# Patient Record
Sex: FEMALE | Race: WHITE | Employment: OTHER | URBAN - METROPOLITAN AREA
[De-identification: names, ages, dates, MRNs, and addresses within clinical notes are randomized per-mention and may not be internally consistent; named-entity substitution may affect disease eponyms.]

---

## 2017-12-18 ENCOUNTER — HOSPITAL ENCOUNTER (OUTPATIENT)
Dept: MAMMOGRAPHY | Age: 72
Discharge: HOME | End: 2017-12-18
Attending: INTERNAL MEDICINE
Payer: MEDICARE

## 2017-12-18 DIAGNOSIS — Z12.31 VISIT FOR SCREENING MAMMOGRAM: ICD-10-CM

## 2017-12-18 PROCEDURE — 77067 SCR MAMMO BI INCL CAD: CPT

## 2018-05-31 ENCOUNTER — HOSPITAL ENCOUNTER (EMERGENCY)
Age: 73
Discharge: HOME | End: 2018-06-01
Attending: EMERGENCY MEDICINE
Payer: MEDICARE

## 2018-05-31 DIAGNOSIS — N30.91 HEMORRHAGIC CYSTITIS: Primary | ICD-10-CM

## 2018-05-31 DIAGNOSIS — B37.31 YEAST VAGINITIS: ICD-10-CM

## 2018-05-31 LAB
BASOPHILS # BLD: 0 K/UL (ref 0–0.2)
BASOPHILS NFR BLD: 0 % (ref 0–2)
DIFFERENTIAL METHOD BLD: ABNORMAL
EOSINOPHIL # BLD: 0.5 K/UL (ref 0–0.8)
EOSINOPHIL NFR BLD: 5 % (ref 0.5–7.8)
ERYTHROCYTE [DISTWIDTH] IN BLOOD BY AUTOMATED COUNT: 13.4 % (ref 11.9–14.6)
HCT VFR BLD AUTO: 35.6 % (ref 35.8–46.3)
HGB BLD-MCNC: 12.4 G/DL (ref 11.7–15.4)
IMM GRANULOCYTES # BLD: 0 K/UL (ref 0–0.5)
IMM GRANULOCYTES NFR BLD AUTO: 0 % (ref 0–5)
LYMPHOCYTES # BLD: 3.3 K/UL (ref 0.5–4.6)
LYMPHOCYTES NFR BLD: 34 % (ref 13–44)
MCH RBC QN AUTO: 30.8 PG (ref 26.1–32.9)
MCHC RBC AUTO-ENTMCNC: 34.8 G/DL (ref 31.4–35)
MCV RBC AUTO: 88.6 FL (ref 79.6–97.8)
MONOCYTES # BLD: 0.9 K/UL (ref 0.1–1.3)
MONOCYTES NFR BLD: 9 % (ref 4–12)
NEUTS SEG # BLD: 5 K/UL (ref 1.7–8.2)
NEUTS SEG NFR BLD: 52 % (ref 43–78)
PLATELET # BLD AUTO: 204 K/UL (ref 150–450)
PMV BLD AUTO: 10.6 FL (ref 10.8–14.1)
RBC # BLD AUTO: 4.02 M/UL (ref 4.05–5.25)
WBC # BLD AUTO: 9.8 K/UL (ref 4.3–11.1)

## 2018-05-31 PROCEDURE — 74011250636 HC RX REV CODE- 250/636: Performed by: EMERGENCY MEDICINE

## 2018-05-31 PROCEDURE — 99284 EMERGENCY DEPT VISIT MOD MDM: CPT | Performed by: EMERGENCY MEDICINE

## 2018-05-31 PROCEDURE — 81003 URINALYSIS AUTO W/O SCOPE: CPT | Performed by: EMERGENCY MEDICINE

## 2018-05-31 PROCEDURE — 80053 COMPREHEN METABOLIC PANEL: CPT | Performed by: STUDENT IN AN ORGANIZED HEALTH CARE EDUCATION/TRAINING PROGRAM

## 2018-05-31 PROCEDURE — 85025 COMPLETE CBC W/AUTO DIFF WBC: CPT | Performed by: STUDENT IN AN ORGANIZED HEALTH CARE EDUCATION/TRAINING PROGRAM

## 2018-05-31 PROCEDURE — 96374 THER/PROPH/DIAG INJ IV PUSH: CPT | Performed by: EMERGENCY MEDICINE

## 2018-05-31 RX ORDER — EZETIMIBE 10 MG
TABLET ORAL
COMMUNITY

## 2018-05-31 RX ORDER — FUROSEMIDE 20 MG/1
20 TABLET ORAL DAILY
Status: ON HOLD | COMMUNITY
End: 2020-05-26

## 2018-05-31 RX ORDER — MORPHINE SULFATE 2 MG/ML
2 INJECTION, SOLUTION INTRAMUSCULAR; INTRAVENOUS
Status: COMPLETED | OUTPATIENT
Start: 2018-05-31 | End: 2018-06-01

## 2018-06-01 VITALS
WEIGHT: 237 LBS | TEMPERATURE: 97.4 F | OXYGEN SATURATION: 96 % | HEART RATE: 80 BPM | RESPIRATION RATE: 18 BRPM | SYSTOLIC BLOOD PRESSURE: 185 MMHG | BODY MASS INDEX: 39.49 KG/M2 | DIASTOLIC BLOOD PRESSURE: 96 MMHG | HEIGHT: 65 IN

## 2018-06-01 LAB
ALBUMIN SERPL-MCNC: 3.6 G/DL (ref 3.2–4.6)
ALBUMIN/GLOB SERPL: 0.9 {RATIO} (ref 1.2–3.5)
ALP SERPL-CCNC: 97 U/L (ref 50–136)
ALT SERPL-CCNC: 15 U/L (ref 12–65)
ANION GAP SERPL CALC-SCNC: 14 MMOL/L (ref 7–16)
AST SERPL-CCNC: 14 U/L (ref 15–37)
BACTERIA URNS QL MICRO: 0 /HPF
BILIRUB SERPL-MCNC: 0.4 MG/DL (ref 0.2–1.1)
BUN SERPL-MCNC: 31 MG/DL (ref 8–23)
CALCIUM SERPL-MCNC: 9.1 MG/DL (ref 8.3–10.4)
CASTS URNS QL MICRO: 0 /LPF
CHLORIDE SERPL-SCNC: 97 MMOL/L (ref 98–107)
CO2 SERPL-SCNC: 24 MMOL/L (ref 21–32)
CREAT SERPL-MCNC: 1.7 MG/DL (ref 0.6–1)
CRYSTALS URNS QL MICRO: 0 /LPF
EPI CELLS #/AREA URNS HPF: 0 /HPF
GLOBULIN SER CALC-MCNC: 3.8 G/DL (ref 2.3–3.5)
GLUCOSE BLD STRIP.AUTO-MCNC: 209 MG/DL (ref 65–100)
GLUCOSE SERPL-MCNC: 318 MG/DL (ref 65–100)
MUCOUS THREADS URNS QL MICRO: 0 /LPF
POTASSIUM SERPL-SCNC: 3.6 MMOL/L (ref 3.5–5.1)
PROT SERPL-MCNC: 7.4 G/DL (ref 6.3–8.2)
RBC #/AREA URNS HPF: NORMAL /HPF
SERVICE CMNT-IMP: NORMAL
SODIUM SERPL-SCNC: 135 MMOL/L (ref 136–145)
WBC URNS QL MICRO: NORMAL /HPF
WET PREP GENITAL: NORMAL

## 2018-06-01 PROCEDURE — 81015 MICROSCOPIC EXAM OF URINE: CPT | Performed by: EMERGENCY MEDICINE

## 2018-06-01 PROCEDURE — 74011250637 HC RX REV CODE- 250/637: Performed by: EMERGENCY MEDICINE

## 2018-06-01 PROCEDURE — 82962 GLUCOSE BLOOD TEST: CPT

## 2018-06-01 PROCEDURE — 87086 URINE CULTURE/COLONY COUNT: CPT | Performed by: EMERGENCY MEDICINE

## 2018-06-01 PROCEDURE — 87210 SMEAR WET MOUNT SALINE/INK: CPT | Performed by: EMERGENCY MEDICINE

## 2018-06-01 RX ORDER — NITROFURANTOIN 25; 75 MG/1; MG/1
100 CAPSULE ORAL 2 TIMES DAILY
Qty: 20 CAP | Refills: 0 | Status: SHIPPED | OUTPATIENT
Start: 2018-06-01 | End: 2020-06-23

## 2018-06-01 RX ORDER — PHENAZOPYRIDINE HYDROCHLORIDE 95 MG/1
TABLET ORAL
Status: DISCONTINUED
Start: 2018-06-01 | End: 2018-06-01 | Stop reason: HOSPADM

## 2018-06-01 RX ORDER — NITROFURANTOIN MACROCRYSTALS 50 MG/1
100 CAPSULE ORAL EVERY 6 HOURS
Status: DISCONTINUED | OUTPATIENT
Start: 2018-06-01 | End: 2018-06-01 | Stop reason: HOSPADM

## 2018-06-01 RX ORDER — ONDANSETRON 4 MG/1
4 TABLET, ORALLY DISINTEGRATING ORAL
Status: COMPLETED | OUTPATIENT
Start: 2018-06-01 | End: 2018-06-01

## 2018-06-01 RX ORDER — HYOSCYAMINE SULFATE 0.12 MG/1
0.12 TABLET SUBLINGUAL
Qty: 10 TAB | Refills: 0 | Status: SHIPPED | OUTPATIENT
Start: 2018-06-01

## 2018-06-01 RX ORDER — PHENAZOPYRIDINE HYDROCHLORIDE 95 MG/1
200 TABLET ORAL
Status: COMPLETED | OUTPATIENT
Start: 2018-06-01 | End: 2018-06-01

## 2018-06-01 RX ORDER — PHENAZOPYRIDINE HYDROCHLORIDE 200 MG/1
200 TABLET, FILM COATED ORAL 3 TIMES DAILY
Qty: 6 TAB | Refills: 0 | Status: SHIPPED | OUTPATIENT
Start: 2018-06-01 | End: 2018-06-03

## 2018-06-01 RX ORDER — ONDANSETRON 4 MG/1
TABLET, ORALLY DISINTEGRATING ORAL
Status: DISCONTINUED
Start: 2018-06-01 | End: 2018-06-01 | Stop reason: HOSPADM

## 2018-06-01 RX ORDER — FLUCONAZOLE 100 MG/1
TABLET ORAL
Status: DISCONTINUED
Start: 2018-06-01 | End: 2018-06-01 | Stop reason: HOSPADM

## 2018-06-01 RX ORDER — FLUCONAZOLE 100 MG/1
200 TABLET ORAL
Status: COMPLETED | OUTPATIENT
Start: 2018-06-01 | End: 2018-06-01

## 2018-06-01 RX ORDER — NITROFURANTOIN MACROCRYSTALS 50 MG/1
CAPSULE ORAL
Status: DISCONTINUED
Start: 2018-06-01 | End: 2018-06-01 | Stop reason: HOSPADM

## 2018-06-01 RX ADMIN — NITROFURANTOIN MACROCRYSTALS 100 MG: 50 CAPSULE ORAL at 02:38

## 2018-06-01 RX ADMIN — MORPHINE SULFATE 2 MG: 2 INJECTION, SOLUTION INTRAMUSCULAR; INTRAVENOUS at 00:01

## 2018-06-01 RX ADMIN — ONDANSETRON 4 MG: 4 TABLET, ORALLY DISINTEGRATING ORAL at 02:59

## 2018-06-01 RX ADMIN — URINARY PAIN RELIEF 190 MG: 95 TABLET ORAL at 02:37

## 2018-06-01 RX ADMIN — FLUCONAZOLE 200 MG: 100 TABLET ORAL at 02:38

## 2018-06-01 NOTE — ED NOTES
I have reviewed discharge instructions with the patient and spouse. The patient and spouse verbalized understanding. Patient left ED via Discharge Method: ambulatory to Home with spouse. Opportunity for questions and clarification provided. Patient given 4 scripts. To continue your aftercare when you leave the hospital, you may receive an automated call from our care team to check in on how you are doing. This is a free service and part of our promise to provide the best care and service to meet your aftercare needs.  If you have questions, or wish to unsubscribe from this service please call [phone]. Thank you for Choosing our [facility].

## 2018-06-01 NOTE — ED TRIAGE NOTES
Pt c/o pain when she urinates. She states she is having blood in her urine and possibly coming out of her vagina. Pt is very anxious in triage.

## 2018-06-01 NOTE — DISCHARGE INSTRUCTIONS
Rest.  Plenty of fluids. Medications as directed. Call your doctor recheck one week. Recheck sooner for worse pain, vomiting, fever. Candidiasis: Care Instructions  Your Care Instructions  Candidiasis (say \"jzt-tem-RY-uh-ralph\") is a yeast infection. Yeast normally lives in your body. But it can cause problems if your body's defenses don't work as they should. Some medicines can increase your chance of getting a yeast infection. These include antibiotics, steroids, and cancer drugs. And some diseases like AIDS and diabetes can make you more likely to get yeast infections. There are different types of yeast infections. Zoraida Muller is a yeast infection in the mouth. It usually occurs in people with weak immune systems. It causes white patches inside the mouth and throat. Yeast infections of the skin usually occur in skin folds where the skin stays moist. They cause red, oozing patches on your skin. Babies can get these infections under the diaper. People who often wear gloves can get them on their hands. Many women get vaginal yeast infections. They are most common when women take antibiotics. These infections can cause the vagina to itch and burn. They also cause white discharge that looks like cottage cheese. In rare cases, yeast infects the blood. This can cause serious disease. This kind of infection is treated with medicine given through a needle into a vein (IV). After you start treatment, a yeast infection usually goes away quickly. But if your immune system is weak, the infection may come back. Tell your doctor if you get yeast infections often. Follow-up care is a key part of your treatment and safety. Be sure to make and go to all appointments, and call your doctor if you are having problems. It's also a good idea to know your test results and keep a list of the medicines you take. How can you care for yourself at home? · Take your medicines exactly as prescribed.  Call your doctor if you think you are having a problem with your medicine. · Use antibiotics only as directed by your doctor. · Eat yogurt with live cultures. It has bacteria called lactobacillus. It may help prevent some types of yeast infections. · Keep your skin clean and dry. Put powder on moist places. · If you are using a cream or suppository to treat a vaginal yeast infection, don't use condoms or a diaphragm. Use a different type of birth control. · Eat a healthy diet and get regular exercise. This will help keep your immune system strong. When should you call for help? Watch closely for changes in your health, and be sure to contact your doctor if:  ? · You do not get better as expected. Where can you learn more? Go to http://tanya-jose.info/. Enter K327 in the search box to learn more about \"Candidiasis: Care Instructions. \"  Current as of: October 13, 2016  Content Version: 11.4  © 0278-9513 [employer]. Care instructions adapted under license by [employer] (which disclaims liability or warranty for this information). If you have questions about a medical condition or this instruction, always ask your healthcare professional. [place] [address] any warranty or liability for your use of this information. Urinary Tract Infection in Women: Care Instructions  Your Care Instructions    A urinary tract infection, or UTI, is a general term for an infection anywhere between the kidneys and the urethra (where urine comes out). Most UTIs are bladder infections. They often cause pain or burning when you urinate. UTIs are caused by bacteria and can be cured with antibiotics. Be sure to complete your treatment so that the infection goes away. Follow-up care is a key part of your treatment and safety. Be sure to make and go to all appointments, and call your doctor if you are having problems.  It's also a good idea to know your test results and keep a list of the medicines you take. How can you care for yourself at home? · Take your antibiotics as directed. Do not stop taking them just because you feel better. You need to take the full course of antibiotics. · Drink extra water and other fluids for the next day or two. This may help wash out the bacteria that are causing the infection. (If you have kidney, heart, or liver disease and have to limit fluids, talk with your doctor before you increase your fluid intake.)  · Avoid drinks that are carbonated or have caffeine. They can irritate the bladder. · Urinate often. Try to empty your bladder each time. · To relieve pain, take a hot bath or lay a heating pad set on low over your lower belly or genital area. Never go to sleep with a heating pad in place. To prevent UTIs  · Drink plenty of water each day. This helps you urinate often, which clears bacteria from your system. (If you have kidney, heart, or liver disease and have to limit fluids, talk with your doctor before you increase your fluid intake.)  · Urinate when you need to. · Urinate right after you have sex. · Change sanitary pads often. · Avoid douches, bubble baths, feminine hygiene sprays, and other feminine hygiene products that have deodorants. · After going to the bathroom, wipe from front to back. When should you call for help? Call your doctor now or seek immediate medical care if:  ? · Symptoms such as fever, chills, nausea, or vomiting get worse or appear for the first time. ? · You have new pain in your back just below your rib cage. This is called flank pain. ? · There is new blood or pus in your urine. ? · You have any problems with your antibiotic medicine. ? Watch closely for changes in your health, and be sure to contact your doctor if:  ? · You are not getting better after taking an antibiotic for 2 days. ? · Your symptoms go away but then come back. Where can you learn more?   Go to http://tanya-jose.info/. Enter I358 in the search box to learn more about \"Urinary Tract Infection in Women: Care Instructions. \"  Current as of: May 12, 2017  Content Version: 11.4  © 9069-2777 [employer]. Care instructions adapted under license by [employer] (which disclaims liability or warranty for this information). If you have questions about a medical condition or this instruction, always ask your healthcare professional. [address] any warranty or liability for your use of this information.

## 2018-06-01 NOTE — ED PROVIDER NOTES
HPI Comments: 25-year-old female with suprapubic pain and hematuria and difficulty with urination tonight. She was treated on May 15 for 7 days with Keflex for UTI that had hematuria at that time as well. All her symptoms seem to improve but started up again tonight. She denies any back pain or fever or vomiting. She does take aspirin because of a history of SVT with ablation. She's had a hysterectomy in the past.  No history of kidney stones    Patient is a 74 y.o. female presenting with hematuria. The history is provided by the patient. Blood in Urine    This is a new problem. The current episode started 6 to 12 hours ago. The problem occurs every urination. The quality of the pain is described as burning and stabbing. The pain is moderate. There has been no fever. Associated symptoms include nausea, frequency, hematuria, urgency and abdominal pain. Pertinent negatives include no chills, no sweats, no vomiting, no flank pain and no back pain. Her past medical history is significant for recurrent UTIs. Her past medical history does not include kidney stones. Past Medical History:   Diagnosis Date    Arrhythmia     recurrent SVT    Arthritis     Asthma     stress induced- well controlled    CAD (coronary artery disease)     \"leaking valves\"    Chronic pain     shoulder    Diabetes ([address]     adv -160.  type 2 IDDM; does not take home sqbs- hypo at 78    GERD (gastroesophageal reflux disease)     Hypertension     well controlled with meds    Morbid obesity ([address]     Stroke [address] [address]     6-2012, mini stroke    Thyroid disease     hypo    Unspecified adverse effect of anesthesia     woke up during every surgery       Past Surgical History:   Procedure Laterality Date    CARDIAC SURG PROCEDURE UNLIST  4/2010    AVNRT ablation times three    HX APPENDECTOMY      HX BREAST BIOPSY      bilateral breast bx    HX KNEE ARTHROSCOPY      left knee    HX LAP CHOLECYSTECTOMY      HX ORTHOPAEDIC rt rotator cuff and bicep tendon repair    HX EMELY AND BSO      hysterectomy         Family History:   Problem Relation Age of Onset    Other Brother     Cancer Mother     Heart Disease Mother     Breast Cancer Mother 79    Heart Disease Father     Malignant Hyperthermia Neg Hx     Pseudocholinesterase Deficiency Neg Hx     Delayed Awakening Neg Hx     Post-op Nausea/Vomiting Neg Hx     Emergence Delirium Neg Hx     Post-op Cognitive Dysfunction Neg Hx        Social History     Social History    Marital status:      Spouse name: N/A    Number of children: N/A    Years of education: N/A     Occupational History    Not on file. Social History Main Topics    Smoking status: Former Smoker     Packs/day: 2.00     Years: 13.00     Quit date: 4/22/1970    Smokeless tobacco: Never Used      Comment: quit 36 years ago    Alcohol use No    Drug use: No    Sexual activity: Not on file     Other Topics Concern    Not on file     Social History Narrative         ALLERGIES: Ace inhibitors; Adhesive tape; Codeine; Other medication; Other omega-3s; and Sulfa (sulfonamide antibiotics)    Review of Systems   Constitutional: Negative for chills and fever. Respiratory: Negative for shortness of breath. Cardiovascular: Negative for chest pain. Gastrointestinal: Positive for abdominal pain and nausea. Negative for blood in stool, diarrhea and vomiting. Genitourinary: Positive for difficulty urinating, dysuria, frequency, hematuria and urgency. Negative for flank pain and vaginal bleeding. Musculoskeletal: Negative for back pain. Vitals:    05/31/18 2255   BP: 183/89   Pulse: 92   Resp: 20   Temp: 97.9 °F (36.6 °C)   SpO2: 96%   Weight: 107.5 kg (237 lb)   Height: 5' 5\" (1.651 m)            Physical Exam   Constitutional: She is oriented to person, place, and time. She appears well-developed and well-nourished. No distress. HENT:   Head: Normocephalic and atraumatic.    Eyes: Conjunctivae and EOM are normal. Pupils are equal, round, and reactive to light. Neck: Normal range of motion. Neck supple. Pulmonary/Chest: Breath sounds normal. No respiratory distress. Abdominal: Soft. Bowel sounds are normal. She exhibits no mass. There is no tenderness. There is no rebound and no guarding. No hernia. Genitourinary: There is no lesion on the right labia. There is no lesion on the left labia. No erythema, tenderness or bleeding in the vagina. No signs of injury around the vagina. No vaginal discharge found. Neurological: She is alert and oriented to person, place, and time. Gait normal.   Nl speech   Skin: Skin is warm and dry. Psychiatric: She has a normal mood and affect. Her speech is normal.   Nursing note and vitals reviewed. MDM  Number of Diagnoses or Management Options  Diagnosis management comments: Suspect recurrent UTI, acute cystitis. Check Urine. Check for attention. Amount and/or Complexity of Data Reviewed  Clinical lab tests: ordered and reviewed    Risk of Complications, Morbidity, and/or Mortality  Presenting problems: moderate  Diagnostic procedures: minimal  Management options: low    Patient Progress  Patient progress: stable        ED Course       Procedures    Results Include:    Recent Results (from the past 24 hour(s))   CBC WITH AUTOMATED DIFF    Collection Time: 05/31/18 11:34 PM   Result Value Ref Range    WBC 9.8 4.3 - 11.1 K/uL    RBC 4.02 (L) 4.05 - 5.25 M/uL    HGB 12.4 11.7 - 15.4 g/dL    HCT 35.6 (L) 35.8 - 46.3 %    MCV 88.6 79.6 - 97.8 FL    MCH 30.8 26.1 - 32.9 PG    MCHC 34.8 31.4 - 35.0 g/dL    RDW 13.4 11.9 - 14.6 %    PLATELET 973 579 - 190 K/uL    MPV 10.6 (L) 10.8 - 14.1 FL    DF AUTOMATED      NEUTROPHILS 52 43 - 78 %    LYMPHOCYTES 34 13 - 44 %    MONOCYTES 9 4.0 - 12.0 %    EOSINOPHILS 5 0.5 - 7.8 %    BASOPHILS 0 0.0 - 2.0 %    IMMATURE GRANULOCYTES 0 0.0 - 5.0 %    ABS. NEUTROPHILS 5.0 1.7 - 8.2 K/UL    ABS.  LYMPHOCYTES 3.3 0.5 - 4.6 K/UL    ABS. MONOCYTES 0.9 0.1 - 1.3 K/UL    ABS. EOSINOPHILS 0.5 0.0 - 0.8 K/UL    ABS. BASOPHILS 0.0 0.0 - 0.2 K/UL    ABS. IMM. GRANS. 0.0 0.0 - 0.5 K/UL   METABOLIC PANEL, COMPREHENSIVE    Collection Time: 05/31/18 11:34 PM   Result Value Ref Range    Sodium 135 (L) 136 - 145 mmol/L    Potassium 3.6 3.5 - 5.1 mmol/L    Chloride 97 (L) 98 - 107 mmol/L    CO2 24 21 - 32 mmol/L    Anion gap 14 7 - 16 mmol/L    Glucose 318 (H) 65 - 100 mg/dL    BUN 31 (H) 8 - 23 MG/DL    Creatinine 1.70 (H) 0.6 - 1.0 MG/DL    GFR est AA 38 (L) >60 ml/min/1.73m2    GFR est non-AA 31 (L) >60 ml/min/1.73m2    Calcium 9.1 8.3 - 10.4 MG/DL    Bilirubin, total 0.4 0.2 - 1.1 MG/DL    ALT (SGPT) 15 12 - 65 U/L    AST (SGOT) 14 (L) 15 - 37 U/L    Alk.  phosphatase 97 50 - 136 U/L    Protein, total 7.4 6.3 - 8.2 g/dL    Albumin 3.6 3.2 - 4.6 g/dL    Globulin 3.8 (H) 2.3 - 3.5 g/dL    A-G Ratio 0.9 (L) 1.2 - 3.5     WET PREP    Collection Time: 06/01/18  1:21 AM   Result Value Ref Range    Special Requests: NO SPECIAL REQUESTS      Wet prep 3 TO 5 WBC/HPF     Wet prep FEW  YEAST        Wet prep NO TRICHOMONAS SEEN      Wet prep CLUE CELLS ABSENT     URINE MICROSCOPIC    Collection Time: 06/01/18  1:34 AM   Result Value Ref Range    WBC  0 /hpf    RBC 10-20 0 /hpf    Epithelial cells 0 0 /hpf    Bacteria 0 0 /hpf    Casts 0 0 /lpf    Crystals, urine 0 0 /LPF    Mucus 0 0 /lpf

## 2018-06-03 LAB
BACTERIA SPEC CULT: NORMAL
SERVICE CMNT-IMP: NORMAL

## 2020-02-07 ENCOUNTER — HOSPITAL ENCOUNTER (OUTPATIENT)
Dept: MAMMOGRAPHY | Age: 75
Discharge: HOME | End: 2020-02-07
Attending: INTERNAL MEDICINE
Payer: MEDICARE

## 2020-02-07 DIAGNOSIS — Z12.31 VISIT FOR SCREENING MAMMOGRAM: ICD-10-CM

## 2020-02-07 PROCEDURE — 77067 SCR MAMMO BI INCL CAD: CPT

## 2020-05-25 ENCOUNTER — HOSPITAL ENCOUNTER (EMERGENCY)
Age: 75
Discharge: HOME | DRG: 246 | End: 2020-05-25
Attending: EMERGENCY MEDICINE
Payer: MEDICARE

## 2020-05-25 ENCOUNTER — HOSPITAL ENCOUNTER (INPATIENT)
Age: 75
LOS: 6 days | Discharge: NURSING, REHAB OR LONG-TERM CARE | DRG: 246 | End: 2020-06-01
Attending: EMERGENCY MEDICINE | Admitting: FAMILY MEDICINE
Payer: MEDICARE

## 2020-05-25 ENCOUNTER — APPOINTMENT (OUTPATIENT)
Dept: GENERAL RADIOLOGY | Age: 75
DRG: 246 | End: 2020-05-25
Attending: EMERGENCY MEDICINE
Payer: MEDICARE

## 2020-05-25 ENCOUNTER — APPOINTMENT (OUTPATIENT)
Dept: CT IMAGING | Age: 75
DRG: 246 | End: 2020-05-25
Attending: EMERGENCY MEDICINE
Payer: MEDICARE

## 2020-05-25 ENCOUNTER — HOSPITAL ENCOUNTER (EMERGENCY)
Dept: NUCLEAR MEDICINE | Age: 75
Discharge: HOME | DRG: 246 | End: 2020-05-25
Attending: EMERGENCY MEDICINE
Payer: MEDICARE

## 2020-05-25 VITALS
SYSTOLIC BLOOD PRESSURE: 175 MMHG | RESPIRATION RATE: 16 BRPM | WEIGHT: 237 LBS | OXYGEN SATURATION: 95 % | TEMPERATURE: 98 F | BODY MASS INDEX: 39.49 KG/M2 | DIASTOLIC BLOOD PRESSURE: 79 MMHG | HEIGHT: 65 IN | HEART RATE: 79 BPM

## 2020-05-25 DIAGNOSIS — I63.10 CEREBROVASCULAR ACCIDENT (CVA) DUE TO EMBOLISM OF PRECEREBRAL ARTERY (HCC): ICD-10-CM

## 2020-05-25 DIAGNOSIS — I63.89 ACUTE ISCHEMIC MULTIFOCAL MULTIPLE VASCULAR TERRITORIES STROKE (HCC): ICD-10-CM

## 2020-05-25 DIAGNOSIS — N20.1 RIGHT URETERAL STONE: Primary | ICD-10-CM

## 2020-05-25 DIAGNOSIS — R11.2 NON-INTRACTABLE VOMITING WITH NAUSEA, UNSPECIFIED VOMITING TYPE: ICD-10-CM

## 2020-05-25 DIAGNOSIS — R53.81 MALAISE AND FATIGUE: ICD-10-CM

## 2020-05-25 DIAGNOSIS — I21.4 NSTEMI (NON-ST ELEVATED MYOCARDIAL INFARCTION) (HCC): Primary | ICD-10-CM

## 2020-05-25 DIAGNOSIS — E87.20 LACTIC ACIDOSIS: ICD-10-CM

## 2020-05-25 DIAGNOSIS — R26.81 GAIT INSTABILITY: ICD-10-CM

## 2020-05-25 DIAGNOSIS — R53.1 WEAKNESS: ICD-10-CM

## 2020-05-25 DIAGNOSIS — R53.83 MALAISE AND FATIGUE: ICD-10-CM

## 2020-05-25 LAB
ALBUMIN SERPL-MCNC: 3.8 G/DL (ref 3.2–4.6)
ALBUMIN SERPL-MCNC: 3.9 G/DL (ref 3.2–4.6)
ALBUMIN/GLOB SERPL: 0.8 {RATIO} (ref 1.2–3.5)
ALBUMIN/GLOB SERPL: 0.9 {RATIO} (ref 1.2–3.5)
ALP SERPL-CCNC: 108 U/L (ref 50–136)
ALP SERPL-CCNC: 130 U/L (ref 50–136)
ALT SERPL-CCNC: 14 U/L (ref 12–65)
ALT SERPL-CCNC: 18 U/L (ref 12–65)
ANION GAP SERPL CALC-SCNC: 10 MMOL/L (ref 7–16)
ANION GAP SERPL CALC-SCNC: 11 MMOL/L (ref 7–16)
ARTERIAL PATENCY WRIST A: YES
AST SERPL-CCNC: 14 U/L (ref 15–37)
AST SERPL-CCNC: 49 U/L (ref 15–37)
ATRIAL RATE: 101 BPM
ATRIAL RATE: 81 BPM
BASE DEFICIT BLD-SCNC: 5 MMOL/L
BASOPHILS # BLD: 0 K/UL (ref 0–0.2)
BASOPHILS # BLD: 0 K/UL (ref 0–0.2)
BASOPHILS NFR BLD: 0 % (ref 0–2)
BASOPHILS NFR BLD: 0 % (ref 0–2)
BDY SITE: ABNORMAL
BILIRUB SERPL-MCNC: 0.3 MG/DL (ref 0.2–1.1)
BILIRUB SERPL-MCNC: 0.4 MG/DL (ref 0.2–1.1)
BUN SERPL-MCNC: 36 MG/DL (ref 8–23)
BUN SERPL-MCNC: 41 MG/DL (ref 8–23)
CALCIUM SERPL-MCNC: 9.2 MG/DL (ref 8.3–10.4)
CALCIUM SERPL-MCNC: 9.6 MG/DL (ref 8.3–10.4)
CALCULATED P AXIS, ECG09: 62 DEGREES
CALCULATED R AXIS, ECG10: -17 DEGREES
CALCULATED R AXIS, ECG10: -17 DEGREES
CALCULATED T AXIS, ECG11: 44 DEGREES
CALCULATED T AXIS, ECG11: 60 DEGREES
CHLORIDE SERPL-SCNC: 102 MMOL/L (ref 98–107)
CHLORIDE SERPL-SCNC: 104 MMOL/L (ref 98–107)
CK SERPL-CCNC: 352 U/L (ref 21–215)
CO2 BLD-SCNC: 19 MMOL/L
CO2 SERPL-SCNC: 21 MMOL/L (ref 21–32)
CO2 SERPL-SCNC: 22 MMOL/L (ref 21–32)
COLLECT TIME,HTIME: 1910
CREAT SERPL-MCNC: 1.63 MG/DL (ref 0.6–1)
CREAT SERPL-MCNC: 1.78 MG/DL (ref 0.6–1)
DIAGNOSIS, 93000: NORMAL
DIAGNOSIS, 93000: NORMAL
DIFFERENTIAL METHOD BLD: ABNORMAL
DIFFERENTIAL METHOD BLD: ABNORMAL
EOSINOPHIL # BLD: 0 K/UL (ref 0–0.8)
EOSINOPHIL # BLD: 0.5 K/UL (ref 0–0.8)
EOSINOPHIL NFR BLD: 0 % (ref 0.5–7.8)
EOSINOPHIL NFR BLD: 5 % (ref 0.5–7.8)
ERYTHROCYTE [DISTWIDTH] IN BLOOD BY AUTOMATED COUNT: 13.3 % (ref 11.9–14.6)
ERYTHROCYTE [DISTWIDTH] IN BLOOD BY AUTOMATED COUNT: 13.4 % (ref 11.9–14.6)
GAS FLOW.O2 O2 DELIVERY SYS: ABNORMAL L/MIN
GLOBULIN SER CALC-MCNC: 4.4 G/DL (ref 2.3–3.5)
GLOBULIN SER CALC-MCNC: 4.7 G/DL (ref 2.3–3.5)
GLUCOSE SERPL-MCNC: 128 MG/DL (ref 65–100)
GLUCOSE SERPL-MCNC: 234 MG/DL (ref 65–100)
HCO3 BLD-SCNC: 18.5 MMOL/L (ref 22–26)
HCT VFR BLD AUTO: 34.7 % (ref 35.8–46.3)
HCT VFR BLD AUTO: 36.1 % (ref 35.8–46.3)
HGB BLD-MCNC: 12.3 G/DL (ref 11.7–15.4)
HGB BLD-MCNC: 12.6 G/DL (ref 11.7–15.4)
IMM GRANULOCYTES # BLD AUTO: 0.1 K/UL (ref 0–0.5)
IMM GRANULOCYTES # BLD AUTO: 0.1 K/UL (ref 0–0.5)
IMM GRANULOCYTES NFR BLD AUTO: 1 % (ref 0–5)
IMM GRANULOCYTES NFR BLD AUTO: 1 % (ref 0–5)
LACTATE SERPL-SCNC: 2.7 MMOL/L (ref 0.4–2)
LIPASE SERPL-CCNC: 104 U/L (ref 73–393)
LYMPHOCYTES # BLD: 2.2 K/UL (ref 0.5–4.6)
LYMPHOCYTES # BLD: 3.2 K/UL (ref 0.5–4.6)
LYMPHOCYTES NFR BLD: 17 % (ref 13–44)
LYMPHOCYTES NFR BLD: 29 % (ref 13–44)
MAGNESIUM SERPL-MCNC: 2.4 MG/DL (ref 1.8–2.4)
MAGNESIUM SERPL-MCNC: 2.4 MG/DL (ref 1.8–2.4)
MCH RBC QN AUTO: 31.7 PG (ref 26.1–32.9)
MCH RBC QN AUTO: 32.2 PG (ref 26.1–32.9)
MCHC RBC AUTO-ENTMCNC: 34.9 G/DL (ref 31.4–35)
MCHC RBC AUTO-ENTMCNC: 35.4 G/DL (ref 31.4–35)
MCV RBC AUTO: 90.8 FL (ref 79.6–97.8)
MCV RBC AUTO: 90.9 FL (ref 79.6–97.8)
MONOCYTES # BLD: 0.8 K/UL (ref 0.1–1.3)
MONOCYTES # BLD: 0.9 K/UL (ref 0.1–1.3)
MONOCYTES NFR BLD: 6 % (ref 4–12)
MONOCYTES NFR BLD: 7 % (ref 4–12)
NEUTS SEG # BLD: 10.3 K/UL (ref 1.7–8.2)
NEUTS SEG # BLD: 6.2 K/UL (ref 1.7–8.2)
NEUTS SEG NFR BLD: 58 % (ref 43–78)
NEUTS SEG NFR BLD: 76 % (ref 43–78)
NRBC # BLD: 0 K/UL (ref 0–0.2)
NRBC # BLD: 0 K/UL (ref 0–0.2)
P-R INTERVAL, ECG05: 236 MS
PCO2 BLD: 28.6 MMHG (ref 35–45)
PH BLD: 7.42 [PH] (ref 7.35–7.45)
PHOSPHATE SERPL-MCNC: 2.3 MG/DL (ref 2.3–3.7)
PLATELET # BLD AUTO: 228 K/UL (ref 150–450)
PLATELET # BLD AUTO: 263 K/UL (ref 150–450)
PMV BLD AUTO: 10.4 FL (ref 9.4–12.3)
PMV BLD AUTO: 10.9 FL (ref 9.4–12.3)
PO2 BLD: 77 MMHG (ref 75–100)
POTASSIUM SERPL-SCNC: 3.8 MMOL/L (ref 3.5–5.1)
POTASSIUM SERPL-SCNC: 4.5 MMOL/L (ref 3.5–5.1)
PROT SERPL-MCNC: 8.2 G/DL (ref 6.3–8.2)
PROT SERPL-MCNC: 8.6 G/DL (ref 6.3–8.2)
Q-T INTERVAL, ECG07: 396 MS
Q-T INTERVAL, ECG07: 420 MS
QRS DURATION, ECG06: 96 MS
QRS DURATION, ECG06: 96 MS
QTC CALCULATION (BEZET), ECG08: 465 MS
QTC CALCULATION (BEZET), ECG08: 487 MS
RBC # BLD AUTO: 3.82 M/UL (ref 4.05–5.2)
RBC # BLD AUTO: 3.97 M/UL (ref 4.05–5.2)
SAO2 % BLD: 96 % (ref 95–98)
SERVICE CMNT-IMP: ABNORMAL
SERVICE CMNT-IMP: ABNORMAL
SODIUM SERPL-SCNC: 134 MMOL/L (ref 136–145)
SODIUM SERPL-SCNC: 136 MMOL/L (ref 136–145)
SPECIMEN TYPE: ABNORMAL
TROPONIN I SERPL-MCNC: 6.44 NG/ML (ref 0.02–0.05)
TROPONIN I SERPL-MCNC: 8.51 NG/ML (ref 0.02–0.05)
TROPONIN I SERPL-MCNC: <0.02 NG/ML (ref 0.02–0.05)
VENTRICULAR RATE, ECG03: 81 BPM
VENTRICULAR RATE, ECG03: 83 BPM
WBC # BLD AUTO: 10.8 K/UL (ref 4.3–11.1)
WBC # BLD AUTO: 13.5 K/UL (ref 4.3–11.1)

## 2020-05-25 PROCEDURE — 84100 ASSAY OF PHOSPHORUS: CPT

## 2020-05-25 PROCEDURE — 81003 URINALYSIS AUTO W/O SCOPE: CPT

## 2020-05-25 PROCEDURE — 84484 ASSAY OF TROPONIN QUANT: CPT

## 2020-05-25 PROCEDURE — 80053 COMPREHEN METABOLIC PANEL: CPT

## 2020-05-25 PROCEDURE — 87040 BLOOD CULTURE FOR BACTERIA: CPT

## 2020-05-25 PROCEDURE — 93005 ELECTROCARDIOGRAM TRACING: CPT | Performed by: EMERGENCY MEDICINE

## 2020-05-25 PROCEDURE — 74019 RADEX ABDOMEN 2 VIEWS: CPT

## 2020-05-25 PROCEDURE — 71045 X-RAY EXAM CHEST 1 VIEW: CPT

## 2020-05-25 PROCEDURE — 83735 ASSAY OF MAGNESIUM: CPT

## 2020-05-25 PROCEDURE — 99285 EMERGENCY DEPT VISIT HI MDM: CPT

## 2020-05-25 PROCEDURE — 74011250637 HC RX REV CODE- 250/637: Performed by: EMERGENCY MEDICINE

## 2020-05-25 PROCEDURE — 83605 ASSAY OF LACTIC ACID: CPT

## 2020-05-25 PROCEDURE — 82803 BLOOD GASES ANY COMBINATION: CPT

## 2020-05-25 PROCEDURE — 83690 ASSAY OF LIPASE: CPT

## 2020-05-25 PROCEDURE — 74011250636 HC RX REV CODE- 250/636: Performed by: EMERGENCY MEDICINE

## 2020-05-25 PROCEDURE — 99284 EMERGENCY DEPT VISIT MOD MDM: CPT

## 2020-05-25 PROCEDURE — 96375 TX/PRO/DX INJ NEW DRUG ADDON: CPT

## 2020-05-25 PROCEDURE — 82550 ASSAY OF CK (CPK): CPT

## 2020-05-25 PROCEDURE — 87088 URINE BACTERIA CULTURE: CPT

## 2020-05-25 PROCEDURE — 96374 THER/PROPH/DIAG INJ IV PUSH: CPT

## 2020-05-25 PROCEDURE — 71046 X-RAY EXAM CHEST 2 VIEWS: CPT

## 2020-05-25 PROCEDURE — 85025 COMPLETE CBC W/AUTO DIFF WBC: CPT

## 2020-05-25 PROCEDURE — 87186 SC STD MICRODIL/AGAR DIL: CPT

## 2020-05-25 PROCEDURE — 36600 WITHDRAWAL OF ARTERIAL BLOOD: CPT

## 2020-05-25 PROCEDURE — 74011000258 HC RX REV CODE- 258: Performed by: EMERGENCY MEDICINE

## 2020-05-25 PROCEDURE — 96365 THER/PROPH/DIAG IV INF INIT: CPT

## 2020-05-25 PROCEDURE — 78580 LUNG PERFUSION IMAGING: CPT

## 2020-05-25 PROCEDURE — 70450 CT HEAD/BRAIN W/O DYE: CPT

## 2020-05-25 PROCEDURE — 96361 HYDRATE IV INFUSION ADD-ON: CPT

## 2020-05-25 PROCEDURE — 87086 URINE CULTURE/COLONY COUNT: CPT

## 2020-05-25 RX ORDER — ONDANSETRON 4 MG/1
4 TABLET, ORALLY DISINTEGRATING ORAL
Qty: 12 TAB | Refills: 0 | Status: SHIPPED | OUTPATIENT
Start: 2020-05-25

## 2020-05-25 RX ORDER — ONDANSETRON 2 MG/ML
4 INJECTION INTRAMUSCULAR; INTRAVENOUS
Status: COMPLETED | OUTPATIENT
Start: 2020-05-25 | End: 2020-05-25

## 2020-05-25 RX ORDER — GUAIFENESIN 100 MG/5ML
324 LIQUID (ML) ORAL
Status: COMPLETED | OUTPATIENT
Start: 2020-05-25 | End: 2020-05-25

## 2020-05-25 RX ORDER — METOCLOPRAMIDE HYDROCHLORIDE 5 MG/ML
10 INJECTION INTRAMUSCULAR; INTRAVENOUS
Status: COMPLETED | OUTPATIENT
Start: 2020-05-25 | End: 2020-05-25

## 2020-05-25 RX ADMIN — PIPERACILLIN SODIUM, TAZOBACTAM SODIUM 4.5 G: 4; .5 INJECTION, POWDER, LYOPHILIZED, FOR SOLUTION INTRAVENOUS at 19:25

## 2020-05-25 RX ADMIN — SODIUM CHLORIDE 1000 ML: 900 INJECTION, SOLUTION INTRAVENOUS at 20:49

## 2020-05-25 RX ADMIN — ONDANSETRON 4 MG: 2 INJECTION INTRAMUSCULAR; INTRAVENOUS at 17:46

## 2020-05-25 RX ADMIN — ASPIRIN 81 MG 324 MG: 81 TABLET ORAL at 18:23

## 2020-05-25 RX ADMIN — NITROGLYCERIN 1 INCH: 20 OINTMENT TOPICAL at 18:23

## 2020-05-25 RX ADMIN — SODIUM CHLORIDE 1000 ML: 900 INJECTION, SOLUTION INTRAVENOUS at 17:46

## 2020-05-25 RX ADMIN — METOCLOPRAMIDE 10 MG: 5 INJECTION, SOLUTION INTRAMUSCULAR; INTRAVENOUS at 03:55

## 2020-05-25 RX ADMIN — VANCOMYCIN HYDROCHLORIDE 2500 MG: 10 INJECTION, POWDER, LYOPHILIZED, FOR SOLUTION INTRAVENOUS at 23:32

## 2020-05-25 NOTE — ED PROVIDER NOTES
64-year-old female presents with complaints of generalized weakness and inability to stand and walk  Nursing reports that the patient was unable to transfer out of the wheelchair and needed the assist of several people to essentially drag her into the bed  Patient was seen yesterday her work-up was unremarkable and she reported feeling better after some Zofran. She states today that her nausea is worse but she is had no vomiting. She did not attempt any medications at home for the nausea today. Patient does have a history of somewhat chronic debility and gait disturbance and has not been able to attend her normal physical therapy due to transportation/logistic problems. Patient does have a history of prior stroke which is left her with fairly significant left-sided weakness. She is difficult to pinpoint in her history but states that the left-sided weakness is not significantly different from what it has been in the past.    The history is provided by the patient and the EMS personnel. Fatigue   This is a recurrent problem. The current episode started more than 2 days ago. The problem has been gradually worsening. There was no focality noted. Primary symptoms include mental status change. Pertinent negatives include no loss of sensation, no slurred speech, no speech difficulty and no movement disorder. There has been no fever. Associated symptoms include chest pain and confusion. Pertinent negatives include no shortness of breath and no headaches. There were no medications administered prior to arrival. Associated medical issues include CVA. Past Medical History:   Diagnosis Date    Arrhythmia     recurrent SVT    Arthritis     Asthma     stress induced- well controlled    CAD (coronary artery disease)     \"leaking valves\"    Chronic pain     shoulder    Diabetes ([facility] [address]     adv -160.  type 2 IDDM; does not take home sqbs- hypo at 78    GERD (gastroesophageal reflux disease)     Hypertension     well controlled with meds    Menopause     Morbid obesity [address] [address]     Stroke ([facility] [address]     , mini stroke    Thyroid disease     hypo    Unspecified adverse effect of anesthesia     woke up during every surgery       Past Surgical History:   Procedure Laterality Date    CARDIAC SURG PROCEDURE UNLIST  2010    AVNRT ablation times three    HX APPENDECTOMY      HX BREAST BIOPSY      bilateral breast bx    HX KNEE ARTHROSCOPY      left knee    HX LAP CHOLECYSTECTOMY      HX ORTHOPAEDIC      rt rotator cuff and bicep tendon repair    HX EMELY AND BSO      hysterectomy         Family History:   Problem Relation Age of Onset    Other Brother     Cancer Mother     Heart Disease Mother     Breast Cancer Mother 79    Heart Disease Father     Malignant Hyperthermia Neg Hx     Pseudocholinesterase Deficiency Neg Hx     Delayed Awakening Neg Hx     Post-op Nausea/Vomiting Neg Hx     Emergence Delirium Neg Hx     Post-op Cognitive Dysfunction Neg Hx        Social History     Socioeconomic History    Marital status:      Spouse name: Not on file    Number of children: Not on file    Years of education: Not on file    Highest education level: Not on file   Occupational History    Not on file   Social Needs    Financial resource strain: Not on file    Food insecurity     Worry: Not on file     Inability: Not on file    Transportation needs     Medical: Not on file     Non-medical: Not on file   Tobacco Use    Smoking status: Former Smoker     Packs/day: 2.00     Years: 13.00     Pack years: 26.00     Last attempt to quit: 1970     Years since quittin.1    Smokeless tobacco: Never Used    Tobacco comment: quit 36 years ago   Substance and Sexual Activity    Alcohol use: No    Drug use: No    Sexual activity: Not on file   Lifestyle    Physical activity     Days per week: Not on file     Minutes per session: Not on file    Stress: Not on file   Relationships    Social connections     Talks on phone: Not on file     Gets together: Not on file     Attends [employer] service: Not on file     Active member of club or organization: Not on file     Attends meetings of clubs or organizations: Not on file     Relationship status: Not on file    Intimate partner violence     Fear of current or ex partner: Not on file     Emotionally abused: Not on file     Physically abused: Not on file     Forced sexual activity: Not on file   Other Topics Concern    Not on file   Social History Narrative    Not on file         ALLERGIES: Ace inhibitors; Adhesive tape; Codeine; Other medication; Other omega-3s; and Sulfa (sulfonamide antibiotics)    Review of Systems   Unable to perform ROS: Mental status change   Constitutional: Positive for fatigue. Respiratory: Negative for shortness of breath. Cardiovascular: Positive for chest pain. Neurological: Positive for weakness. Negative for speech difficulty and headaches. Psychiatric/Behavioral: Positive for confusion. Vitals:    05/25/20 1625 05/25/20 1710   BP: 166/88 196/87   Pulse: 93 90   Resp: 18    Temp: 98.9 °F (37.2 °C)    SpO2: 92% 92%   Weight: 107.5 kg (237 lb)    Height: 5' 5\" (1.651 m)             Physical Exam  Vitals signs and nursing note reviewed. Constitutional:       General: She is in acute distress. Appearance: Normal appearance. She is well-developed. She is obese. HENT:      Head: Normocephalic and atraumatic. Right Ear: External ear normal.      Left Ear: External ear normal.      Mouth/Throat:      Mouth: Mucous membranes are dry. Pharynx: Oropharynx is clear. Uvula midline. No oropharyngeal exudate. Eyes:      Conjunctiva/sclera: Conjunctivae normal.      Pupils: Pupils are equal, round, and reactive to light. Neck:      Musculoskeletal: Normal range of motion and neck supple. Vascular: No JVD. Cardiovascular:      Rate and Rhythm: Normal rate and regular rhythm.       Heart sounds: Normal heart sounds. No murmur. No friction rub. No gallop. Pulmonary:      Effort: Pulmonary effort is normal.      Breath sounds: Normal breath sounds. Abdominal:      General: Bowel sounds are normal. There is no distension. Palpations: Abdomen is soft. There is no mass. Tenderness: There is no abdominal tenderness. Musculoskeletal: Normal range of motion. General: No deformity. Skin:     General: Skin is warm and dry. Capillary Refill: Capillary refill takes less than 2 seconds. Findings: No rash. Neurological:      Mental Status: She is alert and oriented to person, place, and time. GCS: GCS eye subscore is 4. GCS verbal subscore is 5. GCS motor subscore is 6. Cranial Nerves: Cranial nerves are intact. No cranial nerve deficit. Sensory: No sensory deficit. Motor: Weakness, abnormal muscle tone and pronator drift present. Coordination: Coordination abnormal. Heel to Negron Test abnormal.      Gait: Gait abnormal.      Comments: Patient has persistent weakness on her left side in both the upper and lower extremity. Psychiatric:         Mood and Affect: Affect is blunt and flat. Speech: Speech is delayed and tangential.         Behavior: Behavior is slowed. Behavior is cooperative. Cognition and Memory: Memory is impaired. MDM  Number of Diagnoses or Management Options  Gait instability: established and worsening  Lactic acidosis: new and requires workup  Malaise and fatigue: established and worsening  NSTEMI (non-ST elevated myocardial infarction) [facility]): new and requires workup  Diagnosis management comments: 78-year-old female returns to the ER due to worsening nausea generalized malaise and weakness. Work-up today reveals a markedly elevated troponin with a normal EKG. Mild lactic acidosis. Mild bump in her white blood cell count as well. VQ scan is normal   Head CT is normal     Cardiology is consulted. they will follow the patient. I have consulted the hospitalist service who graciously agreed to admit the patient       Amount and/or Complexity of Data Reviewed  Clinical lab tests: ordered and reviewed  Tests in the radiology section of CPT®: ordered and reviewed  Tests in the medicine section of CPT®: ordered and reviewed  Review and summarize past medical records: yes  Discuss the patient with other providers: yes  Independent visualization of images, tracings, or specimens: yes    Risk of Complications, Morbidity, and/or Mortality  Presenting problems: high  Diagnostic procedures: high  Management options: high  General comments: Elements of this note have been dictated via voice recognition software. Text and phrases may be limited by the accuracy of the software. The chart has been reviewed, but errors may still be present.       Critical Care  Total time providing critical care: 30-74 minutes (70)    Patient Progress  Patient progress: improved         Procedures

## 2020-05-25 NOTE — ED PROVIDER NOTES
Patient is a 57-year-old female presenting to emerge department day by EMS  secondary to abdominal pain x1 hour with nausea and vomiting. The patient says that over the last few days she has had a lot of urinary urgency, frequency and dysuria. The patients  is present with her and tells me that she has been under a great deal of stress recently excessive a 59-year-old son who back into the home with them who suffers from depression and he stays downstairs so his wife has been staying upstairs because \"the 2 of them but heads. \"  The patient has been referred to physical therapy for deconditioning and they went for times but the  says that they only have one car and he has to work so he is unable to any taking her for the time being. The patient says she did have some chest pain tonight but notes that it has resolved. She does have a history of cardiac ablation x4 and follows with [facility]. The patient denies any fevers or chills. Past Medical History:   Diagnosis Date    Arrhythmia     recurrent SVT    Arthritis     Asthma     stress induced- well controlled    CAD (coronary artery disease)     \"leaking valves\"    Chronic pain     shoulder    Diabetes ([address]     adv -160.  type 2 IDDM; does not take home sqbs- hypo at 80    GERD (gastroesophageal reflux disease)     Hypertension     well controlled with meds    Menopause     Morbid obesity ([address]     Stroke ([address]     6-2012, mini stroke    Thyroid disease     hypo    Unspecified adverse effect of anesthesia     woke up during every surgery       Past Surgical History:   Procedure Laterality Date    CARDIAC SURG PROCEDURE UNLIST  4/2010    AVNRT ablation times three    HX APPENDECTOMY      HX BREAST BIOPSY      bilateral breast bx    HX KNEE ARTHROSCOPY      left knee    HX LAP CHOLECYSTECTOMY      HX ORTHOPAEDIC      rt rotator cuff and bicep tendon repair    HX EMELY AND BSO      hysterectomy Family History:   Problem Relation Age of Onset    Other Brother     Cancer Mother     Heart Disease Mother     Breast Cancer Mother 79    Heart Disease Father     Malignant Hyperthermia Neg Hx     Pseudocholinesterase Deficiency Neg Hx     Delayed Awakening Neg Hx     Post-op Nausea/Vomiting Neg Hx     Emergence Delirium Neg Hx     Post-op Cognitive Dysfunction Neg Hx        Social History     Socioeconomic History    Marital status:      Spouse name: Not on file    Number of children: Not on file    Years of education: Not on file    Highest education level: Not on file   Occupational History    Not on file   Social Needs    Financial resource strain: Not on file    Food insecurity     Worry: Not on file     Inability: Not on file    Transportation needs     Medical: Not on file     Non-medical: Not on file   Tobacco Use    Smoking status: Former Smoker     Packs/day: 2.00     Years: 13.00     Pack years: 26.00     Last attempt to quit: 1970     Years since quittin.1    Smokeless tobacco: Never Used    Tobacco comment: quit 36 years ago   Substance and Sexual Activity    Alcohol use: No    Drug use: No    Sexual activity: Not on file   Lifestyle    Physical activity     Days per week: Not on file     Minutes per session: Not on file    Stress: Not on file   Relationships    Social connections     Talks on phone: Not on file     Gets together: Not on file     Attends Spiritism service: Not on file     Active member of club or organization: Not on file     Attends meetings of clubs or organizations: Not on file     Relationship status: Not on file    Intimate partner violence     Fear of current or ex partner: Not on file     Emotionally abused: Not on file     Physically abused: Not on file     Forced sexual activity: Not on file   Other Topics Concern    Not on file   Social History Narrative    Not on file         ALLERGIES: Ace inhibitors;  Adhesive tape; Codeine; Other medication; Other omega-3s; and Sulfa (sulfonamide antibiotics)    Review of Systems   Cardiovascular: Positive for chest pain. Gastrointestinal: Positive for abdominal pain, nausea and vomiting. All other systems reviewed and are negative. Vitals:    05/25/20 0349 05/25/20 0432 05/25/20 0438 05/25/20 0510   BP:  169/77 180/85 175/79   Pulse:  81 79 79   Resp:  18 17 16   Temp:    98 °F (36.7 °C)   SpO2: 95% 95% 95% 95%   Weight:       Height:                Physical Exam     GENERAL:The patient is overweight, and well-hydrated. No acute distress  VITAL SIGNS: Heart rate, blood pressure, respiratory rate reviewed as recorded in  nurse's notes  EYES: Pupils reactive. Extraocular motion intact. No conjunctival redness or drainage. MOUTH/THROAT: Pharynx clear; airway patent. NECK: Supple, no meningeal signs. Trachea midline. No masses or thyromegaly. LUNGS: Breath sounds clear and equal bilaterally no accessory muscle use  CARDIOVASCULAR: Regular rate and rhythm  ABDOMEN: Soft without tenderness. No palpable masses or organomegaly. No  peritoneal signs. No rigidity. EXTREMITIES: No clubbing or cyanosis. No joint swelling. Normal muscle tone. No  restricted range of motion appreciated. NEUROLOGIC: Sensation is grossly intact. Cranial nerve exam reveals face is  symmetrical, tongue is midline speech is clear. Negative pronator drift in the arms and legs.  strength 5 out of 5 equal bilaterally. Good sensation in V1 through 3 bilaterally. SKIN: No rash or petechiae. Good skin turgor palpated. PSYCHIATRIC: Alert and oriented. Flat affect.       MDM  Number of Diagnoses or Management Options  Diagnosis management comments: Viral infection, gastroenteritis, viral adenitis, pseudomembranous colitis, inflammatory  bowel disease, infectious diarrhea    Abdominal wall pain,     Constipation, fecal impaction, small bowel obstruction, partial small bowel obstruction,  Ileus    UTI, pyelonephritis, renal colic, ureteral stone     Peptic ulcer disease, esophagitis, GERD    Pancreatitis, pancreatic pseudocyst,    hepatic cirrhosis, GI bleed, esophageal varices, poisoning,    gallbladder disease, cholecystitis, diverticulitis, appendicitis, appendicitis with rupture,    ingestion of foreign material    CHF, COPD, pneumonia, PE,    MI, coronary artery disease, unstable angina, coronary artery disease,    Atrial fibrillation, cardiac arrhythmia, PVC, medication induced palpitations, heart block,  electrolyte induced palpitations,    Aortic dissection, aortic aneurysm,    GERD, musculoskeletal pain, costochondritis, rib fracture, pleurisy,           Amount and/or Complexity of Data Reviewed  Clinical lab tests: ordered and reviewed  Tests in the radiology section of CPT®: ordered and reviewed  Tests in the medicine section of CPT®: reviewed and ordered  Review and summarize past medical records: yes  Independent visualization of images, tracings, or specimens: yes      ED Course as of May 25 0702   Mon May 25, 2020   0401 IMPRESSION: Normal chest.   XR CHEST PA LAT [KH]   0401 IMPRESSION: Negative for free air, ileus or obstruction.     Possible right ureteral calculus. Correlate clinically. XR ABD (AP AND ERECT OR DECUB) [KH]   1515 I talked to the patient and her  about the findings in the emergency department. The patient is feeling better after the Zofran was given to her in the ED. She will be discharged home with a prescription for the Zofran to be used as needed. I encouraged the  to have her follow-up with the family doctor later this week for repeat evaluation.     [KH]      ED Course User Index  [KH] Flakita Matthews, DO       Procedures

## 2020-05-25 NOTE — ED TRIAGE NOTES
Pt BIB EMS for weakness and inability to walk. Per EMS patient was seen here this morning and told she had kidney stones. Pt reports pain on trying to ambulate. Denies that she has taken any prescription rx, falls or trauma. Pt alert and oriented. Denies fever or chills.

## 2020-05-25 NOTE — ED NOTES
I have reviewed discharge instructions with the patient. The patient verbalized understanding. Patient left ED via Discharge Method: wheelchair to Home with spouse. Opportunity for questions and clarification provided. Patient given 1 scripts. To continue your aftercare when you leave the hospital, you may receive an automated call from our care team to check in on how you are doing. This is a free service and part of our promise to provide the best care and service to meet your aftercare needs.  If you have questions, or wish to unsubscribe from this service please call [phone]. Thank you for Choosing our [facility] Emergency Department.

## 2020-05-25 NOTE — DISCHARGE INSTRUCTIONS
Take the Zofran as needed for nausea. Follow-up with your family doctor later this week for repeat evaluation.

## 2020-05-25 NOTE — ED TRIAGE NOTES
Arrives from home via [facility] with reports n/v of which awakened pt this AM. Spouse reports 1 episode vomiting with EMS. Reports lower abdominal pain. Reports intermittent productive cough, onset 1 month pta. Spouse reports slurred speech upon pt waking this AM. Pt denies chest pain, diarrhea. Spouse denies altered mental status. Pt denies urinary symptoms. Spouse states seen by nephrologist approx 6 weeks ago, told insufficient kidney function, follow up in 1 week. Spouse reports generalized weakness, was going to [facility] however difficulty going r/t transportation. Has walker at home however denies using. bgl 107 with EMS. HX DM.

## 2020-05-26 PROBLEM — D72.829 LEUKOCYTOSIS: Status: ACTIVE | Noted: 2020-05-26

## 2020-05-26 PROBLEM — R11.0 NAUSEA: Status: ACTIVE | Noted: 2020-05-26

## 2020-05-26 PROBLEM — R77.8 ELEVATED TROPONIN: Status: ACTIVE | Noted: 2020-05-26

## 2020-05-26 PROBLEM — R53.1 WEAKNESS: Status: ACTIVE | Noted: 2020-05-26

## 2020-05-26 LAB
ACT BLD: 257 SECS (ref 70–128)
ACT BLD: 329 SECS (ref 70–128)
ANION GAP SERPL CALC-SCNC: 9 MMOL/L (ref 7–16)
APPEARANCE UR: ABNORMAL
APTT PPP: 30.5 SEC (ref 24.3–35.4)
APTT PPP: 57.9 SEC (ref 24.3–35.4)
APTT PPP: >200 SEC (ref 24.3–35.4)
BACTERIA URNS QL MICRO: ABNORMAL /HPF
BASOPHILS # BLD: 0 K/UL (ref 0–0.2)
BASOPHILS NFR BLD: 0 % (ref 0–2)
BILIRUB UR QL: NEGATIVE
BUN SERPL-MCNC: 30 MG/DL (ref 8–23)
CALCIUM SERPL-MCNC: 8.9 MG/DL (ref 8.3–10.4)
CASTS URNS QL MICRO: ABNORMAL /LPF
CHLORIDE SERPL-SCNC: 108 MMOL/L (ref 98–107)
CO2 SERPL-SCNC: 21 MMOL/L (ref 21–32)
COLOR UR: YELLOW
CREAT SERPL-MCNC: 1.52 MG/DL (ref 0.6–1)
DIFFERENTIAL METHOD BLD: ABNORMAL
EOSINOPHIL # BLD: 0.2 K/UL (ref 0–0.8)
EOSINOPHIL NFR BLD: 2 % (ref 0.5–7.8)
EPI CELLS #/AREA URNS HPF: ABNORMAL /HPF
ERYTHROCYTE [DISTWIDTH] IN BLOOD BY AUTOMATED COUNT: 13.6 % (ref 11.9–14.6)
GLUCOSE BLD STRIP.AUTO-MCNC: 113 MG/DL (ref 65–100)
GLUCOSE BLD STRIP.AUTO-MCNC: 135 MG/DL (ref 65–100)
GLUCOSE BLD STRIP.AUTO-MCNC: 172 MG/DL (ref 65–100)
GLUCOSE BLD STRIP.AUTO-MCNC: 213 MG/DL (ref 65–100)
GLUCOSE SERPL-MCNC: 168 MG/DL (ref 65–100)
GLUCOSE UR STRIP.AUTO-MCNC: NEGATIVE MG/DL
HCT VFR BLD AUTO: 32.9 % (ref 35.8–46.3)
HGB BLD-MCNC: 11.5 G/DL (ref 11.7–15.4)
HGB UR QL STRIP: NEGATIVE
IMM GRANULOCYTES # BLD AUTO: 0.1 K/UL (ref 0–0.5)
IMM GRANULOCYTES NFR BLD AUTO: 1 % (ref 0–5)
KETONES UR QL STRIP.AUTO: NEGATIVE MG/DL
LACTATE SERPL-SCNC: 1.6 MMOL/L (ref 0.4–2)
LEUKOCYTE ESTERASE UR QL STRIP.AUTO: NEGATIVE
LYMPHOCYTES # BLD: 2.7 K/UL (ref 0.5–4.6)
LYMPHOCYTES NFR BLD: 27 % (ref 13–44)
MCH RBC QN AUTO: 32.1 PG (ref 26.1–32.9)
MCHC RBC AUTO-ENTMCNC: 35 G/DL (ref 31.4–35)
MCV RBC AUTO: 91.9 FL (ref 79.6–97.8)
MONOCYTES # BLD: 1 K/UL (ref 0.1–1.3)
MONOCYTES NFR BLD: 9 % (ref 4–12)
NEUTS SEG # BLD: 6.3 K/UL (ref 1.7–8.2)
NEUTS SEG NFR BLD: 62 % (ref 43–78)
NITRITE UR QL STRIP.AUTO: NEGATIVE
NRBC # BLD: 0 K/UL (ref 0–0.2)
PH UR STRIP: 5.5 [PH] (ref 5–9)
PLATELET # BLD AUTO: 214 K/UL (ref 150–450)
PMV BLD AUTO: 10.5 FL (ref 9.4–12.3)
POTASSIUM SERPL-SCNC: 4 MMOL/L (ref 3.5–5.1)
PROT UR STRIP-MCNC: 30 MG/DL
RBC # BLD AUTO: 3.58 M/UL (ref 4.05–5.2)
RBC #/AREA URNS HPF: 0 /HPF
SODIUM SERPL-SCNC: 138 MMOL/L (ref 136–145)
SP GR UR REFRACTOMETRY: 1.02 (ref 1–1.02)
TROPONIN I SERPL-MCNC: 6.83 NG/ML (ref 0.02–0.05)
TROPONIN I SERPL-MCNC: 7.69 NG/ML (ref 0.02–0.05)
UROBILINOGEN UR QL STRIP.AUTO: 0.2 EU/DL (ref 0.2–1)
WBC # BLD AUTO: 10.2 K/UL (ref 4.3–11.1)
WBC URNS QL MICRO: ABNORMAL /HPF

## 2020-05-26 PROCEDURE — 74011250637 HC RX REV CODE- 250/637: Performed by: INTERNAL MEDICINE

## 2020-05-26 PROCEDURE — C1769 GUIDE WIRE: HCPCS

## 2020-05-26 PROCEDURE — 77030016699 HC CATH ANGI DX INFN1 CARD -A

## 2020-05-26 PROCEDURE — 74011250637 HC RX REV CODE- 250/637: Performed by: FAMILY MEDICINE

## 2020-05-26 PROCEDURE — 36415 COLL VENOUS BLD VENIPUNCTURE: CPT

## 2020-05-26 PROCEDURE — 85347 COAGULATION TIME ACTIVATED: CPT

## 2020-05-26 PROCEDURE — 85025 COMPLETE CBC W/AUTO DIFF WBC: CPT

## 2020-05-26 PROCEDURE — 74011250636 HC RX REV CODE- 250/636: Performed by: INTERNAL MEDICINE

## 2020-05-26 PROCEDURE — 92928 PRQ TCAT PLMT NTRAC ST 1 LES: CPT

## 2020-05-26 PROCEDURE — 82962 GLUCOSE BLOOD TEST: CPT

## 2020-05-26 PROCEDURE — 4A023N7 MEASUREMENT OF CARDIAC SAMPLING AND PRESSURE, LEFT HEART, PERCUTANEOUS APPROACH: ICD-10-PCS | Performed by: INTERNAL MEDICINE

## 2020-05-26 PROCEDURE — 027034Z DILATION OF CORONARY ARTERY, ONE ARTERY WITH DRUG-ELUTING INTRALUMINAL DEVICE, PERCUTANEOUS APPROACH: ICD-10-PCS | Performed by: INTERNAL MEDICINE

## 2020-05-26 PROCEDURE — C1874 STENT, COATED/COV W/DEL SYS: HCPCS

## 2020-05-26 PROCEDURE — 80048 BASIC METABOLIC PNL TOTAL CA: CPT

## 2020-05-26 PROCEDURE — 65660000000 HC RM CCU STEPDOWN

## 2020-05-26 PROCEDURE — 74011000250 HC RX REV CODE- 250: Performed by: INTERNAL MEDICINE

## 2020-05-26 PROCEDURE — 81001 URINALYSIS AUTO W/SCOPE: CPT

## 2020-05-26 PROCEDURE — C8929 TTE W OR WO FOL WCON,DOPPLER: HCPCS

## 2020-05-26 PROCEDURE — B2151ZZ FLUOROSCOPY OF LEFT HEART USING LOW OSMOLAR CONTRAST: ICD-10-PCS | Performed by: INTERNAL MEDICINE

## 2020-05-26 PROCEDURE — 99153 MOD SED SAME PHYS/QHP EA: CPT

## 2020-05-26 PROCEDURE — 74011250636 HC RX REV CODE- 250/636: Performed by: FAMILY MEDICINE

## 2020-05-26 PROCEDURE — B2111ZZ FLUOROSCOPY OF MULTIPLE CORONARY ARTERIES USING LOW OSMOLAR CONTRAST: ICD-10-PCS | Performed by: INTERNAL MEDICINE

## 2020-05-26 PROCEDURE — C1887 CATHETER, GUIDING: HCPCS

## 2020-05-26 PROCEDURE — 77030015766

## 2020-05-26 PROCEDURE — 85730 THROMBOPLASTIN TIME PARTIAL: CPT

## 2020-05-26 PROCEDURE — 74011636637 HC RX REV CODE- 636/637: Performed by: FAMILY MEDICINE

## 2020-05-26 PROCEDURE — 93458 L HRT ARTERY/VENTRICLE ANGIO: CPT

## 2020-05-26 PROCEDURE — 93005 ELECTROCARDIOGRAM TRACING: CPT | Performed by: INTERNAL MEDICINE

## 2020-05-26 PROCEDURE — C1725 CATH, TRANSLUMIN NON-LASER: HCPCS

## 2020-05-26 PROCEDURE — C1894 INTRO/SHEATH, NON-LASER: HCPCS

## 2020-05-26 PROCEDURE — 84484 ASSAY OF TROPONIN QUANT: CPT

## 2020-05-26 PROCEDURE — 99152 MOD SED SAME PHYS/QHP 5/>YRS: CPT

## 2020-05-26 PROCEDURE — 77030029997 HC DEV COM RDL R BND TELE -B

## 2020-05-26 PROCEDURE — 74011636320 HC RX REV CODE- 636/320: Performed by: INTERNAL MEDICINE

## 2020-05-26 PROCEDURE — 83605 ASSAY OF LACTIC ACID: CPT

## 2020-05-26 RX ORDER — NITROFURANTOIN MACROCRYSTALS 50 MG/1
50 CAPSULE ORAL EVERY 6 HOURS
Status: DISCONTINUED | OUTPATIENT
Start: 2020-05-26 | End: 2020-05-26

## 2020-05-26 RX ORDER — MORPHINE SULFATE 2 MG/ML
1 INJECTION, SOLUTION INTRAMUSCULAR; INTRAVENOUS
Status: DISCONTINUED | OUTPATIENT
Start: 2020-05-26 | End: 2020-06-01 | Stop reason: HOSPADM

## 2020-05-26 RX ORDER — HEPARIN SODIUM 5000 [USP'U]/100ML
18-36 INJECTION, SOLUTION INTRAVENOUS
Status: DISCONTINUED | OUTPATIENT
Start: 2020-05-26 | End: 2020-05-26 | Stop reason: DRUGHIGH

## 2020-05-26 RX ORDER — HYDROCODONE BITARTRATE AND ACETAMINOPHEN 5; 325 MG/1; MG/1
1 TABLET ORAL
Status: DISCONTINUED | OUTPATIENT
Start: 2020-05-26 | End: 2020-06-01 | Stop reason: HOSPADM

## 2020-05-26 RX ORDER — SERTRALINE HYDROCHLORIDE 50 MG/1
50 TABLET, FILM COATED ORAL DAILY
Status: DISCONTINUED | OUTPATIENT
Start: 2020-05-26 | End: 2020-05-26

## 2020-05-26 RX ORDER — FUROSEMIDE 40 MG/1
40 TABLET ORAL DAILY
Status: DISCONTINUED | OUTPATIENT
Start: 2020-05-27 | End: 2020-06-01 | Stop reason: HOSPADM

## 2020-05-26 RX ORDER — NITROGLYCERIN 0.4 MG/1
0.4 TABLET SUBLINGUAL
Status: DISCONTINUED | OUTPATIENT
Start: 2020-05-26 | End: 2020-06-01 | Stop reason: HOSPADM

## 2020-05-26 RX ORDER — SODIUM CHLORIDE 0.9 % (FLUSH) 0.9 %
5-40 SYRINGE (ML) INJECTION EVERY 8 HOURS
Status: DISCONTINUED | OUTPATIENT
Start: 2020-05-26 | End: 2020-06-01 | Stop reason: HOSPADM

## 2020-05-26 RX ORDER — HEPARIN SODIUM 5000 [USP'U]/100ML
12-25 INJECTION, SOLUTION INTRAVENOUS
Status: DISCONTINUED | OUTPATIENT
Start: 2020-05-26 | End: 2020-05-26

## 2020-05-26 RX ORDER — CARVEDILOL 25 MG/1
12.5 TABLET ORAL 2 TIMES DAILY WITH MEALS
COMMUNITY
End: 2020-06-23

## 2020-05-26 RX ORDER — ACETAMINOPHEN 325 MG/1
650 TABLET ORAL
Status: DISCONTINUED | OUTPATIENT
Start: 2020-05-26 | End: 2020-06-01 | Stop reason: HOSPADM

## 2020-05-26 RX ORDER — SODIUM CHLORIDE 0.9 % (FLUSH) 0.9 %
5-40 SYRINGE (ML) INJECTION AS NEEDED
Status: DISCONTINUED | OUTPATIENT
Start: 2020-05-26 | End: 2020-06-01 | Stop reason: HOSPADM

## 2020-05-26 RX ORDER — HEPARIN SODIUM 5000 [USP'U]/ML
35 INJECTION, SOLUTION INTRAVENOUS; SUBCUTANEOUS ONCE
Status: COMPLETED | OUTPATIENT
Start: 2020-05-26 | End: 2020-05-26

## 2020-05-26 RX ORDER — LEVOTHYROXINE SODIUM 75 UG/1
150 TABLET ORAL
Status: DISCONTINUED | OUTPATIENT
Start: 2020-05-27 | End: 2020-06-01 | Stop reason: HOSPADM

## 2020-05-26 RX ORDER — FUROSEMIDE 40 MG/1
TABLET ORAL DAILY
COMMUNITY
End: 2020-06-23

## 2020-05-26 RX ORDER — HEPARIN SODIUM 200 [USP'U]/100ML
3 INJECTION, SOLUTION INTRAVENOUS CONTINUOUS
Status: DISCONTINUED | OUTPATIENT
Start: 2020-05-26 | End: 2020-06-01 | Stop reason: HOSPADM

## 2020-05-26 RX ORDER — METOPROLOL TARTRATE 25 MG/1
25 TABLET, FILM COATED ORAL 2 TIMES DAILY
Status: DISCONTINUED | OUTPATIENT
Start: 2020-05-26 | End: 2020-05-26

## 2020-05-26 RX ORDER — FENTANYL CITRATE 50 UG/ML
25-50 INJECTION, SOLUTION INTRAMUSCULAR; INTRAVENOUS
Status: DISCONTINUED | OUTPATIENT
Start: 2020-05-26 | End: 2020-05-26 | Stop reason: ALTCHOICE

## 2020-05-26 RX ORDER — HEPARIN SODIUM 10000 [USP'U]/ML
1000-10000 INJECTION, SOLUTION INTRAVENOUS; SUBCUTANEOUS
Status: DISCONTINUED | OUTPATIENT
Start: 2020-05-26 | End: 2020-06-01 | Stop reason: HOSPADM

## 2020-05-26 RX ORDER — ASPIRIN 81 MG/1
81 TABLET ORAL DAILY
Status: DISCONTINUED | OUTPATIENT
Start: 2020-05-26 | End: 2020-05-26 | Stop reason: SDUPTHER

## 2020-05-26 RX ORDER — MIDAZOLAM HYDROCHLORIDE 1 MG/ML
.5-2 INJECTION, SOLUTION INTRAMUSCULAR; INTRAVENOUS
Status: DISCONTINUED | OUTPATIENT
Start: 2020-05-26 | End: 2020-06-01 | Stop reason: HOSPADM

## 2020-05-26 RX ORDER — AMLODIPINE BESYLATE 10 MG/1
10 TABLET ORAL DAILY
COMMUNITY

## 2020-05-26 RX ORDER — LOSARTAN POTASSIUM 50 MG/1
100 TABLET ORAL 2 TIMES DAILY
Status: DISCONTINUED | OUTPATIENT
Start: 2020-05-26 | End: 2020-06-01 | Stop reason: HOSPADM

## 2020-05-26 RX ORDER — SERTRALINE HYDROCHLORIDE 100 MG/1
200 TABLET, FILM COATED ORAL DAILY
Status: DISCONTINUED | OUTPATIENT
Start: 2020-05-27 | End: 2020-06-01 | Stop reason: HOSPADM

## 2020-05-26 RX ORDER — PANTOPRAZOLE SODIUM 40 MG/1
40 TABLET, DELAYED RELEASE ORAL
Status: DISCONTINUED | OUTPATIENT
Start: 2020-05-26 | End: 2020-06-01 | Stop reason: HOSPADM

## 2020-05-26 RX ORDER — ACETAMINOPHEN 500 MG
1000 TABLET ORAL
COMMUNITY

## 2020-05-26 RX ORDER — CARVEDILOL 12.5 MG/1
12.5 TABLET ORAL 2 TIMES DAILY WITH MEALS
Status: DISCONTINUED | OUTPATIENT
Start: 2020-05-26 | End: 2020-06-01 | Stop reason: HOSPADM

## 2020-05-26 RX ORDER — ONDANSETRON 2 MG/ML
4 INJECTION INTRAMUSCULAR; INTRAVENOUS
Status: DISCONTINUED | OUTPATIENT
Start: 2020-05-26 | End: 2020-06-01 | Stop reason: HOSPADM

## 2020-05-26 RX ORDER — HEPARIN SODIUM 5000 [USP'U]/ML
35 INJECTION, SOLUTION INTRAVENOUS; SUBCUTANEOUS ONCE
Status: DISCONTINUED | OUTPATIENT
Start: 2020-05-26 | End: 2020-05-26

## 2020-05-26 RX ORDER — INSULIN GLARGINE 100 [IU]/ML
18 INJECTION, SOLUTION SUBCUTANEOUS
Status: DISCONTINUED | OUTPATIENT
Start: 2020-05-26 | End: 2020-06-01 | Stop reason: HOSPADM

## 2020-05-26 RX ORDER — EZETIMIBE 10 MG/1
10 TABLET ORAL DAILY
Status: DISCONTINUED | OUTPATIENT
Start: 2020-05-26 | End: 2020-06-01 | Stop reason: HOSPADM

## 2020-05-26 RX ORDER — SODIUM CHLORIDE 9 MG/ML
75 INJECTION, SOLUTION INTRAVENOUS CONTINUOUS
Status: DISCONTINUED | OUTPATIENT
Start: 2020-05-26 | End: 2020-05-27

## 2020-05-26 RX ORDER — FUROSEMIDE 20 MG/1
20 TABLET ORAL DAILY
Status: DISCONTINUED | OUTPATIENT
Start: 2020-05-26 | End: 2020-05-26

## 2020-05-26 RX ORDER — ADHESIVE BANDAGE
30 BANDAGE TOPICAL DAILY PRN
Status: DISCONTINUED | OUTPATIENT
Start: 2020-05-26 | End: 2020-06-01 | Stop reason: HOSPADM

## 2020-05-26 RX ORDER — LIDOCAINE HYDROCHLORIDE 10 MG/ML
10-30 INJECTION, SOLUTION EPIDURAL; INFILTRATION; INTRACAUDAL; PERINEURAL ONCE
Status: COMPLETED | OUTPATIENT
Start: 2020-05-26 | End: 2020-05-26

## 2020-05-26 RX ORDER — GUAIFENESIN 100 MG/5ML
81 LIQUID (ML) ORAL DAILY
Status: DISCONTINUED | OUTPATIENT
Start: 2020-05-27 | End: 2020-06-01 | Stop reason: HOSPADM

## 2020-05-26 RX ORDER — INSULIN LISPRO 100 [IU]/ML
4 INJECTION, SOLUTION INTRAVENOUS; SUBCUTANEOUS
Status: DISCONTINUED | OUTPATIENT
Start: 2020-05-26 | End: 2020-05-27

## 2020-05-26 RX ORDER — SODIUM CHLORIDE 9 MG/ML
125 INJECTION, SOLUTION INTRAVENOUS CONTINUOUS
Status: DISCONTINUED | OUTPATIENT
Start: 2020-05-26 | End: 2020-05-26

## 2020-05-26 RX ADMIN — INSULIN LISPRO 4 UNITS: 100 INJECTION, SOLUTION INTRAVENOUS; SUBCUTANEOUS at 17:40

## 2020-05-26 RX ADMIN — Medication 10 ML: at 07:18

## 2020-05-26 RX ADMIN — ASPIRIN 81 MG: 81 TABLET ORAL at 09:52

## 2020-05-26 RX ADMIN — Medication 5 ML: at 17:34

## 2020-05-26 RX ADMIN — Medication 10 ML: at 21:45

## 2020-05-26 RX ADMIN — HEPARIN SODIUM 12 UNITS/KG/HR: 5000 INJECTION, SOLUTION INTRAVENOUS at 03:20

## 2020-05-26 RX ADMIN — PERFLUTREN 1 ML: 6.52 INJECTION, SUSPENSION INTRAVENOUS at 10:00

## 2020-05-26 RX ADMIN — SODIUM CHLORIDE 75 ML/HR: 900 INJECTION, SOLUTION INTRAVENOUS at 17:33

## 2020-05-26 RX ADMIN — HEPARIN SODIUM 37000 UNITS: 5000 INJECTION INTRAVENOUS; SUBCUTANEOUS at 11:27

## 2020-05-26 RX ADMIN — HEPARIN SODIUM 3 UNITS/HR: 200 INJECTION, SOLUTION INTRAVENOUS at 14:19

## 2020-05-26 RX ADMIN — LIDOCAINE HYDROCHLORIDE 4 ML: 10 INJECTION, SOLUTION EPIDURAL; INFILTRATION; INTRACAUDAL; PERINEURAL at 14:20

## 2020-05-26 RX ADMIN — MIDAZOLAM 1 MG: 1 INJECTION INTRAMUSCULAR; INTRAVENOUS at 14:18

## 2020-05-26 RX ADMIN — FENTANYL CITRATE 25 MCG: 50 INJECTION INTRAMUSCULAR; INTRAVENOUS at 14:18

## 2020-05-26 RX ADMIN — VERAPAMIL HYDROCHLORIDE 2 ML: 2.5 INJECTION, SOLUTION INTRAVENOUS at 14:21

## 2020-05-26 RX ADMIN — SODIUM CHLORIDE 125 ML/HR: 900 INJECTION, SOLUTION INTRAVENOUS at 11:39

## 2020-05-26 RX ADMIN — PANTOPRAZOLE SODIUM 40 MG: 40 TABLET, DELAYED RELEASE ORAL at 07:17

## 2020-05-26 RX ADMIN — INSULIN HUMAN 6 UNITS: 100 INJECTION, SOLUTION PARENTERAL at 12:35

## 2020-05-26 RX ADMIN — CARVEDILOL 12.5 MG: 12.5 TABLET, FILM COATED ORAL at 17:41

## 2020-05-26 RX ADMIN — NITROGLYCERIN 0.2 MG: 200 INJECTION, SOLUTION INTRAVENOUS at 14:42

## 2020-05-26 RX ADMIN — INSULIN HUMAN 3 UNITS: 100 INJECTION, SOLUTION PARENTERAL at 09:48

## 2020-05-26 RX ADMIN — LOSARTAN POTASSIUM 100 MG: 50 TABLET, FILM COATED ORAL at 17:41

## 2020-05-26 RX ADMIN — TICAGRELOR 180 MG: 90 TABLET ORAL at 14:49

## 2020-05-26 RX ADMIN — HEPARIN SODIUM 3000 UNITS: 10000 INJECTION INTRAVENOUS; SUBCUTANEOUS at 14:25

## 2020-05-26 RX ADMIN — HEPARIN SODIUM 3000 UNITS: 10000 INJECTION INTRAVENOUS; SUBCUTANEOUS at 14:35

## 2020-05-26 RX ADMIN — IOPAMIDOL 160 ML: 755 INJECTION, SOLUTION INTRAVENOUS at 14:57

## 2020-05-26 RX ADMIN — Medication 5 ML: at 17:35

## 2020-05-26 NOTE — PROGRESS NOTES
Notified Dr Lamberto Randall of patients PTT result, stated to consult pharmacy about bolus dose. Order of Heparin bolus dose 35 units/kg was placed.

## 2020-05-26 NOTE — PROGRESS NOTES
TRANSFER - IN REPORT:    Verbal report received from One  Raul Laura RN(name) on [address]  being received from ED(unit) for routine progression of care      Report consisted of patients Situation, Background, Assessment and   Recommendations(SBAR). Information from the following report(s) SBAR, ED Summary and MAR was reviewed with the receiving nurse. Opportunity for questions and clarification was provided. Assessment completed upon patients arrival to unit and care assumed.

## 2020-05-26 NOTE — CONSULTS
History of present illness:75 y.o. female history of left-sided weakness with previous history of right basal ganglia with an old lacunar infarct in the left basal ganglia. Patient previous history of cardiac catheterization 2008 with angiographically normal coronary arteries. Last echocardiogram Juju 2018 normal left ventricular systolic function EF 55 to 65% dilated left atrium mild mitral stenosis moderate aortic valve calcification without aortic stenosis. [facility] department [facility]/25/20 on multiple occasions with fatigue complaints of abdominal pain and weakness. Patient was given IV Zosyn. At the time of exam she has no complaints of fatiige abdominal pain dysuria. .  According to patient she lives with  who cares for her and administers medications. She does not recall the names of specific medications she takes. ECG performed 5/25/2020 unremarkable for ischemia. Assessment  1. Elevated troponin repeat study showed troponin of 8. Currently no symptoms with normal ECG. Deferring ischemic evaluation at this time patient will be initiated on intravenous heparin. V/Q scan negative. Previous cath mentioned in 2011 Dr Yuko Trotter notes with non obstructive disease. 2. Mitral stenosis not severe by last echo   3. Abdominal pain uncontrolled appears to be primary complaint today patient with elevated white count tenderness on exam received IV Zosyn in emergency room further work-up is necessary. Patient has surgical incisions and endorses history of cholecystectomy. There is not appear to be scars consistent with appendix removal.  Abdominal xray with Possible right ureteral calculus. 4. 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 5. History of stroke debility appears unchanged compared to her previous history. CT scan was performed showing no acute infarction  6.  CKD acute on chronic   7. Leukocytosis infectious work-up not initiated thus far patient is received IV antibiotics. Abdominal xray with Possible right ureteral calculus. Plan for echo in am to determine med rx vs non invasive note. LM or 3 vessel disease less likely additional work up for weakness should be pursued as as well. Review of Systems   Constitutional: Negative for fever. HENT: Negative for hearing loss. Respiratory: Negative for cough. Cardiovascular: Negative for chest pain. Genitourinary: Negative for dysuria. Musculoskeletal: Negative for myalgias. Skin: Negative for rash. Neurological: Negative for dizziness. Psychiatric/Behavioral: Negative for depression. Past Medical History:   Diagnosis Date    Arrhythmia     recurrent SVT    Arthritis     Asthma     stress induced- well controlled    CAD (coronary artery disease)     \"leaking valves\"    Chronic pain     shoulder    Diabetes ([address]     adv -160.  type 2 IDDM; does not take home sqbs- hypo at 80    GERD (gastroesophageal reflux disease)     Hypertension     well controlled with meds    Menopause     Morbid obesity ([address]     Stroke ([address]     6-2012, mini stroke    Thyroid disease     hypo    Unspecified adverse effect of anesthesia     woke up during every surgery     Past Surgical History:   Procedure Laterality Date    CARDIAC SURG PROCEDURE UNLIST  4/2010    AVNRT ablation times three    HX APPENDECTOMY      HX BREAST BIOPSY      bilateral breast bx    HX KNEE ARTHROSCOPY      left knee    HX LAP CHOLECYSTECTOMY      HX ORTHOPAEDIC      rt rotator cuff and bicep tendon repair    HX EMELY AND BSO      hysterectomy     Family History   Problem Relation Age of Onset    Other Brother    [facility] Cancer Mother     Heart Disease Mother     Breast Cancer Mother 79    Heart Disease Father     Malignant Hyperthermia Neg Hx     Pseudocholinesterase Deficiency Neg Hx     Delayed Awakening Neg Hx     Post-op Nausea/Vomiting Neg Hx     Emergence Delirium Neg Hx     Post-op Cognitive Dysfunction Neg Hx      Social History     Socioeconomic History    Marital status:      Spouse name: Not on file    Number of children: Not on file    Years of education: Not on file    Highest education level: Not on file   Occupational History    Not on file   Social Needs    Financial resource strain: Not on file    Food insecurity     Worry: Not on file     Inability: Not on file    Transportation needs     Medical: Not on file     Non-medical: Not on file   Tobacco Use    Smoking status: Former Smoker     Packs/day: 2.00     Years: 13.00     Pack years: 26.00     Last attempt to quit: 1970     Years since quittin.1    Smokeless tobacco: Never Used    Tobacco comment: quit 36 years ago   Substance and Sexual Activity    Alcohol use: No    Drug use: No    Sexual activity: Not on file   Lifestyle    Physical activity     Days per week: Not on file     Minutes per session: Not on file    Stress: Not on file   Relationships    Social connections     Talks on phone: Not on file     Gets together: Not on file     Attends Bahai service: Not on file     Active member of club or organization: Not on file     Attends meetings of clubs or organizations: Not on file     Relationship status: Not on file    Intimate partner violence     Fear of current or ex partner: Not on file     Emotionally abused: Not on file     Physically abused: Not on file     Forced sexual activity: Not on file   Other Topics Concern    Not on file   Social History Narrative    Not on file     Current Facility-Administered Medications   Medication Dose Route Frequency    sodium chloride 0.9 % bolus infusion 1,000 mL  1,000 mL IntraVENous ONCE    vancomycin (VANCOCIN) 2500 mg in  mL infusion  2,500 mg IntraVENous ONCE     Current Outpatient Medications   Medication Sig    ondansetron (ZOFRAN ODT) 4 mg disintegrating tablet Take 1 Tab by mouth every eight (8) hours as needed for Nausea.  nitrofurantoin, macrocrystal-monohydrate, (MACROBID) 100 mg capsule Take 1 Cap by mouth two (2) times a day.  hyoscyamine SL (LEVSIN/SL) 0.125 mg SL tablet 1 Tab by SubLINGual route every six (6) hours as needed for Cramping.  furosemide (LASIX) 20 mg tablet Take 20 mg by mouth daily.  ezetimibe (ZETIA) 10 mg tablet Take  by mouth.  insulin glargine (LANTUS) 100 unit/mL injection by SubCUTAneous route nightly.  metoprolol (LOPRESSOR) 50 mg tablet Take 25 mg by mouth two (2) times a day. Indications: HYPERTENSION    aspirin delayed-release 81 mg tablet Take 81 mg by mouth daily. Take day of surgery per anesthesia protocol.  insulin aspart (NOVOLOG) 100 unit/mL injection 5 Units by SubCUTAneous route. Sliding scale, don't use day of surgery.  albuterol (PROVENTIL HFA) 90 mcg/actuation inhaler Take 2 Puffs by inhalation daily as needed. Take day of surgery per anesthesia protocol. BRING DAY OF SURGERY    Cholecalciferol, Vitamin D3, (VITAMIN D3) 1,000 unit cap Take 1 Cap by mouth daily. Stop seven days prior to surgery per anesthesia protocol.  multivitamin (ONE A DAY) tablet Take 1 Tab by mouth daily. Take day of surgery per anesthesia protocol.  ASPIRIN/CAFFEINE (ANACIN PO) Take 2 Tabs by mouth two (2) times a day. Stop seven days prior to surgery per anesthesia protocol. Indications: last dose 3-30-10    COZAAR 100 mg Tab Take 100 mg by mouth two (2) times a day. Take 1/2 tablet twice daily.  sertraline (ZOLOFT) 50 mg tablet Take 50 mg by mouth daily. Take day of surgery per anesthesia protocol.  CENESTIN 0.9 mg Tab Take  by mouth daily. Take day of surgery per anesthesia protocol.  PRILOSEC 20 mg capsule Take 20 mg by mouth daily. Take day of surgery per anesthesia protocol.  SYNTHROID 175 mcg tablet Take 175 mcg by mouth daily. Take day of surgery per anesthesia protocol.         Visit Vitals  /87   Pulse 84   Temp 98.9 °F (37.2 °C)   Resp 18   Ht 5' 5\" (1.651 m)   Wt 107.5 kg (237 lb)   SpO2 92%   BMI 39.44 kg/m²     Physical Exam   Constitutional: She appears well-developed. HENT:   Head: Normocephalic. Eyes: Pupils are equal, round, and reactive to light. Neck: Normal range of motion. Cardiovascular: Normal rate. Murmur heard. Pulmonary/Chest: No respiratory distress. She has no wheezes. Abdominal: Bowel sounds are normal. There is abdominal tenderness. Musculoskeletal:         General: No edema. Neurological:   Left upper extremity lower extremity weakness   Skin: Skin is warm. Small laparoscopic surgical scar. Psychiatric: She has a normal mood and affect.        Intake/Output Summary (Last 24 hours) at 5/25/2020 2133  Last data filed at 5/25/2020 2050  Gross per 24 hour   Intake 100 ml   Output    Net 100 ml     Lab Results   Component Value Date/Time    Sodium 134 (L) 05/25/2020 04:33 PM    Potassium 4.5 05/25/2020 04:33 PM    Chloride 102 05/25/2020 04:33 PM    CO2 21 05/25/2020 04:33 PM    Anion gap 11 05/25/2020 04:33 PM    Glucose 234 (H) 05/25/2020 04:33 PM    BUN 36 (H) 05/25/2020 04:33 PM    Creatinine 1.78 (H) 05/25/2020 04:33 PM    GFR est AA 36 (L) 05/25/2020 04:33 PM    GFR est non-AA 29 (L) 05/25/2020 04:33 PM    Calcium 9.6 05/25/2020 04:33 PM     Lab Results   Component Value Date/Time    WBC 13.5 (H) 05/25/2020 04:33 PM    HGB 12.6 05/25/2020 04:33 PM    HCT 36.1 05/25/2020 04:33 PM    PLATELET 700 56/53/9761 04:33 PM    MCV 90.9 05/25/2020 04:33 PM     Lab Results   Component Value Date/Time    CK 40 11/03/2008 10:58 AM    CK - MB <0.5 (L) 11/03/2008 10:58 AM    CK-MB Index CANNOT BE CALCULATED 11/03/2008 10:58 AM    Troponin-I, Qt. 6.44 (HH) 05/25/2020 04:33 PM    BNP 24 08/12/2008 07:30 PM     No results found for: HBA1C, HGBE8, IEV7LBCQ, STX7XBMQ      Assessment:  [unfilled]    Recommendations:

## 2020-05-26 NOTE — PROGRESS NOTES
TRANSFER - IN REPORT:    Verbal report received from Tobias ESPINAL on [address]  being received from Cath Lab for routine progression of care      Report consisted of patients Situation, Background, Assessment and   Recommendations(SBAR). Information from the following report(s) SBAR was reviewed with the receiving nurse. Opportunity for questions and clarification was provided. Assessment completed upon patients arrival to unit and care assumed.

## 2020-05-26 NOTE — PROGRESS NOTES
Patient ptt 57.9 and gave bolus heparin 3,700 and increased heparin drip to 14 units per kg. Started per order normal saline 125 ml per hour.

## 2020-05-26 NOTE — PROGRESS NOTES
TRANSFER - IN REPORT:    Verbal report received from Kofi Carrillo 148 on [address] being received from [facility] for routine progression of care. Report consisted of patients Situation, Background, Assessment and Recommendations(SBAR). Information from the following report(s) SBAR, Procedure Summary, Recent Results, Med Rec Status and Cardiac Rhythm NSR was reviewed. Opportunity for questions and clarification was provided. Assessment completed upon patients arrival to unit and care assumed. Patient received to room 307. Patient connected to monitor and assessment completed. Plan of care reviewed. Patient oriented to room and call light. Patient aware to use call light to communicate any chest pain or needs. Admission skin assessment completed with second RN and reveals the following:   Heels C/D/I. Right radial cath site Tr band in place C/D/I no hematoma or bruising present. Bilateral lower extremities edematous. Scattered scaring thoughtout. Overall skin is clean and intact.

## 2020-05-26 NOTE — H&P
HOSPITALIST INITIAL HISTORY AND PHYSICAL    NAME:  Guero Ryan Organ   Age:  66 y.o.  :   1945   MRN:   [id number]  PCP: Amna Talbert MD  Consulting MD:  Treatment Team: Attending Provider: Catherine Vital MD; Primary Nurse: Marguerite Canales, KYLIE; Consulting Provider: Madison Monzon MD    CHIEF COMPLAINT: nausea    HISTORY OF PRESENT ILLNESS:   Ella Avitia is a 66 y.o. female with a past medical history of CAD, DM type II who presents to the ER with report of nausea that has been going on since early the morning of . She presented to [facility] ED shortly after onset and had a negative workup and was sent home. She came back again tonight with persistent symptoms. She also admits to feeling weak and fatigued. Also admits to mild epigastric pain. Denies harriet chest pain, vomiting, diarrhea, fever, SOB. REVIEW OF SYSTEMS: Comprehensive ROS performed. Denies fevers, chills, vomiting, otherwise negative. Past Medical History:   Diagnosis Date    Arrhythmia     recurrent SVT    Arthritis     Asthma     stress induced- well controlled    CAD (coronary artery disease)     \"leaking valves\"    Chronic pain     shoulder    Diabetes ([address]     adv -160.  type 2 IDDM; does not take home sqbs- hypo at 80    GERD (gastroesophageal reflux disease)     Hypertension     well controlled with meds    Menopause     Morbid obesity ([address]     Stroke ([facility]     -, mini stroke    Thyroid disease     hypo    Unspecified adverse effect of anesthesia     woke up during every surgery        Past Surgical History:   Procedure Laterality Date    CARDIAC SURG PROCEDURE UNLIST  2010    AVNRT ablation times three    HX APPENDECTOMY      HX BREAST BIOPSY      bilateral breast bx    HX KNEE ARTHROSCOPY      left knee    HX LAP CHOLECYSTECTOMY      HX ORTHOPAEDIC      rt rotator cuff and bicep tendon repair    HX EMELY AND BSO      hysterectomy       Prior to Admission Medications Prescriptions Last Dose Informant Patient Reported? Taking? ASPIRIN/CAFFEINE (ANACIN PO)   Yes No   Sig: Take 2 Tabs by mouth two (2) times a day. Stop seven days prior to surgery per anesthesia protocol. Indications: last dose 3-30-10   CENESTIN 0.9 mg Tab   Yes No   Sig: Take  by mouth daily. Take day of surgery per anesthesia protocol. COZAAR 100 mg Tab   Yes No   Sig: Take 100 mg by mouth two (2) times a day. Take 1/2 tablet twice daily. Cholecalciferol, Vitamin D3, (VITAMIN D3) 1,000 unit cap   Yes No   Sig: Take 1 Cap by mouth daily. Stop seven days prior to surgery per anesthesia protocol. PRILOSEC 20 mg capsule   Yes No   Sig: Take 20 mg by mouth daily. Take day of surgery per anesthesia protocol. SYNTHROID 175 mcg tablet   Yes No   Sig: Take 175 mcg by mouth daily. Take day of surgery per anesthesia protocol. albuterol (PROVENTIL HFA) 90 mcg/actuation inhaler   Yes No   Sig: Take 2 Puffs by inhalation daily as needed. Take day of surgery per anesthesia protocol. BRING DAY OF SURGERY   aspirin delayed-release 81 mg tablet   Yes No   Sig: Take 81 mg by mouth daily. Take day of surgery per anesthesia protocol.   ezetimibe (ZETIA) 10 mg tablet   Yes No   Sig: Take  by mouth. furosemide (LASIX) 20 mg tablet   Yes No   Sig: Take 20 mg by mouth daily. hyoscyamine SL (LEVSIN/SL) 0.125 mg SL tablet   No No   Si Tab by SubLINGual route every six (6) hours as needed for Cramping. insulin aspart (NOVOLOG) 100 unit/mL injection   Yes No   Si Units by SubCUTAneous route. Sliding scale, don't use day of surgery. insulin glargine (LANTUS) 100 unit/mL injection   Yes No   Sig: by SubCUTAneous route nightly. metoprolol (LOPRESSOR) 50 mg tablet   Yes No   Sig: Take 25 mg by mouth two (2) times a day. Indications: HYPERTENSION   multivitamin (ONE A DAY) tablet   Yes No   Sig: Take 1 Tab by mouth daily. Take day of surgery per anesthesia protocol.    nitrofurantoin, macrocrystal-monohydrate, (MACROBID) 100 mg capsule   No No   Sig: Take 1 Cap by mouth two (2) times a day. ondansetron (ZOFRAN ODT) 4 mg disintegrating tablet   No No   Sig: Take 1 Tab by mouth every eight (8) hours as needed for Nausea. sertraline (ZOLOFT) 50 mg tablet   Yes No   Sig: Take 50 mg by mouth daily. Take day of surgery per anesthesia protocol. Facility-Administered Medications: None       Allergies   Allergen Reactions    Ace Inhibitors Swelling     Only to some    Adhesive Tape Rash    Codeine Nausea and Vomiting    Other Medication Swelling     Unknown blood pressure medication per pt. Pt also allergic to predforte eye drops, causes pain that is severe    Other Omega-3s Other (comments)     All oral glucose medications, hives and couldn't breathe well    Sulfa (Sulfonamide Antibiotics) Nausea and Vomiting       FAMILY HISTORY: Reviewed.  Negative except   Family History   Problem Relation Age of Onset    Other Brother     Cancer Mother     Heart Disease Mother     Breast Cancer Mother 79    Heart Disease Father     Malignant Hyperthermia Neg Hx     Pseudocholinesterase Deficiency Neg Hx     Delayed Awakening Neg Hx     Post-op Nausea/Vomiting Neg Hx     Emergence Delirium Neg Hx     Post-op Cognitive Dysfunction Neg Hx        Social History     Tobacco Use    Smoking status: Former Smoker     Packs/day: 2.00     Years: 13.00     Pack years: 26.00     Last attempt to quit: 1970     Years since quittin.1    Smokeless tobacco: Never Used    Tobacco comment: quit 36 years ago   Substance Use Topics    Alcohol use: No         Objective:     Visit Vitals  /82   Pulse 86   Temp 98.9 °F (37.2 °C)   Resp 18   Ht 5' 5\" (1.651 m)   Wt 107.5 kg (237 lb)   SpO2 93%   BMI 39.44 kg/m²      Temp (24hrs), Av.3 °F (36.8 °C), Min:97.9 °F (36.6 °C), Max:98.9 °F (37.2 °C)    Oxygen Therapy  O2 Sat (%): 93 % (20)  Pulse via Oximetry: 86 beats per minute (05/25/20 9995)  O2 Device: Room air (05/25/20 6290)  Physical Exam:  General:    The patient is a pleasant elderly female in no acute distress. Head:   Normocephalic/atraumatic. Eyes:  No palpebral pallor or scleral icterus. ENT:  External auricular and nasal exam within normal limits. Mucous membranes are moist.  Neck:  Supple, non-tender, no JVD. Lungs:   Clear to auscultation bilaterally without wheezes or crackles. No respiratory distress or accessory muscle use. Heart:   Regular rate and rhythm, without murmurs, rubs, or gallops. Abdomen:   Mildly TTP over epigastrium non-distended with normoactive bowel sounds. Genitourinary: No tenderness over the bladder or bilateral CVAs. Extremities: Without clubbing, cyanosis, or edema. Skin:     Normal color, texture, and turgor. No rashes, lesions, or jaundice. Pulses: Radial and dorsalis pedis pulses present 2+ bilaterally. Capillary refill <2s. Neurologic: CN II-XII grossly intact and symmetrical.     Moving all four extremities well with no focal deficits. Psychiatric: Pleasant demeanor, appropriate affect. Alert and oriented x 3    Data Review:   Recent Results (from the past 24 hour(s))   CBC WITH AUTOMATED DIFF    Collection Time: 05/25/20  3:04 AM   Result Value Ref Range    WBC 10.8 4.3 - 11.1 K/uL    RBC 3.82 (L) 4.05 - 5.2 M/uL    HGB 12.3 11.7 - 15.4 g/dL    HCT 34.7 (L) 35.8 - 46.3 %    MCV 90.8 79.6 - 97.8 FL    MCH 32.2 26.1 - 32.9 PG    MCHC 35.4 (H) 31.4 - 35.0 g/dL    RDW 13.4 11.9 - 14.6 %    PLATELET 466 946 - 073 K/uL    MPV 10.9 9.4 - 12.3 FL    ABSOLUTE NRBC 0.00 0.0 - 0.2 K/uL    DF AUTOMATED      NEUTROPHILS 58 43 - 78 %    LYMPHOCYTES 29 13 - 44 %    MONOCYTES 7 4.0 - 12.0 %    EOSINOPHILS 5 0.5 - 7.8 %    BASOPHILS 0 0.0 - 2.0 %    IMMATURE GRANULOCYTES 1 0.0 - 5.0 %    ABS. NEUTROPHILS 6.2 1.7 - 8.2 K/UL    ABS. LYMPHOCYTES 3.2 0.5 - 4.6 K/UL    ABS. MONOCYTES 0.8 0.1 - 1.3 K/UL    ABS.  EOSINOPHILS 0.5 0.0 - 0.8 K/UL    ABS. BASOPHILS 0.0 0.0 - 0.2 K/UL    ABS. IMM. GRANS. 0.1 0.0 - 0.5 K/UL   METABOLIC PANEL, COMPREHENSIVE    Collection Time: 05/25/20  3:04 AM   Result Value Ref Range    Sodium 136 136 - 145 mmol/L    Potassium 3.8 3.5 - 5.1 mmol/L    Chloride 104 98 - 107 mmol/L    CO2 22 21 - 32 mmol/L    Anion gap 10 7 - 16 mmol/L    Glucose 128 (H) 65 - 100 mg/dL    BUN 41 (H) 8 - 23 MG/DL    Creatinine 1.63 (H) 0.6 - 1.0 MG/DL    GFR est AA 39 (L) >60 ml/min/1.73m2    GFR est non-AA 33 (L) >60 ml/min/1.73m2    Calcium 9.2 8.3 - 10.4 MG/DL    Bilirubin, total 0.3 0.2 - 1.1 MG/DL    ALT (SGPT) 14 12 - 65 U/L    AST (SGOT) 14 (L) 15 - 37 U/L    Alk.  phosphatase 130 50 - 136 U/L    Protein, total 8.2 6.3 - 8.2 g/dL    Albumin 3.8 3.2 - 4.6 g/dL    Globulin 4.4 (H) 2.3 - 3.5 g/dL    A-G Ratio 0.9 (L) 1.2 - 3.5     LIPASE    Collection Time: 05/25/20  3:04 AM   Result Value Ref Range    Lipase 104 73 - 393 U/L   MAGNESIUM    Collection Time: 05/25/20  3:04 AM   Result Value Ref Range    Magnesium 2.4 1.8 - 2.4 mg/dL   TROPONIN I    Collection Time: 05/25/20  3:04 AM   Result Value Ref Range    Troponin-I, Qt. <0.02 (L) 0.02 - 0.05 NG/ML   EKG, 12 LEAD, INITIAL    Collection Time: 05/25/20  3:11 AM   Result Value Ref Range    Ventricular Rate 83 BPM    Atrial Rate 101 BPM    QRS Duration 96 ms    Q-T Interval 396 ms    QTC Calculation (Bezet) 465 ms    Calculated R Axis -17 degrees    Calculated T Axis 44 degrees    Diagnosis       sinus rhythm with 1st degree AV block   Low voltage QRS  Abnormal ECG  When compared with ECG of 19-SEP-2010 13:21,  No significant change was found  Confirmed by Rheta Records ([id number]) on 5/25/2020 7:36:10 AM     CBC WITH AUTOMATED DIFF    Collection Time: 05/25/20  4:33 PM   Result Value Ref Range    WBC 13.5 (H) 4.3 - 11.1 K/uL    RBC 3.97 (L) 4.05 - 5.2 M/uL    HGB 12.6 11.7 - 15.4 g/dL    HCT 36.1 35.8 - 46.3 %    MCV 90.9 79.6 - 97.8 FL    MCH 31.7 26.1 - 32.9 PG    MCHC 34.9 31.4 - 35.0 g/dL    RDW 13.3 11.9 - 14.6 %    PLATELET 427 342 - 038 K/uL    MPV 10.4 9.4 - 12.3 FL    ABSOLUTE NRBC 0.00 0.0 - 0.2 K/uL    DF AUTOMATED      NEUTROPHILS 76 43 - 78 %    LYMPHOCYTES 17 13 - 44 %    MONOCYTES 6 4.0 - 12.0 %    EOSINOPHILS 0 (L) 0.5 - 7.8 %    BASOPHILS 0 0.0 - 2.0 %    IMMATURE GRANULOCYTES 1 0.0 - 5.0 %    ABS. NEUTROPHILS 10.3 (H) 1.7 - 8.2 K/UL    ABS. LYMPHOCYTES 2.2 0.5 - 4.6 K/UL    ABS. MONOCYTES 0.9 0.1 - 1.3 K/UL    ABS. EOSINOPHILS 0.0 0.0 - 0.8 K/UL    ABS. BASOPHILS 0.0 0.0 - 0.2 K/UL    ABS. IMM. GRANS. 0.1 0.0 - 0.5 K/UL   METABOLIC PANEL, COMPREHENSIVE    Collection Time: 05/25/20  4:33 PM   Result Value Ref Range    Sodium 134 (L) 136 - 145 mmol/L    Potassium 4.5 3.5 - 5.1 mmol/L    Chloride 102 98 - 107 mmol/L    CO2 21 21 - 32 mmol/L    Anion gap 11 7 - 16 mmol/L    Glucose 234 (H) 65 - 100 mg/dL    BUN 36 (H) 8 - 23 MG/DL    Creatinine 1.78 (H) 0.6 - 1.0 MG/DL    GFR est AA 36 (L) >60 ml/min/1.73m2    GFR est non-AA 29 (L) >60 ml/min/1.73m2    Calcium 9.6 8.3 - 10.4 MG/DL    Bilirubin, total 0.4 0.2 - 1.1 MG/DL    ALT (SGPT) 18 12 - 65 U/L    AST (SGOT) 49 (H) 15 - 37 U/L    Alk.  phosphatase 108 50 - 136 U/L    Protein, total 8.6 (H) 6.3 - 8.2 g/dL    Albumin 3.9 3.2 - 4.6 g/dL    Globulin 4.7 (H) 2.3 - 3.5 g/dL    A-G Ratio 0.8 (L) 1.2 - 3.5     MAGNESIUM    Collection Time: 05/25/20  4:33 PM   Result Value Ref Range    Magnesium 2.4 1.8 - 2.4 mg/dL   PHOSPHORUS    Collection Time: 05/25/20  4:33 PM   Result Value Ref Range    Phosphorus 2.3 2.3 - 3.7 MG/DL   TROPONIN I    Collection Time: 05/25/20  4:33 PM   Result Value Ref Range    Troponin-I, Qt. 6.44 (HH) 0.02 - 0.05 NG/ML   EKG, 12 LEAD, INITIAL    Collection Time: 05/25/20  5:48 PM   Result Value Ref Range    Ventricular Rate 81 BPM    Atrial Rate 81 BPM    P-R Interval 236 ms    QRS Duration 96 ms    Q-T Interval 420 ms    QTC Calculation (Bezet) 487 ms    Calculated P Axis 62 degrees    Calculated R Axis -17 degrees Calculated T Axis 60 degrees    Diagnosis         Sinus rhythm with 1st degree A-V block  Prolonged QT  Abnormal ECG    Confirmed by Drew Bell ([id number]) on 5/25/2020 8:12:15 PM     LACTIC ACID    Collection Time: 05/25/20  6:02 PM   Result Value Ref Range    Lactic acid 2.7 (HH) 0.4 - 2.0 MMOL/L   POC G3    Collection Time: 05/25/20  7:13 PM   Result Value Ref Range    Device: ROOM AIR      pH (POC) 7.420 7.35 - 7.45      pCO2 (POC) 28.6 (L) 35 - 45 MMHG    pO2 (POC) 77 75 - 100 MMHG    HCO3 (POC) 18.5 (L) 22 - 26 MMOL/L    sO2 (POC) 96 95 - 98 %    Base deficit (POC) 5 mmol/L    Allens test (POC) YES      Site RIGHT RADIAL      Specimen type (POC) ARTERIAL      Performed by [facility]     CO2, POC 19 MMOL/L    Respiratory comment: PhysicianNotified     COLLECT TIME 1,910     CK    Collection Time: 05/25/20  8:44 PM   Result Value Ref Range     (H) 21 - 215 U/L   TROPONIN I    Collection Time: 05/25/20  8:44 PM   Result Value Ref Range    Troponin-I, Qt. 8.51 (HH) 0.02 - 0.05 NG/ML       Imaging /Procedures /Studies:  Nm Lung Scan Perf    Result Date: 5/25/2020  IMPRESSION: Normal lung perfusion study. Xr Chest Pa Lat    Result Date: 5/25/2020  IMPRESSION: Normal chest.    Xr Abd (ap And Erect Or Decub)    Result Date: 5/25/2020  IMPRESSION: Negative for free air, ileus or obstruction. Possible right ureteral calculus. Correlate clinically. Ct Head Wo Cont    Result Date: 5/25/2020  IMPRESSION: 1. No acute intracranial abnormality. Note, acute/subacute CVA cannot be excluded given the severity of the underlying white matter disease. 2. Moderate to severe bilateral white matter hypoattenuation most in keeping with chronic microangiopathic disease. 3. Old infarct in the right basal ganglia with an old lacunar infarct in the left basal ganglia. Xr Chest Port    Result Date: 5/25/2020  IMPRESSION: No acute cardiopulmonary abnormality.          Assessment and Plan:     Principal Problem: NSTEMI (5/26/2020)    New elevation of troponin to > 8 with nausea, epigastric pain. Cardiology has seen patient and deferred admission. Will admit to telemetry. IV Heparin gtt and TTE per cardiology recommendations. Active Problems:    Weakness (5/26/2020)    Follow clinically. Leukocytosis (5/26/2020)    Follow CBC      Nausea (5/26/2020)    Per above. DVT Prophylaxis: Heparin gtt per above      Code Status: FULL CODE      Disposition: Admit to remote tel for evaluation and treatment as per above.       Anticipated discharge: 2-3 days     Signed By: Magdiel Pickens MD     May 26, 2020

## 2020-05-26 NOTE — PROCEDURES
[facility]  CARDIAC CATH    Name:  Kristen Savage  MR#:  [id number]  :  1945  ACCOUNT #:  [de-identified]  DATE OF SERVICE:  2020    PROCEDURES PERFORMED:  1. Left heart cath, selective coronary arteriography, and left ventriculogram via the right radial approach. 2.  PCI and stenting of the ramus intermediate. PREOPERATIVE DIAGNOSIS:  Non-ST-elevation myocardial infarction. POSTOPERATIVE DIAGNOSIS:  Obstructive coronary artery disease requiring percutaneous revascularization. SURGEON:  Paul Chase. MD Jose Alfredo    ASSISTANT:  None. ESTIMATED BLOOD LOSS:  Less than 5 mL. SPECIMENS REMOVED:  None. COMPLICATIONS:  None. FINDINGS:  As below. IMPLANTS:  Resolute Joey drug-eluting stent. ANESTHESIA:  The patient received moderate supervised conscious sedation with 1 mg of Versed, 25 mcg of fentanyl. Sedation start time was 1418 with a procedure completion time of 1454. Sedation was administered by Noemi Perez RN under my supervision. TECHNICAL FACTORS:  After informed consent was obtained, the patient was brought to the cardiac catheterization lab. The right radial artery was prepped and draped in the usual sterile fashion. Utilizing modified Seldinger technique and micropuncture needle, the right radial artery was entered. A 6-Danish Terumo slender sheath was placed without difficulty. A radial cocktail consisting of 2000 units of heparin, 2 mg of verapamil, and 200 mcg of nitroglycerin was administered. A 5-Danish Tiger 4.0 catheter was used to select and engage the ostium of the left main coronary artery and right coronary artery respectively. Selective injection verification performed. A pigtail catheter was used to cross the aortic valve into the left ventricle. Hemodynamic measurements and left ventriculogram were obtained. Left ventricular aortic pressure gradient was obtained by pullback technique.     At the conclusion of the diagnostic procedure, the patient was referred for PCI of the ramus intermediate. Please see procedure notes for details. CONTRAST:  Isovue 165 for diagnostic interventional procedure. HEMODYNAMICS:  1. Aortic pressure 104/68 with a mean of 78.  2.  Left ventricular end-diastolic pressure 21.  3.  There was no significant gradient across the aortic valve. ANGIOGRAPHIC RESULTS:  1. Left main coronary artery:  Large caliber vessel. 10% ostial stenosis. 2.  LAD:  It is a medium caliber vessel. 30% proximal stenosis. 20% distal stenosis. 3.  First diagonal artery is a very small caliber 1.5 mm vessel. 80% mid stenosis. 4.  Ramus intermediate:  Medium caliber vessel. 20-30% proximal stenosis. High-grade 90% mid stenosis, which appears the culprit of her infarct. Distal vessel contains 20-30% stenosis. 5.  Left circumflex:  Medium caliber nondominant vessel. Patent. 6.  First obtuse marginal:  Medium caliber vessel. Bifurcates into a superior and inferior branch. Both branches at the distal portion contains 60-70% stenosis. Very small caliber 1-1.5 mm vessels. 7.  Right coronary artery:  Large caliber dominant vessel. 20% proximal, 20% mid stenosis. 8.  Right PDA:  Medium caliber vessel. Patent. 9.  Right posterolateral branch:  Medium caliber vessel. Patent. 10.  Left ventriculogram performed in WALLACE projection shows heavy mitral annular calcification. Normal left ventricular chamber size with normal left ventricular systolic function. EF 60-65%. Aortic root is nondilated. CONCLUSION:  1. Obstructive coronary artery disease with culprit of her infarct being a high-grade ramus intermediate. Small vessel disease involving the first diagonal artery and small branches of her obtuse marginal.  Felt best treated medically. 2.  Normal left ventricular systolic function. PLAN:  Proceed with PCI of the ramus intermediate. PCI NOTE:  Lesion:  Ramus intermediate.   Pre-stenosis 90%, post-stenosis 0%. TECHNICAL FACTORS:  The patient was given intravenous heparin to maintain ACT greater than 300. XB3.0 guide was used to select and engage the ostium of the left main coronary artery. A Prowater wire was positioned in the ramus intermediate. It was noted just after the high-grade stenosis a very tortuous and flexion point of the artery. This was marked carefully to avoid stenting of this area. At this point, the stenosis was predilated with a 2.5 x 15 mm Trek balloon to 16 atmospheres. Following predilatation, a Resolute Joey 2.75 x 22 mm drug-eluting stent was positioned and deployed to 16 atmospheres. This was then postdilated with NC Trek 2.75 x 12 mm balloon to 22 atmospheres. Following post dilatation, there was excellent angiographic result. The wire was removed and final orthogonal projections were obtained. After the patient was given 200 mcg of intracoronary nitroglycerin. CONCLUSION:  Successful PCI and stenting of the ramus intermediate with a Resolute Joey 2.75 x 22 mm drug-eluting stent. PLAN:  Monitor the patient closely in the postprocedural setting.       MD FATMATA [email or web address]  D:  05/26/2020 15:00  T:  05/26/2020 15:48  JOB #:  [id number]  CC:  Pravin Dela Cruz MD

## 2020-05-26 NOTE — PROGRESS NOTES
Patient received to the floor. Admission assessment completed. PT is AAO x 4 with normal unlabored respirations present on RA. IV sites are CDI. Pt denies pain. Changed brief and linen. Bed is low and locked with call light in reach and alarm set. Will continue to monitor.

## 2020-05-26 NOTE — ROUTINE PROCESS
Verbal bedside report given to Isadora Ross oncoming RN. Patient's situation, background, assessment and recommendations provided. Opportunity for questions provided. Oncoming RN assumed care of patient. Saline IV drip verified at bedside with oncoming RN. R radial site visualized C/D/I.

## 2020-05-26 NOTE — PROGRESS NOTES
TRANSFER - OUT REPORT:    Verbal report given to Kim Petersen RN on [address]  being transferred to Telemetry for routine progression of care       Report consisted of patients Situation, Background, Assessment and   Recommendations(SBAR). Information from the following report(s) SBAR was reviewed with the receiving nurse. Lines:   Peripheral IV 05/25/20 Right Hand (Active)   Site Assessment Clean, dry, & intact 5/26/2020  8:00 AM   Phlebitis Assessment 0 5/26/2020  8:00 AM   Infiltration Assessment 0 5/26/2020  8:00 AM   Dressing Status Clean, dry, & intact 5/26/2020  8:00 AM   Dressing Type Tape;Transparent 5/26/2020  8:00 AM   Hub Color/Line Status Infusing 5/26/2020  8:00 AM   Alcohol Cap Used No 5/26/2020  8:00 AM       Peripheral IV 05/25/20 Left Wrist (Active)   Site Assessment Clean, dry, & intact 5/26/2020  8:00 AM   Phlebitis Assessment 0 5/26/2020  8:00 AM   Infiltration Assessment 0 5/26/2020  8:00 AM   Dressing Status Clean, dry, & intact 5/26/2020  8:00 AM   Dressing Type Transparent;Tape 5/26/2020  8:00 AM   Hub Color/Line Status Patent 5/26/2020  8:00 AM   Alcohol Cap Used No 5/26/2020  8:00 AM       Peripheral IV 05/25/20 Right Antecubital (Active)   Site Assessment Clean, dry, & intact 5/26/2020  8:00 AM   Phlebitis Assessment 0 5/26/2020  8:00 AM   Infiltration Assessment 0 5/26/2020  8:00 AM   Dressing Status Clean, dry, & intact 5/26/2020  8:00 AM   Dressing Type Transparent;Tape 5/26/2020  8:00 AM   Hub Color/Line Status End cap changed 5/26/2020  8:00 AM   Alcohol Cap Used No 5/26/2020  8:00 AM        Opportunity for questions and clarification was provided.       Patient transported with:   Registered Nurse

## 2020-05-26 NOTE — PROGRESS NOTES
05/26/20 0209   Dual Skin Pressure Injury Assessment   Dual Skin Pressure Injury Assessment WDL   Second Care Provider (Based on [address] [place] [address]) Aakash Parker RN   Skin Integumentary   Skin Integumentary (WDL) WDL    Pressure  Injury Documentation No Pressure Injury Noted-Pressure Ulcer Prevention Initiated   Skin Color Appropriate for ethnicity; Ecchymosis (comment)  (scattered)   Skin Condition/Temp Dry;Flaky;Fragile; Warm   Skin Integrity Intact   Turgor Non-tenting

## 2020-05-26 NOTE — PROGRESS NOTES
MSN, CM:  Spoke with patient this AM about discharge planning. Patient lives with  \"Maikel\" and son Justino Steen" in own 2-story house with 5 steps for entrance. Patient also has a cousin \"Nargis\" who lives close and helps when needed. Patient is independent with all ADL's but does require a walker for ambulation. Patient has no received any rehab or [place] services in the past.  Patient confirms PCP is Dr. Cabrera and  drives her to all appointments. PT and OT consulted for evaluation and recommendations. Case Management will continue to follow for any discharge needs. Care Management Interventions  PCP Verified by CM: Yes(Dr. Cabrera)  Mode of Transport at Discharge:  Other (see comment)(family to transport)  Transition of Care Consult (CM Consult): Discharge Planning  Physical Therapy Consult: Yes  Occupational Therapy Consult: Yes  Current Support Network: Own Home, Lives with Spouse, Other(son lives in home)  Freedom of Choice List was Provided with Basic Dialogue that Supports the Patient's Individualized Plan of Care/Goals, Treatment Preferences and Shares the Quality Data Associated with the Providers?: Yes  Discharge Location  Discharge Placement: Unable to determine at this time

## 2020-05-26 NOTE — PROGRESS NOTES
Patient to CPRU for prep for heart catheterization. Patient alert and oriented, consent signed to best of her physical abilities.  Maikel updated.

## 2020-05-26 NOTE — PROGRESS NOTES
Radial compression band removed at 1814 after slowly reducing air from 10 cc to zero as per hospital protocol. No bleeding or hematoma noted. 2 x 2 gauze with tegaderm placed over puncture site. The affected extremity is warm and dry to the touch. Frequent vital signs printed and placed on bedside chart. Patient instructed to call if any bleeding noted on gauze. Patient verbalized understanding the nursing instructions.

## 2020-05-26 NOTE — PROCEDURES
Brief Cardiac Procedure Note    Patient: Didier Youngblood MRN: [id number]  SSN: [id number]    YOB: 1945  Age: 66 y.o. Sex: female      Date of Procedure: 5/26/2020     Pre-procedure Diagnosis: NSTEMI    Post-procedure Diagnosis: Coronary artery disease    Procedure: Left Heart Catheterization with PCI    Brief Description of Procedure: As above    Performed By: Laura Gallegos MD     Assistants: None    Anesthesia: Moderate Sedation    Estimated Blood Loss: Less than 10 mL      Specimens: None    Implants: None    Findings: Obstructive CAD. PCI of Ramus with [place] 2.75 x 22. Normal EF    Complications: None    Recommendations: Continue medical therapy.     Signed By: Laura Gallegos MD     May 26, 2020

## 2020-05-26 NOTE — PROGRESS NOTES
TRANSFER - OUT REPORT:    Verbal report given to cpru RN(name) on [address]  being transferred to cpru(unit) for routine progression of care       Report consisted of patients Situation, Background, Assessment and   Recommendations(SBAR). Information from the following report(s) SBAR, Kardex, Procedure Summary and MAR was reviewed with the receiving nurse. Lines:   Peripheral IV 05/25/20 Right Hand (Active)   Site Assessment Clean, dry, & intact 5/26/2020  8:00 AM   Phlebitis Assessment 0 5/26/2020  8:00 AM   Infiltration Assessment 0 5/26/2020  8:00 AM   Dressing Status Clean, dry, & intact 5/26/2020  8:00 AM   Dressing Type Tape;Transparent 5/26/2020  8:00 AM   Hub Color/Line Status Infusing 5/26/2020  8:00 AM   Alcohol Cap Used No 5/26/2020  8:00 AM       Peripheral IV 05/25/20 Left Wrist (Active)   Site Assessment Clean, dry, & intact 5/26/2020  8:00 AM   Phlebitis Assessment 0 5/26/2020  8:00 AM   Infiltration Assessment 0 5/26/2020  8:00 AM   Dressing Status Clean, dry, & intact 5/26/2020  8:00 AM   Dressing Type Transparent;Tape 5/26/2020  8:00 AM   Hub Color/Line Status Patent 5/26/2020  8:00 AM   Alcohol Cap Used No 5/26/2020  8:00 AM       Peripheral IV 05/25/20 Right Antecubital (Active)   Site Assessment Clean, dry, & intact 5/26/2020  8:00 AM   Phlebitis Assessment 0 5/26/2020  8:00 AM   Infiltration Assessment 0 5/26/2020  8:00 AM   Dressing Status Clean, dry, & intact 5/26/2020  8:00 AM   Dressing Type Transparent;Tape 5/26/2020  8:00 AM   Hub Color/Line Status End cap changed 5/26/2020  8:00 AM   Alcohol Cap Used No 5/26/2020  8:00 AM        Opportunity for questions and clarification was provided.       Patient transported with:   Registered Nurse          [address] [facility] with Dr. Jonathan Walker  Right radial access  Stent of Ramus  ACT @ 0083 was 257  ACT @ 6371 was 329  hepain 8000 units   Versed 1mg   Fentanyl 25mcg  Brilinta 180mg po  TR Band @ 1450 with 9ml in band

## 2020-05-26 NOTE — ED NOTES
TRANSFER - OUT REPORT:    Verbal report given to RN on [address]  being transferred to  for routine progression of care       Report consisted of patients Situation, Background, Assessment and   Recommendations(SBAR). Information from the following report(s) SBAR, ED Summary and MAR was reviewed with the receiving nurse. Lines:   Peripheral IV 05/25/20 Right Hand (Active)   Site Assessment Clean, dry, & intact 5/25/2020  5:14 PM   Phlebitis Assessment 0 5/25/2020  5:14 PM   Infiltration Assessment 0 5/25/2020  5:14 PM   Dressing Status Clean, dry, & intact 5/25/2020  5:14 PM   Hub Color/Line Status Pink 5/25/2020  5:14 PM       Peripheral IV 05/25/20 Left Wrist (Active)   Site Assessment Clean, dry, & intact 5/25/2020  6:00 PM   Phlebitis Assessment 0 5/25/2020  6:00 PM   Infiltration Assessment 0 5/25/2020  6:00 PM   Dressing Status Clean, dry, & intact 5/25/2020  6:00 PM   Hub Color/Line Status Blue 5/25/2020  6:00 PM       Peripheral IV 05/25/20 Right Antecubital (Active)   Site Assessment Clean, dry, & intact 5/25/2020  6:30 PM   Phlebitis Assessment 0 5/25/2020  6:30 PM   Infiltration Assessment 0 5/25/2020  6:30 PM   Dressing Status Clean, dry, & intact 5/25/2020  6:30 PM   Hub Color/Line Status Pink 5/25/2020  6:30 PM        Opportunity for questions and clarification was provided.       Patient transported with:   [employer]

## 2020-05-26 NOTE — ROUTINE PROCESS
Bedside and Verbal shift change report given to self (oncoming nurse) by Guillaume Nichols RN (offgoing nurse). Report included the following information SBAR, Kardex, ED Summary, Procedure Summary, Intake/Output, MAR, Recent Results

## 2020-05-26 NOTE — PROGRESS NOTES
Progress Note    Patient: Maximino Sullivan MRN: [id number]  SSN: [id number]    YOB: 1945  Age: 66 y.o. Sex: female      Admit Date: 5/25/2020    LOS: 0 days     Subjective: In, she denies any chest pain today, she states however that she did have chest pain on admission. I also discussed with the cardiology, appreciate input from cardiology. Objective:     Vitals:    05/26/20 1545 05/26/20 1600 05/26/20 1615 05/26/20 1656   BP: 143/66 136/76 144/78 157/70   Pulse: 70 69  72   Resp:    18   Temp:    97.4 °F (36.3 °C)   SpO2: 99% 99% 99% 95%   Weight:    106.9 kg (235 lb 10.8 oz)   Height:    5' 6\" (1.676 m)        Intake and Output:  Current Shift: No intake/output data recorded. Last three shifts: 05/24 1901 - 05/26 0700  In: 2100 [I.V.:2100]  Out: 650 [Urine:650]    Physical Exam:   GENERAL: alert, cooperative, no distress, appears stated age  LUNG: clear to auscultation bilaterally  HEART: regular rate and rhythm, S1, S2 normal, no murmur, click, rub or gallop  ABDOMEN: soft, non-tender. Bowel sounds normal. No masses,  no organomegaly  EXTREMITIES:  extremities normal, atraumatic, no cyanosis or edema    Lab/Data Review: All lab results for the last 24 hours reviewed.          Assessment:     Principal Problem:    Elevated troponin (5/26/2020)    Active Problems:    Leukocytosis (5/26/2020)      Nausea (5/26/2020)      Weakness (5/26/2020)        Plan:       NSTEMI   Trending down troponins  Continue heparin drip  Will likely need to go to cardiac cath today  Case discussed with cardiology, appreciate inout     Active Problems:  Weakness   Follow clinically  PT consult, when patient stable for PT     Leukocytosis (5/26/2020)  Possibly reactive, monitor CBC     Nausea (5/26/2020)  Per above.              Signed By: Waldemar Siegel MD     May 26, 2020

## 2020-05-26 NOTE — PROGRESS NOTES
Physical Therapy Note:    Physical therapy evaluation orders received and chart reviewed. Attempted to see patient this PM to initiate assessment. Patient currently off of the floor for prep for heart catheterization. Will follow and re-attempt at a later time/date as schedule permits pending pt availability and medical status.      Arch Hew, PT, DPT

## 2020-05-27 ENCOUNTER — APPOINTMENT (OUTPATIENT)
Dept: CT IMAGING | Age: 75
DRG: 246 | End: 2020-05-27
Attending: INTERNAL MEDICINE
Payer: MEDICARE

## 2020-05-27 ENCOUNTER — APPOINTMENT (OUTPATIENT)
Dept: MRI IMAGING | Age: 75
DRG: 246 | End: 2020-05-27
Attending: PSYCHIATRY & NEUROLOGY
Payer: MEDICARE

## 2020-05-27 LAB
ANION GAP SERPL CALC-SCNC: 9 MMOL/L (ref 7–16)
ATRIAL RATE: 75 BPM
ATRIAL RATE: 91 BPM
BASOPHILS # BLD: 0 K/UL (ref 0–0.2)
BASOPHILS NFR BLD: 0 % (ref 0–2)
BUN SERPL-MCNC: 20 MG/DL (ref 8–23)
CALCIUM SERPL-MCNC: 9.1 MG/DL (ref 8.3–10.4)
CALCULATED P AXIS, ECG09: 56 DEGREES
CALCULATED P AXIS, ECG09: 59 DEGREES
CALCULATED R AXIS, ECG10: -27 DEGREES
CALCULATED R AXIS, ECG10: -50 DEGREES
CALCULATED T AXIS, ECG11: 29 DEGREES
CALCULATED T AXIS, ECG11: 58 DEGREES
CHLORIDE SERPL-SCNC: 110 MMOL/L (ref 98–107)
CHOLEST SERPL-MCNC: 266 MG/DL
CO2 SERPL-SCNC: 21 MMOL/L (ref 21–32)
CREAT SERPL-MCNC: 1.39 MG/DL (ref 0.6–1)
DIAGNOSIS, 93000: NORMAL
DIAGNOSIS, 93000: NORMAL
DIFFERENTIAL METHOD BLD: ABNORMAL
EOSINOPHIL # BLD: 0.3 K/UL (ref 0–0.8)
EOSINOPHIL NFR BLD: 3 % (ref 0.5–7.8)
ERYTHROCYTE [DISTWIDTH] IN BLOOD BY AUTOMATED COUNT: 13.8 % (ref 11.9–14.6)
GLUCOSE BLD STRIP.AUTO-MCNC: 184 MG/DL (ref 65–100)
GLUCOSE BLD STRIP.AUTO-MCNC: 250 MG/DL (ref 65–100)
GLUCOSE BLD STRIP.AUTO-MCNC: 301 MG/DL (ref 65–100)
GLUCOSE BLD STRIP.AUTO-MCNC: 97 MG/DL (ref 65–100)
GLUCOSE SERPL-MCNC: 147 MG/DL (ref 65–100)
HCT VFR BLD AUTO: 32.3 % (ref 35.8–46.3)
HDLC SERPL-MCNC: 41 MG/DL (ref 40–60)
HDLC SERPL: 6.5 {RATIO}
HGB BLD-MCNC: 11.3 G/DL (ref 11.7–15.4)
IMM GRANULOCYTES # BLD AUTO: 0.1 K/UL (ref 0–0.5)
IMM GRANULOCYTES NFR BLD AUTO: 1 % (ref 0–5)
LDLC SERPL CALC-MCNC: 182.6 MG/DL
LIPID PROFILE,FLP: ABNORMAL
LYMPHOCYTES # BLD: 2.5 K/UL (ref 0.5–4.6)
LYMPHOCYTES NFR BLD: 25 % (ref 13–44)
MAGNESIUM SERPL-MCNC: 2.7 MG/DL (ref 1.8–2.4)
MCH RBC QN AUTO: 32.2 PG (ref 26.1–32.9)
MCHC RBC AUTO-ENTMCNC: 35 G/DL (ref 31.4–35)
MCV RBC AUTO: 92 FL (ref 79.6–97.8)
MONOCYTES # BLD: 1 K/UL (ref 0.1–1.3)
MONOCYTES NFR BLD: 10 % (ref 4–12)
NEUTS SEG # BLD: 6.3 K/UL (ref 1.7–8.2)
NEUTS SEG NFR BLD: 62 % (ref 43–78)
NRBC # BLD: 0 K/UL (ref 0–0.2)
P-R INTERVAL, ECG05: 226 MS
P-R INTERVAL, ECG05: 234 MS
PLATELET # BLD AUTO: 204 K/UL (ref 150–450)
PMV BLD AUTO: 10.5 FL (ref 9.4–12.3)
POTASSIUM SERPL-SCNC: 3.7 MMOL/L (ref 3.5–5.1)
Q-T INTERVAL, ECG07: 392 MS
Q-T INTERVAL, ECG07: 444 MS
QRS DURATION, ECG06: 104 MS
QRS DURATION, ECG06: 92 MS
QTC CALCULATION (BEZET), ECG08: 482 MS
QTC CALCULATION (BEZET), ECG08: 495 MS
RBC # BLD AUTO: 3.51 M/UL (ref 4.05–5.2)
SODIUM SERPL-SCNC: 140 MMOL/L (ref 136–145)
TRIGL SERPL-MCNC: 212 MG/DL (ref 35–150)
TROPONIN I SERPL-MCNC: 4.63 NG/ML (ref 0.02–0.05)
VENTRICULAR RATE, ECG03: 75 BPM
VENTRICULAR RATE, ECG03: 91 BPM
VLDLC SERPL CALC-MCNC: 42.4 MG/DL (ref 6–23)
WBC # BLD AUTO: 10.2 K/UL (ref 4.3–11.1)

## 2020-05-27 PROCEDURE — 74011250637 HC RX REV CODE- 250/637: Performed by: FAMILY MEDICINE

## 2020-05-27 PROCEDURE — 97112 NEUROMUSCULAR REEDUCATION: CPT

## 2020-05-27 PROCEDURE — 80048 BASIC METABOLIC PNL TOTAL CA: CPT

## 2020-05-27 PROCEDURE — 83735 ASSAY OF MAGNESIUM: CPT

## 2020-05-27 PROCEDURE — 86580 TB INTRADERMAL TEST: CPT | Performed by: INTERNAL MEDICINE

## 2020-05-27 PROCEDURE — 94760 N-INVAS EAR/PLS OXIMETRY 1: CPT

## 2020-05-27 PROCEDURE — 85025 COMPLETE CBC W/AUTO DIFF WBC: CPT

## 2020-05-27 PROCEDURE — 99222 1ST HOSP IP/OBS MODERATE 55: CPT | Performed by: PSYCHIATRY & NEUROLOGY

## 2020-05-27 PROCEDURE — 36415 COLL VENOUS BLD VENIPUNCTURE: CPT

## 2020-05-27 PROCEDURE — 97530 THERAPEUTIC ACTIVITIES: CPT

## 2020-05-27 PROCEDURE — 80061 LIPID PANEL: CPT

## 2020-05-27 PROCEDURE — 82962 GLUCOSE BLOOD TEST: CPT

## 2020-05-27 PROCEDURE — 74011250637 HC RX REV CODE- 250/637: Performed by: INTERNAL MEDICINE

## 2020-05-27 PROCEDURE — 74011636637 HC RX REV CODE- 636/637: Performed by: FAMILY MEDICINE

## 2020-05-27 PROCEDURE — 70551 MRI BRAIN STEM W/O DYE: CPT

## 2020-05-27 PROCEDURE — 92610 EVALUATE SWALLOWING FUNCTION: CPT

## 2020-05-27 PROCEDURE — 97161 PT EVAL LOW COMPLEX 20 MIN: CPT

## 2020-05-27 PROCEDURE — 74011636637 HC RX REV CODE- 636/637: Performed by: INTERNAL MEDICINE

## 2020-05-27 PROCEDURE — 93005 ELECTROCARDIOGRAM TRACING: CPT | Performed by: INTERNAL MEDICINE

## 2020-05-27 PROCEDURE — 70450 CT HEAD/BRAIN W/O DYE: CPT

## 2020-05-27 PROCEDURE — 97535 SELF CARE MNGMENT TRAINING: CPT

## 2020-05-27 PROCEDURE — 97166 OT EVAL MOD COMPLEX 45 MIN: CPT

## 2020-05-27 PROCEDURE — 84484 ASSAY OF TROPONIN QUANT: CPT

## 2020-05-27 PROCEDURE — 74011000302 HC RX REV CODE- 302: Performed by: INTERNAL MEDICINE

## 2020-05-27 PROCEDURE — 65660000000 HC RM CCU STEPDOWN

## 2020-05-27 RX ORDER — INSULIN LISPRO 100 [IU]/ML
INJECTION, SOLUTION INTRAVENOUS; SUBCUTANEOUS
Status: DISCONTINUED | OUTPATIENT
Start: 2020-05-27 | End: 2020-06-01 | Stop reason: HOSPADM

## 2020-05-27 RX ORDER — INSULIN LISPRO 100 [IU]/ML
4 INJECTION, SOLUTION INTRAVENOUS; SUBCUTANEOUS
Status: DISCONTINUED | OUTPATIENT
Start: 2020-05-27 | End: 2020-06-01

## 2020-05-27 RX ADMIN — Medication 10 ML: at 06:21

## 2020-05-27 RX ADMIN — CARVEDILOL 12.5 MG: 12.5 TABLET, FILM COATED ORAL at 16:08

## 2020-05-27 RX ADMIN — TICAGRELOR 90 MG: 90 TABLET ORAL at 03:04

## 2020-05-27 RX ADMIN — SERTRALINE HYDROCHLORIDE 200 MG: 100 TABLET ORAL at 08:14

## 2020-05-27 RX ADMIN — INSULIN GLARGINE 18 UNITS: 100 INJECTION, SOLUTION SUBCUTANEOUS at 21:51

## 2020-05-27 RX ADMIN — Medication 10 ML: at 13:21

## 2020-05-27 RX ADMIN — CARVEDILOL 12.5 MG: 12.5 TABLET, FILM COATED ORAL at 08:14

## 2020-05-27 RX ADMIN — LEVOTHYROXINE SODIUM 150 MCG: 0.07 TABLET ORAL at 06:20

## 2020-05-27 RX ADMIN — Medication 10 ML: at 21:53

## 2020-05-27 RX ADMIN — LOSARTAN POTASSIUM 100 MG: 50 TABLET, FILM COATED ORAL at 17:51

## 2020-05-27 RX ADMIN — PANTOPRAZOLE SODIUM 40 MG: 40 TABLET, DELAYED RELEASE ORAL at 06:21

## 2020-05-27 RX ADMIN — INSULIN HUMAN 9 UNITS: 100 INJECTION, SOLUTION PARENTERAL at 13:09

## 2020-05-27 RX ADMIN — INSULIN LISPRO 8 UNITS: 100 INJECTION, SOLUTION INTRAVENOUS; SUBCUTANEOUS at 16:19

## 2020-05-27 RX ADMIN — FUROSEMIDE 40 MG: 40 TABLET ORAL at 08:13

## 2020-05-27 RX ADMIN — TUBERCULIN PURIFIED PROTEIN DERIVATIVE 5 UNITS: 5 INJECTION, SOLUTION INTRADERMAL at 13:05

## 2020-05-27 RX ADMIN — EZETIMIBE 10 MG: 10 TABLET ORAL at 08:14

## 2020-05-27 RX ADMIN — ASPIRIN 81 MG 81 MG: 81 TABLET ORAL at 08:14

## 2020-05-27 RX ADMIN — INSULIN LISPRO 4 UNITS: 100 INJECTION, SOLUTION INTRAVENOUS; SUBCUTANEOUS at 16:17

## 2020-05-27 RX ADMIN — INSULIN HUMAN 3 UNITS: 100 INJECTION, SOLUTION PARENTERAL at 08:12

## 2020-05-27 RX ADMIN — TICAGRELOR 90 MG: 90 TABLET ORAL at 16:08

## 2020-05-27 RX ADMIN — LOSARTAN POTASSIUM 100 MG: 50 TABLET, FILM COATED ORAL at 08:14

## 2020-05-27 NOTE — PROGRESS NOTES
Progress Note    Patient: Mechelle Collins MRN: [id number]  SSN: [id number]    YOB: 1945  Age: 66 y.o. Sex: female      Admit Date: 5/25/2020    LOS: 1 day     Subjective:     Seen and examined. She had mental status changes, and also new focal weakness. I was in the stroke evaluation team today, and I evaluated the patient at bedside. CT head STAT was ordered, and also neurology consult STAT ordered. Few minutes later, I discussed the case with neurology the case, dr Florinda Lopez. Objective:     Vitals:    05/27/20 1420 05/27/20 1608 05/27/20 1748 05/27/20 1800   BP: 118/57 139/82 131/65 147/67   Pulse: (!) 58 79 74 71   Resp: 20   20   Temp: 97.5 °F (36.4 °C)   98.8 °F (37.1 °C)   SpO2: 96%   93%   Weight:       Height:            Intake and Output:  Current Shift: 05/27 0701 - 05/27 1900  In: 240 [P.O.:240]  Out: -   Last three shifts: 05/25 1901 - 05/27 0700  In: 2100 [I.V.:2100]  Out: 650 [Urine:650]    Physical Exam:   GENERAL: confused, disoriented  LUNG: clear to auscultation bilaterally  HEART: regular rate and rhythm, S1, S2 normal, no murmur, click, rub or gallop  ABDOMEN: soft, non-tender. Bowel sounds normal. No masses,  no organomegaly  EXTREMITIES:  extremities normal, atraumatic, no cyanosis or edema  NEUROLOGIC: negative  PSYCHIATRIC: new altered mental status, Babinsky positive left side. She had a prior h/o og left sided stroke. Left sided drop, that was not present ysterday. Lab/Data Review: All lab results for the last 24 hours reviewed.        Assessment:     Principal Problem:    Elevated troponin (5/26/2020)    Active Problems:    Leukocytosis (5/26/2020)      Nausea (5/26/2020)      Weakness (5/26/2020)        Plan:     New acute stroke  Multifocal  possibly embolic  Neurology following, not a tPA candidate  Continue aspirin, statin    NSTEMI     5/27/2020  S/p stent placement 5/26/2020  Cardiology following    5/26/2020  Trending down troponins  Continue heparin drip  Will likely need to go to cardiac cath today  Case discussed with cardiology, appreciate inout     Active Problems:  Weakness   Follow clinically  PT consult, when patient stable for PT     Leukocytosis (5/26/2020)  Possibly reactive, monitor CBC     Nausea (5/26/2020)  Per above.         Signed By: Emilee Libman, MD     May 27, 2020

## 2020-05-27 NOTE — CONSULTS
Consult    Patient: Sarah Blackman MRN: [id number]  SSN: [id number]    YOB: 1945  Age: 66 y.o. Sex: female        Assessment:     1. Acute ischemic stroke, multifocal, bilateral anterior and posterior circulation. Embolic in etiology. Probably occurred during cardiac catheterization rule out A. fib  Hospital Problems  Never Reviewed          Codes Class Noted POA    * (Principal) Elevated troponin ICD-10-CM: R79.89  ICD-9-CM: 790.6  5/26/2020 Yes        Leukocytosis ICD-10-CM: W32.553  ICD-9-CM: 288.60  5/26/2020 Yes        Nausea ICD-10-CM: R11.0  ICD-9-CM: 787.02  5/26/2020 Yes        Weakness ICD-10-CM: R53.1  ICD-9-CM: 780.79  5/26/2020 Yes              Plan:     · Start ASA 81 mg daily   · Initiate high intensity statin   · Neurochecks Q4H  · MRI of brain done  · CTA of head and neck routine  · Bedside swallow test   · Labs: A1c, FLP, TSH, Cardiac enzymes, BMP, CBC  · Telemetry and echocardiogram with bubble study  · PT/OT/ST  · DVT prophylaxis   · BP management - normotensive, with long-term goal <140/90  · Smoking cessation if applicable   · Diabetes education if applicable   · Depression Screening prior to discharge      Subjective:      Sarah Blackman is a 66 y.o. female who is being seen for urgent neurological consultation. Patient has a history of prior stroke with chronic left hemiparesis but was noted to have changes on neurological examination this morning by physical therapy. When they compared what they were observing especially in the right upper extremity to prior documentation there was a suggestion of a change. Patient was evaluated appropriately by nursing and after speaking with the patient's primary hospitalist a Code S was not called but a stat neurology consult was placed. At time last at baseline cannot be determined in talking with nursing staff. .    Past Medical History:   Diagnosis Date    Arrhythmia     recurrent SVT    Arthritis     Asthma stress induced- well controlled    CAD (coronary artery disease)     \"leaking valves\"    Chronic pain     shoulder    Diabetes ([facility] [address]     adv -160.  type 2 IDDM; does not take home sqbs- hypo at 80    GERD (gastroesophageal reflux disease)     Hypertension     well controlled with meds    Menopause     Morbid obesity ([facility] [address]     Stroke ([facility] [address]     , mini stroke    Thyroid disease     hypo    Unspecified adverse effect of anesthesia     woke up during every surgery     Past Surgical History:   Procedure Laterality Date    CARDIAC SURG PROCEDURE UNLIST  2010    AVNRT ablation times three    HX APPENDECTOMY      HX BREAST BIOPSY      bilateral breast bx    HX KNEE ARTHROSCOPY      left knee    HX LAP CHOLECYSTECTOMY      HX ORTHOPAEDIC      rt rotator cuff and bicep tendon repair    HX EMELY AND BSO      hysterectomy      Family History   Problem Relation Age of Onset    Other Brother     Cancer Mother     Heart Disease Mother     Breast Cancer Mother 79    Heart Disease Father     Malignant Hyperthermia Neg Hx     Pseudocholinesterase Deficiency Neg Hx     Delayed Awakening Neg Hx     Post-op Nausea/Vomiting Neg Hx     Emergence Delirium Neg Hx     Post-op Cognitive Dysfunction Neg Hx      Social History     Tobacco Use    Smoking status: Former Smoker     Packs/day: 2.00     Years: 13.00     Pack years: 26.00     Last attempt to quit: 1970     Years since quittin.1    Smokeless tobacco: Never Used    Tobacco comment: quit 36 years ago   Substance Use Topics    Alcohol use: No      Current Facility-Administered Medications   Medication Dose Route Frequency Provider Last Rate Last Dose    tuberculin injection 5 Units  5 Units IntraDERMal ONCE Richard Nesbitt MD        losartan (COZAAR) tablet 100 mg  100 mg Oral BID Olivia HSIEH MD   100 mg at 20 0814    ezetimibe (ZETIA) tablet 10 mg  10 mg Oral DAILY Alvarado More MD   10 mg at 20 0814    insulin lispro (HUMALOG) injection 4 Units  4 Units SubCUTAneous DAILY WITH Carlos A Dunn MD   4 Units at 05/26/20 1740    insulin regular (Theone Angle R, HUMULIN R) injection   SubCUTAneous AC&HS Darcia Bloch, MD   3 Units at 05/27/20 4763    pantoprazole (PROTONIX) tablet 40 mg  40 mg Oral ACB Berto HSIEH MD   40 mg at 05/27/20 2779    sodium chloride (NS) flush 5-40 mL  5-40 mL IntraVENous Q8H Berto HSIEH MD   10 mL at 05/27/20 2383    sodium chloride (NS) flush 5-40 mL  5-40 mL IntraVENous PRN Darcia Bloch, MD        acetaminophen (TYLENOL) tablet 650 mg  650 mg Oral Q4H PRN Darcia Bloch, MD        ondansetron [facility]) injection 4 mg  4 mg IntraVENous Q4H PRN Darcia Bloch, MD        magnesium hydroxide (MILK OF MAGNESIA) 400 mg/5 mL oral suspension 30 mL  30 mL Oral DAILY PRN Darcia Bloch, MD        heparin (PF) 2 units/ml in NS infusion  3 Units/hr IntraarTERial CONTINUOUS Juhi Veliz MD   Stopped at 05/26/20 1456    midazolam (VERSED) injection 0.5-2 mg  0.5-2 mg IntraVENous Multiple Juhi Veliz MD   1 mg at 05/26/20 1418    heparin (porcine) 10,000 unit/mL injection 1,000-10,000 Units  1,000-10,000 Units IntraVENous Multiple Juhi Veliz MD   3,000 Units at 05/26/20 1435    nitroglycerin 0.2 mg/ml IV syringe  0.1-0.25 mg IntraCORONary PRN Juhi Veliz MD   0.2 mg at 05/26/20 1442    sodium chloride (NS) flush 5-40 mL  5-40 mL IntraVENous Q8H Juhi Veliz MD   10 mL at 05/27/20 4846    sodium chloride (NS) flush 5-40 mL  5-40 mL IntraVENous PRN Juhi Veliz MD        acetaminophen (TYLENOL) tablet 650 mg  650 mg Oral Q4H PRN Juhi Veliz MD        morphine injection 1 mg  1 mg IntraVENous Q4H PRN Juhi Veliz MD        nitroglycerin (NITROSTAT) tablet 0.4 mg  0.4 mg SubLINGual Q5MIN PRN Juhi Veliz MD        aspirin chewable tablet 81 mg  81 mg Oral DAILY Juhi Veliz MD   81 mg at 05/27/20 0814    HYDROcodone-acetaminophen (NORCO) 5-325 mg per tablet 1 Tab  1 Tab Oral Q4H PRN Sivakumar Veliz MD        [facility]) tablet 90 mg  90 mg Oral Q12H Sivakumar Veliz MD   90 mg at 05/27/20 0304    furosemide (LASIX) tablet 40 mg  40 mg Oral DAILY Richard Nesbitt MD   40 mg at 05/27/20 0813    levothyroxine (SYNTHROID) tablet 150 mcg  150 mcg Oral 6am Richard Nesbitt MD   150 mcg at 05/27/20 0620    sertraline (ZOLOFT) tablet 200 mg  200 mg Oral DAILY Richard Nesbitt MD   200 mg at 05/27/20 0814    insulin glargine (LANTUS) injection 18 Units  18 Units SubCUTAneous QHS Richard Nesbitt MD   Stopped at 05/26/20 2200    carvediloL (COREG) tablet 12.5 mg  12.5 mg Oral BID WITH MEALS Richard Nesbitt MD   12.5 mg at 05/27/20 3481        Allergies   Allergen Reactions    Ace Inhibitors Swelling     Only to some    Adhesive Tape Rash    Codeine Nausea and Vomiting    Other Medication Swelling     Unknown blood pressure medication per pt. Pt also allergic to predforte eye drops, causes pain that is severe    Other Omega-3s Other (comments)     All oral glucose medications, hives and couldn't breathe well    Sulfa (Sulfonamide Antibiotics) Nausea and Vomiting       Review of Systems:      Objective:     Vitals:    05/26/20 2118 05/27/20 0037 05/27/20 0437 05/27/20 0813   BP: 154/66 151/65 159/77 152/83   Pulse: 66 80 80 82   Resp: 18 18 18 18   Temp: 98 °F (36.7 °C) 98.4 °F (36.9 °C) 97.5 °F (36.4 °C) 98.3 °F (36.8 °C)   SpO2: 97% 96% 93% 98%   Weight:   235 lb 3.7 oz (106.7 kg)    Height:            Physical Exam:      General patient is alert and in no acute distress    Cranial nerves II through XII are grossly intact    Patient has significant weakness in the left upper extremity but also mild weakness in the right upper extremity    Reflexes are diffusely brisk.   Plantar responses extensor on the left mute on the right      Recent Results (from the past 24 hour(s)) GLUCOSE, POC    Collection Time: 05/26/20 12:28 PM   Result Value Ref Range    Glucose (POC) 213 (H) 65 - 100 mg/dL   POC ACTIVATED CLOTTING TIME    Collection Time: 05/26/20  2:29 PM   Result Value Ref Range    Activated Clotting Time (POC) 257 (H) 70 - 128 SECS   POC ACTIVATED CLOTTING TIME    Collection Time: 05/26/20  2:45 PM   Result Value Ref Range    Activated Clotting Time (POC) 329 (H) 70 - 128 SECS   PTT    Collection Time: 05/26/20  5:24 PM   Result Value Ref Range    aPTT >200.0 (HH) 24.3 - 35.4 SEC   GLUCOSE, POC    Collection Time: 05/26/20  5:33 PM   Result Value Ref Range    Glucose (POC) 135 (H) 65 - 100 mg/dL   EKG, 12 LEAD, INITIAL    Collection Time: 05/26/20  5:55 PM   Result Value Ref Range    Ventricular Rate 75 BPM    Atrial Rate 75 BPM    P-R Interval 226 ms    QRS Duration 104 ms    Q-T Interval 444 ms    QTC Calculation (Bezet) 495 ms    Calculated P Axis 59 degrees    Calculated R Axis -27 degrees    Calculated T Axis 29 degrees    Diagnosis       Sinus rhythm with 1st degree A-V block  Prolonged QT  Abnormal ECG  When compared with ECG of 25-MAY-2020 17:48,  No significant change was found  Confirmed by IRVIN NORTON (), Rolando Meraz ([id number]) on 5/27/2020 7:30:55 AM     GLUCOSE, POC    Collection Time: 05/26/20  9:23 PM   Result Value Ref Range    Glucose (POC) 113 (H) 65 - 711 mg/dL   METABOLIC PANEL, BASIC    Collection Time: 05/27/20  3:28 AM   Result Value Ref Range    Sodium 140 136 - 145 mmol/L    Potassium 3.7 3.5 - 5.1 mmol/L    Chloride 110 (H) 98 - 107 mmol/L    CO2 21 21 - 32 mmol/L    Anion gap 9 7 - 16 mmol/L    Glucose 147 (H) 65 - 100 mg/dL    BUN 20 8 - 23 MG/DL    Creatinine 1.39 (H) 0.6 - 1.0 MG/DL    GFR est AA 47 (L) >60 ml/min/1.73m2    GFR est non-AA 39 (L) >60 ml/min/1.73m2    Calcium 9.1 8.3 - 10.4 MG/DL   CBC WITH AUTOMATED DIFF    Collection Time: 05/27/20  3:28 AM   Result Value Ref Range    WBC 10.2 4.3 - 11.1 K/uL    RBC 3.51 (L) 4.05 - 5.2 M/uL    HGB 11.3 (L) 11.7 - 15.4 g/dL    HCT 32.3 (L) 35.8 - 46.3 %    MCV 92.0 79.6 - 97.8 FL    MCH 32.2 26.1 - 32.9 PG    MCHC 35.0 31.4 - 35.0 g/dL    RDW 13.8 11.9 - 14.6 %    PLATELET 128 177 - 258 K/uL    MPV 10.5 9.4 - 12.3 FL    ABSOLUTE NRBC 0.00 0.0 - 0.2 K/uL    DF AUTOMATED      NEUTROPHILS 62 43 - 78 %    LYMPHOCYTES 25 13 - 44 %    MONOCYTES 10 4.0 - 12.0 %    EOSINOPHILS 3 0.5 - 7.8 %    BASOPHILS 0 0.0 - 2.0 %    IMMATURE GRANULOCYTES 1 0.0 - 5.0 %    ABS. NEUTROPHILS 6.3 1.7 - 8.2 K/UL    ABS. LYMPHOCYTES 2.5 0.5 - 4.6 K/UL    ABS. MONOCYTES 1.0 0.1 - 1.3 K/UL    ABS. EOSINOPHILS 0.3 0.0 - 0.8 K/UL    ABS. BASOPHILS 0.0 0.0 - 0.2 K/UL    ABS. IMM.  GRANS. 0.1 0.0 - 0.5 K/UL   LIPID PANEL    Collection Time: 05/27/20  3:28 AM   Result Value Ref Range    LIPID PROFILE          Cholesterol, total 266 (H) <200 MG/DL    Triglyceride 212 (H) 35 - 150 MG/DL    HDL Cholesterol 41 40 - 60 MG/DL    LDL, calculated 182.6 (H) <100 MG/DL    VLDL, calculated 42.4 (H) 6.0 - 23.0 MG/DL    CHOL/HDL Ratio 6.5     MAGNESIUM    Collection Time: 05/27/20  3:28 AM   Result Value Ref Range    Magnesium 2.7 (H) 1.8 - 2.4 mg/dL   TROPONIN I    Collection Time: 05/27/20  3:28 AM   Result Value Ref Range    Troponin-I, Qt. 4.63 (HH) 0.02 - 0.05 NG/ML   GLUCOSE, POC    Collection Time: 05/27/20  6:13 AM   Result Value Ref Range    Glucose (POC) 184 (H) 65 - 100 mg/dL   EKG, 12 LEAD, INITIAL    Collection Time: 05/27/20  7:10 AM   Result Value Ref Range    Ventricular Rate 91 BPM    Atrial Rate 91 BPM    P-R Interval 234 ms    QRS Duration 92 ms    Q-T Interval 392 ms    QTC Calculation (Bezet) 482 ms    Calculated P Axis 56 degrees    Calculated R Axis -50 degrees    Calculated T Axis 58 degrees    Diagnosis       Sinus rhythm with 1st degree A-V block  Left axis deviation  Abnormal ECG  When compared with ECG of 26-MAY-2020 17:55,  Nonspecific T wave abnormality now evident in Lateral leads  Confirmed by IRVIN NORTON (), Rolando Meraz ([id number]) on 5/27/2020 7:37:26 AM         Lab Results   Component Value Date/Time    Cholesterol, total 266 (H) 05/27/2020 03:28 AM    HDL Cholesterol 41 05/27/2020 03:28 AM    LDL, calculated 182.6 (H) 05/27/2020 03:28 AM    VLDL, calculated 42.4 (H) 05/27/2020 03:28 AM    Triglyceride 212 (H) 05/27/2020 03:28 AM    CHOL/HDL Ratio 6.5 05/27/2020 03:28 AM        No results found for: HBA1C, HGBE8, KUV1ICFE, [id number]     CT Results (most recent):  Results from [place] encounter on 05/25/20   CT HEAD WO CONT    Narrative HEAD CT WITHOUT CONTRAST  5/27/2020     HISTORY:   increased AMS  ; leukocytosis; myocardial infarction    TECHNIQUE: Noncontrast axial images were obtained through the brain. All CT  scans at this facility used dose modulation, interactive reconstruction and/or  weight based dosing when appropriate to reduce radiation dose to as low as  reasonably achievable. COMPARISON: May 25, 2020    FINDINGS: There is no acute intracranial hemorrhage, significant mass effect or  CT evidence of acute large artery territorial infarction. Please note that a  hyperacute infarct or small vessel infarct may not be apparent on initial CT  imaging. Mild to moderate cerebral volume loss is present. Scattered areas of  low-attenuation are present in the supratentorial white matter that are  compatible with chronic small vessel ischemic disease. There is no hydrocephalus , intra-axial mass or abnormal extra-axial fluid  collection. There are no displaced skull fractures. The mastoid air cells and  paranasal sinuses are clear where imaged. Impression IMPRESSION: Cerebral volume loss and white matter findings compatible with  chronic small vessel ischemic disease.          Results for orders placed or performed during the hospital encounter of 05/25/20   EKG, 12 LEAD, INITIAL   Result Value Ref Range    Ventricular Rate 91 BPM    Atrial Rate 91 BPM    P-R Interval 234 ms    QRS Duration 92 ms    Q-T Interval 392 ms    QTC Calculation (Bezet) 482 ms    Calculated P Axis 56 degrees    Calculated R Axis -50 degrees    Calculated T Axis 58 degrees    Diagnosis       Sinus rhythm with 1st degree A-V block  Left axis deviation  Abnormal ECG  When compared with ECG of 26-MAY-2020 17:55,  Nonspecific T wave abnormality now evident in Lateral leads  Confirmed by CEBE  MD (), Samsukhwinderhaley Choi ([id number]) on 5/27/2020 7:37:26 AM          MRI Results (most recent):    MRI of the brain reviewed with neuroradiology demonstrates multiple punctate areas of restricted diffusion and anterior and posterior circulation bilaterally. There is a moderate sized right caudate acute infarction as well. This pattern is indicative of an embolic process  No results found for this or any previous visit. US Results (most recent):  Results from Abstract encounter on 08/08/14   US ABD COMP        Most recent CTA    Most recent MRI  No results found for this or any previous visit.         Signed By: Heather Walton MD     May 27, 2020

## 2020-05-27 NOTE — PROGRESS NOTES
5/27/2020 12:37 PM    Admit Date: 5/25/2020    Admit Diagnosis: Elevated troponin [R79.89]; Elevated troponin [R79.89]      Subjective:   No cp or sob- facial droop      Objective:      Visit Vitals  /83   Pulse 82   Temp 98.3 °F (36.8 °C)   Resp 18   Ht 5' 6\" (1.676 m)   Wt 235 lb 3.7 oz (106.7 kg)   SpO2 98%   BMI 37.97 kg/m²       Physical Exam:  Lanis Hews, Well Nourished, No Acute Distress, Alert & Oriented x 3, appropriate mood. Neck- supple, no JVD  CV- regular rate and rhythm no MRG  Lung- clear bilaterally  Abd- soft, nontender, nondistended  Ext- no edema bilaterally. Skin- warm and dry        Data Review:   Recent Labs     05/27/20  0328      K 3.7   BUN 20   CREA 1.39*   WBC 10.2   HGB 11.3*   HCT 32.3*      HDL 41       Assessment/Plan:     Principal Problem:    Elevated troponin (5/26/2020)/ cad- Improved with current therapy.  Will continue medications    Active Problems:    Leukocytosis (5/26/2020)      Nausea (5/26/2020)      Weakness (5/26/2020)/ ? cva- worse- neruo eval in progress

## 2020-05-27 NOTE — PROGRESS NOTES
Problem: Self Care Deficits Care Plan (Adult)  Goal: *Acute Goals and Plan of Care (Insert Text)  Description:   1. Patient will complete lower body bathing and dressing with Mod A and adaptive equipment as needed. 2. Patient will complete toileting and toilet transfer with Mod A and adaptive equipment as needed. 3. Patient will complete seated ADL with good static and fair dynamic seated balance. 4. Patient will complete bed mobility with Mod A and one verbal cue placement of UE to assist.  5. Patient will complete functional transfers with Mod A and adaptive equipment as needed. 6. Patient will complete BUE exercises to increase strength in BUE for performance of ADLs and functional mobility. Timeframe: 7 visits      Outcome: Progressing Towards Goal     OCCUPATIONAL THERAPY: Initial Assessment, Daily Note and AM 5/27/2020  INPATIENT: OT Visit Days: 1  [place] / [place] / Product Type: Medicare /      NAME/AGE/GENDER: Elizabeth Mo is a 66 y.o. female   PRIMARY DIAGNOSIS:  Elevated troponin [R79.89]  Elevated troponin [R79.89] Elevated troponin Elevated troponin    ICD-10: Treatment Diagnosis:    · Generalized Muscle Weakness (M62.81)  · Other lack of cordination (R27.8)  · Difficulty in walking, Not elsewhere classified (R26.2)   Precautions/Allergies:     Ace inhibitors; Adhesive tape; Codeine; Other medication; Other omega-3s; and Sulfa (sulfonamide antibiotics)      ASSESSMENT:     Ms. Elizabeth Rodgers is a 66 y.o. female admitted for chest pain and elevated troponin. Now one day post L heart cath. Pt with prior history of CVA with L sided weakness. At baseline, patient lives in two story home with 5 MOLLY. Prior level of function unclear as pt  reports that pt was using a RW for in home ambulation to one RN and reports that pt was independent without use of DME to another. Pt with slight confusion today and unable to verify.      Pt found supine in bed upon arrival. Pt drowsy but agreeable to be seen for initial OT/PT evaluation. OT worked on self care and balance while PT facilitated functional transfers. Pt oriented x 3 but disoriented to situation and believes that she is here because she had a stroke. BUE grossly decreased for ROM, strength, and coordination with deficits greater on L compared to R side. Deficits on R side still significantly decreased for admitting diagnosis. Pt also observed to have decreased eye opening in R compared to L eye. Therapy with concern that pt may have had a stroke. RN notified but reports that pt has presented similarly since admission but would notify pt Hospitalist and that therapy could continue with initial evaluation/treatment session. Pt required Total A x 2 for supine to sit. Seated edge of bed, pt with poor static and poor dynamic seated balance with significant lean to R side. Pt provided with maximal verbal, visual, and manual cueing to hold head upright and lean to L side to orient to midline. Pt unable to self correct  and required Max assist from therapy to maintain upright position. Pt given warm wash cloth and required Max A to wash face with pt able to reach small portion of face due to decreased ROM and strength. Pt continued to show significant decline for admitting diagnosis and required Total A x 2 for sit to supine. RN notified again. Charge Nurse and Hospitalist in room to assess pt. Tanesha Alfaro is currently functioning below baseline for ADLs and functional mobility and would benefit from acute OT to increase independence and safety with ADLs. Will follow for duration of hospital stay to address the above goals. Patient was educated on role and plan of care of occupational therapy. This section established at most recent assessment   PROBLEM LIST (Impairments causing functional limitations):  1. Decreased Strength  2. Decreased ADL/Functional Activities  3.  Decreased Transfer Abilities  4. Decreased Ambulation Ability/Technique  5. Decreased Balance  6. Decreased Activity Tolerance  7. Increased Fatigue  8. Decreased Flexibility/Joint Mobility  9. Decreased Cognition   INTERVENTIONS PLANNED: (Benefits and precautions of occupational therapy have been discussed with the patient.)  1. Activities of daily living training  2. Adaptive equipment training  3. Balance training  4. Clothing management  5. Neuromuscular re-eduation  6. Re-evaluation  7. Therapeutic activity  8. Therapeutic exercise     TREATMENT PLAN: Frequency/Duration: Follow patient 3x/week to address above goals. Rehabilitation Potential For Stated Goals: [facility] (at time of discharge pending progress):    Placement: It is my opinion, based on this patient's performance to date, that Ms. Ronn Ordaz may benefit from intensive therapy at a [address] [place] [address] after discharge due to the functional deficits listed above that are likely to improve with skilled rehabilitation and concerns that he/she may be unsafe to be unsupervised at home due to decreased independence, safety, baance, strength, ROM, and activity tolerance for performance of ADLs and functional mobility. .  Equipment:    TBD              OCCUPATIONAL PROFILE AND HISTORY:   History of Present Injury/Illness (Reason for Referral):  See H & P  Past Medical History/Comorbidities:   Ms. Ronn Ordaz  has a past medical history of Arrhythmia, Arthritis, Asthma, CAD (coronary artery disease), Chronic pain, Diabetes ([address] GERD (gastroesophageal reflux disease), Hypertension, Menopause, Morbid obesity ([address] Stroke ([address] Thyroid disease, and Unspecified adverse effect of anesthesia.  She also has no past medical history of Aneurysm ([address] Autoimmune disease ([address] Cancer ([address] Chronic kidney disease, Coagulation defects, COPD, Difficult intubation, Heart failure ([address] Liver disease, Malignant hyperthermia due to anesthesia, Nausea & vomiting, Other ill-defined conditions(799.89), Pseudocholinesterase deficiency, Psychiatric disorder, PUD (peptic ulcer disease), Seizures ([facility] Thromboembolus ([facility] or Unspecified sleep apnea. Ms. Mahsa Butler  has a past surgical history that includes hx appendectomy; hx lap cholecystectomy; hx orthopaedic; hx knee arthroscopy; hx clemente and bso; hx breast biopsy; and pr cardiac surg procedure unlist (4/2010). Social History/Living Environment:   Home Environment: Private residence  # Steps to Enter: 10  One/Two Story Residence: Two story  # of Interior Steps: 20(patients room is on second floor)  Living Alone: No  Support Systems: Spouse/Significant Other/Partner  Patient Expects to be Discharged to[de-identified] Private residence  Current DME Used/Available at Home: Walker, rolling  Prior Level of Function/Work/Activity:  Prior level of function unclear as pt  reports that pt was using a RW for in home ambulation to one RN and reports that pt was independent without use of DME to another. Pt with slight confusion today and unable to verify. Personal Factors:          Sex:  female        Age:  66 y.o. Number of Personal Factors/Comorbidities that affect the Plan of Care: Extensive review of physical, cognitive, and psychosocial performance (3+):  HIGH COMPLEXITY   ASSESSMENT OF OCCUPATIONAL PERFORMANCE[de-identified]   Activities of Daily Living:   Basic ADLs (From Assessment) Complex ADLs (From Assessment)   Feeding: Moderate assistance  Oral Facial Hygiene/Grooming: Maximum assistance  Bathing: Maximum assistance  Upper Body Dressing: Maximum assistance  Lower Body Dressing: Total assistance  Toileting: Total assistance Instrumental ADL  Meal Preparation: Total assistance  Homemaking: Total assistance   Grooming/Bathing/Dressing Activities of Daily Living   Grooming  Grooming Assistance: Moderate assistance  Position Performed: Seated edge of bed  Washing Face:  Moderate assistance Cognitive Retraining  Safety/Judgement: Fall prevention;Decreased insight into deficits                       Bed/Mat Mobility  Rolling: Total assistance;Assist x2  Supine to Sit: Total assistance;Assist x2  Sit to Supine: Total assistance;Assist x2  Scooting: Total assistance;Assist x2     Most Recent Physical Functioning:   Gross Assessment:  AROM: Grossly decreased, non-functional  Strength: Grossly decreased, non-functional  Coordination: Grossly decreased, non-functional               Posture:     Balance:  Sitting: Impaired  Sitting - Static: Poor (constant support)  Sitting - Dynamic: Poor (constant support) Bed Mobility:  Rolling: Total assistance;Assist x2  Supine to Sit: Total assistance;Assist x2  Sit to Supine: Total assistance;Assist x2  Scooting: Total assistance;Assist x2  Wheelchair Mobility:     Transfers:               Patient Vitals for the past 6 hrs:   BP SpO2 Pulse   20 1420 118/57 96 % (!) 58       Mental Status  Neurologic State: Drowsy  Orientation Level: Oriented to person, Oriented to place, Oriented to time, Disoriented to situation  Cognition: Decreased attention/concentration, Follows commands  Perception: Cues to attend to left side of body, Cues to maintain midline in sitting  Perseveration: No perseveration noted  Safety/Judgement: Fall prevention, Decreased insight into deficits                          Physical Skills Involved:  1. Range of Motion  2. Balance  3. Strength  4. Activity Tolerance  5. Fine Motor Control  6. Gross Motor Control Cognitive Skills Affected (resulting in the inability to perform in a timely and safe manner):  1. Perception  2. Comprehension  3. Expression Psychosocial Skills Affected:  1. Habits/Routines  2. Environmental Adaptation   Number of elements that affect the Plan of Care: 5+:  HIGH COMPLEXITY   CLINICAL DECISION MAKIN [facility] [address] [address] AM-PAC 6 Clicks   Daily Activity Inpatient Short Form  How much help from another person does the patient currently need. ..  Total A Lot A Little None   1. Putting on and taking off regular lower body clothing? [x] 1   [] 2   [] 3   [] 4   2. Bathing (including washing, rinsing, drying)? [] 1   [x] 2   [] 3   [] 4   3. Toileting, which includes using toilet, bedpan or urinal?   [x] 1   [] 2   [] 3   [] 4   4. Putting on and taking off regular upper body clothing? [] 1   [x] 2   [] 3   [] 4   5. Taking care of personal grooming such as brushing teeth? [] 1   [x] 2   [] 3   [] 4   6. Eating meals? [] 1   [x] 2   [] 3   [] 4   © 2007, Trustees of [address] [place] [address] [address] [address], under license to [employer]. All rights reserved      Score:  Initial: 10, completed, 5/27/2020 Most Recent: X (Date: -- )    Interpretation of Tool:  Represents activities that are increasingly more difficult (i.e. Bed mobility, Transfers, Gait). Medical Necessity:     · Skilled intervention continues to be required due to decreased independence, safety, balance, strength, ROM, and activit tolerance for performance of ADLs and functional mobility. .  Reason for Services/Other Comments:  · Patient continues to require skilled intervention due to medical complications and patient unable to attend/participate in therapy as expected. Use of outcome tool(s) and clinical judgement create a POC that gives a: MODERATE COMPLEXITY         TREATMENT:   (In addition to Assessment/Re-Assessment sessions the following treatments were rendered)     Pre-treatment Symptoms/Complaints: Pt with no verbal complaints. Pain: Initial:   Pain Intensity 1: 0  Post Session:  Unchanged     Today's treatment session addressed Decreased Strength, Decreased ADL/Functional Activities and Decreased Balance to progress towards achieving goal(s) listed above. During this session, Physical Therapy addressed  Functional Transfers to progress towards their discipline specific goal(s).   Co-treatment was necessary to improve patient's cognitive participation, ability to follow higher level commands, ability to increase activity demands and ability to return to normal functional activity. Self Care: (8 minutes): Procedure(s) utilized to improve and/or restore self-care/home management as related to grooming. Required maximal visual, verbal, manual and tactile cueing to facilitate activities of daily living skills. Pt given warm wash cloth and required Max A to wash face with pt able to reach small portion of face due to decreased ROM and strength. Neuromuscular Re-education: (15 minutes):  Exercise/activities below to improve balance, coordination and posture. Required maximal visual, verbal, manual and tactile cues to promote static and dynamic balance in sitting. Seated edge of bed, pt with poor static and poor dynamic seated balance with significant lean to R side. Pt provided with maximal verbal, visual, and manual cueing to hold head upright and lean to L side to orient to midline. Pt unable to self correct  and required Max assist from therapy to maintain upright position. Braces/Orthotics/Lines/Etc:   · farris catheter  · O2 Device: Room air  Treatment/Session Assessment:    · Response to Treatment:  Pt drowsy and with decreased movement observed in R UE.  · Interdisciplinary Collaboration:   o Physical Therapist  o Occupational Therapist  o Registered Nurse  o Physician  o Certified Nursing Assistant/Patient Care Technician  · After treatment position/precautions:   o Supine in bed  o Bed/Chair-wheels locked  o Bed in low position  o Call light within reach  o RN notified  o Nurse at bedside  o Pt with Charge Nurse and RN for transfer off floor for further imaging. · Compliance with Program/Exercises: Compliant most of the time, Will assess as treatment progresses. · Recommendations/Intent for next treatment session: \"Next visit will focus on advancements to more challenging activities and reduction in assistance provided\".   Total Treatment Duration:  OT Patient Time In/Time Out  Time In: 9154  Time Out: [address] [place], OTR/L

## 2020-05-27 NOTE — ROUTINE PROCESS
Spoke with Pooja Diaz MD regarding patient's blood sugar. RN checked it was 113. Patient was to receive 18 of Lantus, MD stated to hold Lantus for blood sugar, also held sliding scale.

## 2020-05-27 NOTE — ROUTINE PROCESS
Spoke with  about plan of care. He seems concerned about the patient coming back home and her mobility. I told him I would pass that along to the day shift RN. He stated he would also call back and voice his concerns again.

## 2020-05-27 NOTE — ROUTINE PROCESS
Bedside and Verbal shift change report given to Maribell Evans, RN and Norris Bragg RN (oncoming nurse) by self Sammi delaney). Report included the following information SBAR, Kardex, Intake/Output, MAR, Recent Results

## 2020-05-27 NOTE — ROUTINE PROCESS
Bedside and Verbal shift change report given to by Saad Rivera RN (offgoing nurse). Report included the following information SBAR, Kardex, MAR and Recent Results.

## 2020-05-27 NOTE — PROGRESS NOTES
Care Management Interventions  PCP Verified by CM: Yes(Dr. Cabrera)  Mode of Transport at Discharge: Other (see comment)(family to transport)  Transition of Care Consult (CM Consult): Discharge Planning  Physical Therapy Consult: Yes  Occupational Therapy Consult: Yes  Current Support Network: Own Home, Lives with Spouse, Other(son lives in home)  Freedom of Choice List was Provided with Basic Dialogue that Supports the Patient's Individualized Plan of Care/Goals, Treatment Preferences and Shares the Quality Data Associated with the Providers?: Yes  Discharge Location  Discharge Placement: Unable to determine at this time    CM received a phone call from pt's spouse, Tony Sanchez, this AM He verbalized concerns about the weakness in his wife and that she would need to go to a rehab prior to returning home. He requested [facility] from his personal experience. CM made referral. Noted PPD ordered. Covid added. PT consult pending as pt developed acute changes in balance and orientation this AM while working with her. Pt sent for emergent CT of the head. Neuro consult pending. CM will continue to follow.

## 2020-05-27 NOTE — PROGRESS NOTES
05/27/20 1918   NIH Stroke Scale   Interval Other (comment)  (shift change)   LOC 0   LOC Questions 0   LOC Commands 0   Best Gaze 0   Visual 0   Facial Palsy 1  (previous stroke. some right droop)   Sensory 0   Best Language 0   Dysarthria 1   Extinction and Inattention 0     Verbal bedside report given to everett Rodriguez RN. Patient's situation, background, assessment and recommendations provided. Opportunity for questions provided. Oncoming RN assumed care of patient.

## 2020-05-27 NOTE — ROUTINE PROCESS
Verbal bedside report received from Abhilash Keen RN. Assumed care of patient. Pt encouraged to move. Is Unable to hold left arm up but will hold right

## 2020-05-27 NOTE — PROGRESS NOTES
Problem: Mobility Impaired (Adult and Pediatric)  Goal: *Acute Goals and Plan of Care (Insert Text)  Description: LTG:  (1.)Ms. Marty Morrell will move from supine to sit and sit to supine , scoot up and down and roll side to side in bed with MINIMAL ASSIST within 7 treatment day(s). (2.)Ms. Marty Morrell will transfer from bed to chair and chair to bed with MODERATE ASSIST using the least restrictive device within 7 treatment day(s). (3.)Ms. Marty Morrell will ambulate with MODERATE ASSIST for 10 feet with the least restrictive device within 7 treatment day(s). (4.)Ms. Marty Morrell will participate in therapeutic activity/exercises x 23 minutes for increased strength within 7 treatment days. (5.)Ms. Marty Morrell will maintain static/dynamic sitting x 15 minutes with SUPERVISION for improved balance within 7 treatment days. ________________________________________________________________________________________________     Outcome: Progressing Towards Goal     PHYSICAL THERAPY: Initial Assessment and AM 5/27/2020  INPATIENT: PT Visit Days : 1  [place] / [place] / Product Type: Medicare /       NAME/AGE/GENDER: Tulio Ross is a 66 y.o. female   PRIMARY DIAGNOSIS: Elevated troponin [R79.89]  Elevated troponin [R79.89] Elevated troponin Elevated troponin        ICD-10: Treatment Diagnosis:    Generalized Muscle Weakness (M62.81)  Difficulty in walking, Not elsewhere classified (R26.2)   Precaution/Allergies:  Ace inhibitors; Adhesive tape; Codeine; Other medication; Other omega-3s; and Sulfa (sulfonamide antibiotics)      ASSESSMENT:     Ms. Marty Morrell is a 66 y.o. female in the hospital for the above who was supine in bed upon arrival.  Pt presenting with L facial droop and reported prior CVA causing L side deficits. She also demonstrated some slurred speech throughout and disorientation. Pt noted to demonstrate grossly decreased strength in B LEs. She also has decreased AROM in B LEs (L>R).   Pt observed with R eye slightly less open throughout evaluation as well and decreased L side awareness. She required totalA x 2 for all mobility and poor sitting balance. Pt noted to have increased lean to R in sitting edge of bed and was given mod-max cuing for midline awareness. Given pt's presentation both RN and hospitalist updated on current status. Ms. Marquis Roy could benefit from skilled PT as she is currently functioning below her baseline. At this time, patient is appropriate for Co-treatment with occupational therapy due to patient's decreased overall endurance/tolerance levels, as well as need for high level skilled assistance to complete functional transfers/mobility and functional tasks. Otis Springer is appropriate for a multidisciplinary co-treatment of PT and OT to address goals of both disciplines. This section established at most recent assessment   PROBLEM LIST (Impairments causing functional limitations):  Decreased Strength  Decreased ADL/Functional Activities  Decreased Transfer Abilities  Decreased Ambulation Ability/Technique  Decreased Balance  Decreased Activity Tolerance  Increased Fatigue  Increased Shortness of Breath  Decreased Cognition   INTERVENTIONS PLANNED: (Benefits and precautions of physical therapy have been discussed with the patient.)  Balance Exercise  Bed Mobility  Family Education  Gait Training  Neuromuscular Re-education/Strengthening  Therapeutic Activites  Therapeutic Exercise/Strengthening  Transfer Training     TREATMENT PLAN: Frequency/Duration: 3 times a week for duration of hospital stay  Rehabilitation Potential For Stated Goals: [facility] (at time of discharge pending progress):    Placement:   It is my opinion, based on this patient's performance to date, that Ms. Marquis Roy may benefit from intensive therapy at a [address] [place] [address] after discharge due to the functional deficits listed above that are likely to improve with skilled rehabilitation and concerns that he/she may be unsafe to be unsupervised at home due to decreased balance, mobility, and activity tolerance. Equipment:   None at this time              HISTORY:   History of Present Injury/Illness (Reason for Referral):  Elevated troponin  Past Medical History/Comorbidities:   Ms. Deysi Washington  has a past medical history of Arrhythmia, Arthritis, Asthma, CAD (coronary artery disease), Chronic pain, Diabetes ([address] GERD (gastroesophageal reflux disease), Hypertension, Menopause, Morbid obesity ([address] Stroke ([address] Thyroid disease, and Unspecified adverse effect of anesthesia. She also has no past medical history of Aneurysm ([address] Autoimmune disease ([address] Cancer ([address] Chronic kidney disease, Coagulation defects, COPD, Difficult intubation, Heart failure ([address] Liver disease, Malignant hyperthermia due to anesthesia, Nausea & vomiting, Other ill-defined conditions(799.89), Pseudocholinesterase deficiency, Psychiatric disorder, PUD (peptic ulcer disease), Seizures ([address] Thromboembolus ([address] or Unspecified sleep apnea. Ms. Deysi Washington  has a past surgical history that includes hx appendectomy; hx lap cholecystectomy; hx orthopaedic; hx knee arthroscopy; hx clemente and bso; hx breast biopsy; and pr cardiac surg procedure unlist (4/2010). Social History/Living Environment:   Home Environment: Private residence  # Steps to Enter: 10  One/Two Story Residence: Two story  # of Interior Steps: 20(patients room is on second floor)  Living Alone: No  Support Systems: Spouse/Significant Other/Partner  Patient Expects to be Discharged to[de-identified] Private residence  Current DME Used/Available at Home: Walker, rolling  Prior Level of Function/Work/Activity:  Per RN, lives with spouse and ambulatory with [employer]. Has been declining recently.      Number of Personal Factors/Comorbidities that affect the Plan of Care: 3+: HIGH COMPLEXITY   EXAMINATION:   Most Recent Physical Functioning:   Gross Assessment:  AROM: Generally decreased, functional(L > R)  Strength: Grossly decreased, non-functional(L > R)  Coordination: Generally decreased, functional(L > R)               Posture:     Balance:  Sitting: Impaired  Sitting - Static: Poor (constant support)  Sitting - Dynamic: Poor (constant support) Bed Mobility:  Rolling: Total assistance;Assist x2  Supine to Sit: Total assistance;Assist x2  Sit to Supine: Total assistance;Assist x2  Scooting: Total assistance;Assist x2  Wheelchair Mobility:     Transfers:     Gait:            Body Structures Involved:  Nerves  Heart  Lungs  Muscles Body Functions Affected:  Mental  Sensory/Pain  Cardio  Respiratory  Neuromusculoskeletal  Movement Related Activities and Participation Affected:  General Tasks and Demands  Mobility  Self Care  Domestic Life  Community, Social and [place]   Number of elements that affect the Plan of Care: 4+: HIGH COMPLEXITY   CLINICAL PRESENTATION:   Presentation: Stable and uncomplicated: LOW COMPLEXITY   CLINICAL DECISION MAKIN[address] [place] [id number] AM-PAC 6 Clicks   Basic Mobility Inpatient Short Form  How much difficulty does the patient currently have. .. Unable A Lot A Little None   1. Turning over in bed (including adjusting bedclothes, sheets and blankets)? [] 1   [x] 2   [] 3   [] 4   2. Sitting down on and standing up from a chair with arms ( e.g., wheelchair, bedside commode, etc.)   [x] 1   [] 2   [] 3   [] 4   3. Moving from lying on back to sitting on the side of the bed? [x] 1   [] 2   [] 3   [] 4   How much help from another person does the patient currently need. .. Total A Lot A Little None   4. Moving to and from a bed to a chair (including a wheelchair)? [x] 1   [] 2   [] 3   [] 4   5. Need to walk in hospital room? [x] 1   [] 2   [] 3   [] 4   6. Climbing 3-5 steps with a railing? [x] 1   [] 2   [] 3   [] 4   © , Trustees of [address] [place] [id number], under license to [email or web address].  All rights reserved Score:  Initial: 7 Most Recent: X (Date: -- )    Interpretation of Tool:  Represents activities that are increasingly more difficult (i.e. Bed mobility, Transfers, Gait). Medical Necessity:     Patient demonstrates   fair   rehab potential due to higher previous functional level. Reason for Services/Other Comments:  Patient continues to require skilled intervention due to   Decreased balance, mobility, and activity tolerance   . Use of outcome tool(s) and clinical judgement create a POC that gives a: Clear prediction of patient's progress: LOW COMPLEXITY            TREATMENT:   (In addition to Assessment/Re-Assessment sessions the following treatments were rendered)   Pre-treatment Symptoms/Complaints:  Lethargic  Pain: Initial:   Pain Intensity 1: 0  Post Session:  0     Today's treatment session addressed Decreased Strength, Decreased ADL/Functional Activities, Decreased Balance, Decreased Activity Tolerance, Increased Fatigue, Increased Shortness of Breath, and Decreased Cognition to progress towards achieving goal(s) . During this session, Occupational Therapy addressed ADLs to progress towards their discipline specific goal(s). Co-treatment was necessary to improve patient's cognitive participation, ability to follow higher level commands, ability to increase activity demands, and ability to return to normal functional activity. Therapeutic Activity: (    25 minutes): Therapeutic activities including rolling, supine to sit <>, scooting, and midline activities at EOB to improve mobility, strength, balance, and coordination. Required maximal   to promote static and dynamic balance in sitting and promote coordination of bilateral, upper extremity(s), lower extremity(s). Braces/Orthotics/Lines/Etc:   O2 Device: Room air  Treatment/Session Assessment:    Response to Treatment:  Fairly given increased lethargy after treatment.   Interdisciplinary Collaboration:   Physical Therapist  Occupational Therapist  Registered Nurse  Physician    Certified Nursing Assistant/Patient Care Technician  Charge RN   After treatment position/precautions:   Supine in bed  Bed/Chair-wheels locked  Bed in low position  Nurse at bedside   Compliance with Program/Exercises: Will assess as treatment progresses  Recommendations/Intent for next treatment session: \"Next visit will focus on advancements to more challenging activities and reduction in assistance provided\".   Total Treatment Duration:  PT Patient Time In/Time Out  Time In: 0907  Time Out: Analia [address] Sunil PT, DPT

## 2020-05-27 NOTE — PROGRESS NOTES
Problem: Dysphagia (Adult)  Goal: *Acute Goals and Plan of Care (Insert Text)  Description: LTG: Patient will tolerate least restrictive diet without overt signs or symptoms of airway compromise. STG: Patient will tolerate clear liquid diet- NECTAR THICK LIQUIDS without overt signs or symptoms of airway compromise. STG: Patient will participate in modified barium swallow study as clinically indicated. Outcome: Progressing Towards Goal    STG: Patient will participate in speech/cognitive-linguistic assessment. SPEECH LANGUAGE PATHOLOGY: DYSPHAGIA- Initial Assessment    NAME/AGE/GENDER: Spring Keating is a 66 y.o. female  DATE: 5/27/2020  PRIMARY DIAGNOSIS: Elevated troponin [R79.89]  Elevated troponin [R79.89]       ICD-10: Treatment Diagnosis: R13.12 Dysphagia, Oropharyngeal Phase    RECOMMENDATIONS   DIET:   Liquids:  nectar- Clear liquid diet- ok to have liquids that are not clear, but liquids only    MEDICATIONS: Crushed in puree     ASPIRATION PRECAUTIONS  Slow rate of intake  Small bites/sips  Upright at 90 degrees during meal     COMPENSATORY STRATEGIES/MODIFICATIONS  Requires assistance with PO intake     EDUCATION:  Recommendations discussed with Hospitalist  Nursing  Patient     CONTINUATION OF SKILLED SERVICES/MEDICAL NECESSITY:  Patient is expected to demonstrate progress in  swallow strength, swallow timeliness, swallow function, diet tolerance, and swallow safety in order to  improve swallow safety, work toward diet advancement, and decrease aspiration risk. Patient continues to require skilled intervention due to oropharyngeal dysphagia. RECOMMENDATIONS for CONTINUED SPEECH THERAPY:   YES: Anticipate need for ongoing speech therapy during this hospitalization and at next level of care. ASSESSMENT   Patient with history of CVA with residual left sided weakness and new acute to subacute CVA per MRI this AM.  Speech dysarthric, but intelligible at word and phrase level. Patient presents with significant oropharyngeal dysphagia characterized by right sided facial droop with mild anterior spillage of liquids, severe oral holding with puree/mild oral holding with liquids, and overt s/sx airway compromise with thin liquids trials. No overt s/sx airway compromise with nectar thick liquid trials. Recommend clear liquid diet (liquids do not have to be clear, but liquids only)-NECTAR THICK LIQUIDS. Medications crushed in puree. Modified barium swallow study tomorrow to objectively assess oropharyngeal swallow function. Will also follow to complete speech/cognitive linguistic assessment. REHABILITATION POTENTIAL FOR STATED GOALS: Good    PLAN    FREQUENCY/DURATION: Continue to follow patient 3 times a week for duration of hospital stay to address above goals. - Recommendations for next treatment session: Next treatment will address modified barium swallow study. SUBJECTIVE   Alert upright in bed. Speech dysarthric. Right sided facial droop. History of Present Injury/Illness: Ms. Lyn Corral  has a past medical history of Arrhythmia, Arthritis, Asthma, CAD (coronary artery disease), Chronic pain, Diabetes ([address] GERD (gastroesophageal reflux disease), Hypertension, Menopause, Morbid obesity ([address] Stroke [facility]), Thyroid disease, and Unspecified adverse effect of anesthesia. She also has no past medical history of Aneurysm ([address] Autoimmune disease ([address] Cancer ([address] Chronic kidney disease, Coagulation defects, COPD, Difficult intubation, Heart failure ([address] Liver disease, Malignant hyperthermia due to anesthesia, Nausea & vomiting, Other ill-defined conditions(799.89), Pseudocholinesterase deficiency, Psychiatric disorder, PUD (peptic ulcer disease), Seizures ([address] Thromboembolus ([address] or Unspecified sleep apnea. . She also  has a past surgical history that includes hx appendectomy; hx lap cholecystectomy; hx orthopaedic; hx knee arthroscopy; hx clemente and bso; hx breast biopsy; and pr cardiac surg procedure unlist (4/2010). Problem List:  (Impairments causing functional limitations):  Oropharyngeal dysphagia  Dysarthria     Previous Dysphagia: NONE REPORTED History of CVA, but no history of dysphagia per chart review. Diet Prior to Evaluation: Regular/thin    Orientation:   Person  Place  Time  Situation    Pain: Pain Scale 1: Numeric (0 - 10)  Pain Intensity 1: 0    Cognitive-Linguistic Screen:  Speech Production:   Impaired  Expressive Language:  Needs further assessment  No deficits in conversation, but limited verbalizations  Receptive Language:  Needs further assessment  Follows 1 and 2 step commands  Cognition:   Needs further assessment      OBJECTIVE   Oral Motor:   Labial: Decreased rate, Decreased seal, and Right droop  Dentition: Intact and Natural  Oral Hygiene: Adequate  Lingual: Decreased rate and Decreased strength    Swallow evaluation:   Patient consumed trials ice chip, thin by teaspoon/cup, nectar by teaspoon/cup/straw/serial sips, puree, and mechanical soft consistency. Immediate weak cough with single sips thin by cup. No overt s/sx airway compromise with ice chip, thin by teaspoon, nectar by teaspoon/cup/straw/serial sips, puree, or mechanical soft trials. Mild anterior spillage on right side with thin and nectar trials by teaspoon and cup. No anterior spillage with nectar by straw. Mild oral holding with all liquids trials; however, patient swallowed bolus without cues. Severe oral holding with puree with patient requiring mod-max verbal cues to swallow bolus. Patient masticated mechanical soft consistency trial; however, again required verbal cues to swallow bolus. Mild diffuse lingual residue post swallow. INTERDISCIPLINARY COLLABORATION: Registered Nurse  PRECAUTIONS/ALLERGIES: Ace inhibitors; Adhesive tape; Codeine; Other medication;  Other omega-3s; and Sulfa (sulfonamide antibiotics)     Tool Used: Dysphagia Outcome and Severity Scale (BROOKLYN)    Score Comments   Normal Diet  [] 7 With no strategies or extra time needed   Functional Swallow  [] 6 May have mild oral or pharyngeal delay   Mild Dysphagia  [] 5 Which may require one diet consistency restricted    Mild-Moderate Dysphagia  [] 4 With 1-2 diet consistencies restricted   Moderate Dysphagia  [x] 3 With 2 or more diet consistencies restricted   Moderate-Severe Dysphagia  [] 2 With partial PO strategies (trials with ST only)   Severe Dysphagia  [] 1 With inability to tolerate any PO safely      Score:  Initial: 3 Most Recent: x (Date 05/27/20 )   Interpretation of Tool: The Dysphagia Outcome and Severity Scale (BROOKLYN) is a simple, easy-to-use, 7-point scale developed to systematically rate the functional severity of dysphagia based on objective assessment and make recommendations for diet level, independence level, and type of nutrition. Current Medications:   No current facility-administered medications on file prior to encounter. Current Outpatient Medications on File Prior to Encounter   Medication Sig Dispense Refill    acetaminophen (Tylenol Extra Strength) 500 mg tablet Take 1,000 mg by mouth two (2) times daily as needed for Pain. amLODIPine (Norvasc) 10 mg tablet Take 10 mg by mouth daily. carvediloL (Coreg) 25 mg tablet Take 12.5 mg by mouth two (2) times daily (with meals). furosemide (Lasix) 40 mg tablet Take  by mouth daily. OTHER,NON-FORMULARY, 18 Units by SubCUTAneous route once. dinner   Indications: diabetes, tresiva      Cholecalciferol, Vitamin D3, (VITAMIN D3) 1,000 unit cap Take 1 Cap by mouth daily. Stop seven days prior to surgery per anesthesia protocol. COZAAR 100 mg Tab Take 100 mg by mouth two (2) times a day. Take 1/2 tablet twice daily. sertraline (ZOLOFT) 50 mg tablet Take 200 mg by mouth two (2) times a day. Take day of surgery per anesthesia protocol. PRILOSEC 20 mg capsule Take 20 mg by mouth daily.  Take day of surgery per anesthesia protocol. SYNTHROID 175 mcg tablet Take 150 mcg by mouth daily. Take day of surgery per anesthesia protocol. ondansetron (ZOFRAN ODT) 4 mg disintegrating tablet Take 1 Tab by mouth every eight (8) hours as needed for Nausea. 12 Tab 0    nitrofurantoin, macrocrystal-monohydrate, (MACROBID) 100 mg capsule Take 1 Cap by mouth two (2) times a day. 20 Cap 0    hyoscyamine SL (LEVSIN/SL) 0.125 mg SL tablet 1 Tab by SubLINGual route every six (6) hours as needed for Cramping. 10 Tab 0    ezetimibe (ZETIA) 10 mg tablet Take  by mouth. insulin glargine (Lantus U-100 Insulin) 100 unit/mL injection by SubCUTAneous route nightly. metoprolol (LOPRESSOR) 50 mg tablet Take 25 mg by mouth two (2) times a day. Indications: HYPERTENSION      aspirin delayed-release 81 mg tablet Take 81 mg by mouth daily. Take day of surgery per anesthesia protocol. insulin aspart (NOVOLOG) 100 unit/mL injection 4 Units by SubCUTAneous route once. With dinner      albuterol (PROVENTIL HFA) 90 mcg/actuation inhaler Take 2 Puffs by inhalation daily as needed. Take day of surgery per anesthesia protocol. BRING DAY OF SURGERY      multivitamin (ONE A DAY) tablet Take 1 Tab by mouth daily. Take day of surgery per anesthesia protocol. ASPIRIN/CAFFEINE (ANACIN PO) Take 2 Tabs by mouth two (2) times a day. Stop seven days prior to surgery per anesthesia protocol. Indications: last dose 3-30-10      CENESTIN 0.9 mg Tab Take  by mouth daily. Take day of surgery per anesthesia protocol.             After treatment position/precautions:  Upright in bed  RN notified  Call light within reach    Total Treatment Duration:   Time In: 6831  Time Out: 317 [facility], [address] [facility]

## 2020-05-28 ENCOUNTER — APPOINTMENT (OUTPATIENT)
Dept: GENERAL RADIOLOGY | Age: 75
DRG: 246 | End: 2020-05-28
Attending: INTERNAL MEDICINE
Payer: MEDICARE

## 2020-05-28 LAB
ANION GAP SERPL CALC-SCNC: 12 MMOL/L (ref 7–16)
BACTERIA SPEC CULT: ABNORMAL
BASOPHILS # BLD: 0 K/UL (ref 0–0.2)
BASOPHILS NFR BLD: 0 % (ref 0–2)
BUN SERPL-MCNC: 22 MG/DL (ref 8–23)
CALCIUM SERPL-MCNC: 9.6 MG/DL (ref 8.3–10.4)
CHLORIDE SERPL-SCNC: 107 MMOL/L (ref 98–107)
CO2 SERPL-SCNC: 22 MMOL/L (ref 21–32)
CREAT SERPL-MCNC: 1.74 MG/DL (ref 0.6–1)
DIFFERENTIAL METHOD BLD: ABNORMAL
EOSINOPHIL # BLD: 0.4 K/UL (ref 0–0.8)
EOSINOPHIL NFR BLD: 4 % (ref 0.5–7.8)
ERYTHROCYTE [DISTWIDTH] IN BLOOD BY AUTOMATED COUNT: 13.5 % (ref 11.9–14.6)
EST. AVERAGE GLUCOSE BLD GHB EST-MCNC: 180 MG/DL
GLUCOSE BLD STRIP.AUTO-MCNC: 156 MG/DL (ref 65–100)
GLUCOSE BLD STRIP.AUTO-MCNC: 179 MG/DL (ref 65–100)
GLUCOSE BLD STRIP.AUTO-MCNC: 214 MG/DL (ref 65–100)
GLUCOSE BLD STRIP.AUTO-MCNC: 242 MG/DL (ref 65–100)
GLUCOSE SERPL-MCNC: 119 MG/DL (ref 65–100)
HBA1C MFR BLD: 7.9 % (ref 4.8–6)
HCT VFR BLD AUTO: 33.5 % (ref 35.8–46.3)
HGB BLD-MCNC: 11.3 G/DL (ref 11.7–15.4)
IMM GRANULOCYTES # BLD AUTO: 0.1 K/UL (ref 0–0.5)
IMM GRANULOCYTES NFR BLD AUTO: 0 % (ref 0–5)
LYMPHOCYTES # BLD: 3 K/UL (ref 0.5–4.6)
LYMPHOCYTES NFR BLD: 26 % (ref 13–44)
MCH RBC QN AUTO: 31.3 PG (ref 26.1–32.9)
MCHC RBC AUTO-ENTMCNC: 33.7 G/DL (ref 31.4–35)
MCV RBC AUTO: 92.8 FL (ref 79.6–97.8)
MM INDURATION POC: 0 MM (ref 0–5)
MONOCYTES # BLD: 1 K/UL (ref 0.1–1.3)
MONOCYTES NFR BLD: 9 % (ref 4–12)
NEUTS SEG # BLD: 6.7 K/UL (ref 1.7–8.2)
NEUTS SEG NFR BLD: 60 % (ref 43–78)
NRBC # BLD: 0 K/UL (ref 0–0.2)
PLATELET # BLD AUTO: 206 K/UL (ref 150–450)
PMV BLD AUTO: 10.3 FL (ref 9.4–12.3)
POTASSIUM SERPL-SCNC: 3.2 MMOL/L (ref 3.5–5.1)
PPD POC: NEGATIVE NEGATIVE
RBC # BLD AUTO: 3.61 M/UL (ref 4.05–5.2)
SERVICE CMNT-IMP: ABNORMAL
SODIUM SERPL-SCNC: 141 MMOL/L (ref 136–145)
WBC # BLD AUTO: 11.2 K/UL (ref 4.3–11.1)

## 2020-05-28 PROCEDURE — 82962 GLUCOSE BLOOD TEST: CPT

## 2020-05-28 PROCEDURE — 74230 X-RAY XM SWLNG FUNCJ C+: CPT

## 2020-05-28 PROCEDURE — 74011250637 HC RX REV CODE- 250/637: Performed by: INTERNAL MEDICINE

## 2020-05-28 PROCEDURE — 80048 BASIC METABOLIC PNL TOTAL CA: CPT

## 2020-05-28 PROCEDURE — 74011250637 HC RX REV CODE- 250/637: Performed by: FAMILY MEDICINE

## 2020-05-28 PROCEDURE — 74011636637 HC RX REV CODE- 636/637: Performed by: INTERNAL MEDICINE

## 2020-05-28 PROCEDURE — L3650 SO 8 ABD RESTRAINT PRE OTS: HCPCS | Performed by: NURSE PRACTITIONER

## 2020-05-28 PROCEDURE — 92611 MOTION FLUOROSCOPY/SWALLOW: CPT

## 2020-05-28 PROCEDURE — 77030038269 HC DRN EXT URIN PURWCK BARD -A

## 2020-05-28 PROCEDURE — 36415 COLL VENOUS BLD VENIPUNCTURE: CPT

## 2020-05-28 PROCEDURE — C8924 2D TTE W OR W/O FOL W/CON,FU: HCPCS

## 2020-05-28 PROCEDURE — 74011000255 HC RX REV CODE- 255: Performed by: INTERNAL MEDICINE

## 2020-05-28 PROCEDURE — 65660000000 HC RM CCU STEPDOWN

## 2020-05-28 PROCEDURE — 83036 HEMOGLOBIN GLYCOSYLATED A1C: CPT

## 2020-05-28 PROCEDURE — 99232 SBSQ HOSP IP/OBS MODERATE 35: CPT | Performed by: NURSE PRACTITIONER

## 2020-05-28 PROCEDURE — 85025 COMPLETE CBC W/AUTO DIFF WBC: CPT

## 2020-05-28 RX ADMIN — EZETIMIBE 10 MG: 10 TABLET ORAL at 08:45

## 2020-05-28 RX ADMIN — TICAGRELOR 90 MG: 90 TABLET ORAL at 17:41

## 2020-05-28 RX ADMIN — INSULIN LISPRO 4 UNITS: 100 INJECTION, SOLUTION INTRAVENOUS; SUBCUTANEOUS at 12:21

## 2020-05-28 RX ADMIN — INSULIN LISPRO 4 UNITS: 100 INJECTION, SOLUTION INTRAVENOUS; SUBCUTANEOUS at 08:42

## 2020-05-28 RX ADMIN — INSULIN GLARGINE 18 UNITS: 100 INJECTION, SOLUTION SUBCUTANEOUS at 20:57

## 2020-05-28 RX ADMIN — Medication 5 ML: at 21:00

## 2020-05-28 RX ADMIN — INSULIN LISPRO 2 UNITS: 100 INJECTION, SOLUTION INTRAVENOUS; SUBCUTANEOUS at 17:41

## 2020-05-28 RX ADMIN — TICAGRELOR 90 MG: 90 TABLET ORAL at 06:13

## 2020-05-28 RX ADMIN — FUROSEMIDE 40 MG: 40 TABLET ORAL at 08:45

## 2020-05-28 RX ADMIN — BARIUM SULFATE 15 ML: 400 PASTE ORAL at 09:43

## 2020-05-28 RX ADMIN — LEVOTHYROXINE SODIUM 150 MCG: 0.07 TABLET ORAL at 06:13

## 2020-05-28 RX ADMIN — CARVEDILOL 12.5 MG: 12.5 TABLET, FILM COATED ORAL at 17:42

## 2020-05-28 RX ADMIN — Medication 5 ML: at 06:13

## 2020-05-28 RX ADMIN — LOSARTAN POTASSIUM 100 MG: 50 TABLET, FILM COATED ORAL at 17:41

## 2020-05-28 RX ADMIN — BARIUM SULFATE 45 ML: 980 POWDER, FOR SUSPENSION ORAL at 09:38

## 2020-05-28 RX ADMIN — SERTRALINE HYDROCHLORIDE 200 MG: 100 TABLET ORAL at 08:44

## 2020-05-28 RX ADMIN — LOSARTAN POTASSIUM 100 MG: 50 TABLET, FILM COATED ORAL at 08:45

## 2020-05-28 RX ADMIN — BARIUM SULFATE 15 ML: 400 SUSPENSION ORAL at 09:44

## 2020-05-28 RX ADMIN — PANTOPRAZOLE SODIUM 40 MG: 40 TABLET, DELAYED RELEASE ORAL at 06:13

## 2020-05-28 RX ADMIN — INSULIN LISPRO 4 UNITS: 100 INJECTION, SOLUTION INTRAVENOUS; SUBCUTANEOUS at 20:58

## 2020-05-28 RX ADMIN — BARIUM SULFATE 15 ML: 400 SUSPENSION ORAL at 09:42

## 2020-05-28 RX ADMIN — ASPIRIN 81 MG 81 MG: 81 TABLET ORAL at 08:45

## 2020-05-28 RX ADMIN — INSULIN LISPRO 2 UNITS: 100 INJECTION, SOLUTION INTRAVENOUS; SUBCUTANEOUS at 08:43

## 2020-05-28 RX ADMIN — INSULIN LISPRO 4 UNITS: 100 INJECTION, SOLUTION INTRAVENOUS; SUBCUTANEOUS at 17:41

## 2020-05-28 RX ADMIN — CARVEDILOL 12.5 MG: 12.5 TABLET, FILM COATED ORAL at 08:44

## 2020-05-28 RX ADMIN — INSULIN LISPRO 4 UNITS: 100 INJECTION, SOLUTION INTRAVENOUS; SUBCUTANEOUS at 12:20

## 2020-05-28 RX ADMIN — Medication 10 ML: at 12:54

## 2020-05-28 RX ADMIN — Medication 10 ML: at 12:55

## 2020-05-28 NOTE — PROGRESS NOTES
Neurology Daily Progress Note     Assessment:     66year old female with acute ischemic stroke, embolic etiology. Likely occurred during cardiac catheterization. Would recommend long-term cardiac monitoring to rule out a fib. Plan:     · Continue DAPT per cardiology   · Initiate high intensity statin   · Recommend long-term cardiac monitoring to rule out PAF  · Neurochecks Q4H  · Telemetry   · Limited TTE with bubble study   · PT/OT/ST  · DVT prophylaxis   · BP management - normotensive, with long-term goal <130/80  · Smoking cessation if applicable   · Diabetes education if applicable   · Depression Screening prior to discharge      Subjective: Interval history:    Left sided weakness is unchanged. TTE demonstrates marked LAD. On DAPT. History:    Kianna Golden is a 66 y.o. female who is being seen for stroke. Review of systems negative with exception of pertinent positives and negatives noted above.        Objective:     Vitals:    05/28/20 0057 05/28/20 0426 05/28/20 0839 05/28/20 0906   BP: 145/70 145/75 156/80    Pulse: 76 83 81    Resp: 18 17 17    Temp: 98.1 °F (36.7 °C) 98.2 °F (36.8 °C) 98.7 °F (37.1 °C)    SpO2: 93% 97% 93%    Weight:  233 lb 4 oz (105.8 kg)  233 lb (105.7 kg)   Height:    5' 6\" (1.676 m)          Current Facility-Administered Medications:     tuberculin injection 5 Units, 5 Units, IntraDERMal, ONCE, Richard Nesbitt MD, 5 Units at 05/27/20 1305    insulin lispro (HUMALOG) injection 4 Units, 4 Units, SubCUTAneous, TIDAC, Richard Nesbitt MD, 4 Units at 05/28/20 1220    insulin lispro (HUMALOG) injection, , SubCUTAneous, AC&HS, Richard Nesbitt MD, 4 Units at 05/28/20 1221    losartan (COZAAR) tablet 100 mg, 100 mg, Oral, BID, Gloria HSIEH MD, 100 mg at 05/28/20 0845    ezetimibe (ZETIA) tablet 10 mg, 10 mg, Oral, DAILY, Gloria HSIEH MD, 10 mg at 05/28/20 0845    pantoprazole (PROTONIX) tablet 40 mg, 40 mg, Oral, ACB, Gloria HSIEH MD, 40 mg at 05/28/20 2645    sodium chloride (NS) flush 5-40 mL, 5-40 mL, IntraVENous, Q8H, Jael HSIEH MD, 5 mL at 05/28/20 5121    sodium chloride (NS) flush 5-40 mL, 5-40 mL, IntraVENous, PRN, Ann Ruiz MD    acetaminophen (TYLENOL) tablet 650 mg, 650 mg, Oral, Q4H PRN, Ann Ruiz MD    ondansetron [facility]) injection 4 mg, 4 mg, IntraVENous, Q4H PRN, Ann Ruiz MD    magnesium hydroxide (MILK OF MAGNESIA) 400 mg/5 mL oral suspension 30 mL, 30 mL, Oral, DAILY PRN, Ann Ruiz MD    heparin (PF) 2 units/ml in NS infusion, 3 Units/hr, IntraarTERial, CONTINUOUS, Tracy Veliz MD, Stopped at 05/26/20 1456    midazolam (VERSED) injection 0.5-2 mg, 0.5-2 mg, IntraVENous, Multiple, Tracy Veliz MD, 1 mg at 05/26/20 1418    heparin (porcine) 10,000 unit/mL injection 1,000-10,000 Units, 1,000-10,000 Units, IntraVENous, Multiple, Tracy Veliz MD, 3,000 Units at 05/26/20 1435    nitroglycerin 0.2 mg/ml IV syringe, 0.1-0.25 mg, IntraCORONary, PRN, Trayc Veliz MD, 0.2 mg at 05/26/20 1442    sodium chloride (NS) flush 5-40 mL, 5-40 mL, IntraVENous, Q8H, Tracy Veliz MD, 10 mL at 05/28/20 1254    sodium chloride (NS) flush 5-40 mL, 5-40 mL, IntraVENous, PRN, Tracy Veliz MD    acetaminophen (TYLENOL) tablet 650 mg, 650 mg, Oral, Q4H PRN, Tracy Veliz MD    morphine injection 1 mg, 1 mg, IntraVENous, Q4H PRN, Tracy Veliz MD    nitroglycerin (NITROSTAT) tablet 0.4 mg, 0.4 mg, SubLINGual, Q5MIN PRN, Tracy Veliz MD    aspirin chewable tablet 81 mg, 81 mg, Oral, DAILY, Tracy Veliz MD, 81 mg at 05/28/20 0845    HYDROcodone-acetaminophen (NORCO) 5-325 mg per tablet 1 Tab, 1 Tab, Oral, Q4H PRN, Tracy Veliz MD    ticagror [facility]) tablet 90 mg, 90 mg, Oral, Q12H, Tracy Veliz MD, 90 mg at 05/28/20 3703    furosemide (LASIX) tablet 40 mg, 40 mg, Oral, DAILY, Richard Nesbitt MD, 40 mg at 05/28/20 0834   levothyroxine (SYNTHROID) tablet 150 mcg, 150 mcg, Oral, 6am, Richard Nesbitt MD, 150 mcg at 05/28/20 4516    sertraline (ZOLOFT) tablet 200 mg, 200 mg, Oral, DAILY, Richard Nesbitt MD, 200 mg at 05/28/20 0844    insulin glargine (LANTUS) injection 18 Units, 18 Units, SubCUTAneous, QHS, Richard Nesbitt MD, 18 Units at 05/27/20 2151    carvediloL (COREG) tablet 12.5 mg, 12.5 mg, Oral, BID WITH MEALS, Richard Nesbitt MD, 12.5 mg at 05/28/20 0844    Recent Results (from the past 12 hour(s))   CBC WITH AUTOMATED DIFF    Collection Time: 05/28/20  3:27 AM   Result Value Ref Range    WBC 11.2 (H) 4.3 - 11.1 K/uL    RBC 3.61 (L) 4.05 - 5.2 M/uL    HGB 11.3 (L) 11.7 - 15.4 g/dL    HCT 33.5 (L) 35.8 - 46.3 %    MCV 92.8 79.6 - 97.8 FL    MCH 31.3 26.1 - 32.9 PG    MCHC 33.7 31.4 - 35.0 g/dL    RDW 13.5 11.9 - 14.6 %    PLATELET 838 715 - 877 K/uL    MPV 10.3 9.4 - 12.3 FL    ABSOLUTE NRBC 0.00 0.0 - 0.2 K/uL    DF AUTOMATED      NEUTROPHILS 60 43 - 78 %    LYMPHOCYTES 26 13 - 44 %    MONOCYTES 9 4.0 - 12.0 %    EOSINOPHILS 4 0.5 - 7.8 %    BASOPHILS 0 0.0 - 2.0 %    IMMATURE GRANULOCYTES 0 0.0 - 5.0 %    ABS. NEUTROPHILS 6.7 1.7 - 8.2 K/UL    ABS. LYMPHOCYTES 3.0 0.5 - 4.6 K/UL    ABS. MONOCYTES 1.0 0.1 - 1.3 K/UL    ABS. EOSINOPHILS 0.4 0.0 - 0.8 K/UL    ABS. BASOPHILS 0.0 0.0 - 0.2 K/UL    ABS. IMM.  GRANS. 0.1 0.0 - 0.5 K/UL   METABOLIC PANEL, BASIC    Collection Time: 05/28/20  3:27 AM   Result Value Ref Range    Sodium 141 136 - 145 mmol/L    Potassium 3.2 (L) 3.5 - 5.1 mmol/L    Chloride 107 98 - 107 mmol/L    CO2 22 21 - 32 mmol/L    Anion gap 12 7 - 16 mmol/L    Glucose 119 (H) 65 - 100 mg/dL    BUN 22 8 - 23 MG/DL    Creatinine 1.74 (H) 0.6 - 1.0 MG/DL    GFR est AA 36 (L) >60 ml/min/1.73m2    GFR est non-AA 30 (L) >60 ml/min/1.73m2    Calcium 9.6 8.3 - 10.4 MG/DL   GLUCOSE, POC    Collection Time: 05/28/20  6:08 AM   Result Value Ref Range    Glucose (POC) 156 (H) 65 - 100 mg/dL   GLUCOSE, POC    Collection Time: 05/28/20 12:08 PM   Result Value Ref Range    Glucose (POC) 214 (H) 65 - 100 mg/dL         Physical Exam:  General - Well developed, well nourished, in no apparent distress. Drowsy. HEENT - Normocephalic, atraumatic. Conjunctiva are clear. Neck - Supple without masses  Cardiovascular - Regular rate and rhythm. Normal S1, S2 without murmurs, rubs, or gallops. Lungs - Clear to auscultation. Abdomen - Soft, nontender with normal bowel sounds. Extremities - Peripheral pulses intact. No edema and no rashes. Neurological examination - Comprehension, attention, memory and reasoning are impaired. Language is normal. Speech is dysarthric. On cranial nerve examination, (II, III, IV, VI) pupils are equal, round, and reactive to light. Visual acuity is adequate. Visual fields are full to finger confrontation. Extraocular motility is normal. (V, VII) Face is asymmetric with left facial droop. (VIII) Hearing is intact. .    Motor examination - There is normal muscle tone and bulk. Strength is 5/5 in RUE, 3/5 in RLE, 2/5 in LUE, LLE. Muscle stretch reflexes are normoactive and there are no pathological reflexes present. Unable to assess sensation, cerebellar function, or gait.      Signed By: Dwain Plaza NP     May 28, 2020

## 2020-05-28 NOTE — PROGRESS NOTES
am  5/28/2020 7:26 AM    Admit Date: 5/25/2020    Admit Diagnosis: Elevated troponin [R79.89]; Elevated troponin [R79.89]      Subjective:    Patient sp cath PCI.  Needs to remain on DAPT    Objective:      Visit Vitals  /75 (BP 1 Location: Right arm, BP Patient Position: At rest)   Pulse 83   Temp 98.2 °F (36.8 °C)   Resp 17   Ht 5' 6\" (1.676 m)   Wt 105.8 kg (233 lb 4 oz)   SpO2 97%   BMI 37.65 kg/m²       ROS:  General ROS: negative for - chills  Hematological and Lymphatic ROS: negative for - blood clots or jaundice  Respiratory ROS: no cough, shortness of breath, or wheezing  Cardiovascular ROS: no chest pain or dyspnea on exertion  Gastrointestinal ROS: no abdominal pain, change in bowel habits, or black or bloody stools  Neurological ROS: no TIA or stroke symptoms    Physical Exam:    Physical Examination: General appearance - alert, well appearing, and in no distress  Mental status - alert, oriented to person, place, and time  Eyes - pupils equal and reactive, extraocular eye movements intact  Neck/lymph - supple, no significant adenopathy  Chest/CV - clear to auscultation, no wheezes, rales or rhonchi, symmetric air entry  Heart - normal rate, regular rhythm, normal S1, S2, no murmurs, rubs, clicks or gallops  Abdomen/GI - soft, nontender, nondistended, no masses or organomegaly  Musculoskeletal - no joint tenderness, deformity or swelling  Extremities - peripheral pulses normal, no pedal edema, no clubbing or cyanosis  Skin - normal coloration and turgor, no rashes, no suspicious skin lesions noted    Current Facility-Administered Medications   Medication Dose Route Frequency    tuberculin injection 5 Units  5 Units IntraDERMal ONCE    insulin lispro (HUMALOG) injection 4 Units  4 Units SubCUTAneous TIDAC    insulin lispro (HUMALOG) injection   SubCUTAneous AC&HS    losartan (COZAAR) tablet 100 mg  100 mg Oral BID    ezetimibe (ZETIA) tablet 10 mg  10 mg Oral DAILY    pantoprazole (PROTONIX) tablet 40 mg  40 mg Oral ACB    sodium chloride (NS) flush 5-40 mL  5-40 mL IntraVENous Q8H    sodium chloride (NS) flush 5-40 mL  5-40 mL IntraVENous PRN    acetaminophen (TYLENOL) tablet 650 mg  650 mg Oral Q4H PRN    ondansetron (ZOFRAN) injection 4 mg  4 mg IntraVENous Q4H PRN    magnesium hydroxide (MILK OF MAGNESIA) 400 mg/5 mL oral suspension 30 mL  30 mL Oral DAILY PRN    heparin (PF) 2 units/ml in NS infusion  3 Units/hr IntraarTERial CONTINUOUS    midazolam (VERSED) injection 0.5-2 mg  0.5-2 mg IntraVENous Multiple    heparin (porcine) 10,000 unit/mL injection 1,000-10,000 Units  1,000-10,000 Units IntraVENous Multiple    nitroglycerin 0.2 mg/ml IV syringe  0.1-0.25 mg IntraCORONary PRN    sodium chloride (NS) flush 5-40 mL  5-40 mL IntraVENous Q8H    sodium chloride (NS) flush 5-40 mL  5-40 mL IntraVENous PRN    acetaminophen (TYLENOL) tablet 650 mg  650 mg Oral Q4H PRN    morphine injection 1 mg  1 mg IntraVENous Q4H PRN    nitroglycerin (NITROSTAT) tablet 0.4 mg  0.4 mg SubLINGual Q5MIN PRN    aspirin chewable tablet 81 mg  81 mg Oral DAILY    HYDROcodone-acetaminophen (NORCO) 5-325 mg per tablet 1 Tab  1 Tab Oral Q4H PRN    ticagrelor (BRILINTA) tablet 90 mg  90 mg Oral Q12H    furosemide (LASIX) tablet 40 mg  40 mg Oral DAILY    levothyroxine (SYNTHROID) tablet 150 mcg  150 mcg Oral 6am    sertraline (ZOLOFT) tablet 200 mg  200 mg Oral DAILY    insulin glargine (LANTUS) injection 18 Units  18 Units SubCUTAneous QHS    carvediloL (COREG) tablet 12.5 mg  12.5 mg Oral BID WITH MEALS       Data Review:   @LABRCNT(Na,K,BUN,CREA,WBC,HGB,HCT,PLT,INR,TRP,TCHOL*,Triglyceride*,LDL*,LDLCPOC HDL*,HDL])@    TELEMETRY: nsr    Assessment/Plan:     Principal Problem:    Elevated troponin (5/26/2020)    Active Problems:    Leukocytosis (5/26/2020)      Nausea (5/26/2020)      Weakness (5/26/2020)      Sp cath pci.  Cont DAPT  Call if needed    Zulema Pineda MD

## 2020-05-28 NOTE — PROGRESS NOTES
Progress Note    Patient: Romy Arceo MRN: [id number]  SSN: [id number]    YOB: 1945  Age: 66 y.o. Sex: female      Admit Date: 5/25/2020    LOS: 2 days     Subjective:     Seen and examined, no acute complains, s/p stroke 36-48 hra ago. She will undergo barium swallow evaluation, today    Objective:     Vitals:    05/28/20 0426 05/28/20 0839 05/28/20 0906 05/28/20 1310   BP: 145/75 156/80  157/86   Pulse: 83 81  81   Resp: 17 17  17   Temp: 98.2 °F (36.8 °C) 98.7 °F (37.1 °C)  98.8 °F (37.1 °C)   SpO2: 97% 93%  93%   Weight: 105.8 kg (233 lb 4 oz)  105.7 kg (233 lb)    Height:   5' 6\" (1.676 m)         Intake and Output:  Current Shift: 05/28 0701 - 05/28 1900  In: 220 [P.O.:220]  Out: -   Last three shifts: 05/26 1901 - 05/28 0700  In: 240 [P.O.:240]  Out: -     Physical Exam:   GENERAL: alert, cooperative, appears stated age, fatigued  LUNG: clear to auscultation bilaterally  HEART: regular rate and rhythm, S1, S2 normal, no murmur, click, rub or gallop  ABDOMEN: soft, non-tender. Bowel sounds normal. No masses,  no organomegaly  EXTREMITIES:  extremities normal, atraumatic, no cyanosis or edema  NEUROLOGIC: left facial droop, somehow more confused, left sided weakness      Lab/Data Review: All lab results for the last 24 hours reviewed.          Assessment:     Principal Problem:    Elevated troponin (5/26/2020)    Active Problems:    Leukocytosis (5/26/2020)      Nausea (5/26/2020)      Weakness (5/26/2020)        Plan:     New acute stroke    5/28/2020  Barium swallow today  Continue aspirin, statin, monitor  Neurology following    5/27/2020  Multifocal  possibly embolic  Neurology following, not a tPA candidate  Continue aspirin, statin     NSTEMI   5/28/2020  S/p stent placement, continue aspirin, and Brilinta     5/27/2020  S/p stent placement 5/26/2020  Cardiology following     5/26/2020  Trending down troponins  Continue heparin drip  Will likely need to go to cardiac cath today  Case discussed with cardiology, appreciate inout       Weakness   Continue PT     Leukocytosis  Possibly reactive, continue monitor CBC    DM II  On lantus and carb counting sliding scale  Signed By: Johnie Luna MD     May 28, 2020

## 2020-05-28 NOTE — PROGRESS NOTES
SPEECH PATHOLOGY NOTE:    Modified barium swallow study complete. Patient presents with moderate-severe oropharyngeal dysphagia. Recommend puree diet and honey thick liquids by teaspoon only. Meds crushed in puree. Full report to follow.       Marcia Robles MS, CCC-SLP

## 2020-05-28 NOTE — ROUTINE PROCESS
Spoke with patients  who and updated him on patients swallowing difficulties. He has received special authorization to come to visit her this afternoon for a small amount of time.

## 2020-05-28 NOTE — ROUTINE PROCESS
Verbal bedside report given to everett Cowart RN. Patient's situation, background, assessment and recommendations provided. Opportunity for questions provided. Oncoming RN assumed care of patient.

## 2020-05-28 NOTE — PROGRESS NOTES
CM met with Sukh Eaton, pt's spouse, during his arranged visit with his wife. Informed him referrals had been sent to his requested SNFs. Passed along to the nurse his request for an MD to explain results of the MRI. CM to continue to follow.

## 2020-05-28 NOTE — ROUTINE PROCESS
Bedside and Verbal shift change report given to by Pratima Espinal RN (offgoing nurse). Report included the following information SBAR, Kardex, MAR and Recent Results.

## 2020-05-28 NOTE — PROGRESS NOTES
Problem: Dysphagia (Adult)  Goal: *Acute Goals and Plan of Care (Insert Text)  Description: LTG: Patient will tolerate least restrictive diet without overt signs or symptoms of airway compromise. STG: Patient will tolerate puree diet and HONEY THICK LIQUIDS by teaspoon  without overt signs or symptoms of airway compromise. Downgraded 5/28/20. STG: Patient will participate in modified barium swallow study as clinically indicated. MET 5/28/20. STG: Patient will participate in laryngeal strengthening exercises with 90% accuracy given minimal cues. ADDED 5/28/20. Outcome: Progressing Towards Goal    STG: Patient will participate in speech/cognitive-linguistic assessment. SPEECH LANGUAGE PATHOLOGY: MODIFIED BARIUM SWALLOW STUDY  Initial Assessment    NAME/AGE/GENDER: Otis Springer is a 66 y.o. female  DATE: 5/28/2020  PRIMARY DIAGNOSIS: Elevated troponin [R79.89]  Elevated troponin [R79.89]      ICD-10: Treatment Diagnosis: Oropharyngeal dysphagia (R13.12)  INTERDISCIPLINARY COLLABORATION: Radiologist, Dr. Daryle Flow  PRECAUTIONS/ALLERGIES: Ace inhibitors; Adhesive tape; Codeine; Other medication;  Other omega-3s; and Sulfa (sulfonamide antibiotics)     RECOMMENDATIONS/PLAN   DIET:    PO diet texture:  Pureed   Liquids:  honey, by spoon only    MEDICATIONS: Crushed in puree     COMPENSATORY STRATEGIES/MODIFICATIONS INCLUDING:  · Fully awake/alert  · Upright for all PO  · 1:1 assistance with all PO  · Small bites and sips  · Liquids by spoon  · Provide verbal cues to swallow puree     OTHER RECOMMENDATIONS (including follow up treatment recommendations):   · Treatment to improve/facilitate oral/pharyngeal skills   · Training in laryngeal strengthening and coordination exercises  · Training in use of compensatory safe swallowing strategies/feeding guidelines  · Patient/family education  · Follow-up MBS as deemed necessary following therapy    Recommendations for next treatment session: Next treatment will address diet tolerance and laryngeal strengthening exercises. ASSESSMENT   Ms. Geneva Hassan presents with moderate-severe oropharyngeal dysphagia. Oral phase of swallow characterized by reduced mastication strength, delayed A-P bolus propulsion, and reduced oral containment/posterior lingual elevation, with all liquids spilling to pharynx pre-swallow. Pharyngeal phase of swallow notable for delayed pharyngeal swallow initiation, reduced laryngeal elevation/excursion, decreased epiglottic inversion, and reduced laryngeal closure. These deficits resulted in the following: Aspiration of thin liquids by teaspoon with weak ineffective cough response. Shallow nonclearing penetration with nectar by teaspoon and SILENT penetration to the cords with nectar by cup on 1 out of 3 trials. Deep nonclearing penetration with honey by cup on 1 out of 2 trials. Silent penetration to cords with liquid portion of mixed consistency. No aspiration/penetration with honey by teaspoon, puree, or solid consistency; however, oral holding with puree, requiring cues to swallow bolus, and significantly prolonged oral prep time with solid conistency. Adequate tongue base retraction and pharyngeal constriction resulted in functional pharyngeal clearance post swallow with all trials. Patient is at risk for aspiration with all PO intake due to delayed pharyngeal swallow intiation; however, patient was able to protect airway with honey by teaspoon and pudding/puree. Recommend puree diet and HONEY THICK LIQUIDS by TEASPOON ONLY. Patient requires 1:1 assistance with meals to ensure carryover of safe swallowing precautions. Patient may benefit from nutrition/dietary per MD discretion due to concerns for patient not meeting nutritional needs orally. Will continue to follow for diet tolerance, PO trials for potential diet advancement, and laryngeal strengthening exercises.     SUBJECTIVE   History of Present Injury/Illness: Ms. Geneva Hassan  has a past medical history of Arrhythmia, Arthritis, Asthma, CAD (coronary artery disease), Chronic pain, Diabetes ([facility] [address] GERD (gastroesophageal reflux disease), Hypertension, Menopause, Morbid obesity ([facility] [address] Stroke [facility]), Thyroid disease, and Unspecified adverse effect of anesthesia. She also has no past medical history of Aneurysm ([facility] [address] Autoimmune disease ([facility] [address] Cancer ([facility] [address] Chronic kidney disease, Coagulation defects, COPD, Difficult intubation, Heart failure ([address] Liver disease, Malignant hyperthermia due to anesthesia, Nausea & vomiting, Other ill-defined conditions(799.89), Pseudocholinesterase deficiency, Psychiatric disorder, PUD (peptic ulcer disease), Seizures ([facility] [address] Thromboembolus ([facility] [address] or Unspecified sleep apnea. . She also  has a past surgical history that includes hx appendectomy; hx lap cholecystectomy; hx orthopaedic; hx knee arthroscopy; hx clemente and bso; hx breast biopsy; and pr cardiac surg procedure unlist (4/2010). Pain:  Pain Intensity 1: 0  Pain Location 1: Abdomen  Pain Orientation 1: Lower      Current dietary status prior to evaluation today:  Clear liquid diet- nectar thick liquids    Previous Modified Barium Swallow studies: N/A    Current Medications:   No current facility-administered medications on file prior to encounter. Current Outpatient Medications on File Prior to Encounter   Medication Sig Dispense Refill    acetaminophen (Tylenol Extra Strength) 500 mg tablet Take 1,000 mg by mouth two (2) times daily as needed for Pain.  amLODIPine (Norvasc) 10 mg tablet Take 10 mg by mouth daily.  carvediloL (Coreg) 25 mg tablet Take 12.5 mg by mouth two (2) times daily (with meals).  furosemide (Lasix) 40 mg tablet Take  by mouth daily.  OTHER,NON-FORMULARY, 18 Units by SubCUTAneous route once. dinner   Indications: diabetes, tresiva      Cholecalciferol, Vitamin D3, (VITAMIN D3) 1,000 unit cap Take 1 Cap by mouth daily.  Stop seven days prior to surgery per anesthesia protocol.  COZAAR 100 mg Tab Take 100 mg by mouth two (2) times a day. Take 1/2 tablet twice daily.  sertraline (ZOLOFT) 50 mg tablet Take 200 mg by mouth two (2) times a day. Take day of surgery per anesthesia protocol.  PRILOSEC 20 mg capsule Take 20 mg by mouth daily. Take day of surgery per anesthesia protocol.  SYNTHROID 175 mcg tablet Take 150 mcg by mouth daily. Take day of surgery per anesthesia protocol.  ondansetron (ZOFRAN ODT) 4 mg disintegrating tablet Take 1 Tab by mouth every eight (8) hours as needed for Nausea. 12 Tab 0    nitrofurantoin, macrocrystal-monohydrate, (MACROBID) 100 mg capsule Take 1 Cap by mouth two (2) times a day. 20 Cap 0    hyoscyamine SL (LEVSIN/SL) 0.125 mg SL tablet 1 Tab by SubLINGual route every six (6) hours as needed for Cramping. 10 Tab 0    ezetimibe (ZETIA) 10 mg tablet Take  by mouth.  insulin glargine (Lantus U-100 Insulin) 100 unit/mL injection by SubCUTAneous route nightly.  metoprolol (LOPRESSOR) 50 mg tablet Take 25 mg by mouth two (2) times a day. Indications: HYPERTENSION      aspirin delayed-release 81 mg tablet Take 81 mg by mouth daily. Take day of surgery per anesthesia protocol.  insulin aspart (NOVOLOG) 100 unit/mL injection 4 Units by SubCUTAneous route once. With dinner      albuterol (PROVENTIL HFA) 90 mcg/actuation inhaler Take 2 Puffs by inhalation daily as needed. Take day of surgery per anesthesia protocol. BRING DAY OF SURGERY      multivitamin (ONE A DAY) tablet Take 1 Tab by mouth daily. Take day of surgery per anesthesia protocol.  ASPIRIN/CAFFEINE (ANACIN PO) Take 2 Tabs by mouth two (2) times a day. Stop seven days prior to surgery per anesthesia protocol. Indications: last dose 3-30-10      CENESTIN 0.9 mg Tab Take  by mouth daily. Take day of surgery per anesthesia protocol.             OBJECTIVE   Orientation:    Person   Place   Situation    Oral Assessment:  Labial: Left droop  Dentition: Intact  Oral Hygiene: Adequate  Lingual: Decreased rate and Decreased strength  Vocal Quality: Low volume    Modified barium swallow study was performed in the radiology suite with Ms. James Monk in the lateral plane upright 90° in a stretcher and using C-arm. To evaluate her swallow function, barium coated liquid and food was administered in the form of thin liquids (by spoon), nectar-thick liquids (by spoon, cup sip and straw sip), honey-thick liquids (by spoon and cup sip), pudding, mixed consistency and cracker. Oral phase of swallow:    inability to draw liquid from straw   prolonged bolus manipulation   prolonged mastication   slow oral transit   reduced posterior propulsion skills   reduced posterior tongue elevation   premature spillage to pharynx pre-swallow    Pharyngeal phase of swallow:    Thin by teaspoon pooled in pyriforms for 5 seconds with aspiration before the swallow. Weak ineffective cough response.  Nectar by teaspoon triggered at pyriforms. Trace nonclearing penetration before the swallow.  Nectar by cup triggered at pyriforms. On initial 2 trials, patient with only shallow penetration before the swallow, which cleared during swallow; however, given nectar by cup later in assessment, patient with SILENT penetration to cords before the swallow.  Nectar by straw- unable to draw liquid from straw.  Honey by teaspoon- triggered at vallecula. No aspiration/penetration.  Honey by cup- smaller sip triggered a vallecula with no aspiration/penetration; however, 2nd larger sip pooled in pyriforms for 4 seconds with deep nonclearing penetration occurring during the swallow. · Pudding- patient holds in oral cavity requiring cues to swallow. Pudding spills to vallecula and pools for 5 seconds with swallow triggered at posterior epiglottis. No aspiration/penetration. · Mixed- liquids portion pools in pyriforms for 4 seconds.  SILENT trace penetration of liquid portion to cords during the swallow. · Solid- triggered at vallecula. No aspiration/penetration. Pharyngeal characteristics:   delayed pharyngeal swallow initiation   adequate retraction of base of tongue   delayed and decreased hyolaryngeal elevation/excursion   delayed and decreased epiglottic inversion   adequate constriction of posterior pharyngeal wall   delayed and decreased laryngeal closure  Attempted strategies:    none  Effective strategies:    none  Aspiration/Penetration Scale: 7 (Aspiration. Contrast passes below the folds/glottis, but is not cleared despite attempt.)    Cervical esophageal phase of swallow:    a limited view. Therefore, unable to rule out an esophageal component of dysphagia  **Distal esophagus not assessed due to limitations of MBS study. Assessment/Reassessment only, no treatment provided today. Tool Used: Dysphagia Outcome and Severity Scale ([place])    Comments   Normal Diet With no strategies or extra time needed   Functional Swallow May have mild oral or pharyngeal delay   Mild Dysphagia Which may require one diet consistency restricted    Mild-Moderate Dysphagia With 1-2 diet consistencies restricted   Moderate Dysphagia With 2 or more diet consistencies restricted   Moderately Severe Dysphagia With partial PO strategies (trials with ST only)   Severe Dysphagia With inability to tolerate any PO safely      Score:  Initial: 3 Most Recent: x (Date:5/28/2020)   Interpretation of Tool: The Dysphagia Outcome and Severity Scale (BR[place]) is a simple, easy-to-use, 7-point scale developed to systematically rate the functional severity of dysphagia based on objective assessment and make recommendations for diet level, independence level, and type of nutrition. [place] / [place] / Product Type: Medicare /     Education:  · Recommendations discussed with RN, Wilburn Severin and Hospitalist, Dr. Neena Keith.     Safety:   After treatment position/precautions:  · Patient waiting in radiology holding bay for transport back to room.     Total Treatment Duration:  Time In: 0915   Time Out: 2[address]

## 2020-05-29 LAB
ANION GAP SERPL CALC-SCNC: 8 MMOL/L (ref 7–16)
BASOPHILS # BLD: 0 K/UL (ref 0–0.2)
BASOPHILS NFR BLD: 0 % (ref 0–2)
BUN SERPL-MCNC: 35 MG/DL (ref 8–23)
CALCIUM SERPL-MCNC: 9.3 MG/DL (ref 8.3–10.4)
CHLORIDE SERPL-SCNC: 109 MMOL/L (ref 98–107)
CO2 SERPL-SCNC: 24 MMOL/L (ref 21–32)
CREAT SERPL-MCNC: 2.17 MG/DL (ref 0.6–1)
DIFFERENTIAL METHOD BLD: ABNORMAL
EMERGENT DISEASE PANEL, EDPR: NOT DETECTED
EOSINOPHIL # BLD: 0.5 K/UL (ref 0–0.8)
EOSINOPHIL NFR BLD: 4 % (ref 0.5–7.8)
ERYTHROCYTE [DISTWIDTH] IN BLOOD BY AUTOMATED COUNT: 13.7 % (ref 11.9–14.6)
GLUCOSE BLD STRIP.AUTO-MCNC: 162 MG/DL (ref 65–100)
GLUCOSE BLD STRIP.AUTO-MCNC: 187 MG/DL (ref 65–100)
GLUCOSE BLD STRIP.AUTO-MCNC: 239 MG/DL (ref 65–100)
GLUCOSE BLD STRIP.AUTO-MCNC: 322 MG/DL (ref 65–100)
GLUCOSE SERPL-MCNC: 129 MG/DL (ref 65–100)
HCT VFR BLD AUTO: 33.8 % (ref 35.8–46.3)
HGB BLD-MCNC: 11.4 G/DL (ref 11.7–15.4)
IMM GRANULOCYTES # BLD AUTO: 0.1 K/UL (ref 0–0.5)
IMM GRANULOCYTES NFR BLD AUTO: 1 % (ref 0–5)
LYMPHOCYTES # BLD: 3.1 K/UL (ref 0.5–4.6)
LYMPHOCYTES NFR BLD: 24 % (ref 13–44)
MCH RBC QN AUTO: 31.6 PG (ref 26.1–32.9)
MCHC RBC AUTO-ENTMCNC: 33.7 G/DL (ref 31.4–35)
MCV RBC AUTO: 93.6 FL (ref 79.6–97.8)
MM INDURATION POC: 0 MM (ref 0–5)
MONOCYTES # BLD: 1.2 K/UL (ref 0.1–1.3)
MONOCYTES NFR BLD: 9 % (ref 4–12)
NEUTS SEG # BLD: 8 K/UL (ref 1.7–8.2)
NEUTS SEG NFR BLD: 62 % (ref 43–78)
NRBC # BLD: 0 K/UL (ref 0–0.2)
PLATELET # BLD AUTO: 230 K/UL (ref 150–450)
PMV BLD AUTO: 10.5 FL (ref 9.4–12.3)
POTASSIUM SERPL-SCNC: 3.6 MMOL/L (ref 3.5–5.1)
PPD POC: NEGATIVE NEGATIVE
RBC # BLD AUTO: 3.61 M/UL (ref 4.05–5.2)
SODIUM SERPL-SCNC: 141 MMOL/L (ref 136–145)
WBC # BLD AUTO: 12.8 K/UL (ref 4.3–11.1)

## 2020-05-29 PROCEDURE — 85025 COMPLETE CBC W/AUTO DIFF WBC: CPT

## 2020-05-29 PROCEDURE — 74011250637 HC RX REV CODE- 250/637: Performed by: FAMILY MEDICINE

## 2020-05-29 PROCEDURE — 92523 SPEECH SOUND LANG COMPREHEN: CPT

## 2020-05-29 PROCEDURE — 65660000000 HC RM CCU STEPDOWN

## 2020-05-29 PROCEDURE — 80048 BASIC METABOLIC PNL TOTAL CA: CPT

## 2020-05-29 PROCEDURE — 74011250637 HC RX REV CODE- 250/637: Performed by: INTERNAL MEDICINE

## 2020-05-29 PROCEDURE — 74011636637 HC RX REV CODE- 636/637: Performed by: INTERNAL MEDICINE

## 2020-05-29 PROCEDURE — 82962 GLUCOSE BLOOD TEST: CPT

## 2020-05-29 PROCEDURE — 36415 COLL VENOUS BLD VENIPUNCTURE: CPT

## 2020-05-29 PROCEDURE — 99232 SBSQ HOSP IP/OBS MODERATE 35: CPT | Performed by: NURSE PRACTITIONER

## 2020-05-29 PROCEDURE — 92526 ORAL FUNCTION THERAPY: CPT

## 2020-05-29 RX ORDER — TRAZODONE HYDROCHLORIDE 100 MG/1
100 TABLET ORAL
COMMUNITY

## 2020-05-29 RX ORDER — INSULIN ASPART 100 [IU]/ML
4 INJECTION, SOLUTION INTRAVENOUS; SUBCUTANEOUS EVERY EVENING
COMMUNITY
End: 2020-06-23

## 2020-05-29 RX ORDER — INSULIN DEGLUDEC INJECTION 100 U/ML
18 INJECTION, SOLUTION SUBCUTANEOUS
COMMUNITY
End: 2020-06-23

## 2020-05-29 RX ADMIN — Medication 5 ML: at 22:24

## 2020-05-29 RX ADMIN — LOSARTAN POTASSIUM 100 MG: 50 TABLET, FILM COATED ORAL at 08:36

## 2020-05-29 RX ADMIN — INSULIN LISPRO 8 UNITS: 100 INJECTION, SOLUTION INTRAVENOUS; SUBCUTANEOUS at 11:29

## 2020-05-29 RX ADMIN — Medication 5 ML: at 06:00

## 2020-05-29 RX ADMIN — INSULIN LISPRO 4 UNITS: 100 INJECTION, SOLUTION INTRAVENOUS; SUBCUTANEOUS at 16:49

## 2020-05-29 RX ADMIN — CARVEDILOL 12.5 MG: 12.5 TABLET, FILM COATED ORAL at 17:03

## 2020-05-29 RX ADMIN — Medication 10 ML: at 14:00

## 2020-05-29 RX ADMIN — LEVOTHYROXINE SODIUM 150 MCG: 0.07 TABLET ORAL at 06:07

## 2020-05-29 RX ADMIN — INSULIN GLARGINE 18 UNITS: 100 INJECTION, SOLUTION SUBCUTANEOUS at 22:48

## 2020-05-29 RX ADMIN — FUROSEMIDE 40 MG: 40 TABLET ORAL at 08:36

## 2020-05-29 RX ADMIN — PANTOPRAZOLE SODIUM 40 MG: 40 TABLET, DELAYED RELEASE ORAL at 06:07

## 2020-05-29 RX ADMIN — INSULIN LISPRO 4 UNITS: 100 INJECTION, SOLUTION INTRAVENOUS; SUBCUTANEOUS at 08:35

## 2020-05-29 RX ADMIN — TICAGRELOR 90 MG: 90 TABLET ORAL at 17:03

## 2020-05-29 RX ADMIN — INSULIN LISPRO 2 UNITS: 100 INJECTION, SOLUTION INTRAVENOUS; SUBCUTANEOUS at 01:58

## 2020-05-29 RX ADMIN — CARVEDILOL 12.5 MG: 12.5 TABLET, FILM COATED ORAL at 08:36

## 2020-05-29 RX ADMIN — SERTRALINE HYDROCHLORIDE 200 MG: 100 TABLET ORAL at 08:36

## 2020-05-29 RX ADMIN — INSULIN LISPRO 4 UNITS: 100 INJECTION, SOLUTION INTRAVENOUS; SUBCUTANEOUS at 11:29

## 2020-05-29 RX ADMIN — ASPIRIN 81 MG 81 MG: 81 TABLET ORAL at 08:36

## 2020-05-29 RX ADMIN — INSULIN LISPRO 4 UNITS: 100 INJECTION, SOLUTION INTRAVENOUS; SUBCUTANEOUS at 16:48

## 2020-05-29 RX ADMIN — TICAGRELOR 90 MG: 90 TABLET ORAL at 06:07

## 2020-05-29 RX ADMIN — INSULIN LISPRO 2 UNITS: 100 INJECTION, SOLUTION INTRAVENOUS; SUBCUTANEOUS at 08:35

## 2020-05-29 RX ADMIN — LOSARTAN POTASSIUM 100 MG: 50 TABLET, FILM COATED ORAL at 17:03

## 2020-05-29 RX ADMIN — EZETIMIBE 10 MG: 10 TABLET ORAL at 08:35

## 2020-05-29 NOTE — PROGRESS NOTES
Progress Note    Patient: Janet Samuel MRN: [id number]  SSN: [id number]    YOB: 1945  Age: 66 y.o. Sex: female      Admit Date: 5/25/2020    LOS: 3 days     Subjective:     Seen and examined, no acute distress, mild improvement, left facial dropp, left sided weakness persistent    Objective:     Vitals:    05/29/20 0059 05/29/20 0502 05/29/20 0835 05/29/20 1232   BP: 142/62 128/50 159/74 119/51   Pulse: 71 78 77 74   Resp: 18 18 18 17   Temp: 98.4 °F (36.9 °C) 99.2 °F (37.3 °C) 98.1 °F (36.7 °C) 97.9 °F (36.6 °C)   SpO2: 95% 95%  94%   Weight:  105.4 kg (232 lb 5.8 oz)     Height:            Intake and Output:  Current Shift: 05/29 0701 - 05/29 1900  In: 360 [P.O.:360]  Out: 500 [Urine:500]  Last three shifts: 05/27 1901 - 05/29 0700  In: 220 [P.O.:220]  Out: 825 [Urine:825]    Physical Exam:   GENERAL: alert, cooperative, no distress, confused, tired  LUNG: clear to auscultation bilaterally  HEART: regular rate and rhythm, S1, S2 normal, no murmur, click, rub or gallop  ABDOMEN: soft, non-tender. Bowel sounds normal. No masses,  no organomegaly  EXTREMITIES:  extremities normal, atraumatic, no cyanosis or edema  NEUROLOGIC: left sided weakness, improved   Lab/Data Review: All lab results for the last 24 hours reviewed.          Assessment:     Principal Problem:    Elevated troponin (5/26/2020)    Active Problems:    Leukocytosis (5/26/2020)      Nausea (5/26/2020)      Weakness (5/26/2020)        Plan:     New acute stroke    5/29/2020  Continue soft diet, continue PT, aspirin, statin, monitor     5/28/2020  Barium swallow today  Continue aspirin, statin, monitor  Neurology following     5/27/2020  Multifocal  possibly embolic  Neurology following, not a tPA candidate  Continue aspirin, statin     NSTEMI   5/28/2020  S/p stent placement, continue aspirin, and Brilinta      5/27/2020  S/p stent placement 5/26/2020  Cardiology following     5/26/2020  Trending down troponins  Continue heparin drip  Will likely need to go to cardiac cath today  Case discussed with cardiology, appreciate inout        Weakness   Continue PT     Leukocytosis  Possibly reactive, continue monitor CBC    Signed By: Evelyne Treviño MD     May 29, 2020

## 2020-05-29 NOTE — PROGRESS NOTES
Problem: Dysphagia (Adult)  Goal: *Acute Goals and Plan of Care (Insert Text)  Description: LTG: Patient will tolerate least restrictive diet without overt signs or symptoms of airway compromise. STG: Patient will tolerate clear liquid diet- NECTAR THICK LIQUIDS without overt signs or symptoms of airway compromise. STG: Patient will participate in modified barium swallow study as clinically indicated. Outcome: Progressing Towards Goal     Problem: Neurolinguistics Impaired (Adult)  Goal: *Speech Goal: (INSERT TEXT)  Description: LTG: Patient will increase receptive/expressive language skills demonstrated by the ability to communicate basic wants/needs across environments   STG: Patient will complete automatic naming tasks with 80% accuracy given minimal cueing  STG: Patient will name common items with 75% accuracy given minimal cueing  STG: Patient will participate in ongoing assessment of speech and language abilities to assist with determining functional goals.     Outcome: Progressing Towards Goal   SPEECH LANGUAGE PATHOLOGY: DYSPHAGIA AND SPEECH-LANGUAGE/COGNITION: Initial Assessment    NAME/AGE/GENDER: Guero Live is a 66 y.o. female  DATE: 5/29/2020  PRIMARY DIAGNOSIS: Elevated troponin [R79.89]  Elevated troponin [R79.89]       ICD-10: Treatment Diagnosis: R13.12 Dysphagia, Oropharyngeal Phase  F80.2 Mixed Receptive-Expressive Language Disorder  R47.1 Dysarthria and Anarthria    RECOMMENDATIONS   DIET:   continue prescribed diet  PO:  Pureed  Liquids:  honey BY TSP ONLY    MEDICATIONS: Crushed in puree     ASPIRATION PRECAUTIONS  Slow rate of intake  Small bites/sips  Upright at 90 degrees during meal  1:1 assistance with meals      COMPENSATORY STRATEGIES/MODIFICATIONS  TSP SIPS ONLY     EDUCATION:  Recommendations discussed with Nursing  Family  Patient     CONTINUATION OF SKILLED SERVICES/MEDICAL NECESSITY:  Patient is expected to demonstrate progress in  swallow strength, swallow timeliness, swallow function, diet tolerance, and swallow safety in order to  improve swallow safety, work toward diet advancement, and decrease aspiration risk. Patient is expected to demonstrate progress in expressive communication and receptive ability to decrease assistance required communication, increase independence with activities of daily living, and increase communication with family/caregivers. Patient continues to require skilled intervention due to dysphagia and aphasia. RECOMMENDATIONS for CONTINUED SPEECH THERAPY: YES: Anticipate need for ongoing speech therapy during this hospitalization and at next level of care. ASSESSMENT   Patient presents with minimal po intake during therapy session. Agreeable only to honey via tsp. Anterior spillage on right, but no overt s/sx of airway compromise. Reviewed results and recommendations of MBS with  who verbally expresses understanding. Recommend puree diet/honey thick liquids via tsp only. 1:1 assistance with meals. Initial language evaluation also completed. Patient with relative strengths in receptive language abilities as she responded to all basic yes/no questions with 100% accuracy. Delayed responses noted. Minimal verbal output and required moderate verbal cues to attempt. Able to state 's name and typical phrase with model. Able to count with model when provided with moderate cues to vocalize. Suspect her expressive language abilities are greater than she reveals at this time. She is functioning significantly below baseline level of function and will benefit from skilled therapy to address above mentioned deficits during inpatient stay and at next level of care. REHABILITATION POTENTIAL FOR STATED GOALS: Good    PLAN    FREQUENCY/DURATION: Continue to follow patient 3 times a week for duration of hospital stay to address above goals.     - Recommendations for next treatment session: Next treatment will address diet tolerance, language treatment     SUBJECTIVE   Alert, agreeable to treatment. Minimal verbalizations. Initially flat affect, but smiling appropriately when  arrived. History of Present Injury/Illness: Ms. Eli Avelar  has a past medical history of Arrhythmia, Arthritis, Asthma, CAD (coronary artery disease), Chronic pain, Diabetes ([address] GERD (gastroesophageal reflux disease), Hypertension, Menopause, Morbid obesity ([address] Stroke [facility]), Thyroid disease, and Unspecified adverse effect of anesthesia. She also has no past medical history of Aneurysm ([address] Autoimmune disease ([address] Cancer ([address] Chronic kidney disease, Coagulation defects, COPD, Difficult intubation, Heart failure ([address] Liver disease, Malignant hyperthermia due to anesthesia, Nausea & vomiting, Other ill-defined conditions(799.89), Pseudocholinesterase deficiency, Psychiatric disorder, PUD (peptic ulcer disease), Seizures ([facility] Thromboembolus ([facility] or Unspecified sleep apnea. . She also  has a past surgical history that includes hx appendectomy; hx lap cholecystectomy; hx orthopaedic; hx knee arthroscopy; hx clemente and bso; hx breast biopsy; and pr cardiac surg procedure unlist (4/2010). Problem List:  (Impairments causing functional limitations):  Oropharyngeal dysphagia  Expressive aphasia  ? Mild receptive aphasia  Orientation:   Person    Pain: Pain Scale 1: Numeric (0 - 10)  Pain Intensity 1: 0    OBJECTIVE   Swallow evaluation:   Patient seen for diet tolerance with puree diet/honey thick liquids via tsp. Results of modified barium swallow study reviewed with patient and . Educated on need for honey thick liquids via tsp only due to silent penetration and aspiration with thin/nectar thick liquids. RN reports good intake with noontime meal. Patient only agreeable to honey thick liquids as she was still full from lunch. Honey thick liquids provided via tsp sip. Mild anterior spillage on right side.  Prolonged oral holding requiring intermittent verbal cues to initiate swallow on ~25% of trials. Single swallow per bolus with no overt s/sx of airway compromise. Cognitive Linguistic Assessment :  Language evaluation initiated this PM. Results as follow:  - Basic yes/no questions re: personal information and preferences 100%  - Simple yes/no questions 100%  - Functional 1-step commands 100% during po trials. Patient with no response to additional commands. - Spontaneous speech stating \"thank you\" at end of session. No additional spontaneous speech  - Modeled single syllable, functional words ('woohoo', and 'Maikel\") with 75% accuracy when provided with encouragement to attempt. - Automatic speech tasks: counting 1-5 with 65% accuracy when provided with model and encouragement to respond (100% accuracy on final trial)  Moderate dysarthria noted on all expressive language tasks. INTERDISCIPLINARY COLLABORATION: Registered Nurse  PRECAUTIONS/ALLERGIES: Ace inhibitors; Adhesive tape; Codeine; Other medication;  Other omega-3s; and Sulfa (sulfonamide antibiotics)     Tool Used: Dysphagia Outcome and Severity Scale (BROOKLYN)    Score Comments   Normal Diet  [] 7 With no strategies or extra time needed   Functional Swallow  [] 6 May have mild oral or pharyngeal delay   Mild Dysphagia  [] 5 Which may require one diet consistency restricted    Mild-Moderate Dysphagia  [] 4 With 1-2 diet consistencies restricted   Moderate Dysphagia  [] 3 With 2 or more diet consistencies restricted   Moderate-Severe Dysphagia  [] 2 With partial PO strategies (trials with ST only)   Severe Dysphagia  [] 1 With inability to tolerate any PO safely      Score:  Initial: 3 Most Recent: x (Date 05/29/20 )   Interpretation of Tool: The Dysphagia Outcome and Severity Scale (BROOKLYN) is a simple, easy-to-use, 7-point scale developed to systematically rate the functional severity of dysphagia based on objective assessment and make recommendations for diet level, independence level, and type of nutrition. Tool Used: MODIFIED SOFIA SCALE (mRS)   Score   No Symptoms  [] 0   No significant disability despite symptoms; able to carry out all usual duties and activities  [] 1   Slight disability; unable to carry out all previous activities but able to look after own affairs without assistance. [] 2   Moderate disability; requiring some help but able to walk without assistance  [] 3   Moderately severe disability; unable to walk without assistance and unable to attend to own bodily needs without assistance  [] 4   Severe disability; bedridden, incontinent, and requiring constant nursing care and attention  [] 5      Score:  Initial: 4    Interpretation of Tool: The Modified Sofia Scale is a 7-point scaled used to quantify level of disability as it relates to a patient's functional abilities. Current Medications:   No current facility-administered medications on file prior to encounter. Current Outpatient Medications on File Prior to Encounter   Medication Sig Dispense Refill    acetaminophen (Tylenol Extra Strength) 500 mg tablet Take 1,000 mg by mouth two (2) times daily as needed for Pain. amLODIPine (Norvasc) 10 mg tablet Take 10 mg by mouth daily. carvediloL (Coreg) 25 mg tablet Take 12.5 mg by mouth two (2) times daily (with meals). furosemide (Lasix) 40 mg tablet Take  by mouth daily. OTHER,NON-FORMULARY, 18 Units by SubCUTAneous route once. dinner   Indications: diabetes, tresiva      Cholecalciferol, Vitamin D3, (VITAMIN D3) 1,000 unit cap Take 1 Cap by mouth daily. Stop seven days prior to surgery per anesthesia protocol. COZAAR 100 mg Tab Take 100 mg by mouth two (2) times a day. Take 1/2 tablet twice daily. sertraline (ZOLOFT) 50 mg tablet Take 200 mg by mouth two (2) times a day. Take day of surgery per anesthesia protocol. PRILOSEC 20 mg capsule Take 20 mg by mouth daily. Take day of surgery per anesthesia protocol. SYNTHROID 175 mcg tablet Take 150 mcg by mouth daily. Take day of surgery per anesthesia protocol. ondansetron (ZOFRAN ODT) 4 mg disintegrating tablet Take 1 Tab by mouth every eight (8) hours as needed for Nausea. 12 Tab 0    nitrofurantoin, macrocrystal-monohydrate, (MACROBID) 100 mg capsule Take 1 Cap by mouth two (2) times a day. 20 Cap 0    hyoscyamine SL (LEVSIN/SL) 0.125 mg SL tablet 1 Tab by SubLINGual route every six (6) hours as needed for Cramping. 10 Tab 0    ezetimibe (ZETIA) 10 mg tablet Take  by mouth. insulin glargine (Lantus U-100 Insulin) 100 unit/mL injection by SubCUTAneous route nightly. metoprolol (LOPRESSOR) 50 mg tablet Take 25 mg by mouth two (2) times a day. Indications: HYPERTENSION      aspirin delayed-release 81 mg tablet Take 81 mg by mouth daily. Take day of surgery per anesthesia protocol. insulin aspart (NOVOLOG) 100 unit/mL injection 4 Units by SubCUTAneous route once. With dinner      albuterol (PROVENTIL HFA) 90 mcg/actuation inhaler Take 2 Puffs by inhalation daily as needed. Take day of surgery per anesthesia protocol. BRING DAY OF SURGERY      multivitamin (ONE A DAY) tablet Take 1 Tab by mouth daily. Take day of surgery per anesthesia protocol. ASPIRIN/CAFFEINE (ANACIN PO) Take 2 Tabs by mouth two (2) times a day. Stop seven days prior to surgery per anesthesia protocol. Indications: last dose 3-30-10      CENESTIN 0.9 mg Tab Take  by mouth daily. Take day of surgery per anesthesia protocol.             SAFETY:  After treatment position/precautions:  Upright in bed  RN notified   at bedside  Call light within reach    Total Treatment Duration:   Time In: 2427  Time Out: 100 [facility] [address] CCC-SLP

## 2020-05-29 NOTE — ROUTINE PROCESS
Bedside and Verbal shift change report given to self (oncoming nurse) by Nawaf Mir (offgoing nurse). Report included the following information SBAR, Kardex, Intake/Output and MAR.

## 2020-05-29 NOTE — PROGRESS NOTES
Neurology Daily Progress Note     Assessment:     66year old female with acute ischemic stroke, embolic etiology. Likely occurred during cardiac catheterization. Would recommend long-term cardiac monitoring to rule out a fib. No additional neurological workup is needed at this time. Patient can follow up with Dr. Cesar Cody in the outpatient clinic after discharge. Will sign off. Plan:     · Continue DAPT per cardiology   · Initiate high intensity statin   · Recommend long-term cardiac monitoring to rule out PAF  · Neurochecks Q4H  · Telemetry   · Limited TTE with bubble study   · PT/OT/ST  · DVT prophylaxis   · BP management - normotensive, with long-term goal <130/80  · Smoking cessation if applicable   · Diabetes education if applicable   · Depression Screening prior to discharge      Subjective: Interval history:    Left sided weakness is unchanged. TTE demonstrates marked LAD, negative for PFO. On DAPT. History:    Erika Parker is a 66 y.o. female who is being seen for stroke. Review of systems negative with exception of pertinent positives and negatives noted above.        Objective:     Vitals:    05/28/20 2046 05/29/20 0059 05/29/20 0502 05/29/20 0835   BP: 110/51 142/62 128/50 159/74   Pulse: 73 71 78 77   Resp: 18 18 18 18   Temp: 97.8 °F (36.6 °C) 98.4 °F (36.9 °C) 99.2 °F (37.3 °C) 98.1 °F (36.7 °C)   SpO2: 96% 95% 95%    Weight:   232 lb 5.8 oz (105.4 kg)    Height:              Current Facility-Administered Medications:     insulin lispro (HUMALOG) injection 4 Units, 4 Units, SubCUTAneous, TIDACLaz Catalin, MD, 4 Units at 05/29/20 1129    insulin lispro (HUMALOG) injection, , SubCUTAneous, AC&HS, Richard Nesbitt MD, 8 Units at 05/29/20 1129    losartan (COZAAR) tablet 100 mg, 100 mg, Oral, BID, Billy HSIEH MD, 100 mg at 05/29/20 0836    ezetimibe (ZETIA) tablet 10 mg, 10 mg, Oral, DAILY, Billy HSIEH MD, 10 mg at 05/29/20 0835    pantoprazole (PROTONIX) tablet 40 mg, 40 mg, Oral, ACB, Johnny HSIEH MD, 40 mg at 05/29/20 0607    sodium chloride (NS) flush 5-40 mL, 5-40 mL, IntraVENous, Q8H, Johnny HSIEH MD, 5 mL at 05/29/20 0600    sodium chloride (NS) flush 5-40 mL, 5-40 mL, IntraVENous, PRN, Bruce Smith MD    acetaminophen (TYLENOL) tablet 650 mg, 650 mg, Oral, Q4H PRN, Bruce Smith MD    [facility]) injection 4 mg, 4 mg, IntraVENous, Q4H PRN, Bruce Smith MD    magnesium hydroxide (MILK OF MAGNESIA) 400 mg/5 mL oral suspension 30 mL, 30 mL, Oral, DAILY PRN, Bruce Smith MD    heparin (PF) 2 units/ml in NS infusion, 3 Units/hr, IntraarTERial, CONTINUOUS, Paul Veliz MD, Stopped at 05/26/20 1456    midazolam (VERSED) injection 0.5-2 mg, 0.5-2 mg, IntraVENous, Multiple, Paul Veliz MD, 1 mg at 05/26/20 1418    heparin (porcine) 10,000 unit/mL injection 1,000-10,000 Units, 1,000-10,000 Units, IntraVENous, Multiple, Paul Veliz MD, 3,000 Units at 05/26/20 1435    nitroglycerin 0.2 mg/ml IV syringe, 0.1-0.25 mg, IntraCORONary, PRN, Paul Veliz MD, 0.2 mg at 05/26/20 1442    sodium chloride (NS) flush 5-40 mL, 5-40 mL, IntraVENous, Q8H, Paul Veliz MD, 5 mL at 05/28/20 2100    sodium chloride (NS) flush 5-40 mL, 5-40 mL, IntraVENous, PRN, Paul Veliz MD    acetaminophen (TYLENOL) tablet 650 mg, 650 mg, Oral, Q4H PRN, Paul Veliz MD    morphine injection 1 mg, 1 mg, IntraVENous, Q4H PRN, Paul Veliz MD    nitroglycerin (NITROSTAT) tablet 0.4 mg, 0.4 mg, SubLINGual, Q5MIN PRN, Paul Veliz MD    aspirin chewable tablet 81 mg, 81 mg, Oral, DAILY, Paul Veliz MD, 81 mg at 05/29/20 0836    HYDROcodone-acetaminophen (NORCO) 5-325 mg per tablet 1 Tab, 1 Tab, Oral, Q4H PRN, Paul Veliz MD    [facility]) tablet 90 mg, 90 mg, Oral, Q12H, Paul Veliz MD, 90 mg at 05/29/20 0607    furosemide (LASIX) tablet 40 mg, 40 mg, Oral, DAILY, Richard Nesbitt MD, 40 mg at 05/29/20 0836    levothyroxine (SYNTHROID) tablet 150 mcg, 150 mcg, Oral, 6am, Richard Nesbitt MD, 150 mcg at 05/29/20 0607    sertraline (ZOLOFT) tablet 200 mg, 200 mg, Oral, DAILY, Richard Nesbitt MD, 200 mg at 05/29/20 0836    insulin glargine (LANTUS) injection 18 Units, 18 Units, SubCUTAneous, QHS, Richrad Nesbitt MD, 18 Units at 05/28/20 2057    carvediloL (COREG) tablet 12.5 mg, 12.5 mg, Oral, BID WITH MEALS, Richard Nesbitt MD, 12.5 mg at 05/29/20 0836    Recent Results (from the past 12 hour(s))   CBC WITH AUTOMATED DIFF    Collection Time: 05/29/20  3:40 AM   Result Value Ref Range    WBC 12.8 (H) 4.3 - 11.1 K/uL    RBC 3.61 (L) 4.05 - 5.2 M/uL    HGB 11.4 (L) 11.7 - 15.4 g/dL    HCT 33.8 (L) 35.8 - 46.3 %    MCV 93.6 79.6 - 97.8 FL    MCH 31.6 26.1 - 32.9 PG    MCHC 33.7 31.4 - 35.0 g/dL    RDW 13.7 11.9 - 14.6 %    PLATELET 811 142 - 585 K/uL    MPV 10.5 9.4 - 12.3 FL    ABSOLUTE NRBC 0.00 0.0 - 0.2 K/uL    DF AUTOMATED      NEUTROPHILS 62 43 - 78 %    LYMPHOCYTES 24 13 - 44 %    MONOCYTES 9 4.0 - 12.0 %    EOSINOPHILS 4 0.5 - 7.8 %    BASOPHILS 0 0.0 - 2.0 %    IMMATURE GRANULOCYTES 1 0.0 - 5.0 %    ABS. NEUTROPHILS 8.0 1.7 - 8.2 K/UL    ABS. LYMPHOCYTES 3.1 0.5 - 4.6 K/UL    ABS. MONOCYTES 1.2 0.1 - 1.3 K/UL    ABS. EOSINOPHILS 0.5 0.0 - 0.8 K/UL    ABS. BASOPHILS 0.0 0.0 - 0.2 K/UL    ABS. IMM.  GRANS. 0.1 0.0 - 0.5 K/UL   METABOLIC PANEL, BASIC    Collection Time: 05/29/20  3:40 AM   Result Value Ref Range    Sodium 141 136 - 145 mmol/L    Potassium 3.6 3.5 - 5.1 mmol/L    Chloride 109 (H) 98 - 107 mmol/L    CO2 24 21 - 32 mmol/L    Anion gap 8 7 - 16 mmol/L    Glucose 129 (H) 65 - 100 mg/dL    BUN 35 (H) 8 - 23 MG/DL    Creatinine 2.17 (H) 0.6 - 1.0 MG/DL    GFR est AA 28 (L) >60 ml/min/1.73m2    GFR est non-AA 23 (L) >60 ml/min/1.73m2    Calcium 9.3 8.3 - 10.4 MG/DL   GLUCOSE, POC    Collection Time: 05/29/20  6:27 AM   Result Value Ref Range Glucose (POC) 162 (H) 65 - 100 mg/dL   GLUCOSE, POC    Collection Time: 05/29/20 11:03 AM   Result Value Ref Range    Glucose (POC) 322 (H) 65 - 100 mg/dL     MRI Results (most recent):  Results from [place] encounter on 05/25/20   MRI BRAIN WO CONT    Narrative EXAMINATION: BRAIN MRI 5/27/2020 11:49 AM    ACCESSION NUMBER: [id number]    INDICATION: Worsening of old stroke    COMPARISON: Head CT 5/27/2020 and 5/25/2020    TECHNIQUE: Multiplanar multisequence MRI of the brain without intravenous  contrast.    FINDINGS:    There are numerous small and punctate areas of restricted diffusion scattered  throughout the supratentorial brain parenchyma, worst in the right lentiform and  caudate nuclei. There are multiple foci of restricted diffusion throughout both cerebellar  hemispheres. There is a moderate-sized focus of restricted diffusion in the  right aspect of the benjy. The preponderance of these areas demonstrate T2  hyperintensity. The constellation of findings is consistent with multiple supratentorial acute  or early subacute infarctions involving multiple vascular distributions. None of the infarctions demonstrates significant associated mass effect or  hemorrhagic conversion. The ventricles are within normal limits for the degree of global brain  parenchymal volume loss. There is no midline shift. The basilar cisterns are  patent. There is no cerebellar tonsillar ectopia or herniation. There are T2 hyperintensities in the periventricular and subcortical white  matter and benjy, a nonspecific finding which likely represents chronic  microangiopathy. Prior bilateral lens replacement. The expected large vascular flow voids are preserved. There are no suspicious osseous lesions. Impression IMPRESSION:    1. Numerous acute or early subacute infarctions scattered throughout the  supratentorial and infratentorial brain parenchyma.  These are most likely  embolic infarctions from a central source. The most confluent areas of  involvement include the right caudate and lentiform nuclei, the benjy, and the  left cerebellar hemisphere. There is no evidence of significant mass effect or  hemorrhagic conversion. 2. Moderate burden of chronic microangiopathy. 3. Discussed with Dr. Abigail Del Rio in person by Dr. Graciela Johnston at 11:45 AM 5/27/2020. VOICE DICTATED BY: Dr. Maged Melendez            Physical Exam:  General - Well developed, well nourished, in no apparent distress. Drowsy. HEENT - Normocephalic, atraumatic. Conjunctiva are clear. Neck - Supple without masses  Cardiovascular - Regular rate and rhythm. Normal S1, S2 without murmurs, rubs, or gallops. Lungs - Clear to auscultation. Abdomen - Soft, nontender with normal bowel sounds. Extremities - Peripheral pulses intact. No edema and no rashes. Neurological examination - Comprehension, attention, memory and reasoning are impaired. Language is normal. Speech is dysarthric. On cranial nerve examination, (II, III, IV, VI) pupils are equal, round, and reactive to light. Visual acuity is adequate. Visual fields are full to finger confrontation. Extraocular motility is normal. (V, VII) Face is asymmetric with left facial droop. (VIII) Hearing is intact. .    Motor examination - There is normal muscle tone and bulk. Strength is 5/5 in RUE, 3/5 in RLE, 2/5 in LUE, LLE. Muscle stretch reflexes are normoactive and there are no pathological reflexes present. Unable to assess sensation, cerebellar function, or gait.      Signed By: Reji Payton NP     May 29, 2020

## 2020-05-29 NOTE — ROUTINE PROCESS
Bedside and verbal report given to KYLIE Zayas by Irma Navarro. Report included the following information SBAR, Kardex, Intake/Output and MAR. Oncoming RN assumed care of the patient.

## 2020-05-30 LAB
ANION GAP SERPL CALC-SCNC: 7 MMOL/L (ref 7–16)
BACTERIA SPEC CULT: NORMAL
BACTERIA SPEC CULT: NORMAL
BASOPHILS # BLD: 0 K/UL (ref 0–0.2)
BASOPHILS NFR BLD: 0 % (ref 0–2)
BUN SERPL-MCNC: 51 MG/DL (ref 8–23)
CALCIUM SERPL-MCNC: 9.4 MG/DL (ref 8.3–10.4)
CHLORIDE SERPL-SCNC: 110 MMOL/L (ref 98–107)
CO2 SERPL-SCNC: 24 MMOL/L (ref 21–32)
CREAT SERPL-MCNC: 2.59 MG/DL (ref 0.6–1)
DIFFERENTIAL METHOD BLD: ABNORMAL
EOSINOPHIL # BLD: 0.6 K/UL (ref 0–0.8)
EOSINOPHIL NFR BLD: 5 % (ref 0.5–7.8)
ERYTHROCYTE [DISTWIDTH] IN BLOOD BY AUTOMATED COUNT: 14.1 % (ref 11.9–14.6)
GLUCOSE BLD STRIP.AUTO-MCNC: 170 MG/DL (ref 65–100)
GLUCOSE BLD STRIP.AUTO-MCNC: 178 MG/DL (ref 65–100)
GLUCOSE BLD STRIP.AUTO-MCNC: 259 MG/DL (ref 65–100)
GLUCOSE BLD STRIP.AUTO-MCNC: 261 MG/DL (ref 65–100)
GLUCOSE SERPL-MCNC: 144 MG/DL (ref 65–100)
HCT VFR BLD AUTO: 31.9 % (ref 35.8–46.3)
HGB BLD-MCNC: 10.9 G/DL (ref 11.7–15.4)
IMM GRANULOCYTES # BLD AUTO: 0.1 K/UL (ref 0–0.5)
IMM GRANULOCYTES NFR BLD AUTO: 1 % (ref 0–5)
LYMPHOCYTES # BLD: 3.1 K/UL (ref 0.5–4.6)
LYMPHOCYTES NFR BLD: 26 % (ref 13–44)
MCH RBC QN AUTO: 32.2 PG (ref 26.1–32.9)
MCHC RBC AUTO-ENTMCNC: 34.2 G/DL (ref 31.4–35)
MCV RBC AUTO: 94.4 FL (ref 79.6–97.8)
MM INDURATION POC: 0 MM (ref 0–5)
MONOCYTES # BLD: 1 K/UL (ref 0.1–1.3)
MONOCYTES NFR BLD: 9 % (ref 4–12)
NEUTS SEG # BLD: 6.8 K/UL (ref 1.7–8.2)
NEUTS SEG NFR BLD: 59 % (ref 43–78)
NRBC # BLD: 0 K/UL (ref 0–0.2)
PLATELET # BLD AUTO: 226 K/UL (ref 150–450)
PMV BLD AUTO: 11 FL (ref 9.4–12.3)
POTASSIUM SERPL-SCNC: 3.8 MMOL/L (ref 3.5–5.1)
PPD POC: NEGATIVE NEGATIVE
RBC # BLD AUTO: 3.38 M/UL (ref 4.05–5.2)
SERVICE CMNT-IMP: NORMAL
SERVICE CMNT-IMP: NORMAL
SODIUM SERPL-SCNC: 141 MMOL/L (ref 136–145)
WBC # BLD AUTO: 11.6 K/UL (ref 4.3–11.1)

## 2020-05-30 PROCEDURE — 74011250637 HC RX REV CODE- 250/637: Performed by: FAMILY MEDICINE

## 2020-05-30 PROCEDURE — 85025 COMPLETE CBC W/AUTO DIFF WBC: CPT

## 2020-05-30 PROCEDURE — 36415 COLL VENOUS BLD VENIPUNCTURE: CPT

## 2020-05-30 PROCEDURE — 74011250637 HC RX REV CODE- 250/637: Performed by: INTERNAL MEDICINE

## 2020-05-30 PROCEDURE — 82962 GLUCOSE BLOOD TEST: CPT

## 2020-05-30 PROCEDURE — 97110 THERAPEUTIC EXERCISES: CPT

## 2020-05-30 PROCEDURE — 74011636637 HC RX REV CODE- 636/637: Performed by: INTERNAL MEDICINE

## 2020-05-30 PROCEDURE — 65660000000 HC RM CCU STEPDOWN

## 2020-05-30 PROCEDURE — 74011250636 HC RX REV CODE- 250/636: Performed by: INTERNAL MEDICINE

## 2020-05-30 PROCEDURE — 80048 BASIC METABOLIC PNL TOTAL CA: CPT

## 2020-05-30 RX ORDER — SODIUM CHLORIDE 9 MG/ML
75 INJECTION, SOLUTION INTRAVENOUS CONTINUOUS
Status: DISPENSED | OUTPATIENT
Start: 2020-05-30 | End: 2020-05-31

## 2020-05-30 RX ADMIN — TICAGRELOR 90 MG: 90 TABLET ORAL at 18:10

## 2020-05-30 RX ADMIN — INSULIN LISPRO 2 UNITS: 100 INJECTION, SOLUTION INTRAVENOUS; SUBCUTANEOUS at 08:33

## 2020-05-30 RX ADMIN — ASPIRIN 81 MG 81 MG: 81 TABLET ORAL at 08:31

## 2020-05-30 RX ADMIN — Medication 10 ML: at 06:26

## 2020-05-30 RX ADMIN — INSULIN LISPRO 4 UNITS: 100 INJECTION, SOLUTION INTRAVENOUS; SUBCUTANEOUS at 08:32

## 2020-05-30 RX ADMIN — LEVOTHYROXINE SODIUM 150 MCG: 0.07 TABLET ORAL at 06:26

## 2020-05-30 RX ADMIN — INSULIN LISPRO 2 UNITS: 100 INJECTION, SOLUTION INTRAVENOUS; SUBCUTANEOUS at 22:13

## 2020-05-30 RX ADMIN — Medication 5 ML: at 22:16

## 2020-05-30 RX ADMIN — SERTRALINE HYDROCHLORIDE 200 MG: 100 TABLET ORAL at 08:31

## 2020-05-30 RX ADMIN — CARVEDILOL 12.5 MG: 12.5 TABLET, FILM COATED ORAL at 18:10

## 2020-05-30 RX ADMIN — INSULIN LISPRO 6 UNITS: 100 INJECTION, SOLUTION INTRAVENOUS; SUBCUTANEOUS at 16:34

## 2020-05-30 RX ADMIN — INSULIN LISPRO 4 UNITS: 100 INJECTION, SOLUTION INTRAVENOUS; SUBCUTANEOUS at 16:34

## 2020-05-30 RX ADMIN — LOSARTAN POTASSIUM 100 MG: 50 TABLET, FILM COATED ORAL at 08:31

## 2020-05-30 RX ADMIN — INSULIN GLARGINE 18 UNITS: 100 INJECTION, SOLUTION SUBCUTANEOUS at 22:12

## 2020-05-30 RX ADMIN — SODIUM CHLORIDE 75 ML/HR: 900 INJECTION, SOLUTION INTRAVENOUS at 20:46

## 2020-05-30 RX ADMIN — PANTOPRAZOLE SODIUM 40 MG: 40 TABLET, DELAYED RELEASE ORAL at 06:26

## 2020-05-30 RX ADMIN — EZETIMIBE 10 MG: 10 TABLET ORAL at 08:31

## 2020-05-30 RX ADMIN — FUROSEMIDE 40 MG: 40 TABLET ORAL at 08:31

## 2020-05-30 RX ADMIN — TICAGRELOR 90 MG: 90 TABLET ORAL at 06:28

## 2020-05-30 RX ADMIN — CARVEDILOL 12.5 MG: 12.5 TABLET, FILM COATED ORAL at 08:31

## 2020-05-30 RX ADMIN — INSULIN LISPRO 4 UNITS: 100 INJECTION, SOLUTION INTRAVENOUS; SUBCUTANEOUS at 11:19

## 2020-05-30 RX ADMIN — LOSARTAN POTASSIUM 100 MG: 50 TABLET, FILM COATED ORAL at 18:10

## 2020-05-30 RX ADMIN — SODIUM CHLORIDE 75 ML/HR: 900 INJECTION, SOLUTION INTRAVENOUS at 08:41

## 2020-05-30 RX ADMIN — INSULIN LISPRO 6 UNITS: 100 INJECTION, SOLUTION INTRAVENOUS; SUBCUTANEOUS at 11:19

## 2020-05-30 NOTE — PROGRESS NOTES
Progress Note    Patient: Elizabeth Mo MRN: [id number]  SSN: [id number]    YOB: 1945  Age: 66 y.o. Sex: female      Admit Date: 5/25/2020    LOS: 4 days     Subjective:     Seen and examined, patient looks more tired fatigued and less talkative today. Left facial droop more pronounced today than yesterday. Objective:     Vitals:    05/30/20 0051 05/30/20 0452 05/30/20 0831 05/30/20 1242   BP: 127/59 136/72 120/52 114/66   Pulse: 76 73 87 75   Resp: 18 18 18 18   Temp: 98 °F (36.7 °C) 98.2 °F (36.8 °C) 98.1 °F (36.7 °C) 97.9 °F (36.6 °C)   SpO2: 96% 95% 98% 94%   Weight:  107 kg (235 lb 14.3 oz)     Height:            Intake and Output:  Current Shift: 05/30 0701 - 05/30 1900  In: 180 [P.O.:180]  Out: 450 [Urine:450]  Last three shifts: 05/28 1901 - 05/30 0700  In: 360 [P.O.:360]  Out: 1325 [Urine:1325]    Physical Exam:   GENERAL: alert, cooperative, no distress, appears stated age  LUNG: clear to auscultation bilaterally  HEART: regular rate and rhythm, S1, S2 normal, no murmur, click, rub or gallop  ABDOMEN: soft, non-tender. Bowel sounds normal. No masses,  no organomegaly  EXTREMITIES:  extremities normal, atraumatic, no cyanosis or edema  Neuro: ;eft facial droop, left sided hemiparesis    Lab/Data Review: All lab results for the last 24 hours reviewed. Assessment:     Principal Problem:    Elevated troponin (5/26/2020)    Active Problems:    Leukocytosis (5/26/2020)      Nausea (5/26/2020)      Weakness (5/26/2020)        Plan:     New acute stroke  5/30/2020  Continue PT, pending placement, continue aspirin, statin.  Neurology followed patient, they signed off today    5/29/2020  Continue current management, neurology following     5/28/2020  Barium swallow today  Continue aspirin, statin, monitor  Neurology following     5/27/2020  Multifocal  possibly embolic  Neurology following, not a tPA candidate  Continue aspirin, statin     NSTEMI   Continue aspirin, brilinta, s/p stent placement    5/28/2020  S/p stent placement, continue aspirin, and Brilinta      5/27/2020  S/p stent placement 5/26/2020  Cardiology following     5/26/2020  Trending down troponins  Continue heparin drip  Will likely need to go to cardiac cath today  Case discussed with cardiology, appreciate inout        Weakness   Continue PT     Leukocytosis  Possibly reactive, continue monitor CBC     DM II  On lantus and carb counting sliding scale  Signed By: Osorio Gay MD            Signed By: Osorio Gay MD     May 30, 2020

## 2020-05-30 NOTE — ROUTINE PROCESS
Bedside and Verbal shift change report given to self (oncoming nurse) by Nadyne Lennox (offgoing nurse). Report included the following information SBAR, Kardex, Intake/Output and MAR.

## 2020-05-30 NOTE — PROGRESS NOTES
Problem: Mobility Impaired (Adult and Pediatric)  Goal: *Acute Goals and Plan of Care (Insert Text)  Description: LTG:  (1.)Ms. David Musa will move from supine to sit and sit to supine , scoot up and down and roll side to side in bed with MINIMAL ASSIST within 7 treatment day(s). (2.)Ms. David Musa will transfer from bed to chair and chair to bed with MODERATE ASSIST using the least restrictive device within 7 treatment day(s). (3.)Ms. David Musa will ambulate with MODERATE ASSIST for 10 feet with the least restrictive device within 7 treatment day(s). (4.)Ms. David Musa will participate in therapeutic activity/exercises x 23 minutes for increased strength within 7 treatment days. (5.)Ms. David Musa will maintain static/dynamic sitting x 15 minutes with SUPERVISION for improved balance within 7 treatment days. ________________________________________________________________________________________________     Outcome: Progressing Towards Goal     PHYSICAL THERAPY: Daily Note and PM 5/30/2020  INPATIENT: PT Visit Days : 2  [place] / [place] / Product Type: Medicare /       NAME/AGE/GENDER: Catherine Orellana is a 66 y.o. female   PRIMARY DIAGNOSIS: Elevated troponin [R79.89]  Elevated troponin [R79.89] Elevated troponin Elevated troponin       ICD-10: Treatment Diagnosis:    · Generalized Muscle Weakness (M62.81)  · Difficulty in walking, Not elsewhere classified (R26.2)   Precaution/Allergies:  Ace inhibitors; Adhesive tape; Codeine; Other medication; Other omega-3s; and Sulfa (sulfonamide antibiotics)      ASSESSMENT:     Ms. David Musa is a 66 y.o. female in the hospital for the above who was supine in bed upon arrival eyes open and son present. She was non verbal despite maximal coaxing throughout session. Worked on just active and passive movement in bed today as she is not following commands or moving extremities and is to heavy to move without assistance.   Attempted to get her to performed heel slides, ankle pumps and hip IR/ER om R with limited success but some active movement noted. No active movement noted on the L. Performed below exercises mostly passive. Son educated on ways of assisting to stimulate her, work on L neglect and allowing for rest in between. Poor ability to participate noted today. At this time, patient is appropriate for Co-treatment with occupational therapy due to patient's decreased overall endurance/tolerance levels, as well as need for high level skilled assistance to complete functional transfers/mobility and functional tasks. Rafi Donovan is appropriate for a multidisciplinary co-treatment of PT and OT to address goals of both disciplines. This section established at most recent assessment   PROBLEM LIST (Impairments causing functional limitations):  1. Decreased Strength  2. Decreased ADL/Functional Activities  3. Decreased Transfer Abilities  4. Decreased Ambulation Ability/Technique  5. Decreased Balance  6. Decreased Activity Tolerance  7. Increased Fatigue  8. Increased Shortness of Breath  9. Decreased Cognition   INTERVENTIONS PLANNED: (Benefits and precautions of physical therapy have been discussed with the patient.)  1. Balance Exercise  2. Bed Mobility  3. Family Education  4. Gait Training  5. Neuromuscular Re-education/Strengthening  6. Therapeutic Activites  7. Therapeutic Exercise/Strengthening  8. Transfer Training     TREATMENT PLAN: Frequency/Duration: 3 times a week for duration of hospital stay  Rehabilitation Potential For Stated Goals: [facility] (at time of discharge pending progress):    Placement:   It is my opinion, based on this patient's performance to date, that Ms. Jay Littlejohn may benefit from intensive therapy at [address] after discharge due to the functional deficits listed above that are likely to improve with skilled rehabilitation and concerns that he/she may be unsafe to be unsupervised at home due to decreased balance, mobility, and activity tolerance. Equipment:    None at this time              HISTORY:   History of Present Injury/Illness (Reason for Referral):  Elevated troponin  Past Medical History/Comorbidities:   Ms. James Monk  has a past medical history of Arrhythmia, Arthritis, Asthma, CAD (coronary artery disease), Chronic pain, Diabetes ([address] GERD (gastroesophageal reflux disease), Hypertension, Menopause, Morbid obesity ([address] Stroke ([address] Thyroid disease, and Unspecified adverse effect of anesthesia. She also has no past medical history of Aneurysm ([address] Autoimmune disease ([address] Cancer ([address] Chronic kidney disease, Coagulation defects, COPD, Difficult intubation, Heart failure ([address] Liver disease, Malignant hyperthermia due to anesthesia, Nausea & vomiting, Other ill-defined conditions(799.89), Pseudocholinesterase deficiency, Psychiatric disorder, PUD (peptic ulcer disease), Seizures ([address] Thromboembolus ([address] or Unspecified sleep apnea. Ms. James Monk  has a past surgical history that includes hx appendectomy; hx lap cholecystectomy; hx orthopaedic; hx knee arthroscopy; hx clemente and bso; hx breast biopsy; and pr cardiac surg procedure unlist (4/2010). Social History/Living Environment:   Home Environment: Private residence  # Steps to Enter: 10  One/Two Story Residence: Two story  # of Interior Steps: 20(patients room is on second floor)  Living Alone: No  Support Systems: Spouse/Significant Other/Partner  Patient Expects to be Discharged to[de-identified] Private residence  Current DME Used/Available at Home: Walker, rolling  Prior Level of Function/Work/Activity:  Per RN, lives with spouse and ambulatory with [employer]. Has been declining recently.      Number of Personal Factors/Comorbidities that affect the Plan of Care: 3+: HIGH COMPLEXITY   EXAMINATION:   Most Recent Physical Functioning:   Gross Assessment:                  Posture:     Balance:    Bed Mobility:     Wheelchair Mobility:     Transfers:     Gait:            Body Structures Involved:  1. Nerves  2. Heart  3. Lungs  4. Muscles Body Functions Affected:  1. Mental  2. Sensory/Pain  3. Cardio  4. Respiratory  5. Neuromusculoskeletal  6. Movement Related Activities and Participation Affected:  1. General Tasks and Demands  2. Mobility  3. Self Care  4. Domestic Life  5. Community, Social and [place]   Number of elements that affect the Plan of Care: 4+: HIGH COMPLEXITY   CLINICAL PRESENTATION:   Presentation: Stable and uncomplicated: LOW COMPLEXITY   CLINICAL DECISION MAKIN [facility] Inpatient Short Form  How much difficulty does the patient currently have. .. Unable A Lot A Little None   1. Turning over in bed (including adjusting bedclothes, sheets and blankets)? [] 1   [x] 2   [] 3   [] 4   2. Sitting down on and standing up from a chair with arms ( e.g., wheelchair, bedside commode, etc.)   [x] 1   [] 2   [] 3   [] 4   3. Moving from lying on back to sitting on the side of the bed? [x] 1   [] 2   [] 3   [] 4   How much help from another person does the patient currently need. .. Total A Lot A Little None   4. Moving to and from a bed to a chair (including a wheelchair)? [x] 1   [] 2   [] 3   [] 4   5. Need to walk in hospital room? [x] 1   [] 2   [] 3   [] 4   6. Climbing 3-5 steps with a railing? [x] 1   [] 2   [] 3   [] 4   © , Trustees of [address] [place] [id number], under license to [employer]. All rights reserved      Score:  Initial: 7 Most Recent: X (Date: -- )    Interpretation of Tool:  Represents activities that are increasingly more difficult (i.e. Bed mobility, Transfers, Gait). Medical Necessity:     · Patient demonstrates   · fair  ·  rehab potential due to higher previous functional level. Reason for Services/Other Comments:  · Patient continues to require skilled intervention due to   · Decreased balance, mobility, and activity tolerance   · . Use of outcome tool(s) and clinical judgement create a POC that gives a: Clear prediction of patient's progress: LOW COMPLEXITY            TREATMENT:   (In addition to Assessment/Re-Assessment sessions the following treatments were rendered)   Pre-treatment Symptoms/Complaints:  Lethargic. Did not speak at all  Pain: Initial:   Pain Intensity 1: 0  Post Session:  0     Therapeutic Exercise: (30 Minutes):  Exercises per grid below to improve mobility, strength and dynamic movement of arm - bilateral and leg - bilateral to improve functional movement of any sort. Required maximal visual, verbal, manual and tactile cues to promote proper body mechanics. Date:  5/30/20 Date:   Date:     Activity/Exercise Parameters Parameters Parameters   Ankle pumps 10x PROM     Heel chord stretch 1 min B     Heel slides 10x B PROM     Hip ER/IR with knee bent 10x B PROM     Elbow flex/ext 10x B PROM     Should flexion 10x B PROM             Braces/Orthotics/Lines/Etc:   · O2 Device: Room air  Treatment/Session Assessment:    · Response to Treatment: poor or little response  · Interdisciplinary Collaboration:   o Physical Therapy Assistant  o Registered Nurse  · After treatment position/precautions:   o Supine in bed  o Bed/Chair-wheels locked  o Bed in low position  o RN notified  o Family at bedside   · Compliance with Program/Exercises: Will assess as treatment progresses  · Recommendations/Intent for next treatment session: \"Next visit will focus on advancements to more challenging activities and reduction in assistance provided\".   Total Treatment Duration:  PT Patient Time In/Time Out  Time In: 1350  Time Out: 11 [facility]

## 2020-05-30 NOTE — ROUTINE PROCESS
Bedside and verbal report given to KYLIE Zayas by Rosa Isela Crum. Report included the following information SBAR, Kardex, Intake/Output and MAR. Oncoming RN assumed care of the patient.

## 2020-05-31 LAB
ANION GAP SERPL CALC-SCNC: 6 MMOL/L (ref 7–16)
BASOPHILS # BLD: 0 K/UL (ref 0–0.2)
BASOPHILS NFR BLD: 0 % (ref 0–2)
BUN SERPL-MCNC: 57 MG/DL (ref 8–23)
CALCIUM SERPL-MCNC: 9.4 MG/DL (ref 8.3–10.4)
CHLORIDE SERPL-SCNC: 114 MMOL/L (ref 98–107)
CO2 SERPL-SCNC: 25 MMOL/L (ref 21–32)
CREAT SERPL-MCNC: 2.21 MG/DL (ref 0.6–1)
DIFFERENTIAL METHOD BLD: ABNORMAL
EOSINOPHIL # BLD: 0.6 K/UL (ref 0–0.8)
EOSINOPHIL NFR BLD: 5 % (ref 0.5–7.8)
ERYTHROCYTE [DISTWIDTH] IN BLOOD BY AUTOMATED COUNT: 14.2 % (ref 11.9–14.6)
GLUCOSE BLD STRIP.AUTO-MCNC: 142 MG/DL (ref 65–100)
GLUCOSE BLD STRIP.AUTO-MCNC: 161 MG/DL (ref 65–100)
GLUCOSE BLD STRIP.AUTO-MCNC: 209 MG/DL (ref 65–100)
GLUCOSE BLD STRIP.AUTO-MCNC: 217 MG/DL (ref 65–100)
GLUCOSE BLD STRIP.AUTO-MCNC: 293 MG/DL (ref 65–100)
GLUCOSE SERPL-MCNC: 113 MG/DL (ref 65–100)
HCT VFR BLD AUTO: 33.3 % (ref 35.8–46.3)
HGB BLD-MCNC: 11.1 G/DL (ref 11.7–15.4)
IMM GRANULOCYTES # BLD AUTO: 0.1 K/UL (ref 0–0.5)
IMM GRANULOCYTES NFR BLD AUTO: 1 % (ref 0–5)
LYMPHOCYTES # BLD: 2.7 K/UL (ref 0.5–4.6)
LYMPHOCYTES NFR BLD: 24 % (ref 13–44)
MCH RBC QN AUTO: 32.3 PG (ref 26.1–32.9)
MCHC RBC AUTO-ENTMCNC: 33.3 G/DL (ref 31.4–35)
MCV RBC AUTO: 96.8 FL (ref 79.6–97.8)
MONOCYTES # BLD: 1.2 K/UL (ref 0.1–1.3)
MONOCYTES NFR BLD: 11 % (ref 4–12)
NEUTS SEG # BLD: 6.7 K/UL (ref 1.7–8.2)
NEUTS SEG NFR BLD: 60 % (ref 43–78)
NRBC # BLD: 0 K/UL (ref 0–0.2)
PLATELET # BLD AUTO: 215 K/UL (ref 150–450)
PMV BLD AUTO: 10.8 FL (ref 9.4–12.3)
POTASSIUM SERPL-SCNC: 4 MMOL/L (ref 3.5–5.1)
RBC # BLD AUTO: 3.44 M/UL (ref 4.05–5.2)
SODIUM SERPL-SCNC: 145 MMOL/L (ref 136–145)
WBC # BLD AUTO: 11.2 K/UL (ref 4.3–11.1)

## 2020-05-31 PROCEDURE — 74011636637 HC RX REV CODE- 636/637: Performed by: INTERNAL MEDICINE

## 2020-05-31 PROCEDURE — 82962 GLUCOSE BLOOD TEST: CPT

## 2020-05-31 PROCEDURE — 85025 COMPLETE CBC W/AUTO DIFF WBC: CPT

## 2020-05-31 PROCEDURE — 74011250637 HC RX REV CODE- 250/637: Performed by: INTERNAL MEDICINE

## 2020-05-31 PROCEDURE — 65660000000 HC RM CCU STEPDOWN

## 2020-05-31 PROCEDURE — 80048 BASIC METABOLIC PNL TOTAL CA: CPT

## 2020-05-31 PROCEDURE — 74011250637 HC RX REV CODE- 250/637: Performed by: FAMILY MEDICINE

## 2020-05-31 PROCEDURE — 36415 COLL VENOUS BLD VENIPUNCTURE: CPT

## 2020-05-31 RX ADMIN — INSULIN LISPRO 4 UNITS: 100 INJECTION, SOLUTION INTRAVENOUS; SUBCUTANEOUS at 16:41

## 2020-05-31 RX ADMIN — FUROSEMIDE 40 MG: 40 TABLET ORAL at 08:19

## 2020-05-31 RX ADMIN — PANTOPRAZOLE SODIUM 40 MG: 40 TABLET, DELAYED RELEASE ORAL at 05:01

## 2020-05-31 RX ADMIN — TICAGRELOR 90 MG: 90 TABLET ORAL at 05:01

## 2020-05-31 RX ADMIN — CARVEDILOL 12.5 MG: 12.5 TABLET, FILM COATED ORAL at 18:04

## 2020-05-31 RX ADMIN — EZETIMIBE 10 MG: 10 TABLET ORAL at 08:19

## 2020-05-31 RX ADMIN — TICAGRELOR 90 MG: 90 TABLET ORAL at 18:04

## 2020-05-31 RX ADMIN — Medication 10 ML: at 22:50

## 2020-05-31 RX ADMIN — LEVOTHYROXINE SODIUM 150 MCG: 0.07 TABLET ORAL at 05:01

## 2020-05-31 RX ADMIN — INSULIN GLARGINE 18 UNITS: 100 INJECTION, SOLUTION SUBCUTANEOUS at 22:44

## 2020-05-31 RX ADMIN — INSULIN LISPRO 2 UNITS: 100 INJECTION, SOLUTION INTRAVENOUS; SUBCUTANEOUS at 08:18

## 2020-05-31 RX ADMIN — INSULIN LISPRO 4 UNITS: 100 INJECTION, SOLUTION INTRAVENOUS; SUBCUTANEOUS at 08:18

## 2020-05-31 RX ADMIN — LOSARTAN POTASSIUM 100 MG: 50 TABLET, FILM COATED ORAL at 18:06

## 2020-05-31 RX ADMIN — SERTRALINE HYDROCHLORIDE 200 MG: 100 TABLET ORAL at 08:19

## 2020-05-31 RX ADMIN — ASPIRIN 81 MG 81 MG: 81 TABLET ORAL at 08:19

## 2020-05-31 RX ADMIN — LOSARTAN POTASSIUM 100 MG: 50 TABLET, FILM COATED ORAL at 08:19

## 2020-05-31 RX ADMIN — INSULIN LISPRO 4 UNITS: 100 INJECTION, SOLUTION INTRAVENOUS; SUBCUTANEOUS at 16:40

## 2020-05-31 RX ADMIN — INSULIN LISPRO 4 UNITS: 100 INJECTION, SOLUTION INTRAVENOUS; SUBCUTANEOUS at 11:29

## 2020-05-31 RX ADMIN — INSULIN LISPRO 4 UNITS: 100 INJECTION, SOLUTION INTRAVENOUS; SUBCUTANEOUS at 11:28

## 2020-05-31 RX ADMIN — Medication 5 ML: at 06:00

## 2020-05-31 RX ADMIN — CARVEDILOL 12.5 MG: 12.5 TABLET, FILM COATED ORAL at 08:19

## 2020-05-31 NOTE — ROUTINE PROCESS
Bedside and verbal report given to Lety Napier RN by Fabio Reyes. Report included the following information SBAR, Kardex, Intake/Output and MAR. Oncoming RN assumed care of the patient.

## 2020-05-31 NOTE — PROGRESS NOTES
Progress Note    Patient: Otis Springer MRN: [id number]  SSN: [id number]    YOB: 1945  Age: 66 y.o. Sex: female      Admit Date: 5/25/2020    LOS: 5 days     Subjective:     Seen and examined, not much clinically changed. Objective:     Vitals:    05/31/20 0414 05/31/20 0819 05/31/20 1243 05/31/20 1713   BP: 145/74 150/74 124/71 118/69   Pulse: 67 70 71 63   Resp: 18 17 18 14   Temp: 97.6 °F (36.4 °C) 97.7 °F (36.5 °C) 97.3 °F (36.3 °C) 97.6 °F (36.4 °C)   SpO2: 98% 95% 96% 96%   Weight: 106.2 kg (234 lb 2.1 oz)      Height:            Intake and Output:  Current Shift: No intake/output data recorded. Last three shifts: 05/30 0701 - 05/31 1900  In: 420 [P.O.:420]  Out: 3200 [Urine:3200]    Physical Exam:   GENERAL: sleepy, more clinically perked than yesterday  LUNG: clear to auscultation bilaterally  HEART: regular rate and rhythm, S1, S2 normal, no murmur, click, rub or gallop  ABDOMEN: soft, non-tender. Bowel sounds normal. No masses,  no organomegaly  EXTREMITIES:  extremities normal, atraumatic, no cyanosis or edema  NEUROLOGIC: ledt sided hemiparesis, left facial droop  Lab/Data Review: All lab results for the last 24 hours reviewed. Assessment:     Principal Problem:    Elevated troponin (5/26/2020)    Active Problems:    Leukocytosis (5/26/2020)      Nausea (5/26/2020)      Weakness (5/26/2020)        Plan:     New acute stroke  5/31/2020  Not much clinically changed, likely acute rehab, vs SNIF placement    5/30/2020  Continue PT, pending placement, continue aspirin, statin.  Neurology followed patient, they signed off today     5/29/2020  Continue current management, neurology following     5/28/2020  Barium swallow today  Continue aspirin, statin, monitor  Neurology following     5/27/2020  Multifocal  possibly embolic  Neurology following, not a tPA candidate  Continue aspirin, statin     NSTEMI   Continue aspirin, brilinta, s/p stent placement     5/28/2020  S/p stent placement, continue aspirin, and Brilinta      5/27/2020  S/p stent placement 5/26/2020  Cardiology following     5/26/2020  Trending down troponins  Continue heparin drip  Will likely need to go to cardiac cath today  Case discussed with cardiology, appreciate inout        Weakness   Continue PT     Leukocytosis  Possibly reactive, continue monitor CBC     DM II  On lantus and carb counting sliding scale  Signed By: Mami Hicks MD          Signed By: Mami Hicks MD     May 31, 2020

## 2020-05-31 NOTE — ROUTINE PROCESS
Bedside and Verbal shift change report given to self (oncoming nurse) by Harleen Delgado (offgoing nurse). Report included the following information SBAR, Kardex, Intake/Output and MAR.

## 2020-06-01 VITALS
SYSTOLIC BLOOD PRESSURE: 129 MMHG | RESPIRATION RATE: 18 BRPM | TEMPERATURE: 97.4 F | OXYGEN SATURATION: 96 % | DIASTOLIC BLOOD PRESSURE: 56 MMHG | HEART RATE: 67 BPM | BODY MASS INDEX: 37.13 KG/M2 | WEIGHT: 231.04 LBS | HEIGHT: 66 IN

## 2020-06-01 LAB
ANION GAP SERPL CALC-SCNC: 10 MMOL/L (ref 7–16)
BASOPHILS # BLD: 0 K/UL (ref 0–0.2)
BASOPHILS NFR BLD: 0 % (ref 0–2)
BUN SERPL-MCNC: 59 MG/DL (ref 8–23)
CALCIUM SERPL-MCNC: 9.6 MG/DL (ref 8.3–10.4)
CHLORIDE SERPL-SCNC: 111 MMOL/L (ref 98–107)
CO2 SERPL-SCNC: 24 MMOL/L (ref 21–32)
CREAT SERPL-MCNC: 2.04 MG/DL (ref 0.6–1)
DIFFERENTIAL METHOD BLD: ABNORMAL
EOSINOPHIL # BLD: 0.6 K/UL (ref 0–0.8)
EOSINOPHIL NFR BLD: 6 % (ref 0.5–7.8)
ERYTHROCYTE [DISTWIDTH] IN BLOOD BY AUTOMATED COUNT: 13.9 % (ref 11.9–14.6)
GLUCOSE BLD STRIP.AUTO-MCNC: 161 MG/DL (ref 65–100)
GLUCOSE BLD STRIP.AUTO-MCNC: 269 MG/DL (ref 65–100)
GLUCOSE BLD STRIP.AUTO-MCNC: 310 MG/DL (ref 65–100)
GLUCOSE SERPL-MCNC: 129 MG/DL (ref 65–100)
HCT VFR BLD AUTO: 32.7 % (ref 35.8–46.3)
HGB BLD-MCNC: 10.9 G/DL (ref 11.7–15.4)
IMM GRANULOCYTES # BLD AUTO: 0.1 K/UL (ref 0–0.5)
IMM GRANULOCYTES NFR BLD AUTO: 1 % (ref 0–5)
LYMPHOCYTES # BLD: 2.4 K/UL (ref 0.5–4.6)
LYMPHOCYTES NFR BLD: 25 % (ref 13–44)
MCH RBC QN AUTO: 31.6 PG (ref 26.1–32.9)
MCHC RBC AUTO-ENTMCNC: 33.3 G/DL (ref 31.4–35)
MCV RBC AUTO: 94.8 FL (ref 79.6–97.8)
MONOCYTES # BLD: 0.8 K/UL (ref 0.1–1.3)
MONOCYTES NFR BLD: 8 % (ref 4–12)
NEUTS SEG # BLD: 5.6 K/UL (ref 1.7–8.2)
NEUTS SEG NFR BLD: 59 % (ref 43–78)
NRBC # BLD: 0 K/UL (ref 0–0.2)
PLATELET # BLD AUTO: 237 K/UL (ref 150–450)
PMV BLD AUTO: 10.6 FL (ref 9.4–12.3)
POTASSIUM SERPL-SCNC: 4 MMOL/L (ref 3.5–5.1)
RBC # BLD AUTO: 3.45 M/UL (ref 4.05–5.2)
SODIUM SERPL-SCNC: 145 MMOL/L (ref 136–145)
WBC # BLD AUTO: 9.5 K/UL (ref 4.3–11.1)

## 2020-06-01 PROCEDURE — 80048 BASIC METABOLIC PNL TOTAL CA: CPT

## 2020-06-01 PROCEDURE — 36415 COLL VENOUS BLD VENIPUNCTURE: CPT

## 2020-06-01 PROCEDURE — 97530 THERAPEUTIC ACTIVITIES: CPT

## 2020-06-01 PROCEDURE — 74011636637 HC RX REV CODE- 636/637: Performed by: INTERNAL MEDICINE

## 2020-06-01 PROCEDURE — 74011250637 HC RX REV CODE- 250/637: Performed by: FAMILY MEDICINE

## 2020-06-01 PROCEDURE — 92507 TX SP LANG VOICE COMM INDIV: CPT

## 2020-06-01 PROCEDURE — 74011250637 HC RX REV CODE- 250/637: Performed by: INTERNAL MEDICINE

## 2020-06-01 PROCEDURE — 85025 COMPLETE CBC W/AUTO DIFF WBC: CPT

## 2020-06-01 PROCEDURE — 97112 NEUROMUSCULAR REEDUCATION: CPT

## 2020-06-01 PROCEDURE — 97535 SELF CARE MNGMENT TRAINING: CPT

## 2020-06-01 PROCEDURE — 82962 GLUCOSE BLOOD TEST: CPT

## 2020-06-01 PROCEDURE — 92526 ORAL FUNCTION THERAPY: CPT

## 2020-06-01 RX ORDER — INSULIN GLARGINE 100 [IU]/ML
18 INJECTION, SOLUTION SUBCUTANEOUS
Qty: 1 VIAL | Refills: 1 | Status: SHIPPED | OUTPATIENT
Start: 2020-06-01 | End: 2020-06-23

## 2020-06-01 RX ORDER — INSULIN LISPRO 100 [IU]/ML
7 INJECTION, SOLUTION INTRAVENOUS; SUBCUTANEOUS
Status: DISCONTINUED | OUTPATIENT
Start: 2020-06-01 | End: 2020-06-01 | Stop reason: HOSPADM

## 2020-06-01 RX ORDER — NITROGLYCERIN 0.4 MG/1
0.4 TABLET SUBLINGUAL
Qty: 15 TAB | Refills: 0 | Status: SHIPPED | OUTPATIENT
Start: 2020-06-01

## 2020-06-01 RX ORDER — INSULIN ASPART 100 [IU]/ML
4 INJECTION, SOLUTION INTRAVENOUS; SUBCUTANEOUS ONCE
Qty: 10 ML | Status: CANCELLED | OUTPATIENT
Start: 2020-06-01 | End: 2020-06-01

## 2020-06-01 RX ORDER — ATORVASTATIN CALCIUM 40 MG/1
40 TABLET, FILM COATED ORAL
Status: DISCONTINUED | OUTPATIENT
Start: 2020-06-01 | End: 2020-06-01 | Stop reason: HOSPADM

## 2020-06-01 RX ORDER — ATORVASTATIN CALCIUM 40 MG/1
40 TABLET, FILM COATED ORAL
Qty: 30 TAB | Refills: 2 | Status: SHIPPED | OUTPATIENT
Start: 2020-06-01

## 2020-06-01 RX ADMIN — INSULIN LISPRO 2 UNITS: 100 INJECTION, SOLUTION INTRAVENOUS; SUBCUTANEOUS at 10:28

## 2020-06-01 RX ADMIN — Medication 10 ML: at 13:11

## 2020-06-01 RX ADMIN — SERTRALINE HYDROCHLORIDE 200 MG: 100 TABLET ORAL at 10:27

## 2020-06-01 RX ADMIN — Medication 5 ML: at 06:11

## 2020-06-01 RX ADMIN — FUROSEMIDE 40 MG: 40 TABLET ORAL at 10:28

## 2020-06-01 RX ADMIN — PANTOPRAZOLE SODIUM 40 MG: 40 TABLET, DELAYED RELEASE ORAL at 05:52

## 2020-06-01 RX ADMIN — LOSARTAN POTASSIUM 100 MG: 50 TABLET, FILM COATED ORAL at 10:28

## 2020-06-01 RX ADMIN — INSULIN LISPRO 7 UNITS: 100 INJECTION, SOLUTION INTRAVENOUS; SUBCUTANEOUS at 13:10

## 2020-06-01 RX ADMIN — ASPIRIN 81 MG 81 MG: 81 TABLET ORAL at 10:28

## 2020-06-01 RX ADMIN — INSULIN LISPRO 8 UNITS: 100 INJECTION, SOLUTION INTRAVENOUS; SUBCUTANEOUS at 13:10

## 2020-06-01 RX ADMIN — TICAGRELOR 90 MG: 90 TABLET ORAL at 05:52

## 2020-06-01 RX ADMIN — LEVOTHYROXINE SODIUM 150 MCG: 0.07 TABLET ORAL at 05:52

## 2020-06-01 RX ADMIN — EZETIMIBE 10 MG: 10 TABLET ORAL at 10:28

## 2020-06-01 RX ADMIN — CARVEDILOL 12.5 MG: 12.5 TABLET, FILM COATED ORAL at 10:28

## 2020-06-01 NOTE — PROGRESS NOTES
Problem: Dysphagia (Adult)  Goal: *Acute Goals and Plan of Care (Insert Text)  Description: LTG: Patient will tolerate least restrictive diet without overt signs or symptoms of airway compromise. STG: Patient will tolerate puree diet and HONEY THICK LIQUIDS by teaspoon  without overt signs or symptoms of airway compromise. Downgraded 5/28/20. STG: Patient will participate in modified barium swallow study as clinically indicated. MET 5/28/20. STG: Patient will participate in laryngeal strengthening exercises with 90% accuracy given minimal cues. ADDED 5/28/20. Outcome: Progressing Towards Goal     STG: Patient will participate in speech/cognitive-linguistic assessment. Outcome: Progressing Towards Goal     Problem: Neurolinguistics Impaired (Adult)  Goal: *Speech Goal: (INSERT TEXT)  Description: LTG: Patient will increase receptive/expressive language skills demonstrated by the ability to communicate basic wants/needs across environments   STG: Patient will complete automatic naming tasks with 80% accuracy given minimal cueing  STG: Patient will name common items with 75% accuracy given minimal cueing  STG: Patient will participate in ongoing assessment of speech and language abilities to assist with determining functional goals.     Outcome: Progressing Towards Goal     SPEECH LANGUAGE PATHOLOGY: DYSPHAGIA AND SPEECH-LANGUAGE/COGNITION: Daily Note 2    NAME/AGE/GENDER: Guero Childers is a 66 y.o. female  DATE: 6/1/2020  PRIMARY DIAGNOSIS: Elevated troponin [R79.89]  Elevated troponin [R79.89]      ICD-10: Treatment Diagnosis: R13.12 Dysphagia, Oropharyngeal Phase  F80.2 Mixed Receptive-Expressive Language Disorder  R47.1 Dysarthria and Anarthria    RECOMMENDATIONS   DIET:    continue prescribed diet   PO:  Pureed   Liquids:  honey BY TSP ONLY    MEDICATIONS: Crushed in puree     ASPIRATION PRECAUTIONS  · Slow rate of intake  · Small bites/sips  · Upright at 90 degrees during meal  · 1:1 assistance with meals      COMPENSATORY STRATEGIES/MODIFICATIONS  · TSP SIPS ONLY     EDUCATION:  · Recommendations discussed with Nursing  · Family  · Patient     CONTINUATION OF SKILLED SERVICES/MEDICAL NECESSITY:   Patient is expected to demonstrate progress in  swallow strength, swallow timeliness, swallow function, diet tolerance, and swallow safety in order to  improve swallow safety, work toward diet advancement, and decrease aspiration risk.  Patient is expected to demonstrate progress in expressive communication and receptive ability to decrease assistance required communication, increase independence with activities of daily living, and increase communication with family/caregivers.  Patient continues to require skilled intervention due to dysphagia and aphasia. RECOMMENDATIONS for CONTINUED SPEECH THERAPY: YES: Anticipate need for ongoing speech therapy during this hospitalization and at next level of care. ASSESSMENT   Patient with moderate-severe oropharyngeal dysphagia per modified barium swallow study completed 5/28/20. Completed effortful swallows with cues to swallow hard and fast to reduce spillage to pharynx pre-swallow (visualized on modified barium swallow) with moderate verbal cues. Continues to have mild anterior loss on right side with honey by teaspoon, which was utilized to complete effortful swallows. Recommend puree diet/honey thick liquids via tsp only. 1:1 assistance with meals. Patient with ongoing expressive aphasia. Only answers some open ended questions with short responses. Improved accuracy with automatic speech tasks. Able to complete confrontation naming and responsive naming with min-mod cues given increased response time. Requires encouragement to respond during structured therapy tasks and in conversation. Patient with weak vocal quality required mod-max verbal cues to increase vocal intensity.  ? If due to reduce breath support as patient noted to take breath after 1-2 words when completing automatic speech tasks. Will continue to follow to improve patient's functional communication for expressing wants/needs. REHABILITATION POTENTIAL FOR STATED GOALS: Good    PLAN    FREQUENCY/DURATION: Continue to follow patient 3 times a week for duration of hospital stay to address above goals. - Recommendations for next treatment session: Next treatment will address diet tolerance, language treatment     SUBJECTIVE   Alert upright in bed. Continues to have flat affect and requires encouragement to verbalize. History of Present Injury/Illness: Ms. Adriana Yeager  has a past medical history of Arrhythmia, Arthritis, Asthma, CAD (coronary artery disease), Chronic pain, Diabetes ([address] GERD (gastroesophageal reflux disease), Hypertension, Menopause, Morbid obesity ([address] Stroke [facility]), Thyroid disease, and Unspecified adverse effect of anesthesia. She also has no past medical history of Aneurysm ([address] Autoimmune disease ([address] Cancer ([address] Chronic kidney disease, Coagulation defects, COPD, Difficult intubation, Heart failure ([address] Liver disease, Malignant hyperthermia due to anesthesia, Nausea & vomiting, Other ill-defined conditions(799.89), Pseudocholinesterase deficiency, Psychiatric disorder, PUD (peptic ulcer disease), Seizures ([address] Thromboembolus ([address] or Unspecified sleep apnea. . She also  has a past surgical history that includes hx appendectomy; hx lap cholecystectomy; hx orthopaedic; hx knee arthroscopy; hx clemente and bso; hx breast biopsy; and pr cardiac surg procedure unlist (4/2010). Problem List:  (Impairments causing functional limitations):  1. Oropharyngeal dysphagia  2.  Expressive aphasia  3. ? Mild receptive aphasia  Orientation:   Person  Place    Pain: Pain Scale 1: Numeric (0 - 10)  Pain Intensity 1: 0    OBJECTIVE   Swallow evaluation:   Patient completed effortful swallows 2 sets of 10 reps to increase posterior lingual elevation, hyolaryngeal elevation/excurstion, epiglottic inversion, and laryngeal closure. Patient instructed to swallow hard in fast to reduce spillage to pharynx prior to swallow. Effortful swallows completed with honey thick liquids. Patient consumed 4 oz honey thick liquid without overt s/sx airway compromise. Cognitive Linguistic Assessment :  Language evaluation initiated this PM. Results as follow:  - Automatic speech tasks- counting 1-10: 100% accuracy given minimal verbal cues; ISADORA: 90% accuracy given min-mod phonemic cues; FLASH: 50% accuracy given mod-max phonemic cues  -confrontation namin/8 accurate given minimal verbal cues  -Responsive namin/5 accurate given minimal verbal cues and increased time to respond. INTERDISCIPLINARY COLLABORATION: Registered Nurse  PRECAUTIONS/ALLERGIES: Ace inhibitors; Adhesive tape; Codeine; Other medication; Other omega-3s; and Sulfa (sulfonamide antibiotics)     Tool Used: Dysphagia Outcome and Severity Scale ([place])    Score Comments   Normal Diet  [] 7 With no strategies or extra time needed   Functional Swallow  [] 6 May have mild oral or pharyngeal delay   Mild Dysphagia  [] 5 Which may require one diet consistency restricted    Mild-Moderate Dysphagia  [] 4 With 1-2 diet consistencies restricted   Moderate Dysphagia  [] 3 With 2 or more diet consistencies restricted   Moderate-Severe Dysphagia  [] 2 With partial PO strategies (trials with ST only)   Severe Dysphagia  [] 1 With inability to tolerate any PO safely      Score:  Initial: 3 Most Recent: 3 (Date 20 )   Interpretation of Tool: The Dysphagia Outcome and Severity Scale ([place]) is a simple, easy-to-use, 7-point scale developed to systematically rate the functional severity of dysphagia based on objective assessment and make recommendations for diet level, independence level, and type of nutrition.      Tool Used: MODIFIED BLANK SCALE (mRS)   Score   No Symptoms  [] 0   No significant disability despite symptoms; able to carry out all usual duties and activities  [] 1   Slight disability; unable to carry out all previous activities but able to look after own affairs without assistance. [] 2   Moderate disability; requiring some help but able to walk without assistance  [] 3   Moderately severe disability; unable to walk without assistance and unable to attend to own bodily needs without assistance  [] 4   Severe disability; bedridden, incontinent, and requiring constant nursing care and attention  [] 5      Score:  Initial: 4 Current: 4   Interpretation of Tool: The Modified [place] Scale is a 7-point scaled used to quantify level of disability as it relates to a patient's functional abilities. Current Medications:   No current facility-administered medications on file prior to encounter. Current Outpatient Medications on File Prior to Encounter   Medication Sig Dispense Refill    traZODone (DESYREL) 100 mg tablet Take 100 mg by mouth nightly.  insulin degludec Tamea Fuchs FlexTouch U-100) 100 unit/mL (3 mL) inpn 18 Units by SubCUTAneous route nightly.  insulin aspart U-100 (NovoLOG Flexpen U-100 Insulin) 100 unit/mL (3 mL) inpn 4 Units by SubCUTAneous route every evening.  acetaminophen (Tylenol Extra Strength) 500 mg tablet Take 1,000 mg by mouth two (2) times daily as needed for Pain.  amLODIPine (Norvasc) 10 mg tablet Take 10 mg by mouth daily.  carvediloL (Coreg) 25 mg tablet Take 12.5 mg by mouth two (2) times daily (with meals).  furosemide (Lasix) 40 mg tablet Take  by mouth daily.  OTHER,NON-FORMULARY, 18 Units by SubCUTAneous route once. dinner   Indications: diabetes, tresiva      aspirin delayed-release 81 mg tablet Take 81 mg by mouth daily. Take day of surgery per anesthesia protocol.  Cholecalciferol, Vitamin D3, (VITAMIN D3) 1,000 unit cap Take 1 Cap by mouth daily. Stop seven days prior to surgery per anesthesia protocol.       COZAAR 100 mg Tab Take 100 mg by mouth daily. Take 1/2 tablet twice daily.  sertraline (ZOLOFT) 50 mg tablet Take 200 mg by mouth two (2) times a day. Take day of surgery per anesthesia protocol.  PRILOSEC 20 mg capsule Take 20 mg by mouth daily. Take day of surgery per anesthesia protocol.  SYNTHROID 175 mcg tablet Take 150 mcg by mouth daily. Take day of surgery per anesthesia protocol.  ondansetron (ZOFRAN ODT) 4 mg disintegrating tablet Take 1 Tab by mouth every eight (8) hours as needed for Nausea. 12 Tab 0    nitrofurantoin, macrocrystal-monohydrate, (MACROBID) 100 mg capsule Take 1 Cap by mouth two (2) times a day. 20 Cap 0    hyoscyamine SL (LEVSIN/SL) 0.125 mg SL tablet 1 Tab by SubLINGual route every six (6) hours as needed for Cramping. 10 Tab 0    ezetimibe (ZETIA) 10 mg tablet Take  by mouth.  albuterol (PROVENTIL HFA) 90 mcg/actuation inhaler Take 2 Puffs by inhalation daily as needed. Take day of surgery per anesthesia protocol. BRING DAY OF SURGERY      multivitamin (ONE A DAY) tablet Take 1 Tab by mouth daily. Take day of surgery per anesthesia protocol.  ASPIRIN/CAFFEINE (ANACIN PO) Take 2 Tabs by mouth two (2) times a day. Stop seven days prior to surgery per anesthesia protocol. Indications: last dose 3-30-10      CENESTIN 0.9 mg Tab Take  by mouth daily. Take day of surgery per anesthesia protocol.             SAFETY:  After treatment position/precautions:  · Upright in bed  · RN notified  · Call light within reach    Total Treatment Duration:   Time In: 9822  Time Out: [id number] [facility] [address] [address] [facility], [place], operated by [facility]

## 2020-06-01 NOTE — DISCHARGE INSTRUCTIONS
Patient Education        Left Heart Catheterization: About This Test  What is it? Left heart catheterization is a test to check the left side of your heart. Your doctor might look at the shape of your heart, the motion of your heart, or the blood pressure inside the chambers. Why is this test done? This test gives information about how your heart is working. It can:  · Check blood flow and blood pressure in the chambers of the heart. · Check the pumping action of the heart. · Find out if a heart defect is present and how severe it is. · Find out how well the heart valves work. How is the test done? · You will get medicine to help you relax. · A thin tube called a catheter is put into a blood vessel in the groin or the arm. The doctor moves the catheter through the blood vessel into your heart. · You will get a shot to numb the skin where the catheter goes in. · Dye may be injected into your heart. Your doctor can watch on special monitors as the dye moves in your heart. The dye helps your doctor see blood flow in your heart. · If a heart defect is found, cardiac catheterization sometimes is used to correct it during the test.  · You will stay in a room for at least a few hours to make sure the catheter site starts to heal. You may have a bandage or a compression device on your groin or arm to prevent bleeding. · If the catheter was placed in your groin, you may lie in bed for a few hours. If the catheter was put in your arm, you will need to keep your arm still for at least 1 hour. How long does it take? The test will take about 30 minutes. If a problem is found and the doctor treats it, the test can take a few hours longer. What happens after the test?  · You may or may not need to stay in the hospital overnight. You will get more instructions for what to do at home. · Drink plenty of fluids for several hours after the test.  Follow-up care is a key part of your treatment and safety.  Be sure to make and go to all appointments, and call your doctor if you are having problems. It's also a good idea to know your test results and keep a list of the medicines you take. Where can you learn more? Go to http://tanya-jose.info/  Enter W306 in the search box to learn more about \"Left Heart Catheterization: About This Test.\"  Current as of: December 16, 2019               Content Version: 12.5  © 6159-6848 [employer]. Care instructions adapted under license by [employer] (which disclaims liability or warranty for this information). If you have questions about a medical condition or this instruction, always ask your healthcare professional. [address] any warranty or liability for your use of this information. Patient Education        Percutaneous Coronary Intervention: What to Expect at Home  Your Recovery    Percutaneous coronary intervention (PCI) is the name for procedures that are used to open a narrowed or blocked coronary artery. The two most common PCI procedures are coronary angioplasty and coronary stent placement. Your groin or arm may have a bruise and feel sore for a day or two after a percutaneous coronary intervention (PCI). You can do light activities around the house, but nothing strenuous for several days. This care sheet gives you a general idea about how long it will take for you to recover. But each person recovers at a different pace. Follow the steps below to get better as quickly as possible. How can you care for yourself at home? Activity  · If the doctor gave you a sedative:  ? For 24 hours, don't do anything that requires attention to detail, such as going to work, making important decisions, or signing any legal documents. It takes time for the medicine's effects to completely wear off.  ? For your safety, do not drive or operate any machinery that could be dangerous.  Wait until the medicine wears off and you can think clearly and react easily. · Do not do strenuous exercise and do not lift, pull, or push anything heavy until your doctor says it is okay. This may be for a day or two. You can walk around the house and do light activity, such as cooking. · If the catheter was placed in your groin, try not to walk up stairs for the first couple of days. · If the catheter was placed in your arm near your wrist, do not bend your wrist deeply for the first couple of days. Be careful using your hand to get into and out of a chair or bed. · Carry your stent identification card with you at all times. · If your doctor recommends it, get more exercise. Walking is a good choice. Bit by bit, increase the amount you walk every day. Try for at least 30 minutes on most days of the week. Diet  · Drink plenty of fluids to help your body flush out the dye. If you have kidney, heart, or liver disease and have to limit fluids, talk with your doctor before you increase the amount of fluids you drink. · Keep eating a heart-healthy diet that has lots of fruits, vegetables, and whole grains. If you have not been eating this way, talk to your doctor. You also may want to talk to a dietitian. This expert can help you to learn about healthy foods and plan meals. Medicines  · Your doctor will tell you if and when you can restart your medicines. He or she will also give you instructions about taking any new medicines. · If you take aspirin or some other blood thinner, ask your doctor if and when to start taking it again. Make sure that you understand exactly what your doctor wants you to do. · Your doctor will prescribe blood-thinning medicines. You will likely take aspirin plus another antiplatelet, such as clopidogrel (Plavix). It is very important that you take these medicines exactly as directed. These medicines help keep the coronary artery open and reduce your risk of a heart attack.   · Call your doctor if you think you are having a problem with your medicine. Care of the catheter site  · For 1 or 2 days, keep a bandage over the spot where the catheter was inserted. The bandage probably will fall off in this time. · Put ice or a cold pack on the area for 10 to 20 minutes at a time to help with soreness or swelling. Put a thin cloth between the ice and your skin. · You may shower 24 to 48 hours after the procedure, if your doctor okays it. Pat the incision dry. · Do not soak the catheter site until it is healed. Don't take a bath for 1 week, or until your doctor tells you it is okay. · Watch for bleeding from the site. A small amount of blood (up to the size of a quarter) on the bandage can be normal.  · If you are bleeding, lie down and press on the area for 15 minutes to try to make it stop. If the bleeding does not stop, call your doctor or seek immediate medical care. Follow-up care is a key part of your treatment and safety. Be sure to make and go to all appointments, and call your doctor if you are having problems. It's also a good idea to know your test results and keep a list of the medicines you take. When should you call for help? [id number] anytime you think you may need emergency care. For example, call if:  · You passed out (lost consciousness). · You have severe trouble breathing. · You have sudden chest pain and shortness of breath, or you cough up blood. · You have symptoms of a heart attack, such as:  ? Chest pain or pressure. ? Sweating. ? Shortness of breath. ? Nausea or vomiting. ? Pain that spreads from the chest to the neck, jaw, or one or both shoulders or arms. ? Dizziness or lightheadedness. ? A fast or uneven pulse. After calling 911, chew 1 adult-strength aspirin. Wait for an ambulance. Do not try to drive yourself.   · You have been diagnosed with angina, and you have angina symptoms that do not go away with rest or are not getting better within 5 minutes after you take one dose of nitroglycerin. Call your doctor now or seek immediate medical care if:  · You are bleeding from the area where the catheter was put in your artery. · You have a fast-growing, painful lump at the catheter site. · You have signs of infection, such as:  ? Increased pain, swelling, warmth, or redness. ? Red streaks leading from the catheter site. ? Pus draining from the catheter site. ? A fever. · Your leg, arm, or hand is painful, looks blue, or feels cold, numb, or tingly. Watch closely for changes in your health, and be sure to contact your doctor if you have any problems. Where can you learn more? Go to http://tanya-jose.info/  Enter V049 in the search box to learn more about \"Percutaneous Coronary Intervention: What to Expect at Home. \"  Current as of: December 16, 2019               Content Version: 12.5  © 0369-7172 [employer]. Care instructions adapted under license by [employer] (which disclaims liability or warranty for this information). If you have questions about a medical condition or this instruction, always ask your healthcare professional. [place] [address] any warranty or liability for your use of this information. Patient Education        Reducing Risk of Another Heart Attack With Medicine: Care Instructions  Your Care Instructions     After a heart attack, medicines help lower your risk of having another one. These medicines include:  · ACE inhibitors or ARBs. These are types of blood pressure medicines. · Statins and other cholesterol medicines. These lower cholesterol. · Aspirin and other antiplatelets. These medicines prevent blood clots from forming in your blood vessels. This can help prevent a heart attack. · Beta-blocker medicines. These are a type of blood pressure and heart medicine. All medicines can cause side effects. So it is important to understand the pros and cons of any medicine you take.  It is also important to take your medicines exactly as your doctor tells you to. Follow-up care is a key part of your treatment and safety. Be sure to make and go to all appointments, and call your doctor if you are having problems. It's also a good idea to know your test results and keep a list of the medicines you take. ACE inhibitors  ACE (angiotensin-converting enzyme) inhibitors are used for three main reasons. They lower blood pressure, protect the kidneys, and prevent heart attacks and strokes. Examples include benazepril (Lotensin), lisinopril (Prinivil), and ramipril (Altace). An angiotensin II receptor blocker (ARB) may be used instead of an ACE inhibitor. ARBs help you in the same ways as ACE inhibitors. Examples include candesartan (Atacand), irbesartan (Avapro), and losartan (Cozaar). Before you start taking an ACE inhibitor or an ARB, make sure your doctor knows if:  · You are taking a water pill (diuretic). · You are taking potassium pills or using salt substitutes. · You are pregnant or breastfeeding. · You have had a kidney transplant or other kidney problems. ACE inhibitors and ARBs can cause side effects. Call your doctor right away if you have:  · Trouble breathing. · Swelling in your face, head, neck, or tongue. Statins  Statins can help lower your risk for a heart attack and stroke. This medicine lowers your cholesterol. Examples include atorvastatin (Lipitor), lovastatin (Mevacor), pravastatin (Pravachol), and simvastatin (Zocor). Before you start taking a statin, make sure your doctor knows if:  · You have had a kidney transplant or other kidney problems. · You have liver disease. · You take any other prescription medicine, over-the-counter medicine, vitamins, supplements, or herbal remedies. · You are pregnant or breastfeeding. Statins can cause side effects. Call your doctor right away if you have:  · New, severe muscle aches. · Brown urine.   Aspirin  After a heart attack, aspirin can help lower your risk of having another one. Most heart attacks are caused by a blood clot that blocks a coronary artery. When this happens, oxygen can't get to the heart muscle, and part of the heart dies. Aspirin can help prevent blood clots that can block the blood vessels. You may not be able to use aspirin if you:  · Have asthma or certain other health conditions. · Have an ulcer or other stomach problem. · Take some other medicine (called a blood thinner) that prevents blood clots. · Are allergic to aspirin. Your doctor may recommend that you take one low-dose aspirin (81 mg) tablet each day, with a meal and a full glass of water. Aspirin can also cause serious bleeding. Be sure you get instructions about how to take aspirin safely. Call your doctor right away if you have:  · Unusual bleeding or bruising. · Nausea, vomiting, or heartburn. · Black or bloody stools. Beta-blockers  Beta-blockers are used for three main reasons. They lower blood pressure, relieve angina symptoms (such as chest pain or pressure), and reduce the chances of a second heart attack. They include atenolol (Tenormin), carvedilol (Coreg), and metoprolol (Lopressor). Before you start taking a beta-blocker, make sure your doctor knows if you have:  · Severe asthma or frequent asthma attacks. · A very slow pulse (less than 55 beats a minute). Beta-blockers can cause side effects. Call your doctor right away if you have:  · Wheezing or trouble breathing. · Dizziness or lightheadedness. · Asthma that gets worse. When should you call for help? Watch closely for changes in your health, and be sure to contact your doctor if you have any problems. Where can you learn more? Go to http://tanya-jose.info/  Enter R428 in the search box to learn more about \"Reducing Risk of Another Heart Attack With Medicine: Care Instructions. \"  Current as of: December 16, 2019               Content Version: 12.5  © 9319-0271 [employer]. Care instructions adapted under license by [employer] (which disclaims liability or warranty for this information). If you have questions about a medical condition or this instruction, always ask your healthcare professional. [address]  any warranty or liability for your use of this information. Patient Education      Atorvastatin (Lipitor) - (By mouth)   Why this medicine is used:   Treats high cholesterol and triglyceride levels. Reduces the risk of angina, stroke, heart attack, or certain heart and blood vessel problems. Contact a nurse or doctor right away if you have:  · Severe headache, confusion, trouble speaking  · Dark urine or pale stools  · Yellow skin or eyes  · Nausea, vomiting, loss of appetite, stomach pain  · Muscle pain, tenderness, or weakness; unusual tiredness     Common side effects:  · Diarrhea  · Joint pain  © 2017 [facility] Information is for End User's use only and may not be sold, redistributed or otherwise used for commercial purposes. Patient Education      Nitroglycerin (Nitro-Time) - (By mouth)   Why this medicine is used:   Treats or prevents angina (chest pain). Contact a nurse or doctor right away if you have:  · Throbbing, severe, or ongoing headache, low fever  · Confusion or trouble seeing  · Severe or ongoing dizziness, lightheadedness, or fainting     Common side effects:  · Feeling of warmth, redness of the face, neck, arms, and upper chest  · Headache  © 2017 [address] is for End User's use only and may not be sold, redistributed or otherwise used for commercial purposes. Patient Education      Ticagrelor (Brilinta) - (By mouth)   Why this medicine is used:   Helps prevent stroke, heart attack, and other heart problems.   Contact a nurse or doctor right away if you have:  · Sudden or severe headache  · Shortness of breath, trouble breathing  · Bloody vomit or vomit that looks like coffee grounds; bloody or black, tarry stools  · Bleeding that does not stop or bruises that do not heal     Common side effects:  · Minor bleeding or bruising  · Headache  © 2017 [facility] Information is for End User's use only and may not be sold, redistributed or otherwise used for commercial purposes.

## 2020-06-01 NOTE — PROGRESS NOTES
Care Management Interventions  PCP Verified by CM: Yes(Dr. [place] [facility])  Mode of Transport at Discharge: BLS  Transition of Care Consult (CM Consult): Discharge Planning, SNF  Discharge Durable Medical Equipment: No  Physical Therapy Consult: Yes  Occupational Therapy Consult: Yes  Speech Therapy Consult: Yes  Current Support Network: Own Home, Lives with Spouse, Other(son lives in home)  The Plan for Transition of Care is Related to the Following Treatment Goals : SNf  The Patient and/or Patient Representative was Provided with a Choice of Provider and Agrees with the Discharge Plan?: Yes  Name of the Patient Representative Who was Provided with a Choice of Provider and Agrees with the Discharge Plan: Pt and spouse, Maikel  Freedom of Choice List was Provided with Basic Dialogue that Supports the Patient's Individualized Plan of Care/Goals, Treatment Preferences and Shares the Quality Data Associated with the Providers?: Yes  Discharge Location  Discharge Placement: Rehab Unit Subacute    CM spoke with  at [facility] this AM. Bed available. today. CM spoke by phone with pt's spouse, Joey Kirby ([phone]) to notify him of the discharge to SNF. He verbalizes agreement. [place] [facility] scheduled for transport at 1500. No further CM needs. 1440 CM notified pt's  of DC time.

## 2020-06-01 NOTE — ROUTINE PROCESS
Verbal bedside report given to [facility], oncoming RN. Patient's situation, background, assessment and recommendations provided. Opportunity for questions provided. Oncoming RN assumed care of patient.

## 2020-06-01 NOTE — PROGRESS NOTES
Problem: Self Care Deficits Care Plan (Adult)  Goal: *Acute Goals and Plan of Care (Insert Text)  Description:   1. Patient will complete lower body bathing and dressing with Mod A and adaptive equipment as needed. 2. Patient will complete toileting and toilet transfer with Mod A and adaptive equipment as needed. 3. Patient will complete seated ADL with good static and fair dynamic seated balance. 4. Patient will complete bed mobility with Mod A and one verbal cue placement of UE to assist.  5. Patient will complete functional transfers with Mod A and adaptive equipment as needed. 6. Patient will complete BUE exercises to increase strength in BUE for performance of ADLs and functional mobility. Timeframe: 7 visits      Outcome: Progressing Towards Goal     OCCUPATIONAL THERAPY: Daily Note and AM 6/1/2020  INPATIENT: OT Visit Days: 2  [place] / [place] / Product Type: Medicare /      NAME/AGE/GENDER: Kianna Golden is a 66 y.o. female   PRIMARY DIAGNOSIS:  Elevated troponin [R79.89]  Elevated troponin [R79.89] Elevated troponin Elevated troponin    ICD-10: Treatment Diagnosis:    · Generalized Muscle Weakness (M62.81)  · Other lack of cordination (R27.8)  · Difficulty in walking, Not elsewhere classified (R26.2)   Precautions/Allergies:     Ace inhibitors; Adhesive tape; Codeine; Other medication; Other omega-3s; and Sulfa (sulfonamide antibiotics)      ASSESSMENT:     Ms. Alla Nichols is a 66 y.o. female admitted for chest pain and elevated troponin. Now one day post L heart cath. Pt with prior history of CVA with L sided weakness. At baseline, patient lives in two story home with 5 MOLLY. Prior level of function unclear as pt  reports that pt was using a RW for in home ambulation to one RN and reports that pt was independent without use of DME to another. Pt with slight confusion today and unable to verify.      6/1/20: Patient was supine in the bed upon arrival. Pt is alert and did verbalize some today with patient able to state her name, that she was at the hospital, and the month. Pt continues to require total assistance x 2 for bed mobility to include rolling, supine to sit, and sit to supine. Pt did however demonstrate some improvement with sitting balance edge of bed requiring CGA/min A. Pt sits with forward flexed posture and as she fatigued pt began to lean forward even more. Pt had no observed functional use of her L UE but pt was able to grasp washcloth in her R hand and with hand over hand assistance patient was encouraged for washing her face and participating some with upper body bathing overall requiring maximal assistance. Pt donned a new gown with education to don through the L UE first and with maximal assistance was able to reach R arm through the sleeve. Pt had large bowel movement in her brief and was returned to supine for hygiene. Pt did attempted to hold with R arm to bed rail during hygiene. Pt positioned with many pillows under her extremities and pillows to prop her more towards midline in supported sitting position. Pt to continue per plan of care. Elizabeth Mo is currently functioning below baseline for ADLs and functional mobility and would benefit from acute OT to increase independence and safety with ADLs. Will follow for duration of hospital stay to address the above goals. Patient was educated on role and plan of care of occupational therapy. This section established at most recent assessment   PROBLEM LIST (Impairments causing functional limitations):  1. Decreased Strength  2. Decreased ADL/Functional Activities  3. Decreased Transfer Abilities  4. Decreased Ambulation Ability/Technique  5. Decreased Balance  6. Decreased Activity Tolerance  7. Increased Fatigue  8. Decreased Flexibility/Joint Mobility  9.  Decreased Cognition   INTERVENTIONS PLANNED: (Benefits and precautions of occupational therapy have been discussed with the patient.)  1. Activities of daily living training  2. Adaptive equipment training  3. Balance training  4. Clothing management  5. Neuromuscular re-eduation  6. Re-evaluation  7. Therapeutic activity  8. Therapeutic exercise     TREATMENT PLAN: Frequency/Duration: Follow patient 3x/week to address above goals. Rehabilitation Potential For Stated Goals: [facility] (at time of discharge pending progress):    Placement: It is my opinion, based on this patient's performance to date, that Ms. Nguyen Nichols may benefit from intensive therapy at a [address] [place] [address] after discharge due to the functional deficits listed above that are likely to improve with skilled rehabilitation and concerns that he/she may be unsafe to be unsupervised at home due to decreased independence, safety, baance, strength, ROM, and activity tolerance for performance of ADLs and functional mobility. .  Equipment:    TBD              OCCUPATIONAL PROFILE AND HISTORY:   History of Present Injury/Illness (Reason for Referral):  See H & P  Past Medical History/Comorbidities:   Ms. Nguyen Nichols  has a past medical history of Arrhythmia, Arthritis, Asthma, CAD (coronary artery disease), Chronic pain, Diabetes ([address] GERD (gastroesophageal reflux disease), Hypertension, Menopause, Morbid obesity ([address] Stroke ([address] Thyroid disease, and Unspecified adverse effect of anesthesia. She also has no past medical history of Aneurysm ([address] Autoimmune disease ([address] Cancer ([address] Chronic kidney disease, Coagulation defects, COPD, Difficult intubation, Heart failure ([address] Liver disease, Malignant hyperthermia due to anesthesia, Nausea & vomiting, Other ill-defined conditions(799.89), Pseudocholinesterase deficiency, Psychiatric disorder, PUD (peptic ulcer disease), Seizures ([address] Thromboembolus ([address] or Unspecified sleep apnea.   Ms. Nguyen Nichols  has a past surgical history that includes hx appendectomy; hx lap cholecystectomy; hx orthopaedic; hx knee arthroscopy; hx clemente and bso; hx breast biopsy; and pr cardiac surg procedure unlist (4/2010). Social History/Living Environment:   Home Environment: Private residence  # Steps to Enter: 10  One/Two Story Residence: Two story  # of Interior Steps: 20(patients room is on second floor)  Living Alone: No  Support Systems: Spouse/Significant Other/Partner  Patient Expects to be Discharged to[de-identified] Private residence  Current DME Used/Available at Home: Walker, rolling  Prior Level of Function/Work/Activity:  Prior level of function unclear as pt  reports that pt was using a RW for in home ambulation to one RN and reports that pt was independent without use of DME to another. Pt with slight confusion today and unable to verify. Personal Factors:          Sex:  female        Age:  66 y.o. Number of Personal Factors/Comorbidities that affect the Plan of Care: Extensive review of physical, cognitive, and psychosocial performance (3+):  HIGH COMPLEXITY   ASSESSMENT OF OCCUPATIONAL PERFORMANCE[de-identified]   Activities of Daily Living:   Basic ADLs (From Assessment) Complex ADLs (From Assessment)   Feeding: Moderate assistance  Oral Facial Hygiene/Grooming: Maximum assistance  Bathing: Maximum assistance  Upper Body Dressing: Maximum assistance  Lower Body Dressing: Total assistance  Toileting: Total assistance Instrumental ADL  Meal Preparation: Total assistance  Homemaking: Total assistance   Grooming/Bathing/Dressing Activities of Daily Living   Grooming  Washing Face: Maximum assistance  Cues: Tactile cues provided;Verbal cues provided;Visual cues provided Cognitive Retraining  Safety/Judgement: Fall prevention;Home safety   Upper Body Bathing  Bathing Assistance: Maximum assistance  Position Performed: Seated edge of bed  Cues: Physical assistance; Tactile cues provided;Verbal cues provided;Visual cues provided Feeding  Feeding Assistance: Total assistance (dependent)   Lower Body Bathing  Bathing Assistance:  Total assistance(dependent)  Perineal  : Total assistance (dependent)  Position Performed: Supine Toileting  Toileting Assistance: Maximum assistance; Total assistance(dependent)(able to assist some w/ rolling)  Bowel Hygiene: Total assistance (dependent)  Clothing Management: Total assistance (dependent)   Upper Body [address] Gown: Maximum assistance; Total assistance (dependent)  Cues: Physical assistance; Tactile cues provided;Verbal cues provided;Visual cues provided     Lower Body Dressing Assistance  Socks: Total assistance (dependent) Bed/Mat Mobility  Rolling: Total assistance;Assist x2  Supine to Sit: Total assistance;Assist x2  Sit to Supine: Total assistance;Assist x2  Scooting: Total assistance;Assist x2     Most Recent Physical Functioning:   Gross Assessment:                  Posture:     Balance:  Sitting: Impaired  Sitting - Static: Fair (occasional)  Sitting - Dynamic: Poor (constant support) Bed Mobility:  Rolling: Total assistance;Assist x2  Supine to Sit: Total assistance;Assist x2  Sit to Supine: Total assistance;Assist x2  Scooting: Total assistance;Assist x2  Wheelchair Mobility:     Transfers:               Patient Vitals for the past 6 hrs:   BP SpO2 Pulse   06/01/20 1005 149/67 95 % 72   06/01/20 1417 129/56 96 % 67       Mental Status  Neurologic State: Alert  Orientation Level: Oriented to person, Oriented to place, Oriented to time  Cognition: Follows commands  Perception: Cues to maintain midline in sitting  Perseveration: No perseveration noted  Safety/Judgement: Fall prevention, Home safety                          Physical Skills Involved:  1. Range of Motion  2. Balance  3. Strength  4. Activity Tolerance  5. Fine Motor Control  6. Gross Motor Control Cognitive Skills Affected (resulting in the inability to perform in a timely and safe manner):  1. Perception  2. Comprehension  3. Expression Psychosocial Skills Affected:  1. Habits/Routines  2.  Environmental Adaptation Number of elements that affect the Plan of Care: 5+:  HIGH COMPLEXITY   CLINICAL DECISION MAKIN[address] [place] [id number] AM-PAC 6 Clicks   Daily Activity Inpatient Short Form  How much help from another person does the patient currently need. .. Total A Lot A Little None   1. Putting on and taking off regular lower body clothing? [x] 1   [] 2   [] 3   [] 4   2. Bathing (including washing, rinsing, drying)? [] 1   [x] 2   [] 3   [] 4   3. Toileting, which includes using toilet, bedpan or urinal?   [x] 1   [] 2   [] 3   [] 4   4. Putting on and taking off regular upper body clothing? [] 1   [x] 2   [] 3   [] 4   5. Taking care of personal grooming such as brushing teeth? [] 1   [x] 2   [] 3   [] 4   6. Eating meals? [] 1   [x] 2   [] 3   [] 4   © , Trustees of [address] [place] [id number], under license to [employer]. All rights reserved      Score:  Initial: 10, completed, 2020 Most Recent: X (Date: -- )    Interpretation of Tool:  Represents activities that are increasingly more difficult (i.e. Bed mobility, Transfers, Gait). Medical Necessity:     · Skilled intervention continues to be required due to decreased independence, safety, balance, strength, ROM, and activit tolerance for performance of ADLs and functional mobility. .  Reason for Services/Other Comments:  · Patient continues to require skilled intervention due to medical complications and patient unable to attend/participate in therapy as expected. Use of outcome tool(s) and clinical judgement create a POC that gives a: MODERATE COMPLEXITY         TREATMENT:   (In addition to Assessment/Re-Assessment sessions the following treatments were rendered)     Pre-treatment Symptoms/Complaints: Pt with no verbal complaints.   Pain: Initial:   Pain Intensity 1: 0  Post Session:  Unchanged     Today's treatment session addressed Decreased Strength, Decreased ADL/Functional Activities and Decreased Balance to progress towards achieving goal(s) listed above. During this session, Physical Therapy addressed  Functional Transfers to progress towards their discipline specific goal(s). Co-treatment was necessary to improve patient's cognitive participation, ability to follow higher level commands, ability to increase activity demands and ability to return to normal functional activity. Self Care: (30 minutes): Procedure(s) utilized to improve and/or restore self-care/home management as related to dressing, bathing and grooming. Required maximal visual, verbal, manual and tactile cueing to facilitate activities of daily living skills. Patient worked on washing her face, parts of her upper body, and donning a new gown. Pt required hand over hand assistance and guidance of the R UE but was able to grasp the washcloth. Neuromuscular Re-education: (8  minutes):  Exercise/activities below to improve balance, coordination and posture. Required minimal visual, verbal, manual and tactile cues to promote static and dynamic balance in sitting. Seated edge of bed,pt still with slight lean to the R but encouraged from more midline positioning with head as well. Pt sits with flexed posture and was encouraged for more upright head and trunk positioning throughout session. Braces/Orthotics/Lines/Etc:   · farris catheter  · O2 Device: Room air  Treatment/Session Assessment:    · Response to Treatment:  Pt drowsy and with decreased movement observed in R UE.  · Interdisciplinary Collaboration:   o Physical Therapy Assistant  o Occupational Therapist  o Registered Nurse  o Certified Nursing Assistant/Patient Care Technician  · After treatment position/precautions:   o Supine in bed  o Bed/Chair-wheels locked  o Bed in low position  o Call light within reach  o RN notified   · Compliance with Program/Exercises: Compliant most of the time, Will assess as treatment progresses. · Recommendations/Intent for next treatment session:   \"Next visit will focus on advancements to more challenging activities and reduction in assistance provided\".   Total Treatment Duration:  OT Patient Time In/Time Out  Time In: 1052  Time Out: [address], OT, OTR/L

## 2020-06-01 NOTE — DISCHARGE SUMMARY
Discharge Summary     Patient: Austin Anthony MRN: [id number]  SSN: [id number]    YOB: 1945  Age: 66 y.o. Sex: female       Admit Date: 5/25/2020    Discharge Date: 6/1/2020      Admission Diagnoses: Elevated troponin [R79.89]  Elevated troponin [R79.89]    Discharge Diagnoses:   Problem List as of 6/1/2020 Never Reviewed          Codes Class Noted - Resolved    * (Principal) Elevated troponin ICD-10-CM: R79.89  ICD-9-CM: 790.6  5/26/2020 - Present        Leukocytosis ICD-10-CM: D72.829  ICD-9-CM: 288.60  5/26/2020 - Present        Nausea ICD-10-CM: R11.0  ICD-9-CM: 787.02  5/26/2020 - Present        Weakness ICD-10-CM: R53.1  ICD-9-CM: 780.79  5/26/2020 - Present               Discharge Condition: [facility] Course: HPI as per admitting provider: Austin Anthony is a 66 y.o. female with a past medical history of CAD, DM type II who presents to the ER with report of nausea that has been going on since early the morning of 5/25. She presented to [facility] ED shortly after onset and had a negative workup and was sent home. She came back again tonight with persistent symptoms. She also admits to feeling weak and fatigued. Also admits to mild epigastric pain. Denies harriet chest pain, vomiting, diarrhea, fever, SOB. \"    Was admitted for a non-STEMI, patient has been admitted for non-STEMI, she was evaluated by cardiology, she was taken eventually to cardiology lab, and underwent a stent placement. Blankly around the time of the procedure, the patient underwent multiple embolic stroke, cleared. He has atrial fibrillation or not. Patient will be discharged on Brilinta aspirin statin aspirin, she has left hemiparesis, she will be also discharged on a soft diet, she will be discharged to nursing home for rehab. The plan with the ,  Rolla extensively discussed with the family her clinical status. Discharge time 31 minutes.     PE  Gen: NAD  Lungs: CTa b/l  Abdomen: soft, non tender, non distended  Extr; no calf tenderness, no edema  Neurology: left hemparesis    Consults: Cardiology and Neurology          Disposition: SNF    Discharge Medications:   Current Discharge Medication List      START taking these medications    Details   ticagrelor (BRILINTA) 90 mg tablet Take 1 Tab by mouth every twelve (12) hours every twelve (12) hours. Qty: 60 Tab, Refills: 0      nitroglycerin (NITROSTAT) 0.4 mg SL tablet 1 Tab by SubLINGual route every five (5) minutes as needed for Chest Pain. Up to 3 doses. Qty: 15 Tab, Refills: 0      atorvastatin (LIPITOR) 40 mg tablet Take 1 Tab by mouth nightly. Qty: 30 Tab, Refills: 2         CONTINUE these medications which have CHANGED    Details   insulin glargine (Lantus U-100 Insulin) 100 unit/mL injection 18 Units by SubCUTAneous route nightly. Qty: 1 Vial, Refills: 1         CONTINUE these medications which have NOT CHANGED    Details   traZODone (DESYREL) 100 mg tablet Take 100 mg by mouth nightly. insulin degludec Don Grates FlexTouch U-100) 100 unit/mL (3 mL) inpn 18 Units by SubCUTAneous route nightly. insulin aspart U-100 (NovoLOG Flexpen U-100 Insulin) 100 unit/mL (3 mL) inpn 4 Units by SubCUTAneous route every evening. acetaminophen (Tylenol Extra Strength) 500 mg tablet Take 1,000 mg by mouth two (2) times daily as needed for Pain. amLODIPine (Norvasc) 10 mg tablet Take 10 mg by mouth daily. carvediloL (Coreg) 25 mg tablet Take 12.5 mg by mouth two (2) times daily (with meals). furosemide (Lasix) 40 mg tablet Take  by mouth daily. OTHER,NON-FORMULARY, 18 Units by SubCUTAneous route once. dinner   Indications: diabetes, tresiva      aspirin delayed-release 81 mg tablet Take 81 mg by mouth daily. Take day of surgery per anesthesia protocol. Cholecalciferol, Vitamin D3, (VITAMIN D3) 1,000 unit cap Take 1 Cap by mouth daily. Stop seven days prior to surgery per anesthesia protocol.       COZAAR 100 mg Tab Take 100 mg by mouth daily. Take 1/2 tablet twice daily. sertraline (ZOLOFT) 50 mg tablet Take 200 mg by mouth two (2) times a day. Take day of surgery per anesthesia protocol. PRILOSEC 20 mg capsule Take 20 mg by mouth daily. Take day of surgery per anesthesia protocol. SYNTHROID 175 mcg tablet Take 150 mcg by mouth daily. Take day of surgery per anesthesia protocol. ondansetron (ZOFRAN ODT) 4 mg disintegrating tablet Take 1 Tab by mouth every eight (8) hours as needed for Nausea. Qty: 12 Tab, Refills: 0      nitrofurantoin, macrocrystal-monohydrate, (MACROBID) 100 mg capsule Take 1 Cap by mouth two (2) times a day. Qty: 20 Cap, Refills: 0      hyoscyamine SL (LEVSIN/SL) 0.125 mg SL tablet 1 Tab by SubLINGual route every six (6) hours as needed for Cramping. Qty: 10 Tab, Refills: 0      ezetimibe (ZETIA) 10 mg tablet Take  by mouth. albuterol (PROVENTIL HFA) 90 mcg/actuation inhaler Take 2 Puffs by inhalation daily as needed. Take day of surgery per anesthesia protocol. BRING DAY OF SURGERY      multivitamin (ONE A DAY) tablet Take 1 Tab by mouth daily. Take day of surgery per anesthesia protocol. ASPIRIN/CAFFEINE (ANACIN PO) Take 2 Tabs by mouth two (2) times a day. Stop seven days prior to surgery per anesthesia protocol. Indications: last dose 3-30-10      CENESTIN 0.9 mg Tab Take  by mouth daily. Take day of surgery per anesthesia protocol.             STOP taking these medications       insulin aspart (NOVOLOG) 100 unit/mL injection Comments:   Reason for Stopping:         metoprolol (LOPRESSOR) 50 mg tablet Comments:   Reason for Stopping:               Activity: as per PT  Diet: Low fat, Low cholesterol, Nectar thick liquids and mechanical soft diet, assistance with feedings  Wound Care: None needed    Follow-up Appointments   Procedures    FOLLOW UP VISIT Appointment in: 3 - 5 Days pcp     pcp     Standing Status:   Standing     Number of Occurrences:   1     Order Specific Question:   Appointment in     Answer:   3 - 5 Days    FOLLOW UP VISIT Appointment in: 3 - 5 Days Follow up with Primary Care Provider in 1 weeks. Follow up with Primary Care Provider in 1 weeks. Standing Status:   Standing     Number of Occurrences:   1     Standing Expiration Date:   6/2/2020     Order Specific Question:   Appointment in     Answer:   3 - 5 Days    FOLLOW UP VISIT Appointment in: Ten Days cardiology     cardiology     Standing Status:   Standing     Number of Occurrences:   1     Standing Expiration Date:   6/2/2020     Order Specific Question:   Appointment in     Answer:   Ten Days    FOLLOW UP VISIT Appointment in: Two Weeks neurology     neurology     Standing Status:   Standing     Number of Occurrences:   1     Standing Expiration Date:   6/2/2020     Order Specific Question:   Appointment in     Answer:    Two Weeks       Signed By: Emiliano Porras MD     June 1, 2020

## 2020-06-01 NOTE — PROGRESS NOTES
Problem: Falls - Risk of  Goal: *Absence of Falls  Description: Document Aleyda Schafer Fall Risk and appropriate interventions in the flowsheet. Outcome: Resolved/Not Met  Note: Fall Risk Interventions:  Mobility Interventions: Communicate number of staff needed for ambulation/transfer    Mentation Interventions: Bed/chair exit alarm    Medication Interventions: Evaluate medications/consider consulting pharmacy    Elimination Interventions: Call light in reach              Problem: Pressure Injury - Risk of  Goal: *Prevention of pressure injury  Description: Document Santiago Scale and appropriate interventions in the flowsheet.   Outcome: Progressing Towards Goal  Note: Pressure Injury Interventions:  Sensory Interventions: Assess changes in LOC    Moisture Interventions: Absorbent underpads    Activity Interventions: Pressure redistribution bed/mattress(bed type)    Mobility Interventions: Pressure redistribution bed/mattress (bed type)    Nutrition Interventions: Document food/fluid/supplement intake    Friction and Shear Interventions: Apply protective barrier, creams and emollients

## 2020-06-01 NOTE — ROUTINE PROCESS
TRANSFER - OUT REPORT: 
 
Verbal report given to Karen Escamilla RN on [address]  being transferred to [facility] for routine progression of care Report consisted of patients Situation, Background, Assessment and Recommendations(SBAR). Information from the following report(s) SBAR, Kardex and Procedure Summary was reviewed with the receiving nurse. Opportunity for questions and clarification was provided.

## 2020-06-01 NOTE — PROGRESS NOTES
Problem: Mobility Impaired (Adult and Pediatric)  Goal: *Acute Goals and Plan of Care (Insert Text)  Description: LTG:  (1.)Ms. Kya Guaman will move from supine to sit and sit to supine , scoot up and down and roll side to side in bed with MINIMAL ASSIST within 7 treatment day(s). (2.)Ms. Kya Guaman will transfer from bed to chair and chair to bed with MODERATE ASSIST using the least restrictive device within 7 treatment day(s). (3.)Ms. Kya Guaman will ambulate with MODERATE ASSIST for 10 feet with the least restrictive device within 7 treatment day(s). (4.)Ms. Kya Guaman will participate in therapeutic activity/exercises x 23 minutes for increased strength within 7 treatment days. (5.)Ms. Kya Guaman will maintain static/dynamic sitting x 15 minutes with SUPERVISION for improved balance within 7 treatment days. ________________________________________________________________________________________________     Outcome: Progressing Towards Goal     PHYSICAL THERAPY: Daily Note and AM 6/1/2020  INPATIENT: PT Visit Days : 3  [place] / [place] / Product Type: Medicare /       NAME/AGE/GENDER: Romy Arceo is a 66 y.o. female   PRIMARY DIAGNOSIS: Elevated troponin [R79.89]  Elevated troponin [R79.89] Elevated troponin Elevated troponin       ICD-10: Treatment Diagnosis:    · Generalized Muscle Weakness (M62.81)  · Difficulty in walking, Not elsewhere classified (R26.2)   Precaution/Allergies:  Ace inhibitors; Adhesive tape; Codeine; Other medication; Other omega-3s; and Sulfa (sulfonamide antibiotics)      ASSESSMENT:     Ms. Kya Guaman is a 66 y.o. female in the hospital for the above who was supine in bed upon arrival. She is agreeable to PT and would like to sit EOB. Pt performed supine to sit with total assist x2. She attempted to  her right LE, but was not able to move but a few inches. She sat EOB for about 20 minutes with close SBA/CGA.  Pt positioned back to supine and needed brief changed. Performed rolling left and right multiple times for linen and brief change. Pt required total assist x2 for scooting up in the bed. Multiple pillows positioned for comfort. Pt left supine with needs in reach. She was able to verbally answer questions to and was oriented x3. At this time, patient is appropriate for Co-treatment with occupational therapy due to patient's decreased overall endurance/tolerance levels, as well as need for high level skilled assistance to complete functional transfers/mobility and functional tasks. Romy Arceo is appropriate for a multidisciplinary co-treatment of PT and OT to address goals of both disciplines. This section established at most recent assessment   PROBLEM LIST (Impairments causing functional limitations):  1. Decreased Strength  2. Decreased ADL/Functional Activities  3. Decreased Transfer Abilities  4. Decreased Ambulation Ability/Technique  5. Decreased Balance  6. Decreased Activity Tolerance  7. Increased Fatigue  8. Increased Shortness of Breath  9. Decreased Cognition   INTERVENTIONS PLANNED: (Benefits and precautions of physical therapy have been discussed with the patient.)  1. Balance Exercise  2. Bed Mobility  3. Family Education  4. Gait Training  5. Neuromuscular Re-education/Strengthening  6. Therapeutic Activites  7. Therapeutic Exercise/Strengthening  8. Transfer Training     TREATMENT PLAN: Frequency/Duration: 3 times a week for duration of hospital stay  Rehabilitation Potential For Stated Goals: [facility] (at time of discharge pending progress):    Placement:   It is my opinion, based on this patient's performance to date, that Ms. Kya Guaman may benefit from intensive therapy at a [address] [place] [address] after discharge due to the functional deficits listed above that are likely to improve with skilled rehabilitation and concerns that he/she may be unsafe to be unsupervised at home due to decreased balance, mobility, and activity tolerance. Equipment:    None at this time              HISTORY:   History of Present Injury/Illness (Reason for Referral):  Elevated troponin  Past Medical History/Comorbidities:   Ms. Michael Fabian  has a past medical history of Arrhythmia, Arthritis, Asthma, CAD (coronary artery disease), Chronic pain, Diabetes ([address] GERD (gastroesophageal reflux disease), Hypertension, Menopause, Morbid obesity ([address] Stroke ([address] Thyroid disease, and Unspecified adverse effect of anesthesia. She also has no past medical history of Aneurysm ([address] Autoimmune disease ([address] Cancer ([address] Chronic kidney disease, Coagulation defects, COPD, Difficult intubation, Heart failure ([address] Liver disease, Malignant hyperthermia due to anesthesia, Nausea & vomiting, Other ill-defined conditions(799.89), Pseudocholinesterase deficiency, Psychiatric disorder, PUD (peptic ulcer disease), Seizures ([address] Thromboembolus ([address] or Unspecified sleep apnea. Ms. Michael Fabian  has a past surgical history that includes hx appendectomy; hx lap cholecystectomy; hx orthopaedic; hx knee arthroscopy; hx clemente and bso; hx breast biopsy; and pr cardiac surg procedure unlist (4/2010). Social History/Living Environment:   Home Environment: Private residence  # Steps to Enter: 10  One/Two Story Residence: Two story  # of Interior Steps: 20(patients room is on second floor)  Living Alone: No  Support Systems: Spouse/Significant Other/Partner  Patient Expects to be Discharged to[de-identified] Private residence  Current DME Used/Available at Home: Walker, rolling  Prior Level of Function/Work/Activity:  Per RN, lives with spouse and ambulatory with [facility] PTA. Has been declining recently.      Number of Personal Factors/Comorbidities that affect the Plan of Care: 3+: HIGH COMPLEXITY   EXAMINATION:   Most Recent Physical Functioning:   Gross Assessment:                  Posture:     Balance:  Sitting: Impaired  Sitting - Static: Fair (occasional)  Sitting - Dynamic: Poor (constant support) Bed Mobility:  Rolling: Total assistance;Assist x2  Supine to Sit: Total assistance;Assist x2  Sit to Supine: Total assistance;Assist x2  Scooting: Total assistance;Assist x2  Wheelchair Mobility:     Transfers:     Gait:            Body Structures Involved:  1. Nerves  2. Heart  3. Lungs  4. Muscles Body Functions Affected:  1. Mental  2. Sensory/Pain  3. Cardio  4. Respiratory  5. Neuromusculoskeletal  6. Movement Related Activities and Participation Affected:  1. General Tasks and Demands  2. Mobility  3. Self Care  4. Domestic Life  5. Community, Social and [place]   Number of elements that affect the Plan of Care: 4+: HIGH COMPLEXITY   CLINICAL PRESENTATION:   Presentation: Stable and uncomplicated: LOW COMPLEXITY   CLINICAL DECISION MAKIN [facility] Short Form  How much difficulty does the patient currently have. .. Unable A Lot A Little None   1. Turning over in bed (including adjusting bedclothes, sheets and blankets)? [] 1   [x] 2   [] 3   [] 4   2. Sitting down on and standing up from a chair with arms ( e.g., wheelchair, bedside commode, etc.)   [x] 1   [] 2   [] 3   [] 4   3. Moving from lying on back to sitting on the side of the bed? [x] 1   [] 2   [] 3   [] 4   How much help from another person does the patient currently need. .. Total A Lot A Little None   4. Moving to and from a bed to a chair (including a wheelchair)? [x] 1   [] 2   [] 3   [] 4   5. Need to walk in hospital room? [x] 1   [] 2   [] 3   [] 4   6. Climbing 3-5 steps with a railing? [x] 1   [] 2   [] 3   [] 4   © , Trustees of [address] [place] [address] [address] [address], under license to [employer]. All rights reserved      Score:  Initial: 7 Most Recent: X (Date: -- )    Interpretation of Tool:  Represents activities that are increasingly more difficult (i.e. Bed mobility, Transfers, Gait).     Medical Necessity:     · Patient demonstrates   · fair  ·  rehab potential due to higher previous functional level. Reason for Services/Other Comments:  · Patient continues to require skilled intervention due to   · Decreased balance, mobility, and activity tolerance   · . Use of outcome tool(s) and clinical judgement create a POC that gives a: Clear prediction of patient's progress: LOW COMPLEXITY            TREATMENT:   (In addition to Assessment/Re-Assessment sessions the following treatments were rendered)   Pre-treatment Symptoms/Complaints:  More alert today and able to answer questions  Pain: Initial:      Post Session:  0     Therapeutic Exercise: ( ):  Exercises per grid below to improve mobility, strength and dynamic movement of arm - bilateral and leg - bilateral to improve functional movement of any sort. Required maximal visual, verbal, manual and tactile cues to promote proper body mechanics. Date:  5/30/20 Date:   Date:     Activity/Exercise Parameters Parameters Parameters   Ankle pumps 10x PROM     Heel chord stretch 1 min B     Heel slides 10x B PROM     Hip ER/IR with knee bent 10x B PROM     Elbow flex/ext 10x B PROM     Should flexion 10x B PROM             Braces/Orthotics/Lines/Etc:   · O2 Device: Room air  Treatment/Session Assessment:    · Response to Treatment:  More alert and participated with PT  · Interdisciplinary Collaboration:   o Physical Therapy Assistant  o Occupational Therapist  o Registered Nurse  · After treatment position/precautions:   o Supine in bed  o Bed/Chair-wheels locked  o Bed in low position  o RN notified   · Compliance with Program/Exercises: Will assess as treatment progresses  · Recommendations/Intent for next treatment session: \"Next visit will focus on advancements to more challenging activities and reduction in assistance provided\".   Total Treatment Duration:  PT Patient Time In/Time Out  Time In: 1052  Time Out: [address] [place], PTA

## 2020-06-01 NOTE — ROUTINE PROCESS
Chart review completed. Pt s/p NSTEMI with PCI 5/26/20. Pt also with recent Acute CVA. RN states pt immobile and nonverbal at present. Pt not a candidate for Cardiac Rehab at this time.

## 2020-06-02 ENCOUNTER — PATIENT OUTREACH (OUTPATIENT)
Dept: CASE MANAGEMENT | Age: 75
End: 2020-06-02

## 2020-06-02 NOTE — PROGRESS NOTES
Patient discharged to [address] [place] [address] on 6/1/20. Patient discharged to a [facility] facility. Patient will be included in weekly care coordination calls. Information forwarded to Alok Lind RN, [facility] Provider [facility] RN Care Manager.

## 2020-06-05 ENCOUNTER — HOSPITAL ENCOUNTER (INPATIENT)
Age: 75
LOS: 18 days | Discharge: NURSING, REHAB OR LONG-TERM CARE | DRG: 004 | End: 2020-06-23
Attending: EMERGENCY MEDICINE | Admitting: SURGERY
Payer: MEDICARE

## 2020-06-05 DIAGNOSIS — Z93.0 STATUS POST TRACHEOSTOMY (HCC): ICD-10-CM

## 2020-06-05 DIAGNOSIS — Z51.5 ENCOUNTER FOR PALLIATIVE CARE: ICD-10-CM

## 2020-06-05 DIAGNOSIS — Z71.89 ACP (ADVANCE CARE PLANNING): ICD-10-CM

## 2020-06-05 DIAGNOSIS — R06.02 SHORTNESS OF BREATH: ICD-10-CM

## 2020-06-05 DIAGNOSIS — J96.01 ACUTE RESPIRATORY FAILURE WITH HYPOXIA (HCC): ICD-10-CM

## 2020-06-05 DIAGNOSIS — N17.9 ACUTE KIDNEY INJURY (HCC): Primary | ICD-10-CM

## 2020-06-05 DIAGNOSIS — N17.9 AKI (ACUTE KIDNEY INJURY) (HCC): ICD-10-CM

## 2020-06-05 DIAGNOSIS — Z93.1 S/P PERCUTANEOUS ENDOSCOPIC GASTROSTOMY (PEG) TUBE PLACEMENT (HCC): ICD-10-CM

## 2020-06-05 DIAGNOSIS — R13.10 DYSPHAGIA, UNSPECIFIED TYPE: ICD-10-CM

## 2020-06-05 DIAGNOSIS — Z86.73 HISTORY OF CVA (CEREBROVASCULAR ACCIDENT): ICD-10-CM

## 2020-06-05 DIAGNOSIS — E87.0 HYPERNATREMIA: ICD-10-CM

## 2020-06-05 DIAGNOSIS — N39.0 URINARY TRACT INFECTION WITHOUT HEMATURIA, SITE UNSPECIFIED: ICD-10-CM

## 2020-06-05 DIAGNOSIS — G93.41 ACUTE METABOLIC ENCEPHALOPATHY: ICD-10-CM

## 2020-06-05 DIAGNOSIS — R53.81 DEBILITY: ICD-10-CM

## 2020-06-05 DIAGNOSIS — Z71.0 DISCUSSION ABOUT ADVANCE CARE PLANNING HELD WITH FAMILY MEMBER: ICD-10-CM

## 2020-06-05 DIAGNOSIS — R04.2 HEMOPTYSIS: ICD-10-CM

## 2020-06-05 DIAGNOSIS — J69.0 ASPIRATION PNEUMONIA OF BOTH LOWER LOBES, UNSPECIFIED ASPIRATION PNEUMONIA TYPE (HCC): ICD-10-CM

## 2020-06-05 PROBLEM — D64.9 NORMOCYTIC ANEMIA: Status: ACTIVE | Noted: 2020-06-05

## 2020-06-05 PROBLEM — R77.8 ELEVATED TROPONIN: Status: RESOLVED | Noted: 2020-05-26 | Resolved: 2020-06-05

## 2020-06-05 PROBLEM — R53.1 WEAKNESS: Status: RESOLVED | Noted: 2020-05-26 | Resolved: 2020-06-05

## 2020-06-05 PROBLEM — R11.0 NAUSEA: Status: RESOLVED | Noted: 2020-05-26 | Resolved: 2020-06-05

## 2020-06-05 LAB
ALBUMIN SERPL-MCNC: 3.3 G/DL (ref 3.2–4.6)
ALBUMIN/GLOB SERPL: 0.7 {RATIO} (ref 1.2–3.5)
ALP SERPL-CCNC: 93 U/L (ref 50–136)
ALT SERPL-CCNC: 23 U/L (ref 12–65)
ANION GAP SERPL CALC-SCNC: 11 MMOL/L (ref 7–16)
APPEARANCE UR: ABNORMAL
AST SERPL-CCNC: 16 U/L (ref 15–37)
BACTERIA URNS QL MICRO: ABNORMAL /HPF
BASOPHILS # BLD: 0 K/UL (ref 0–0.2)
BASOPHILS NFR BLD: 0 % (ref 0–2)
BILIRUB SERPL-MCNC: 0.3 MG/DL (ref 0.2–1.1)
BILIRUB UR QL: NEGATIVE
BUN SERPL-MCNC: 125 MG/DL (ref 8–23)
CALCIUM SERPL-MCNC: 9.4 MG/DL (ref 8.3–10.4)
CHLORIDE SERPL-SCNC: 113 MMOL/L (ref 98–107)
CO2 SERPL-SCNC: 21 MMOL/L (ref 21–32)
COLOR UR: YELLOW
CREAT SERPL-MCNC: 3.36 MG/DL (ref 0.6–1)
CRYSTALS URNS QL MICRO: ABNORMAL /LPF
DIFFERENTIAL METHOD BLD: ABNORMAL
EOSINOPHIL # BLD: 0.7 K/UL (ref 0–0.8)
EOSINOPHIL NFR BLD: 6 % (ref 0.5–7.8)
EPI CELLS #/AREA URNS HPF: ABNORMAL /HPF
ERYTHROCYTE [DISTWIDTH] IN BLOOD BY AUTOMATED COUNT: 13.8 % (ref 11.9–14.6)
GLOBULIN SER CALC-MCNC: 5 G/DL (ref 2.3–3.5)
GLUCOSE BLD STRIP.AUTO-MCNC: 207 MG/DL (ref 65–100)
GLUCOSE SERPL-MCNC: 365 MG/DL (ref 65–100)
GLUCOSE UR STRIP.AUTO-MCNC: NEGATIVE MG/DL
HCT VFR BLD AUTO: 29.9 % (ref 35.8–46.3)
HGB BLD-MCNC: 9.6 G/DL (ref 11.7–15.4)
HGB UR QL STRIP: ABNORMAL
IMM GRANULOCYTES # BLD AUTO: 0.1 K/UL (ref 0–0.5)
IMM GRANULOCYTES NFR BLD AUTO: 0 % (ref 0–5)
KETONES UR QL STRIP.AUTO: NEGATIVE MG/DL
LACTATE SERPL-SCNC: 2.4 MMOL/L (ref 0.4–2)
LEUKOCYTE ESTERASE UR QL STRIP.AUTO: ABNORMAL
LYMPHOCYTES # BLD: 2.9 K/UL (ref 0.5–4.6)
LYMPHOCYTES NFR BLD: 25 % (ref 13–44)
MCH RBC QN AUTO: 31.3 PG (ref 26.1–32.9)
MCHC RBC AUTO-ENTMCNC: 32.1 G/DL (ref 31.4–35)
MCV RBC AUTO: 97.4 FL (ref 79.6–97.8)
MONOCYTES # BLD: 0.9 K/UL (ref 0.1–1.3)
MONOCYTES NFR BLD: 8 % (ref 4–12)
NEUTS SEG # BLD: 6.7 K/UL (ref 1.7–8.2)
NEUTS SEG NFR BLD: 59 % (ref 43–78)
NITRITE UR QL STRIP.AUTO: NEGATIVE
NRBC # BLD: 0 K/UL (ref 0–0.2)
OTHER OBSERVATIONS,UCOM: ABNORMAL
PH UR STRIP: 8 [PH] (ref 5–9)
PLATELET # BLD AUTO: 255 K/UL (ref 150–450)
PMV BLD AUTO: 11.2 FL (ref 9.4–12.3)
POTASSIUM SERPL-SCNC: 4.5 MMOL/L (ref 3.5–5.1)
PROT SERPL-MCNC: 8.3 G/DL (ref 6.3–8.2)
PROT UR STRIP-MCNC: 100 MG/DL
RBC # BLD AUTO: 3.07 M/UL (ref 4.05–5.2)
RBC #/AREA URNS HPF: ABNORMAL /HPF
SODIUM SERPL-SCNC: 145 MMOL/L (ref 136–145)
SP GR UR REFRACTOMETRY: 1.02 (ref 1–1.02)
UROBILINOGEN UR QL STRIP.AUTO: 0.2 EU/DL (ref 0.2–1)
WBC # BLD AUTO: 11.3 K/UL (ref 4.3–11.1)
WBC URNS QL MICRO: ABNORMAL /HPF

## 2020-06-05 PROCEDURE — 81001 URINALYSIS AUTO W/SCOPE: CPT

## 2020-06-05 PROCEDURE — 74011000258 HC RX REV CODE- 258: Performed by: EMERGENCY MEDICINE

## 2020-06-05 PROCEDURE — 87186 SC STD MICRODIL/AGAR DIL: CPT

## 2020-06-05 PROCEDURE — 83605 ASSAY OF LACTIC ACID: CPT

## 2020-06-05 PROCEDURE — 99285 EMERGENCY DEPT VISIT HI MDM: CPT

## 2020-06-05 PROCEDURE — 87086 URINE CULTURE/COLONY COUNT: CPT

## 2020-06-05 PROCEDURE — 80053 COMPREHEN METABOLIC PANEL: CPT

## 2020-06-05 PROCEDURE — 51702 INSERT TEMP BLADDER CATH: CPT

## 2020-06-05 PROCEDURE — 87088 URINE BACTERIA CULTURE: CPT

## 2020-06-05 PROCEDURE — 87040 BLOOD CULTURE FOR BACTERIA: CPT

## 2020-06-05 PROCEDURE — 96365 THER/PROPH/DIAG IV INF INIT: CPT

## 2020-06-05 PROCEDURE — 74011250636 HC RX REV CODE- 250/636: Performed by: EMERGENCY MEDICINE

## 2020-06-05 PROCEDURE — 85025 COMPLETE CBC W/AUTO DIFF WBC: CPT

## 2020-06-05 PROCEDURE — 65270000029 HC RM PRIVATE

## 2020-06-05 PROCEDURE — 82962 GLUCOSE BLOOD TEST: CPT

## 2020-06-05 PROCEDURE — 74011636637 HC RX REV CODE- 636/637: Performed by: FAMILY MEDICINE

## 2020-06-05 RX ORDER — ADHESIVE BANDAGE
30 BANDAGE TOPICAL DAILY PRN
Status: DISCONTINUED | OUTPATIENT
Start: 2020-06-05 | End: 2020-06-23 | Stop reason: HOSPADM

## 2020-06-05 RX ORDER — SODIUM CHLORIDE 0.9 % (FLUSH) 0.9 %
5-40 SYRINGE (ML) INJECTION AS NEEDED
Status: DISCONTINUED | OUTPATIENT
Start: 2020-06-05 | End: 2020-06-23 | Stop reason: HOSPADM

## 2020-06-05 RX ORDER — HEPARIN SODIUM 5000 [USP'U]/ML
5000 INJECTION, SOLUTION INTRAVENOUS; SUBCUTANEOUS EVERY 8 HOURS
Status: DISCONTINUED | OUTPATIENT
Start: 2020-06-06 | End: 2020-06-23 | Stop reason: HOSPADM

## 2020-06-05 RX ORDER — INSULIN LISPRO 100 [IU]/ML
4 INJECTION, SOLUTION INTRAVENOUS; SUBCUTANEOUS EVERY EVENING
Status: DISCONTINUED | OUTPATIENT
Start: 2020-06-06 | End: 2020-06-06

## 2020-06-05 RX ORDER — SERTRALINE HYDROCHLORIDE 100 MG/1
200 TABLET, FILM COATED ORAL 2 TIMES DAILY
Status: DISCONTINUED | OUTPATIENT
Start: 2020-06-06 | End: 2020-06-23 | Stop reason: HOSPADM

## 2020-06-05 RX ORDER — LEVOTHYROXINE SODIUM 75 UG/1
150 TABLET ORAL DAILY
Status: DISCONTINUED | OUTPATIENT
Start: 2020-06-06 | End: 2020-06-07

## 2020-06-05 RX ORDER — HYOSCYAMINE SULFATE 0.12 MG/1
0.12 TABLET SUBLINGUAL
Status: DISCONTINUED | OUTPATIENT
Start: 2020-06-05 | End: 2020-06-23 | Stop reason: HOSPADM

## 2020-06-05 RX ORDER — EZETIMIBE 10 MG/1
10 TABLET ORAL DAILY
Status: DISCONTINUED | OUTPATIENT
Start: 2020-06-06 | End: 2020-06-23 | Stop reason: HOSPADM

## 2020-06-05 RX ORDER — ONDANSETRON 4 MG/1
4 TABLET, ORALLY DISINTEGRATING ORAL
Status: DISCONTINUED | OUTPATIENT
Start: 2020-06-05 | End: 2020-06-23 | Stop reason: HOSPADM

## 2020-06-05 RX ORDER — LOSARTAN POTASSIUM 50 MG/1
100 TABLET ORAL DAILY
Status: DISCONTINUED | OUTPATIENT
Start: 2020-06-06 | End: 2020-06-23 | Stop reason: HOSPADM

## 2020-06-05 RX ORDER — SODIUM CHLORIDE 0.9 % (FLUSH) 0.9 %
5-40 SYRINGE (ML) INJECTION EVERY 8 HOURS
Status: DISCONTINUED | OUTPATIENT
Start: 2020-06-05 | End: 2020-06-23 | Stop reason: HOSPADM

## 2020-06-05 RX ORDER — ASPIRIN 81 MG/1
81 TABLET ORAL DAILY
Status: DISCONTINUED | OUTPATIENT
Start: 2020-06-06 | End: 2020-06-11 | Stop reason: ALTCHOICE

## 2020-06-05 RX ORDER — AMLODIPINE BESYLATE 10 MG/1
10 TABLET ORAL DAILY
Status: DISCONTINUED | OUTPATIENT
Start: 2020-06-06 | End: 2020-06-23 | Stop reason: HOSPADM

## 2020-06-05 RX ORDER — PANTOPRAZOLE SODIUM 40 MG/1
40 TABLET, DELAYED RELEASE ORAL
Status: DISCONTINUED | OUTPATIENT
Start: 2020-06-06 | End: 2020-06-09

## 2020-06-05 RX ORDER — ATORVASTATIN CALCIUM 40 MG/1
40 TABLET, FILM COATED ORAL
Status: DISCONTINUED | OUTPATIENT
Start: 2020-06-05 | End: 2020-06-23 | Stop reason: HOSPADM

## 2020-06-05 RX ORDER — INSULIN GLARGINE 100 [IU]/ML
18 INJECTION, SOLUTION SUBCUTANEOUS
Status: DISCONTINUED | OUTPATIENT
Start: 2020-06-05 | End: 2020-06-12

## 2020-06-05 RX ORDER — ONDANSETRON 2 MG/ML
4 INJECTION INTRAMUSCULAR; INTRAVENOUS
Status: DISCONTINUED | OUTPATIENT
Start: 2020-06-05 | End: 2020-06-23 | Stop reason: HOSPADM

## 2020-06-05 RX ORDER — ACETAMINOPHEN 325 MG/1
650 TABLET ORAL
Status: DISCONTINUED | OUTPATIENT
Start: 2020-06-05 | End: 2020-06-23 | Stop reason: HOSPADM

## 2020-06-05 RX ORDER — TRAZODONE HYDROCHLORIDE 50 MG/1
100 TABLET ORAL
Status: DISCONTINUED | OUTPATIENT
Start: 2020-06-05 | End: 2020-06-07

## 2020-06-05 RX ORDER — CARVEDILOL 12.5 MG/1
12.5 TABLET ORAL 2 TIMES DAILY WITH MEALS
Status: DISCONTINUED | OUTPATIENT
Start: 2020-06-06 | End: 2020-06-23 | Stop reason: HOSPADM

## 2020-06-05 RX ADMIN — Medication 10 ML: at 23:50

## 2020-06-05 RX ADMIN — CEFTRIAXONE SODIUM 1 G: 1 INJECTION, POWDER, FOR SOLUTION INTRAMUSCULAR; INTRAVENOUS at 18:38

## 2020-06-05 RX ADMIN — SODIUM CHLORIDE 1000 ML: 9 INJECTION, SOLUTION INTRAVENOUS at 17:59

## 2020-06-05 RX ADMIN — INSULIN GLARGINE 18 UNITS: 100 INJECTION, SOLUTION SUBCUTANEOUS at 23:50

## 2020-06-05 NOTE — ED PROVIDER NOTES
29-year-old white female who is currently residing at [facility] sent to the emergency department due to elevated creatinine found on blood work obtained this morning around 5 AM.  Patient is somnolent and cannot contribute significantly to history of present illness. Review of medical records shows an H&P indicating patient was alert and oriented x4 yesterday. The history is provided by the EMS personnel. Abnormal Lab Results          Past Medical History:   Diagnosis Date    Arrhythmia     recurrent SVT    Arthritis     Asthma     stress induced- well controlled    CAD (coronary artery disease)     \"leaking valves\"    Chronic pain     shoulder    Diabetes ([address]     adv -160.  type 2 IDDM; does not take home sqbs- hypo at 78    GERD (gastroesophageal reflux disease)     Hypertension     well controlled with meds    Menopause     Morbid obesity ([address]     Stroke ([address]     6-2012, mini stroke    Thyroid disease     hypo    Unspecified adverse effect of anesthesia     woke up during every surgery       Past Surgical History:   Procedure Laterality Date    CARDIAC SURG PROCEDURE UNLIST  4/2010    AVNRT ablation times three    HX APPENDECTOMY      HX BREAST BIOPSY      bilateral breast bx    HX KNEE ARTHROSCOPY      left knee    HX LAP CHOLECYSTECTOMY      HX ORTHOPAEDIC      rt rotator cuff and bicep tendon repair    HX EMELY AND BSO      hysterectomy         Family History:   Problem Relation Age of Onset    Other Brother     Cancer Mother     Heart Disease Mother     Breast Cancer Mother 79    Heart Disease Father     Malignant Hyperthermia Neg Hx     Pseudocholinesterase Deficiency Neg Hx     Delayed Awakening Neg Hx     Post-op Nausea/Vomiting Neg Hx     Emergence Delirium Neg Hx     Post-op Cognitive Dysfunction Neg Hx        Social History     Socioeconomic History    Marital status:      Spouse name: Not on file    Number of children: Not on file    Years of education: Not on file    Highest education level: Not on file   Occupational History    Not on file   Social Needs    Financial resource strain: Not on file    Food insecurity     Worry: Not on file     Inability: Not on file    Transportation needs     Medical: Not on file     Non-medical: Not on file   Tobacco Use    Smoking status: Former Smoker     Packs/day: 2.00     Years: 13.00     Pack years: 26.00     Last attempt to quit: 1970     Years since quittin.1    Smokeless tobacco: Never Used    Tobacco comment: quit 36 years ago   Substance and Sexual Activity    Alcohol use: No    Drug use: No    Sexual activity: Not on file   Lifestyle    Physical activity     Days per week: Not on file     Minutes per session: Not on file    Stress: Not on file   Relationships    Social connections     Talks on phone: Not on file     Gets together: Not on file     Attends Islam service: Not on file     Active member of club or organization: Not on file     Attends meetings of clubs or organizations: Not on file     Relationship status: Not on file    Intimate partner violence     Fear of current or ex partner: Not on file     Emotionally abused: Not on file     Physically abused: Not on file     Forced sexual activity: Not on file   Other Topics Concern    Not on file   Social History Narrative    Not on file         ALLERGIES: Ace inhibitors; Adhesive tape; Codeine; Other medication; Other omega-3s; and Sulfa (sulfonamide antibiotics)    Review of Systems   Unable to perform ROS: Mental status change       Vitals:    20 1709   BP: 151/66   Pulse: 80   Resp: 18   Temp: 99 °F (37.2 °C)   SpO2: 93%   Weight: 104.8 kg (231 lb)   Height: 5' 6\" (1.676 m)            Physical Exam  Vitals signs and nursing note reviewed. Constitutional:       General: She is not in acute distress. Appearance: Normal appearance. She is not toxic-appearing. HENT:      Head: Normocephalic and atraumatic.       Nose: Nose normal.      Mouth/Throat:      Mouth: Mucous membranes are moist.      Pharynx: Oropharynx is clear. Eyes:      Conjunctiva/sclera: Conjunctivae normal.      Pupils: Pupils are equal, round, and reactive to light. Neck:      Musculoskeletal: Normal range of motion and neck supple. Cardiovascular:      Rate and Rhythm: Normal rate and regular rhythm. Heart sounds: Murmur present. Pulmonary:      Effort: Pulmonary effort is normal.      Breath sounds: Normal breath sounds. No wheezing or rales. Abdominal:      General: There is no distension. Palpations: Abdomen is soft. Tenderness: There is no abdominal tenderness. Musculoskeletal:         General: No swelling or tenderness. Skin:     General: Skin is warm and dry. Neurological:      Mental Status: She is alert. Comments: Somnolent but opens eyes to voice. Does not respond significantly to questioning. She is globally weak and does not follow commands to perform detailed neuro exam.   Psychiatric:         Mood and Affect: Mood normal.         Behavior: Behavior normal.          MDM  Number of Diagnoses or Management Options  Diagnosis management comments: Lab work confirms acute kidney injury, urinary tract infection and elevated lactic acid. Treated with IV fluids and Rocephin. Blood culture and urine cultures pending. Hospitalist consulted for admission.        Amount and/or Complexity of Data Reviewed  Clinical lab tests: ordered and reviewed  Tests in the medicine section of CPT®: ordered and reviewed  Independent visualization of images, tracings, or specimens: yes    Risk of Complications, Morbidity, and/or Mortality  Presenting problems: moderate  Diagnostic procedures: moderate  Management options: moderate           Procedures

## 2020-06-05 NOTE — ED NOTES
Bedside report to Savi Upton RN. Care transferred at this time. Patient resting quietly in bed. NAD noted at this time.

## 2020-06-06 PROBLEM — E87.0 HYPERNATREMIA: Status: ACTIVE | Noted: 2020-06-06

## 2020-06-06 LAB
ANION GAP SERPL CALC-SCNC: 11 MMOL/L (ref 7–16)
BASOPHILS # BLD: 0.1 K/UL (ref 0–0.2)
BASOPHILS NFR BLD: 1 % (ref 0–2)
BUN SERPL-MCNC: 103 MG/DL (ref 8–23)
CALCIUM SERPL-MCNC: 9.6 MG/DL (ref 8.3–10.4)
CHLORIDE SERPL-SCNC: 120 MMOL/L (ref 98–107)
CO2 SERPL-SCNC: 21 MMOL/L (ref 21–32)
CREAT SERPL-MCNC: 2.53 MG/DL (ref 0.6–1)
DIFFERENTIAL METHOD BLD: ABNORMAL
EOSINOPHIL # BLD: 0.9 K/UL (ref 0–0.8)
EOSINOPHIL NFR BLD: 9 % (ref 0.5–7.8)
ERYTHROCYTE [DISTWIDTH] IN BLOOD BY AUTOMATED COUNT: 14 % (ref 11.9–14.6)
GLUCOSE BLD STRIP.AUTO-MCNC: 180 MG/DL (ref 65–100)
GLUCOSE BLD STRIP.AUTO-MCNC: 181 MG/DL (ref 65–100)
GLUCOSE BLD STRIP.AUTO-MCNC: 191 MG/DL (ref 65–100)
GLUCOSE BLD STRIP.AUTO-MCNC: 251 MG/DL (ref 65–100)
GLUCOSE SERPL-MCNC: 222 MG/DL (ref 65–100)
HCT VFR BLD AUTO: 34 % (ref 35.8–46.3)
HGB BLD-MCNC: 11 G/DL (ref 11.7–15.4)
IMM GRANULOCYTES # BLD AUTO: 0.1 K/UL (ref 0–0.5)
IMM GRANULOCYTES NFR BLD AUTO: 1 % (ref 0–5)
LYMPHOCYTES # BLD: 2.8 K/UL (ref 0.5–4.6)
LYMPHOCYTES NFR BLD: 29 % (ref 13–44)
MCH RBC QN AUTO: 31.8 PG (ref 26.1–32.9)
MCHC RBC AUTO-ENTMCNC: 32.4 G/DL (ref 31.4–35)
MCV RBC AUTO: 98.3 FL (ref 79.6–97.8)
MONOCYTES # BLD: 0.7 K/UL (ref 0.1–1.3)
MONOCYTES NFR BLD: 7 % (ref 4–12)
NEUTS SEG # BLD: 5.1 K/UL (ref 1.7–8.2)
NEUTS SEG NFR BLD: 54 % (ref 43–78)
NRBC # BLD: 0 K/UL (ref 0–0.2)
PLATELET # BLD AUTO: 263 K/UL (ref 150–450)
PMV BLD AUTO: 11.3 FL (ref 9.4–12.3)
POTASSIUM SERPL-SCNC: 4.5 MMOL/L (ref 3.5–5.1)
RBC # BLD AUTO: 3.46 M/UL (ref 4.05–5.2)
SODIUM SERPL-SCNC: 152 MMOL/L (ref 136–145)
WBC # BLD AUTO: 9.5 K/UL (ref 4.3–11.1)

## 2020-06-06 PROCEDURE — 85025 COMPLETE CBC W/AUTO DIFF WBC: CPT

## 2020-06-06 PROCEDURE — 80048 BASIC METABOLIC PNL TOTAL CA: CPT

## 2020-06-06 PROCEDURE — 74011636637 HC RX REV CODE- 636/637: Performed by: INTERNAL MEDICINE

## 2020-06-06 PROCEDURE — 74011250636 HC RX REV CODE- 250/636: Performed by: INTERNAL MEDICINE

## 2020-06-06 PROCEDURE — 74011636637 HC RX REV CODE- 636/637: Performed by: FAMILY MEDICINE

## 2020-06-06 PROCEDURE — 97530 THERAPEUTIC ACTIVITIES: CPT

## 2020-06-06 PROCEDURE — 65270000029 HC RM PRIVATE

## 2020-06-06 PROCEDURE — 82962 GLUCOSE BLOOD TEST: CPT

## 2020-06-06 PROCEDURE — 97162 PT EVAL MOD COMPLEX 30 MIN: CPT

## 2020-06-06 PROCEDURE — 74011000258 HC RX REV CODE- 258: Performed by: FAMILY MEDICINE

## 2020-06-06 PROCEDURE — 36415 COLL VENOUS BLD VENIPUNCTURE: CPT

## 2020-06-06 PROCEDURE — 74011250637 HC RX REV CODE- 250/637: Performed by: FAMILY MEDICINE

## 2020-06-06 PROCEDURE — 74011250636 HC RX REV CODE- 250/636: Performed by: FAMILY MEDICINE

## 2020-06-06 PROCEDURE — 92610 EVALUATE SWALLOWING FUNCTION: CPT

## 2020-06-06 RX ORDER — DEXTROSE MONOHYDRATE 50 MG/ML
75 INJECTION, SOLUTION INTRAVENOUS CONTINUOUS
Status: DISPENSED | OUTPATIENT
Start: 2020-06-06 | End: 2020-06-07

## 2020-06-06 RX ADMIN — LOSARTAN POTASSIUM 100 MG: 50 TABLET, FILM COATED ORAL at 17:31

## 2020-06-06 RX ADMIN — INSULIN HUMAN 3 UNITS: 100 INJECTION, SOLUTION PARENTERAL at 12:13

## 2020-06-06 RX ADMIN — INSULIN HUMAN 3 UNITS: 100 INJECTION, SOLUTION PARENTERAL at 16:34

## 2020-06-06 RX ADMIN — AMLODIPINE BESYLATE 10 MG: 10 TABLET ORAL at 17:31

## 2020-06-06 RX ADMIN — CARVEDILOL 12.5 MG: 12.5 TABLET, FILM COATED ORAL at 17:30

## 2020-06-06 RX ADMIN — Medication 10 ML: at 22:18

## 2020-06-06 RX ADMIN — HEPARIN SODIUM 5000 UNITS: 5000 INJECTION INTRAVENOUS; SUBCUTANEOUS at 14:01

## 2020-06-06 RX ADMIN — ASPIRIN 81 MG: 81 TABLET ORAL at 17:30

## 2020-06-06 RX ADMIN — DEXTROSE MONOHYDRATE 50 ML/HR: 5 INJECTION, SOLUTION INTRAVENOUS at 10:53

## 2020-06-06 RX ADMIN — INSULIN GLARGINE 18 UNITS: 100 INJECTION, SOLUTION SUBCUTANEOUS at 22:11

## 2020-06-06 RX ADMIN — Medication 5 ML: at 14:00

## 2020-06-06 RX ADMIN — HUMAN INSULIN 4 UNITS: 100 INJECTION, SOLUTION SUBCUTANEOUS at 16:30

## 2020-06-06 RX ADMIN — INSULIN HUMAN 9 UNITS: 100 INJECTION, SOLUTION PARENTERAL at 08:53

## 2020-06-06 RX ADMIN — HEPARIN SODIUM 5000 UNITS: 5000 INJECTION INTRAVENOUS; SUBCUTANEOUS at 05:58

## 2020-06-06 RX ADMIN — SERTRALINE HYDROCHLORIDE 200 MG: 100 TABLET ORAL at 17:31

## 2020-06-06 RX ADMIN — CEFTRIAXONE SODIUM 2 G: 2 INJECTION, POWDER, FOR SOLUTION INTRAMUSCULAR; INTRAVENOUS at 05:58

## 2020-06-06 RX ADMIN — PANTOPRAZOLE SODIUM 40 MG: 40 TABLET, DELAYED RELEASE ORAL at 05:58

## 2020-06-06 RX ADMIN — HEPARIN SODIUM 5000 UNITS: 5000 INJECTION INTRAVENOUS; SUBCUTANEOUS at 22:17

## 2020-06-06 RX ADMIN — Medication 10 ML: at 06:00

## 2020-06-06 RX ADMIN — EZETIMIBE 10 MG: 10 TABLET ORAL at 17:30

## 2020-06-06 RX ADMIN — INSULIN HUMAN 3 UNITS: 100 INJECTION, SOLUTION PARENTERAL at 22:11

## 2020-06-06 RX ADMIN — LEVOTHYROXINE SODIUM 150 MCG: 0.07 TABLET ORAL at 05:58

## 2020-06-06 NOTE — PROGRESS NOTES
Problem: Dysphagia (Adult)  Goal: *Speech Goal: (INSERT TEXT)  Description: LTG: Patient will tolerate least restrictive diet without overt signs or symptoms of airway compromise by discharge. STG: Patient will tolerate pureed diet and honey-thick liquids without overt signs or symptoms of aspiration 100% of the time. STG: Patient will perform laryngeal strengthening exercises x10 each with 70% accuracy to improve strength and coordination of swallowing function. STG: Patient will participate in modified barium swallow study as clinically indicated. Outcome: Progressing Towards Goal     SPEECH LANGUAGE PATHOLOGY: DYSPHAGIA- Initial Assessment    NAME/AGE/GENDER: Ella Avitia is a 66 y.o. female  DATE: 6/6/2020  PRIMARY DIAGNOSIS: ROSA (acute kidney injury) ([facility] [N17.9]      ICD-10: Treatment Diagnosis: R13.12 Dysphagia, Oropharyngeal Phase    RECOMMENDATIONS   DIET:    PO:  Pureed   Liquids:  honey    Feed only when fully awake/alert    MEDICATIONS: Crushed in puree     ASPIRATION PRECAUTIONS  · Slow rate of intake  · Small bites/sips  · Upright at 90 degrees during meal  · Fully awake/alert for all PO     EDUCATION:  · Recommendations discussed with Nursing and Patient     CONTINUATION OF SKILLED SERVICES/MEDICAL NECESSITY:   Patient is expected to demonstrate progress in  swallow strength, swallow timeliness, swallow function, diet tolerance and swallow safety in order to  improve swallow safety, work toward diet advancement and decrease aspiration risk.  Patient continues to require skilled intervention due to dysphagia. RECOMMENDATIONS for CONTINUED SPEECH THERAPY:   YES: Anticipate need for ongoing speech therapy during this hospitalization and at next level of care. ASSESSMENT   Patient presents with decreased mental status which adversely impacts swallow. Patient would open eyes and follow simple commands with delay. Voice is whisper, but becomes barely audible with cue.  Trials of puree and honey thick liquids attempted, per MBS performed 5/28/20. Delayed oral manipulation of all boluses and multiple swallows elicited with initial trials of honey-thick liquid, but not subsequent trials. Adequate laryngeal excursion/elevation. No change in vocal quality throughout PO trials. Patient did not accept much for assessment. Recommend puree diet and honey-thick liquids only when fully awake/alert. Crush any oral meds in applesauce. Consider calorie count if current cognitive status persists. Patient would likely benefit from repeat MBS when more cognitively alert prior to upgraded PO textures. REHABILITATION POTENTIAL FOR STATED GOALS: Fair    PLAN    FREQUENCY/DURATION: Continue to follow patient 3 times a week for duration of hospital stay to address above goals. - Recommendations for next treatment session: Next treatment will address diet tolerance    SUBJECTIVE   Patient presents with eyes closed. RN present for IV and reports patient has not been consistently alert enough to have breakfast or oral meds. Untouched breakfast tray at bedside. Patient did open eyes to voice and respond with delays. She declined PO trials past 3-4 bites/sips of each consistency. History of Present Injury/Illness: Ms. Marquis Roy  has a past medical history of Arrhythmia, Arthritis, Asthma, CAD (coronary artery disease), Chronic pain, Diabetes ([address] GERD (gastroesophageal reflux disease), Hypertension, Menopause, Morbid obesity ([address] Stroke [facility]), Thyroid disease, and Unspecified adverse effect of anesthesia.  She also has no past medical history of Aneurysm ([address] Autoimmune disease ([address] Cancer ([address] Chronic kidney disease, Coagulation defects, COPD, Difficult intubation, Heart failure ([address] Liver disease, Malignant hyperthermia due to anesthesia, Nausea & vomiting, Other ill-defined conditions(799.89), Pseudocholinesterase deficiency, Psychiatric disorder, PUD (peptic ulcer disease), Seizures ([address] Thromboembolus ([address] or Unspecified sleep apnea. . She also  has a past surgical history that includes hx appendectomy; hx lap cholecystectomy; hx orthopaedic; hx knee arthroscopy; hx clemente and bso; hx breast biopsy; and pr cardiac surg procedure unlist (4/2010). Problem List:  (Impairments causing functional limitations):  1. dysphagia    Previous Dysphagia: YES  Patient was followed by SLP service during previous hospital admission with discharge 6/1/20 to SNF. MBS performed 5/28/20 with results as follows:  \"Ms. Torito Khan presents with moderate-severe oropharyngeal dysphagia. Oral phase of swallow characterized by reduced mastication strength, delayed A-P bolus propulsion, and reduced oral containment/posterior lingual elevation, with all liquids spilling to pharynx pre-swallow. Pharyngeal phase of swallow notable for delayed pharyngeal swallow initiation, reduced laryngeal elevation/excursion, decreased epiglottic inversion, and reduced laryngeal closure. These deficits resulted in the following: Aspiration of thin liquids by teaspoon with weak ineffective cough response. Shallow nonclearing penetration with nectar by teaspoon and SILENT penetration to the cords with nectar by cup on 1 out of 3 trials. Deep nonclearing penetration with honey by cup on 1 out of 2 trials. Silent penetration to cords with liquid portion of mixed consistency. No aspiration/penetration with honey by teaspoon, puree, or solid consistency; however, oral holding with puree, requiring cues to swallow bolus, and significantly prolonged oral prep time with solid conistency. Adequate tongue base retraction and pharyngeal constriction resulted in functional pharyngeal clearance post swallow with all trials. Patient is at risk for aspiration with all PO intake due to delayed pharyngeal swallow intiation; however, patient was able to protect airway with honey by teaspoon and pudding/puree.  Recommend puree diet and HONEY THICK LIQUIDS by TEASPOON ONLY. Patient requires 1:1 assistance with meals to ensure carryover of safe swallowing precautions. Patient may benefit from nutrition/dietary per MD discretion due to concerns for patient not meeting nutritional needs orally. Will continue to follow for diet tolerance, PO trials for potential diet advancement, and laryngeal strengthening exercises. \"  Diet Prior to Evaluation: regular/thin on admission; RN spoke with patient's  last night, who reported patient was on pureed diet at SNF so diet changed to puree/thin. Per patient today, she was on thickened liquids as well at SNF    Orientation:   Person    Pain: Pain Scale 1: Visual  Pain Intensity 1: 0    Cognitive-Linguistic Screen:   Speech Production:   o Impaired   Expressive Language:  o Impaired   Receptive Language:  o Impaired   Cognition:   o Impaired      OBJECTIVE   Oral Motor:   · Labial: Decreased rate, Decreased seal and Impaired coordination  · Dentition: Natural  · Oral Hygiene: Dry  · Lingual: Decreased rate, Decreased strength and Impaired coordination    Swallow evaluation:   Patient consumed trials of pureed textures and honey thick liquids by spoon, cup and straw sip. INTERDISCIPLINARY COLLABORATION: Registered Nurse  PRECAUTIONS/ALLERGIES: Ace inhibitors; Adhesive tape; Codeine; Other medication;  Other omega-3s; and Sulfa (sulfonamide antibiotics)     Tool Used: Dysphagia Outcome and Severity Scale (BROOKLYN)    Score Comments   Normal Diet  [] 7 With no strategies or extra time needed   Functional Swallow  [] 6 May have mild oral or pharyngeal delay   Mild Dysphagia  [] 5 Which may require one diet consistency restricted    Mild-Moderate Dysphagia  [] 4 With 1-2 diet consistencies restricted   Moderate Dysphagia  [] 3 With 2 or more diet consistencies restricted   Moderate-Severe Dysphagia  [] 2 With partial PO strategies (trials with ST only)   Severe Dysphagia  [] 1 With inability to tolerate any PO safely      Score: Initial: 3 Most Recent: 3 (Date 06/06/20 )   Interpretation of Tool: The Dysphagia Outcome and Severity Scale (BROOKLYN) is a simple, easy-to-use, 7-point scale developed to systematically rate the functional severity of dysphagia based on objective assessment and make recommendations for diet level, independence level, and type of nutrition. Current Medications:   No current facility-administered medications on file prior to encounter. Current Outpatient Medications on File Prior to Encounter   Medication Sig Dispense Refill    ticagrelor (BRILINTA) 90 mg tablet Take 1 Tab by mouth every twelve (12) hours every twelve (12) hours. 60 Tab 0    nitroglycerin (NITROSTAT) 0.4 mg SL tablet 1 Tab by SubLINGual route every five (5) minutes as needed for Chest Pain. Up to 3 doses. 15 Tab 0    atorvastatin (LIPITOR) 40 mg tablet Take 1 Tab by mouth nightly. 30 Tab 2    insulin glargine (Lantus U-100 Insulin) 100 unit/mL injection 18 Units by SubCUTAneous route nightly. 1 Vial 1    traZODone (DESYREL) 100 mg tablet Take 100 mg by mouth nightly.  insulin degludec Jaci Au FlexTouch U-100) 100 unit/mL (3 mL) inpn 18 Units by SubCUTAneous route nightly.  insulin aspart U-100 (NovoLOG Flexpen U-100 Insulin) 100 unit/mL (3 mL) inpn 4 Units by SubCUTAneous route every evening.  acetaminophen (Tylenol Extra Strength) 500 mg tablet Take 1,000 mg by mouth two (2) times daily as needed for Pain.  amLODIPine (Norvasc) 10 mg tablet Take 10 mg by mouth daily.  carvediloL (Coreg) 25 mg tablet Take 12.5 mg by mouth two (2) times daily (with meals).  furosemide (Lasix) 40 mg tablet Take  by mouth daily.  OTHER,NON-FORMULARY, 18 Units by SubCUTAneous route once. dinner   Indications: diabetes, tresiva      ondansetron (ZOFRAN ODT) 4 mg disintegrating tablet Take 1 Tab by mouth every eight (8) hours as needed for Nausea.  12 Tab 0    nitrofurantoin, macrocrystal-monohydrate, (MACROBID) 100 mg capsule Take 1 Cap by mouth two (2) times a day. 20 Cap 0    hyoscyamine SL (LEVSIN/SL) 0.125 mg SL tablet 1 Tab by SubLINGual route every six (6) hours as needed for Cramping. 10 Tab 0    ezetimibe (ZETIA) 10 mg tablet Take  by mouth.  aspirin delayed-release 81 mg tablet Take 81 mg by mouth daily. Take day of surgery per anesthesia protocol.  albuterol (PROVENTIL HFA) 90 mcg/actuation inhaler Take 2 Puffs by inhalation daily as needed. Take day of surgery per anesthesia protocol. BRING DAY OF SURGERY      Cholecalciferol, Vitamin D3, (VITAMIN D3) 1,000 unit cap Take 1 Cap by mouth daily. Stop seven days prior to surgery per anesthesia protocol.  multivitamin (ONE A DAY) tablet Take 1 Tab by mouth daily. Take day of surgery per anesthesia protocol.  ASPIRIN/CAFFEINE (ANACIN PO) Take 2 Tabs by mouth two (2) times a day. Stop seven days prior to surgery per anesthesia protocol. Indications: last dose 3-30-10      COZAAR 100 mg Tab Take 100 mg by mouth daily. Take 1/2 tablet twice daily.  sertraline (ZOLOFT) 50 mg tablet Take 200 mg by mouth two (2) times a day. Take day of surgery per anesthesia protocol.  CENESTIN 0.9 mg Tab Take  by mouth daily. Take day of surgery per anesthesia protocol.  PRILOSEC 20 mg capsule Take 20 mg by mouth daily. Take day of surgery per anesthesia protocol.  SYNTHROID 175 mcg tablet Take 150 mcg by mouth daily. Take day of surgery per anesthesia protocol.             After treatment position/precautions:  · Upright in bed  · RN at bedside    Total Treatment Duration:   Time In: 3046  Time Out: 806 [place], [place], [facility]SLP

## 2020-06-06 NOTE — PROGRESS NOTES
Hospitalist Note     Admit Date:  2020  5:03 PM   Name:  Romy Arceo      Age:  66 y.o.    :  1945   MRN:  [id number]   PCP:  Zeny Fraga MD    HPI/Subjective:   Reason for Admission: ROSA (acute kidney injury) [facility]) [N17.9]    Hospital Course:   Romy Arceo is a 66 y.o. female with medical history significant for CAD, hypertension, diabetes from [place] campus with worsening confusion and elevated creatinine. Patient was also found to have urinary tract infection      20 :  Patient is seen and examined at bedside. No acute events reported overnight by nursing staff. Patient appeared comfortable without any distress. Lethargic but arousable. Whisper softly in an attempt to answer questions. Minimally following commands. Unable to obtain ROS due to clinical status. Objective:     Patient Vitals for the past 24 hrs:   Temp Pulse Resp BP SpO2   20 1159 98.8 °F (37.1 °C) 88 18 166/86 92 %   20 1034 98.3 °F (36.8 °C) 83 20 166/88 94 %   20 0324 97.5 °F (36.4 °C) 78 22 146/68 95 %   20 2258 98.5 °F (36.9 °C) 81 24 122/60 93 %   20    132/62 95 %   20 2000    148/67 93 %   20 1929    146/65 92 %   20 1928    130/60 94 %   20 1900  80 18 130/60 93 %   20 1830  73 16 122/58 94 %   20 1800  80 16 138/67 95 %   20 1730  81 18 132/60 94 %   20 1709 99 °F (37.2 °C) 80 18 151/66 93 %   20 1707    151/66 94 %     Oxygen Therapy  O2 Sat (%): 92 % (20 1159)  Pulse via Oximetry: 80 beats per minute (20 2030)  O2 Device: Room air (20 0755)    Estimated body mass index is 33.98 kg/m² as calculated from the following:    Height as of this encounter: 5' 6\" (1.676 m). Weight as of this encounter: 95.5 kg (210 lb 8 oz).       Intake/Output Summary (Last 24 hours) at 2020 1646  Last data filed at 2020 1300  Gross per 24 hour   Intake 243 ml   Output 1300 ml   Net -1057 ml       *Note that automatically entered I/Os may not be accurate; dependent on patient compliance with collection and accurate  by techs. Physical Exam:   General:     Lethargic but arousable. Minimally following commands. Whispers to answer questions. Well nourished and Obese. Head:   normocephalic, atraumatic  Eyes, Ears, nose: PERRL. Normal conjunctiva  Neck:    supple, non-tender. Trachea midline. Lungs:   CTAB, no wheezing, rhonchi, rales  Cardiac:   RRR, Normal S1 and S2. Abdomen:   Soft, non distended, nontender, +BS, no guarding/rebound  Extremities:   Warm, dry. No edema   Skin:   No rashes, no jaundice  Neuro:  AAOx1. Unable to assess  Psychiatric:  No anxiety, calm, cooperative    Data Review:  I have reviewed all labs, meds, and studies from the last 24 hours:    Recent Results (from the past 24 hour(s))   METABOLIC PANEL, COMPREHENSIVE    Collection Time: 06/05/20  5:23 PM   Result Value Ref Range    Sodium 145 136 - 145 mmol/L    Potassium 4.5 3.5 - 5.1 mmol/L    Chloride 113 (H) 98 - 107 mmol/L    CO2 21 21 - 32 mmol/L    Anion gap 11 7 - 16 mmol/L    Glucose 365 (H) 65 - 100 mg/dL     (H) 8 - 23 MG/DL    Creatinine 3.36 (H) 0.6 - 1.0 MG/DL    GFR est AA 17 (L) >60 ml/min/1.73m2    GFR est non-AA 14 (L) >60 ml/min/1.73m2    Calcium 9.4 8.3 - 10.4 MG/DL    Bilirubin, total 0.3 0.2 - 1.1 MG/DL    ALT (SGPT) 23 12 - 65 U/L    AST (SGOT) 16 15 - 37 U/L    Alk.  phosphatase 93 50 - 136 U/L    Protein, total 8.3 (H) 6.3 - 8.2 g/dL    Albumin 3.3 3.2 - 4.6 g/dL    Globulin 5.0 (H) 2.3 - 3.5 g/dL    A-G Ratio 0.7 (L) 1.2 - 3.5     LACTIC ACID    Collection Time: 06/05/20  5:23 PM   Result Value Ref Range    Lactic acid 2.4 (HH) 0.4 - 2.0 MMOL/L   URINALYSIS W/ RFLX MICROSCOPIC    Collection Time: 06/05/20  5:42 PM   Result Value Ref Range    Color YELLOW      Appearance TURBID      Specific gravity 1.019 1.001 - 1.023      pH (UA) 8.0 5.0 - 9.0      Protein 100 (A) NEG mg/dL Glucose Negative mg/dL    Ketone Negative NEG mg/dL    Bilirubin Negative NEG      Blood LARGE (A) NEG      Urobilinogen 0.2 0.2 - 1.0 EU/dL    Nitrites Negative NEG      Leukocyte Esterase LARGE (A) NEG      WBC 3-5 0 /hpf    RBC 0-3 0 /hpf    Epithelial cells 0-3 0 /hpf    Bacteria 4+ (H) 0 /hpf    Crystals, urine TRIPLE PHOSPHATE 0 /LPF    Other observations RESULTS VERIFIED MANUALLY     CULTURE, URINE    Collection Time: 06/05/20  6:45 PM   Result Value Ref Range    Special Requests: NO SPECIAL REQUESTS      Culture result:        NO GROWTH AFTER SHORT PERIOD OF INCUBATION. FURTHER RESULTS TO FOLLOW AFTER OVERNIGHT INCUBATION. CULTURE, BLOOD    Collection Time: 06/05/20  6:45 PM   Result Value Ref Range    Special Requests: NO SPECIAL REQUESTS  RIGHT  FOREARM        Culture result: NO GROWTH AFTER 10 HOURS     CULTURE, BLOOD    Collection Time: 06/05/20  6:45 PM   Result Value Ref Range    Special Requests: NO SPECIAL REQUESTS  LEFT  Antecubital        Culture result: NO GROWTH AFTER 10 HOURS     CBC WITH AUTOMATED DIFF    Collection Time: 06/05/20  7:02 PM   Result Value Ref Range    WBC 11.3 (H) 4.3 - 11.1 K/uL    RBC 3.07 (L) 4.05 - 5.2 M/uL    HGB 9.6 (L) 11.7 - 15.4 g/dL    HCT 29.9 (L) 35.8 - 46.3 %    MCV 97.4 79.6 - 97.8 FL    MCH 31.3 26.1 - 32.9 PG    MCHC 32.1 31.4 - 35.0 g/dL    RDW 13.8 11.9 - 14.6 %    PLATELET 272 158 - 524 K/uL    MPV 11.2 9.4 - 12.3 FL    ABSOLUTE NRBC 0.00 0.0 - 0.2 K/uL    DF AUTOMATED      NEUTROPHILS 59 43 - 78 %    LYMPHOCYTES 25 13 - 44 %    MONOCYTES 8 4.0 - 12.0 %    EOSINOPHILS 6 0.5 - 7.8 %    BASOPHILS 0 0.0 - 2.0 %    IMMATURE GRANULOCYTES 0 0.0 - 5.0 %    ABS. NEUTROPHILS 6.7 1.7 - 8.2 K/UL    ABS. LYMPHOCYTES 2.9 0.5 - 4.6 K/UL    ABS. MONOCYTES 0.9 0.1 - 1.3 K/UL    ABS. EOSINOPHILS 0.7 0.0 - 0.8 K/UL    ABS. BASOPHILS 0.0 0.0 - 0.2 K/UL    ABS. IMM.  GRANS. 0.1 0.0 - 0.5 K/UL   GLUCOSE, POC    Collection Time: 06/05/20 11:47 PM   Result Value Ref Range Glucose (POC) 207 (H) 65 - 931 mg/dL   METABOLIC PANEL, BASIC    Collection Time: 06/06/20  6:48 AM   Result Value Ref Range    Sodium 152 (H) 136 - 145 mmol/L    Potassium 4.5 3.5 - 5.1 mmol/L    Chloride 120 (H) 98 - 107 mmol/L    CO2 21 21 - 32 mmol/L    Anion gap 11 7 - 16 mmol/L    Glucose 222 (H) 65 - 100 mg/dL     (H) 8 - 23 MG/DL    Creatinine 2.53 (H) 0.6 - 1.0 MG/DL    GFR est AA 24 (L) >60 ml/min/1.73m2    GFR est non-AA 20 (L) >60 ml/min/1.73m2    Calcium 9.6 8.3 - 10.4 MG/DL   CBC WITH AUTOMATED DIFF    Collection Time: 06/06/20  6:48 AM   Result Value Ref Range    WBC 9.5 4.3 - 11.1 K/uL    RBC 3.46 (L) 4.05 - 5.2 M/uL    HGB 11.0 (L) 11.7 - 15.4 g/dL    HCT 34.0 (L) 35.8 - 46.3 %    MCV 98.3 (H) 79.6 - 97.8 FL    MCH 31.8 26.1 - 32.9 PG    MCHC 32.4 31.4 - 35.0 g/dL    RDW 14.0 11.9 - 14.6 %    PLATELET 823 228 - 898 K/uL    MPV 11.3 9.4 - 12.3 FL    ABSOLUTE NRBC 0.00 0.0 - 0.2 K/uL    DF AUTOMATED      NEUTROPHILS 54 43 - 78 %    LYMPHOCYTES 29 13 - 44 %    MONOCYTES 7 4.0 - 12.0 %    EOSINOPHILS 9 (H) 0.5 - 7.8 %    BASOPHILS 1 0.0 - 2.0 %    IMMATURE GRANULOCYTES 1 0.0 - 5.0 %    ABS. NEUTROPHILS 5.1 1.7 - 8.2 K/UL    ABS. LYMPHOCYTES 2.8 0.5 - 4.6 K/UL    ABS. MONOCYTES 0.7 0.1 - 1.3 K/UL    ABS. EOSINOPHILS 0.9 (H) 0.0 - 0.8 K/UL    ABS. BASOPHILS 0.1 0.0 - 0.2 K/UL    ABS. IMM.  GRANS. 0.1 0.0 - 0.5 K/UL   GLUCOSE, POC    Collection Time: 06/06/20  7:22 AM   Result Value Ref Range    Glucose (POC) 251 (H) 65 - 100 mg/dL   GLUCOSE, POC    Collection Time: 06/06/20 12:04 PM   Result Value Ref Range    Glucose (POC) 191 (H) 65 - 100 mg/dL   GLUCOSE, POC    Collection Time: 06/06/20  4:31 PM   Result Value Ref Range    Glucose (POC) 180 (H) 65 - 100 mg/dL        All Micro Results     Procedure Component Value Units Date/Time    CULTURE, URINE [[id number]] Collected:  06/05/20 1845    Order Status:  Completed Specimen:  Cath Urine Updated:  06/06/20 1976     Special Requests: NO SPECIAL REQUESTS        Culture result:       NO GROWTH AFTER SHORT PERIOD OF INCUBATION. FURTHER RESULTS TO FOLLOW AFTER OVERNIGHT INCUBATION.           CULTURE, BLOOD [[id number]] Collected:  06/05/20 1845    Order Status:  Completed Specimen:  Blood Updated:  06/06/20 0818     Special Requests: --        NO SPECIAL REQUESTS  LEFT  Antecubital       Culture result: NO GROWTH AFTER 10 HOURS       CULTURE, BLOOD [[id number]] Collected:  06/05/20 1845    Order Status:  Completed Specimen:  Blood Updated:  06/06/20 0818     Special Requests: --        NO SPECIAL REQUESTS  RIGHT  FOREARM       Culture result: NO GROWTH AFTER 10 HOURS             Current Meds:  Current Facility-Administered Medications   Medication Dose Route Frequency    dextrose 5% infusion  50 mL/hr IntraVENous CONTINUOUS    insulin regular (NOVOLIN R, HUMULIN R) injection 4 Units  4 Units SubCUTAneous QPM    amLODIPine (NORVASC) tablet 10 mg  10 mg Oral DAILY    aspirin delayed-release tablet 81 mg  81 mg Oral DAILY    atorvastatin (LIPITOR) tablet 40 mg  40 mg Oral QHS    carvediloL (COREG) tablet 12.5 mg  12.5 mg Oral BID WITH MEALS    losartan (COZAAR) tablet 100 mg  100 mg Oral DAILY    ezetimibe (ZETIA) tablet 10 mg  10 mg Oral DAILY    hyoscyamine SL (LEVSIN/SL) tablet 0.125 mg  0.125 mg SubLINGual Q6H PRN    insulin glargine (LANTUS) injection 18 Units  18 Units SubCUTAneous QHS    ondansetron (ZOFRAN ODT) tablet 4 mg  4 mg Oral Q8H PRN    insulin regular (NOVOLIN R, HUMULIN R) injection   SubCUTAneous AC&HS    sertraline (ZOLOFT) tablet 200 mg  200 mg Oral BID    pantoprazole (PROTONIX) tablet 40 mg  40 mg Oral ACB    levothyroxine (SYNTHROID) tablet 150 mcg  150 mcg Oral DAILY    ticagrelor (BRILINTA) tablet 90 mg  90 mg Oral Q12H    traZODone (DESYREL) tablet 100 mg  100 mg Oral QHS    sodium chloride (NS) flush 5-40 mL  5-40 mL IntraVENous Q8H    sodium chloride (NS) flush 5-40 mL  5-40 mL IntraVENous PRN    acetaminophen (TYLENOL) tablet 650 mg  650 mg Oral Q4H PRN    ondansetron (ZOFRAN) injection 4 mg  4 mg IntraVENous Q4H PRN    magnesium hydroxide (MILK OF MAGNESIA) 400 mg/5 mL oral suspension 30 mL  30 mL Oral DAILY PRN    heparin (porcine) injection 5,000 Units  5,000 Units SubCUTAneous Q8H    cefTRIAXone (ROCEPHIN) 2 g in 0.9% sodium chloride (MBP/ADV) 50 mL  2 g IntraVENous Q24H       Other Studies:    No results found. Assessment and Plan: Active Hospital Problems    Diagnosis Date Noted    [facility]A (acute kidney injury) ([facility] 06/05/2020    UTI (urinary tract infection) 06/05/2020    Acute metabolic encephalopathy 10/04/7492    Normocytic anemia 06/05/2020    Leukocytosis 05/26/2020           Plan:    Acute metabolic encephalopathy likely due to underlying infection  - UA showed large leukocyte esterase with 4+ bacteria. - follow-up urine culture  - Continue with ceftriaxone  - SLP eval    ROSA on CKD Stage 3 (baseline Cr 1.50 - 2.59)  - IVF and monitor  - treat underlying UTI  - Renal U/S    Hypernatremia  - likely due to receiving IVF upon admission  - start D5   - monitor    Diet:  DIET PUREED  DVT PPx: heparin SQ  Code: Full Code    Hero Monroy (Spouse [phone])  - attempted to reach Hero Monroy but unable reach him    Medical Decision Making:    Labs/Imaging reviewed. Additional information obtained from nursing staff. Patient is high risk due to medical condition and comorbidities. Plan discussed with nursing staff. Plan discussed with patient/family. All questions/concerns were addressed. Pt/family agrees with the plan.          Signed By: Pawan Ríos MD     June 6, 2020

## 2020-06-06 NOTE — PROGRESS NOTES
Patient able to state her name and that she was at the hospital. Patient did not answer any more questions she went right back to sleep. Patient is drowsy but does open eyes spontaneously. No distress observed. No pain observed. Jefferson draining yellow cloudy urine. Bed placed in low position. Call light in reach. Side rails up x 2. Door left open. Bed alarm on. Oral medications held due to patient being drowsy.

## 2020-06-06 NOTE — PROGRESS NOTES
TRANSFER - IN REPORT:    Verbal report received from [facility] RN(name) on [address]  being received from ER(unit) for routine progression of care      Report consisted of patients Situation, Background, Assessment and   Recommendations(SBAR). Information from the following report(s) SBAR, Kardex, Intake/Output, MAR and Recent Results was reviewed with the receiving nurse. Opportunity for questions and clarification was provided. Assessment completed upon patients arrival to unit and care assumed.

## 2020-06-06 NOTE — PROGRESS NOTES
06/05/20 2350   Dual Skin Pressure Injury Assessment   Dual Skin Pressure Injury Assessment X   Second Care Provider (Based on [address] [place] [address]) Josee ESPINAL   Skin Integumentary   Skin Integumentary (WDL) X   Skin Color Appropriate for ethnicity; Ecchymosis (comment)  (abdomen, right upper leg)   Skin Condition/Temp Dry; Warm   Skin Integrity Excoriation  (sacrum)

## 2020-06-06 NOTE — PROGRESS NOTES
Spoke with spouse Kang Joce [phone] (home) [phone]. Spouse states patient is currently at [address] in room 201 A for rehab. Patient was getting PT/OT/ST services and on a pureed diet. Patient is oriented 1-2 and does not walk.

## 2020-06-07 ENCOUNTER — APPOINTMENT (OUTPATIENT)
Dept: GENERAL RADIOLOGY | Age: 75
DRG: 004 | End: 2020-06-07
Attending: INTERNAL MEDICINE
Payer: MEDICARE

## 2020-06-07 ENCOUNTER — APPOINTMENT (OUTPATIENT)
Dept: ULTRASOUND IMAGING | Age: 75
DRG: 004 | End: 2020-06-07
Attending: INTERNAL MEDICINE
Payer: MEDICARE

## 2020-06-07 LAB
ANION GAP SERPL CALC-SCNC: 10 MMOL/L (ref 7–16)
BASOPHILS # BLD: 0 K/UL (ref 0–0.2)
BASOPHILS NFR BLD: 0 % (ref 0–2)
BUN SERPL-MCNC: 92 MG/DL (ref 8–23)
CALCIUM SERPL-MCNC: 9.6 MG/DL (ref 8.3–10.4)
CHLORIDE SERPL-SCNC: 121 MMOL/L (ref 98–107)
CO2 SERPL-SCNC: 21 MMOL/L (ref 21–32)
CREAT SERPL-MCNC: 2.36 MG/DL (ref 0.6–1)
DIFFERENTIAL METHOD BLD: ABNORMAL
EOSINOPHIL # BLD: 0.8 K/UL (ref 0–0.8)
EOSINOPHIL NFR BLD: 7 % (ref 0.5–7.8)
ERYTHROCYTE [DISTWIDTH] IN BLOOD BY AUTOMATED COUNT: 14.1 % (ref 11.9–14.6)
GLUCOSE BLD STRIP.AUTO-MCNC: 190 MG/DL (ref 65–100)
GLUCOSE BLD STRIP.AUTO-MCNC: 205 MG/DL (ref 65–100)
GLUCOSE BLD STRIP.AUTO-MCNC: 252 MG/DL (ref 65–100)
GLUCOSE BLD STRIP.AUTO-MCNC: 284 MG/DL (ref 65–100)
GLUCOSE SERPL-MCNC: 182 MG/DL (ref 65–100)
HCT VFR BLD AUTO: 35.1 % (ref 35.8–46.3)
HGB BLD-MCNC: 11.2 G/DL (ref 11.7–15.4)
IMM GRANULOCYTES # BLD AUTO: 0.1 K/UL (ref 0–0.5)
IMM GRANULOCYTES NFR BLD AUTO: 1 % (ref 0–5)
LYMPHOCYTES # BLD: 3 K/UL (ref 0.5–4.6)
LYMPHOCYTES NFR BLD: 26 % (ref 13–44)
MCH RBC QN AUTO: 31.5 PG (ref 26.1–32.9)
MCHC RBC AUTO-ENTMCNC: 31.9 G/DL (ref 31.4–35)
MCV RBC AUTO: 98.6 FL (ref 79.6–97.8)
MONOCYTES # BLD: 0.8 K/UL (ref 0.1–1.3)
MONOCYTES NFR BLD: 7 % (ref 4–12)
NEUTS SEG # BLD: 7.1 K/UL (ref 1.7–8.2)
NEUTS SEG NFR BLD: 60 % (ref 43–78)
NRBC # BLD: 0 K/UL (ref 0–0.2)
PLATELET # BLD AUTO: 290 K/UL (ref 150–450)
PMV BLD AUTO: 11.6 FL (ref 9.4–12.3)
POTASSIUM SERPL-SCNC: 4.4 MMOL/L (ref 3.5–5.1)
RBC # BLD AUTO: 3.56 M/UL (ref 4.05–5.2)
SODIUM SERPL-SCNC: 152 MMOL/L (ref 136–145)
WBC # BLD AUTO: 11.8 K/UL (ref 4.3–11.1)

## 2020-06-07 PROCEDURE — 76770 US EXAM ABDO BACK WALL COMP: CPT

## 2020-06-07 PROCEDURE — 65270000029 HC RM PRIVATE

## 2020-06-07 PROCEDURE — 74011250637 HC RX REV CODE- 250/637: Performed by: FAMILY MEDICINE

## 2020-06-07 PROCEDURE — 74011250636 HC RX REV CODE- 250/636: Performed by: FAMILY MEDICINE

## 2020-06-07 PROCEDURE — 85025 COMPLETE CBC W/AUTO DIFF WBC: CPT

## 2020-06-07 PROCEDURE — 36415 COLL VENOUS BLD VENIPUNCTURE: CPT

## 2020-06-07 PROCEDURE — 71045 X-RAY EXAM CHEST 1 VIEW: CPT

## 2020-06-07 PROCEDURE — 80048 BASIC METABOLIC PNL TOTAL CA: CPT

## 2020-06-07 PROCEDURE — 74011000258 HC RX REV CODE- 258: Performed by: FAMILY MEDICINE

## 2020-06-07 PROCEDURE — 77010033711 HC HIGH FLOW OXYGEN

## 2020-06-07 PROCEDURE — 94760 N-INVAS EAR/PLS OXIMETRY 1: CPT

## 2020-06-07 PROCEDURE — 74011636637 HC RX REV CODE- 636/637: Performed by: FAMILY MEDICINE

## 2020-06-07 PROCEDURE — 82962 GLUCOSE BLOOD TEST: CPT

## 2020-06-07 PROCEDURE — 74011250636 HC RX REV CODE- 250/636: Performed by: INTERNAL MEDICINE

## 2020-06-07 RX ORDER — DEXTROSE MONOHYDRATE 50 MG/ML
50 INJECTION, SOLUTION INTRAVENOUS CONTINUOUS
Status: DISPENSED | OUTPATIENT
Start: 2020-06-07 | End: 2020-06-08

## 2020-06-07 RX ORDER — FUROSEMIDE 10 MG/ML
20 INJECTION INTRAMUSCULAR; INTRAVENOUS ONCE
Status: COMPLETED | OUTPATIENT
Start: 2020-06-07 | End: 2020-06-07

## 2020-06-07 RX ORDER — HYDRALAZINE HYDROCHLORIDE 20 MG/ML
10 INJECTION INTRAMUSCULAR; INTRAVENOUS
Status: DISCONTINUED | OUTPATIENT
Start: 2020-06-07 | End: 2020-06-23 | Stop reason: HOSPADM

## 2020-06-07 RX ADMIN — INSULIN HUMAN 3 UNITS: 100 INJECTION, SOLUTION PARENTERAL at 08:05

## 2020-06-07 RX ADMIN — Medication 10 ML: at 22:27

## 2020-06-07 RX ADMIN — EZETIMIBE 10 MG: 10 TABLET ORAL at 09:09

## 2020-06-07 RX ADMIN — HEPARIN SODIUM 5000 UNITS: 5000 INJECTION INTRAVENOUS; SUBCUTANEOUS at 05:51

## 2020-06-07 RX ADMIN — TICAGRELOR 90 MG: 90 TABLET ORAL at 09:00

## 2020-06-07 RX ADMIN — ASPIRIN 81 MG: 81 TABLET ORAL at 09:09

## 2020-06-07 RX ADMIN — Medication 10 ML: at 15:59

## 2020-06-07 RX ADMIN — PANTOPRAZOLE SODIUM 40 MG: 40 TABLET, DELAYED RELEASE ORAL at 08:07

## 2020-06-07 RX ADMIN — INSULIN HUMAN 9 UNITS: 100 INJECTION, SOLUTION PARENTERAL at 22:23

## 2020-06-07 RX ADMIN — LEVOTHYROXINE SODIUM 150 MCG: 0.07 TABLET ORAL at 08:07

## 2020-06-07 RX ADMIN — FUROSEMIDE 20 MG: 10 INJECTION, SOLUTION INTRAMUSCULAR; INTRAVENOUS at 15:03

## 2020-06-07 RX ADMIN — DEXTROSE MONOHYDRATE 50 ML/HR: 5 INJECTION, SOLUTION INTRAVENOUS at 05:51

## 2020-06-07 RX ADMIN — DEXTROSE MONOHYDRATE 75 ML/HR: 5 INJECTION, SOLUTION INTRAVENOUS at 12:03

## 2020-06-07 RX ADMIN — AMLODIPINE BESYLATE 10 MG: 10 TABLET ORAL at 09:09

## 2020-06-07 RX ADMIN — CARVEDILOL 12.5 MG: 12.5 TABLET, FILM COATED ORAL at 08:06

## 2020-06-07 RX ADMIN — SERTRALINE HYDROCHLORIDE 200 MG: 100 TABLET ORAL at 09:10

## 2020-06-07 RX ADMIN — CEFTRIAXONE SODIUM 2 G: 2 INJECTION, POWDER, FOR SOLUTION INTRAMUSCULAR; INTRAVENOUS at 05:51

## 2020-06-07 RX ADMIN — LOSARTAN POTASSIUM 100 MG: 50 TABLET, FILM COATED ORAL at 09:09

## 2020-06-07 RX ADMIN — Medication 10 ML: at 05:55

## 2020-06-07 RX ADMIN — HEPARIN SODIUM 5000 UNITS: 5000 INJECTION INTRAVENOUS; SUBCUTANEOUS at 15:03

## 2020-06-07 RX ADMIN — HEPARIN SODIUM 5000 UNITS: 5000 INJECTION INTRAVENOUS; SUBCUTANEOUS at 22:23

## 2020-06-07 NOTE — PROGRESS NOTES
Problem: Mobility Impaired (Adult and Pediatric)  Goal: *Therapy Goal (Edit Goal, Insert Text)  Outcome: Progressing Towards Goal  Note: STG:  (1.)Ms. Hermelinda Thompson will move from supine to sit and sit to supine , scoot up and down, and roll side to side with MAXIMAL ASSIST within 4 treatment day(s). (2.)Ms. Hermelinda Thompson will transfer from bed to chair and chair to bed with MAXIMAL ASSIST using the least restrictive device within 4 treatment day(s). (3.)Ms. Hermelinda Thompson will ambulate with MAXIMAL ASSIST for 5 feet with the least restrictive device within 4 treatment day(s). LTG:  (1.)Ms. Hermelinda Thompson will move from supine to sit and sit to supine , scoot up and down, and roll side to side in bed with MODERATE ASSIST within 7 treatment day(s). (2.)Ms. Hermelinda Thompson will transfer from bed to chair and chair to bed with MODERATE ASSIST using the least restrictive device within 7 treatment day(s). (3.)Ms. Hermelinda Thompson will ambulate with MODERATE ASSIST for 25 feet with the least restrictive device within 7 treatment day(s). ________________________________________________________________________________________________       PHYSICAL THERAPY: Initial Assessment and PM 6/6/2020  INPATIENT: PT Visit Days : 1  [place] / [place] / Product Type: Medicare /       NAME/AGE/GENDER: Jorge Lebron is a 66 y.o. female   PRIMARY DIAGNOSIS: ROSA (acute kidney injury) ([facility] [U88.8] Acute metabolic encephalopathy Acute metabolic encephalopathy        ICD-10: Treatment Diagnosis:    Generalized Muscle Weakness (M62.81)  Other lack of cordination (R27.8)  Difficulty in walking, Not elsewhere classified (R26.2)  Other abnormalities of gait and mobility (R26.89)   Precaution/Allergies:  Ace inhibitors; Adhesive tape; Codeine; Other medication; Other omega-3s; and Sulfa (sulfonamide antibiotics)      ASSESSMENT:     Ms. Hermelinda Thompson presents with decreased transfers, ambulation and mobility. Minimal responsiveness at this time. Recent prior CVA. Pt noted to demonstrate grossly decreased strength in B LEs. She also has decreased AROM in B LEs (L>R). She required Total A x 1 for all mobility and poor sitting balance. Pt noted to have increased lean to R in sitting edge of bed and was given TOTAL ASSISTANCE x 1 cuing for midline awareness. Then patient in return to supine TOTAL ASSISTANCE x 1 and bed returned to chair position. Ms. Jay Littlejohn could benefit from skilled PT as she is currently functioning below her baseline. This section established at most recent assessment   PROBLEM LIST (Impairments causing functional limitations):  Decreased Strength  Decreased ADL/Functional Activities  Decreased Transfer Abilities  Decreased Ambulation Ability/Technique  Decreased Balance   INTERVENTIONS PLANNED: (Benefits and precautions of physical therapy have been discussed with the patient.)  Balance Exercise  Bed Mobility  Gait Training  Group Therapy  Neuromuscular Re-education/Strengthening  Therapeutic Activites  Therapeutic Exercise/Strengthening     TREATMENT PLAN: Frequency/Duration: 3 times a week for duration of hospital stay  Rehabilitation Potential For Stated Goals: Good     REHAB RECOMMENDATIONS (at time of discharge pending progress):    Placement: It is my opinion, based on this patient's performance to date, that Ms. Jay Littlejohn may benefit from intensive therapy at a [address] [place] [address] after discharge due to the functional deficits listed above that are likely to improve with skilled rehabilitation and concerns that he/she may be unsafe to be unsupervised at home due to debility and decreased mobility. Equipment:   None at this time              HISTORY:   History of Present Injury/Illness (Reason for Referral):  PER MD H&P   Sharonlinden Smith is a 66 y.o. female with a past medical history of CAD, DM type II, HTN who presents to the ER from [facility] with report of worsening confusion and elevated creatinine on labs.  The patient is unable to contributed to history beyond denying pain or fever. Past Medical History/Comorbidities:   Ms. Ronn Ordaz  has a past medical history of Arrhythmia, Arthritis, Asthma, CAD (coronary artery disease), Chronic pain, Diabetes ([address] GERD (gastroesophageal reflux disease), Hypertension, Menopause, Morbid obesity ([address] Stroke [facility]), Thyroid disease, and Unspecified adverse effect of anesthesia. She also has no past medical history of Aneurysm ([address] Autoimmune disease ([address] Cancer ([address] Chronic kidney disease, Coagulation defects, COPD, Difficult intubation, Heart failure ([address] Liver disease, Malignant hyperthermia due to anesthesia, Nausea & vomiting, Other ill-defined conditions(799.89), Pseudocholinesterase deficiency, Psychiatric disorder, PUD (peptic ulcer disease), Seizures ([facility] Thromboembolus ([facility] or Unspecified sleep apnea. Ms. Ronn Ordaz  has a past surgical history that includes hx appendectomy; hx lap cholecystectomy; hx orthopaedic; hx knee arthroscopy; hx clemente and bso; hx breast biopsy; and pr cardiac surg procedure unlist (4/2010). Social History/Living Environment:   Home Environment: Private residence  # Steps to Enter: 10  One/Two Story Residence: Two story  # of Interior Steps: 20(patients room is on second floor)  Living Alone: No  Support Systems: Spouse/Significant Other/Partner  Patient Expects to be Discharged to[de-identified] Private residence  Current DME Used/Available at Home: Walker, rolling  Prior Level of Function/Work/Activity:  Per RN, lives with spouse and ambulatory with [employer] PTA. Has been declining recently. Dominant Side:         RIGHT    Personal Factors:          Sex:  female        Age:  66 y.o.    Number of Personal Factors/Comorbidities that affect the Plan of Care: 1-2: MODERATE COMPLEXITY   EXAMINATION:   Most Recent Physical Functioning:   Gross Assessment:  AROM: Generally decreased, functional  Strength: Generally decreased, functional  Coordination: Generally decreased, functional  Tone: Abnormal  Sensation: Impaired               Posture:  Posture (WDL): Exceptions to WDL  Posture Assessment: Cervical, Forward head  Balance:  Sitting: Impaired  Sitting - Static: Poor (constant support)  Sitting - Dynamic: Poor (constant support)  Standing: (unable to stand at this time. ) Bed Mobility:  Rolling: Total assistance  Supine to Sit: Total assistance  Sit to Supine: Total assistance  Scooting: Total assistance  Wheelchair Mobility:     Transfers:     Gait:            Body Structures Involved:  Bones  Joints  Muscles  Ligaments Body Functions Affected:  Neuromusculoskeletal  Movement Related  Skin Related  Metobolic/Endocrine Activities and Participation Affected:  Communication  Mobility  [address] [place] [address], Social and Civic Life   Number of elements that affect the Plan of Care: 3: MODERATE COMPLEXITY   CLINICAL PRESENTATION:   Presentation: Evolving clinical presentation with changing clinical characteristics: MODERATE COMPLEXITY   CLINICAL DECISION MAKIN [facility] Inpatient Short Form  How much difficulty does the patient currently have. .. Unable A Lot A Little None   1. Turning over in bed (including adjusting bedclothes, sheets and blankets)? [x] 1   [] 2   [] 3   [] 4   2. Sitting down on and standing up from a chair with arms ( e.g., wheelchair, bedside commode, etc.)   [x] 1   [] 2   [] 3   [] 4   3. Moving from lying on back to sitting on the side of the bed? [] 1   [] 2   [] 3   [] 4   How much help from another person does the patient currently need. .. Total A Lot A Little None   4. Moving to and from a bed to a chair (including a wheelchair)? [x] 1   [] 2   [] 3   [] 4   5. Need to walk in hospital room? [x] 1   [] 2   [] 3   [] 4   6. Climbing 3-5 steps with a railing? [x] 1   [] 2   [] 3   [] 4   © , Trustees of [facility] [employer], under license to [employer].  All rights reserved Score:  Initial: 6 Most Recent: X (Date: -- )    Interpretation of Tool:  Represents activities that are increasingly more difficult (i.e. Bed mobility, Transfers, Gait). Medical Necessity:     Patient demonstrates   fair and poor   rehab potential due to higher previous functional level. Reason for Services/Other Comments:  Patient continues to require skilled intervention due to   medical complications, patient unable to attend/participate in therapy as expected, and decreased transfers, ambulation and mobility. .   Use of outcome tool(s) and clinical judgement create a POC that gives a: Questionable prediction of patient's progress: MODERATE COMPLEXITY            TREATMENT:   (In addition to Assessment/Re-Assessment sessions the following treatments were rendered)   Pre-treatment Symptoms/Complaints:  0/10   Pain: Initial:   Pain Intensity 1: 0  Post Session:  0/10      Therapeutic Activity: (    10 mins): Therapeutic activities including Bed transfers and sitting dangling EOB on level ground to improve mobility, strength, balance, and coordination. Required minimal   to promote coordination of bilateral, lower extremity(s) and promote motor control of bilateral, lower extremity(s). Braces/Orthotics/Lines/Etc:   IV  farris catheter  O2 Device: Room air  Treatment/Session Assessment:    Response to Treatment:  improved mobility and total assistance x 1 . Interdisciplinary Collaboration:   Physical Therapist  Registered Nurse  After treatment position/precautions:   Supine in bed  Bed alarm/tab alert on  Bed/Chair-wheels locked  Bed in low position  Call light within reach  RN notified  Side rails x 3   Compliance with Program/Exercises: Will assess as treatment progresses  Recommendations/Intent for next treatment session: \"Next visit will focus on advancements to more challenging activities, reduction in assistance provided, and transfers, mobility and ambulation. \".   Total Treatment Duration:  PT Patient Time In/Time Out  Time In: 1806  Time Out: 1847 [place], [place]

## 2020-06-07 NOTE — PROGRESS NOTES
Hospitalist Note     Admit Date:  2020  5:03 PM   Name:  Erika Parker      Age:  66 y.o.    :  1945   MRN:  [id number]   PCP:  Juan David Oquendo MD    HPI/Subjective:   Reason for Admission: ROSA (acute kidney injury) [facility]) [N17.9]    Hospital Course:   Erika Parker is a 66 y.o. female with medical history significant for CAD, hypertension, prior CVA with residual right sided weakness, diabetes from [facility] with worsening confusion and elevated creatinine. Patient was also found to have urinary tract infection. 20 :  Patient is seen and examined at bedside. No acute events reported overnight by nursing staff. Patient went down to US of Kidney and upon return, she was found to be desaturating to the 80s on room air. RRT was called. Patient was suctioned and put on non-rebreather with saturation improving to the 96%. Patient appeared lethargic initially but returned to baseline after O2 administration. Unable to obtain ROS due to clinical status. Objective:     Patient Vitals for the past 24 hrs:   Temp Pulse Resp BP SpO2   20 1329     93 %   20 1026 97.7 °F (36.5 °C) 95 22 (!) 148/92 96 %   20 0745 99.4 °F (37.4 °C) 98 22 139/70 97 %   20 0429 98.5 °F (36.9 °C) 91 22 140/68 92 %   20 2245 98.6 °F (37 °C) 89 24 136/66 91 %   20 1922 98.5 °F (36.9 °C) 90 22 129/66 91 %   20 1708 99.2 °F (37.3 °C) 100 22 161/74 96 %     Oxygen Therapy  O2 Sat (%): 93 % (20)  Pulse via Oximetry: 80 beats per minute (20)  O2 Device: Hi flow nasal cannula (20)  O2 Flow Rate (L/min): 5 l/min (20)    Estimated body mass index is 33.98 kg/m² as calculated from the following:    Height as of this encounter: 5' 6\" (1.676 m). Weight as of this encounter: 95.5 kg (210 lb 8 oz).       Intake/Output Summary (Last 24 hours) at 2020 1435  Last data filed at 2020 0900  Gross per 24 hour   Intake 0 ml Output 825 ml   Net -825 ml       *Note that automatically entered I/Os may not be accurate; dependent on patient compliance with collection and accurate  by techs. Physical Exam:   General:     Lethargic but arousable. Well nourished and Obese. Head:   normocephalic, atraumatic  Eyes, Ears, nose: PERRL. Normal conjunctiva  Neck:    supple, non-tender. Trachea midline. Lungs:   Coarse breath sounds, no wheezing  Cardiac:   RRR, Normal S1 and S2. Abdomen:   Soft, non distended, nontender, +BS, no guarding/rebound  Extremities:   Warm, dry. Trace edema   Skin:   No rashes, no jaundice  Neuro:  AAOx1. Unable to assess  Psychiatric:  No anxiety, calm, cooperative    Data Review:  I have reviewed all labs, meds, and studies from the last 24 hours:    Recent Results (from the past 24 hour(s))   GLUCOSE, POC    Collection Time: 06/06/20  4:31 PM   Result Value Ref Range    Glucose (POC) 180 (H) 65 - 100 mg/dL   GLUCOSE, POC    Collection Time: 06/06/20  9:26 PM   Result Value Ref Range    Glucose (POC) 181 (H) 65 - 100 mg/dL   CBC WITH AUTOMATED DIFF    Collection Time: 06/07/20  6:31 AM   Result Value Ref Range    WBC 11.8 (H) 4.3 - 11.1 K/uL    RBC 3.56 (L) 4.05 - 5.2 M/uL    HGB 11.2 (L) 11.7 - 15.4 g/dL    HCT 35.1 (L) 35.8 - 46.3 %    MCV 98.6 (H) 79.6 - 97.8 FL    MCH 31.5 26.1 - 32.9 PG    MCHC 31.9 31.4 - 35.0 g/dL    RDW 14.1 11.9 - 14.6 %    PLATELET 087 988 - 249 K/uL    MPV 11.6 9.4 - 12.3 FL    ABSOLUTE NRBC 0.00 0.0 - 0.2 K/uL    DF AUTOMATED      NEUTROPHILS 60 43 - 78 %    LYMPHOCYTES 26 13 - 44 %    MONOCYTES 7 4.0 - 12.0 %    EOSINOPHILS 7 0.5 - 7.8 %    BASOPHILS 0 0.0 - 2.0 %    IMMATURE GRANULOCYTES 1 0.0 - 5.0 %    ABS. NEUTROPHILS 7.1 1.7 - 8.2 K/UL    ABS. LYMPHOCYTES 3.0 0.5 - 4.6 K/UL    ABS. MONOCYTES 0.8 0.1 - 1.3 K/UL    ABS. EOSINOPHILS 0.8 0.0 - 0.8 K/UL    ABS. BASOPHILS 0.0 0.0 - 0.2 K/UL    ABS. IMM.  GRANS. 0.1 0.0 - 0.5 K/UL   METABOLIC PANEL, BASIC    Collection Time: 06/07/20  6:31 AM   Result Value Ref Range    Sodium 152 (H) 136 - 145 mmol/L    Potassium 4.4 3.5 - 5.1 mmol/L    Chloride 121 (H) 98 - 107 mmol/L    CO2 21 21 - 32 mmol/L    Anion gap 10 7 - 16 mmol/L    Glucose 182 (H) 65 - 100 mg/dL    BUN 92 (H) 8 - 23 MG/DL    Creatinine 2.36 (H) 0.6 - 1.0 MG/DL    GFR est AA 26 (L) >60 ml/min/1.73m2    GFR est non-AA 21 (L) >60 ml/min/1.73m2    Calcium 9.6 8.3 - 10.4 MG/DL   GLUCOSE, POC    Collection Time: 06/07/20  7:20 AM   Result Value Ref Range    Glucose (POC) 190 (H) 65 - 100 mg/dL   GLUCOSE, POC    Collection Time: 06/07/20 10:09 AM   Result Value Ref Range    Glucose (POC) 205 (H) 65 - 100 mg/dL        All Micro Results     Procedure Component Value Units Date/Time    CULTURE, URINE [[id number]]  (Abnormal) Collected:  06/05/20 1845    Order Status:  Completed Specimen:  Cath Urine Updated:  06/07/20 0918     Special Requests: NO SPECIAL REQUESTS        Culture result:       >100,000 COLONIES/mL GRAM NEGATIVE RODS                  >100,000 COLONIES/mL 2ND GRAM NEGATIVE GRAHAM            SUBCULTURE IN PROGRESS       CULTURE, BLOOD [[id number]] Collected:  06/05/20 1845    Order Status:  Completed Specimen:  Blood Updated:  06/07/20 0731     Special Requests: --        NO SPECIAL REQUESTS  RIGHT  FOREARM       Culture result: NO GROWTH 2 DAYS       CULTURE, BLOOD [[id number]] Collected:  06/05/20 1845    Order Status:  Completed Specimen:  Blood Updated:  06/07/20 0731     Special Requests: --        NO SPECIAL REQUESTS  LEFT  Antecubital       Culture result: NO GROWTH 2 DAYS             Current Meds:  Current Facility-Administered Medications   Medication Dose Route Frequency    dextrose 5% infusion  50 mL/hr IntraVENous CONTINUOUS    furosemide (LASIX) injection 20 mg  20 mg IntraVENous ONCE    insulin regular (NOVOLIN R, HUMULIN R) injection 4 Units  4 Units SubCUTAneous QPM    amLODIPine (NORVASC) tablet 10 mg  10 mg Oral DAILY    aspirin delayed-release tablet 81 mg  81 mg Oral DAILY    atorvastatin (LIPITOR) tablet 40 mg  40 mg Oral QHS    carvediloL (COREG) tablet 12.5 mg  12.5 mg Oral BID WITH MEALS    losartan (COZAAR) tablet 100 mg  100 mg Oral DAILY    ezetimibe (ZETIA) tablet 10 mg  10 mg Oral DAILY    hyoscyamine SL (LEVSIN/SL) tablet 0.125 mg  0.125 mg SubLINGual Q6H PRN    insulin glargine (LANTUS) injection 18 Units  18 Units SubCUTAneous QHS    ondansetron (ZOFRAN ODT) tablet 4 mg  4 mg Oral Q8H PRN    insulin regular (NOVOLIN R, HUMULIN R) injection   SubCUTAneous AC&HS    sertraline (ZOLOFT) tablet 200 mg  200 mg Oral BID    pantoprazole (PROTONIX) tablet 40 mg  40 mg Oral ACB    levothyroxine (SYNTHROID) tablet 150 mcg  150 mcg Oral DAILY    ticagrelor (BRILINTA) tablet 90 mg  90 mg Oral Q12H    traZODone (DESYREL) tablet 100 mg  100 mg Oral QHS    sodium chloride (NS) flush 5-40 mL  5-40 mL IntraVENous Q8H    sodium chloride (NS) flush 5-40 mL  5-40 mL IntraVENous PRN    acetaminophen (TYLENOL) tablet 650 mg  650 mg Oral Q4H PRN    ondansetron (ZOFRAN) injection 4 mg  4 mg IntraVENous Q4H PRN    magnesium hydroxide (MILK OF MAGNESIA) 400 mg/5 mL oral suspension 30 mL  30 mL Oral DAILY PRN    heparin (porcine) injection 5,000 Units  5,000 Units SubCUTAneous Q8H    cefTRIAXone (ROCEPHIN) 2 g in 0.9% sodium chloride (MBP/ADV) 50 mL  2 g IntraVENous Q24H       Other Studies:    No results found. Assessment and Plan: Active Hospital Problems    Diagnosis Date Noted    Hypernatremia 06/06/2020    ROSA (acute kidney injury) ([facility] 06/05/2020    UTI (urinary tract infection) 06/05/2020    Acute metabolic encephalopathy 70/52/2407    Normocytic anemia 06/05/2020    Leukocytosis 05/26/2020           Plan:    Acute metabolic encephalopathy likely due to underlying infection  Prior Recent CVA with right sided deficit  - UA showed large leukocyte esterase with 4+ bacteria.     - UCx (6/5): GNR  - BCx (6/5): no growth to date  - Continue with ceftriaxone  - SLP eval    Acute Respiratory Failure with hypoxia likely due to aspiration  - CXR shows no acute changes. - O2 supplementation and wean as tolerated  - NPO for now pending SLP eval  - Lasix 20mg IV for diuresis    ROSA on CKD Stage 3 (baseline Cr 1.50 - 2.59)  - IVF and monitor  - treat underlying UTI  - Renal U/S no obstruction or hydronephrosis    Hypernatremia  - likely due to receiving IVF upon admission  - start D5W IVF   - monitor    Diet:  DIET PUREED  DVT PPx: heparin SQ  Code: Full Code    Tony Sanchez (Spouse [phone])  - called Makiel but unable to reach him to give update. Medical Decision Making:    Labs/Imaging reviewed. Additional information obtained from nursing staff. Patient is high risk due to medical condition and comorbidities. Plan discussed with nursing staff. Plan discussed with patient/family. All questions/concerns were addressed. Pt/family agrees with the plan.          Signed By: Justin Garcia MD     June 7, 2020

## 2020-06-07 NOTE — PROGRESS NOTES
Assessment complete via flow sheet. Pt drowsy and able to state name. Respirations even and unlabored. Lung sounds coarse to auscultation. S1 S2 auscultated. Bowel sounds active, abdomen soft. Denies other needs. Bed in lowest position, side rails up x3, call bell in reach. Instructed to call for assistance. Pt verbalized understanding. Plan of care reviewed with patient.

## 2020-06-07 NOTE — PROGRESS NOTES
Problem: Patient Education: Go to Patient Education Activity  Goal: Patient/Family Education  Outcome: Progressing Towards Goal     Problem: Pressure Injury - Risk of  Goal: *Prevention of pressure injury  Description: Document Santiago Scale and appropriate interventions in the flowsheet. Outcome: Progressing Towards Goal  Note: Pressure Injury Interventions:  Sensory Interventions: Assess need for specialty bed, Avoid rigorous massage over bony prominences, Chair cushion, Check visual cues for pain, Float heels, Keep linens dry and wrinkle-free, Maintain/enhance activity level, Minimize linen layers, Pad between skin to skin, Pressure redistribution bed/mattress (bed type), Suspension boots, Turn and reposition approx. every two hours (pillows and wedges if needed)    Moisture Interventions: Apply protective barrier, creams and emollients, Assess need for specialty bed, Absorbent underpads, Check for incontinence Q2 hours and as needed, Internal/External fecal devices, Internal/External urinary devices, Limit adult briefs, Maintain skin hydration (lotion/cream), Minimize layers, Moisture barrier, Offer toileting Q_hr    Activity Interventions: Assess need for specialty bed, Chair cushion, Increase time out of bed, Pressure redistribution bed/mattress(bed type), PT/OT evaluation    Mobility Interventions: Chair cushion, Float heels, HOB 30 degrees or less, Pressure redistribution bed/mattress (bed type), PT/OT evaluation, Suspension boots, Trapeze to reposition, Turn and reposition approx.  every two hours(pillow and wedges)    Nutrition Interventions: Document food/fluid/supplement intake, Discuss nutritional consult with provider, Offer support with meals,snacks and hydration    Friction and Shear Interventions: Apply protective barrier, creams and emollients, Feet elevated on foot rest, HOB 30 degrees or less, Lift sheet, Lift team/patient mobility team, Minimize layers, Sit at 90-degree angle, Transfer aides:transfer board/Kapil lift/ceiling lift, Transferring/repositioning devices

## 2020-06-07 NOTE — PROGRESS NOTES
Pt in bed resting, pt responds to name. Pr whispers, \"yes\". Pt given thickened liquid with spoon, pt able to swallow crushed medication, with reminder to swallow. Pt does not open eyes. Wagner Hutchison

## 2020-06-07 NOTE — PROGRESS NOTES
Pt back from [address] [place] [address] [address]. Pt transferred from stretcher to bed, audible wheezing noted and foam output from mouth. VS obtained, O2 sat 82%. RR called, MD and respiratory at bedside. Pt suctioned with yanker, nasal suction performed by respiratory. Pt placed on non rebreather O2 sat 94%. Dr. Bharath Rogel to order chest xray.

## 2020-06-08 ENCOUNTER — APPOINTMENT (OUTPATIENT)
Dept: GENERAL RADIOLOGY | Age: 75
DRG: 004 | End: 2020-06-08
Attending: FAMILY MEDICINE
Payer: MEDICARE

## 2020-06-08 ENCOUNTER — APPOINTMENT (OUTPATIENT)
Dept: CT IMAGING | Age: 75
DRG: 004 | End: 2020-06-08
Attending: INTERNAL MEDICINE
Payer: MEDICARE

## 2020-06-08 ENCOUNTER — APPOINTMENT (OUTPATIENT)
Dept: GENERAL RADIOLOGY | Age: 75
DRG: 004 | End: 2020-06-08
Attending: INTERNAL MEDICINE
Payer: MEDICARE

## 2020-06-08 LAB
AMMONIA PLAS-SCNC: 28 UMOL/L (ref 11–32)
ANION GAP SERPL CALC-SCNC: 11 MMOL/L (ref 7–16)
ARTERIAL PATENCY WRIST A: YES
ARTERIAL PATENCY WRIST A: YES
BASE DEFICIT BLD-SCNC: 5 MMOL/L
BASE DEFICIT BLD-SCNC: 6 MMOL/L
BASOPHILS # BLD: 0.1 K/UL (ref 0–0.2)
BASOPHILS NFR BLD: 0 % (ref 0–2)
BDY SITE: ABNORMAL
BDY SITE: ABNORMAL
BUN SERPL-MCNC: 98 MG/DL (ref 8–23)
CALCIUM SERPL-MCNC: 9.5 MG/DL (ref 8.3–10.4)
CHLORIDE SERPL-SCNC: 116 MMOL/L (ref 98–107)
CO2 BLD-SCNC: 18 MMOL/L
CO2 BLD-SCNC: 19 MMOL/L
CO2 SERPL-SCNC: 20 MMOL/L (ref 21–32)
COLLECT TIME,HTIME: 36
COLLECT TIME,HTIME: 622
CREAT SERPL-MCNC: 2.93 MG/DL (ref 0.6–1)
DIFFERENTIAL METHOD BLD: ABNORMAL
EOSINOPHIL # BLD: 0.5 K/UL (ref 0–0.8)
EOSINOPHIL NFR BLD: 3 % (ref 0.5–7.8)
ERYTHROCYTE [DISTWIDTH] IN BLOOD BY AUTOMATED COUNT: 14.4 % (ref 11.9–14.6)
FLOW RATE ISTAT,IFRATE: 10 L/MIN
FLOW RATE ISTAT,IFRATE: 5 L/MIN
GAS FLOW.O2 O2 DELIVERY SYS: ABNORMAL L/MIN
GAS FLOW.O2 O2 DELIVERY SYS: ABNORMAL L/MIN
GLUCOSE BLD STRIP.AUTO-MCNC: 219 MG/DL (ref 65–100)
GLUCOSE BLD STRIP.AUTO-MCNC: 260 MG/DL (ref 65–100)
GLUCOSE BLD STRIP.AUTO-MCNC: 269 MG/DL (ref 65–100)
GLUCOSE BLD STRIP.AUTO-MCNC: 343 MG/DL (ref 65–100)
GLUCOSE SERPL-MCNC: 369 MG/DL (ref 65–100)
HCO3 BLD-SCNC: 17.7 MMOL/L (ref 22–26)
HCO3 BLD-SCNC: 18.3 MMOL/L (ref 22–26)
HCT VFR BLD AUTO: 35.4 % (ref 35.8–46.3)
HGB BLD-MCNC: 11.1 G/DL (ref 11.7–15.4)
IMM GRANULOCYTES # BLD AUTO: 0.1 K/UL (ref 0–0.5)
IMM GRANULOCYTES NFR BLD AUTO: 1 % (ref 0–5)
LACTATE SERPL-SCNC: 1.4 MMOL/L (ref 0.4–2)
LYMPHOCYTES # BLD: 4.1 K/UL (ref 0.5–4.6)
LYMPHOCYTES NFR BLD: 24 % (ref 13–44)
MCH RBC QN AUTO: 31.4 PG (ref 26.1–32.9)
MCHC RBC AUTO-ENTMCNC: 31.4 G/DL (ref 31.4–35)
MCV RBC AUTO: 100 FL (ref 79.6–97.8)
MONOCYTES # BLD: 1.2 K/UL (ref 0.1–1.3)
MONOCYTES NFR BLD: 7 % (ref 4–12)
NEUTS SEG # BLD: 11.3 K/UL (ref 1.7–8.2)
NEUTS SEG NFR BLD: 66 % (ref 43–78)
NRBC # BLD: 0 K/UL (ref 0–0.2)
PCO2 BLD: 25.5 MMHG (ref 35–45)
PCO2 BLD: 30.2 MMHG (ref 35–45)
PH BLD: 7.39 [PH] (ref 7.35–7.45)
PH BLD: 7.45 [PH] (ref 7.35–7.45)
PLATELET # BLD AUTO: 291 K/UL (ref 150–450)
PMV BLD AUTO: 11.5 FL (ref 9.4–12.3)
PO2 BLD: 101 MMHG (ref 75–100)
PO2 BLD: 62 MMHG (ref 75–100)
POTASSIUM SERPL-SCNC: 4.6 MMOL/L (ref 3.5–5.1)
RBC # BLD AUTO: 3.54 M/UL (ref 4.05–5.2)
SAO2 % BLD: 93 % (ref 95–98)
SAO2 % BLD: 98 % (ref 95–98)
SERVICE CMNT-IMP: ABNORMAL
SODIUM SERPL-SCNC: 147 MMOL/L (ref 136–145)
SPECIMEN TYPE: ABNORMAL
SPECIMEN TYPE: ABNORMAL
WBC # BLD AUTO: 17.3 K/UL (ref 4.3–11.1)

## 2020-06-08 PROCEDURE — 83605 ASSAY OF LACTIC ACID: CPT

## 2020-06-08 PROCEDURE — 82803 BLOOD GASES ANY COMBINATION: CPT

## 2020-06-08 PROCEDURE — 77010033678 HC OXYGEN DAILY

## 2020-06-08 PROCEDURE — 80048 BASIC METABOLIC PNL TOTAL CA: CPT

## 2020-06-08 PROCEDURE — 82962 GLUCOSE BLOOD TEST: CPT

## 2020-06-08 PROCEDURE — 36600 WITHDRAWAL OF ARTERIAL BLOOD: CPT

## 2020-06-08 PROCEDURE — 74011250637 HC RX REV CODE- 250/637: Performed by: INTERNAL MEDICINE

## 2020-06-08 PROCEDURE — 71045 X-RAY EXAM CHEST 1 VIEW: CPT

## 2020-06-08 PROCEDURE — 70450 CT HEAD/BRAIN W/O DYE: CPT

## 2020-06-08 PROCEDURE — 74011000258 HC RX REV CODE- 258: Performed by: INTERNAL MEDICINE

## 2020-06-08 PROCEDURE — 36415 COLL VENOUS BLD VENIPUNCTURE: CPT

## 2020-06-08 PROCEDURE — 74011000258 HC RX REV CODE- 258: Performed by: FAMILY MEDICINE

## 2020-06-08 PROCEDURE — 94760 N-INVAS EAR/PLS OXIMETRY 1: CPT

## 2020-06-08 PROCEDURE — 74011250636 HC RX REV CODE- 250/636: Performed by: FAMILY MEDICINE

## 2020-06-08 PROCEDURE — 87040 BLOOD CULTURE FOR BACTERIA: CPT

## 2020-06-08 PROCEDURE — 74011250636 HC RX REV CODE- 250/636: Performed by: INTERNAL MEDICINE

## 2020-06-08 PROCEDURE — 65270000029 HC RM PRIVATE

## 2020-06-08 PROCEDURE — 85025 COMPLETE CBC W/AUTO DIFF WBC: CPT

## 2020-06-08 PROCEDURE — 74011636637 HC RX REV CODE- 636/637: Performed by: INTERNAL MEDICINE

## 2020-06-08 PROCEDURE — 82140 ASSAY OF AMMONIA: CPT

## 2020-06-08 RX ORDER — ACETAMINOPHEN 650 MG/1
650 SUPPOSITORY RECTAL
Status: DISCONTINUED | OUTPATIENT
Start: 2020-06-08 | End: 2020-06-23 | Stop reason: HOSPADM

## 2020-06-08 RX ORDER — LEVALBUTEROL INHALATION SOLUTION 1.25 MG/3ML
1.25 SOLUTION RESPIRATORY (INHALATION)
Status: DISCONTINUED | OUTPATIENT
Start: 2020-06-08 | End: 2020-06-23 | Stop reason: HOSPADM

## 2020-06-08 RX ORDER — DEXTROSE MONOHYDRATE AND SODIUM CHLORIDE 5; .45 G/100ML; G/100ML
75 INJECTION, SOLUTION INTRAVENOUS CONTINUOUS
Status: DISCONTINUED | OUTPATIENT
Start: 2020-06-08 | End: 2020-06-09

## 2020-06-08 RX ADMIN — PIPERACILLIN AND TAZOBACTAM 3.38 G: 3; .375 INJECTION, POWDER, FOR SOLUTION INTRAVENOUS at 19:34

## 2020-06-08 RX ADMIN — HEPARIN SODIUM 5000 UNITS: 5000 INJECTION INTRAVENOUS; SUBCUTANEOUS at 22:20

## 2020-06-08 RX ADMIN — INSULIN HUMAN 12 UNITS: 100 INJECTION, SOLUTION PARENTERAL at 08:17

## 2020-06-08 RX ADMIN — HEPARIN SODIUM 5000 UNITS: 5000 INJECTION INTRAVENOUS; SUBCUTANEOUS at 13:49

## 2020-06-08 RX ADMIN — PIPERACILLIN AND TAZOBACTAM 3.38 G: 3; .375 INJECTION, POWDER, FOR SOLUTION INTRAVENOUS at 08:22

## 2020-06-08 RX ADMIN — ACETAMINOPHEN 650 MG: 650 SUPPOSITORY RECTAL at 14:31

## 2020-06-08 RX ADMIN — HEPARIN SODIUM 5000 UNITS: 5000 INJECTION INTRAVENOUS; SUBCUTANEOUS at 05:16

## 2020-06-08 RX ADMIN — HUMAN INSULIN 6 UNITS: 100 INJECTION, SOLUTION SUBCUTANEOUS at 17:08

## 2020-06-08 RX ADMIN — Medication 10 ML: at 13:50

## 2020-06-08 RX ADMIN — ACETAMINOPHEN 650 MG: 650 SUPPOSITORY RECTAL at 08:18

## 2020-06-08 RX ADMIN — HUMAN INSULIN 9 UNITS: 100 INJECTION, SOLUTION SUBCUTANEOUS at 11:38

## 2020-06-08 RX ADMIN — DEXTROSE MONOHYDRATE AND SODIUM CHLORIDE 50 ML/HR: 5; .45 INJECTION, SOLUTION INTRAVENOUS at 16:00

## 2020-06-08 RX ADMIN — Medication 10 ML: at 05:17

## 2020-06-08 RX ADMIN — SODIUM CHLORIDE 250 ML: 900 INJECTION, SOLUTION INTRAVENOUS at 17:05

## 2020-06-08 RX ADMIN — Medication 10 ML: at 21:30

## 2020-06-08 RX ADMIN — CEFTRIAXONE SODIUM 2 G: 2 INJECTION, POWDER, FOR SOLUTION INTRAMUSCULAR; INTRAVENOUS at 05:16

## 2020-06-08 NOTE — PROGRESS NOTES
Date of Outreach Update:  Guero Perry was seen and assessed. MEWS Score: 5 (06/08/20 0720)  Vitals:    06/08/20 0908 06/08/20 1119 06/08/20 1433 06/08/20 1450   BP:  138/66  127/65   Pulse:  (!) 105  (!) 102   Resp:  (!) 36  (!) 32   Temp: 99.9 °F (37.7 °C) 98.5 °F (36.9 °C) 100.4 °F (38 °C) 100.2 °F (37.9 °C)   SpO2:  96%  96%   Weight:       Height:             Pain Assessment  Pain Intensity 1: 0 (06/07/20 1958)        Patient Stated Pain Goal: 0      Previous Outreach assessment has been reviewed. There have been no significant clinical changes since the completion of the last dated Outreach assessment. Will continue to follow up per outreach protocol.     Signed By:   Staci Nam RN    June 8, 2020 3:46 PM

## 2020-06-08 NOTE — PROGRESS NOTES
Renaldo Huang CRITICAL CARE OUTREACH NURSE PROGRESS REPORT      SUBJECTIVE: Called to assess patient secondary to RR 6/7. MEWS Score: 5 (06/07/20 2346)  Vitals:    06/07/20 1434 06/07/20 2053 06/07/20 2304 06/07/20 2346   BP: 149/70 124/65  140/71   Pulse: 93 (!) 106 (!) 116 (!) 110   Resp: 22 20 (!) 36 (!) 36   Temp: 99 °F (37.2 °C) 99.8 °F (37.7 °C) 99.9 °F (37.7 °C) 99.8 °F (37.7 °C)   SpO2: 92% 97% 96% 96%   Weight:       Height:            LAB DATA:    Recent Labs     06/07/20  0631 06/06/20  0648 06/05/20  1723   * 152* 145   K 4.4 4.5 4.5   * 120* 113*   CO2 21 21 21   AGAP 10 11 11   * 222* 365*   BUN 92* 103* 125*   CREA 2.36* 2.53* 3.36*   GFRAA 26* 24* 17*   GFRNA 21* 20* 14*   CA 9.6 9.6 9.4   ALB  --   --  3.3   TP  --   --  8.3*   GLOB  --   --  5.0*   AGRAT  --   --  0.7*   ALT  --   --  23        Recent Labs     06/07/20  0631 06/06/20  0648 06/05/20  1902   WBC 11.8* 9.5 11.3*   HGB 11.2* 11.0* 9.6*   HCT 35.1* 34.0* 29.9*    263 255          OBJECTIVE: On arrival to room, I found patient to be in bed, continuous SpO2 monitoring in place, primary RN at bedside. ASSESSMENT:  Pt opens eyes to noxious stimulation. Not currently following any commands. SpO2 93% on 5L NC, RR 36. Lung sounds coarse. Dr. Neisha Peña notified of findings, orders received for ABG, CXR and repeat lactic. VS, labs, and progress notes reviewed. Discussed with primary RN. PLAN:  Will continue to follow per outreach protocol.      Dr. Neisha Peña notified of ABG results, O2 increased to 10L NC.

## 2020-06-08 NOTE — PROGRESS NOTES
Assessment complete via flow sheet. Pt responds to voice . Lung sounds coarse. S1 S2 auscultated. Pt on 5L high flow NC. Bowel sounds active, abdomen soft. Denies other needs. Bed alarm on and in lowest position, side rails up x3, call bell in reach. Instructed to call for assistance. Pt verbalized understanding. Plan of care reviewed with patient.

## 2020-06-08 NOTE — PROGRESS NOTES
Bedside report received from [facility]. Pt opens eyes to voice, minimally responsive. Dr Erasto Jonas notified of pt VS, will put in orders.

## 2020-06-08 NOTE — PROGRESS NOTES
Date of Outreach Update:  Guero Vallejo was seen and assessed. MEWS Score: 5 (06/08/20 0888)  Vitals:    06/07/20 2304 06/07/20 2346 06/08/20 0258 06/08/20 0611   BP:  140/71 149/76 149/76   Pulse: (!) 116 (!) 110 (!) 105 (!) 105   Resp: (!) 36 (!) 36 (!) 32 (!) 34   Temp: 99.9 °F (37.7 °C) 99.8 °F (37.7 °C) 97.9 °F (36.6 °C) 98.5 °F (36.9 °C)   SpO2: 96% 96% 97% 93%   Weight:       Height:             Pain Assessment  Pain Intensity 1: 0 (06/07/20 1958)        Patient Stated Pain Goal: 0      Pt continues with RR in mid 35s. SpO2 93% on 10L NC. Pt still not moving any extremities but will briefly open eyes to voice. Discussed findings with Dr. Tiara Onofre and primary RN, orders received for repeat ABG. Previous Outreach assessment has been reviewed. There have been no significant clinical changes since the completion of the last dated Outreach assessment. Will continue to follow up per outreach protocol.     Signed By:   Vashti Malone    June 8, 2020 6:13 AM

## 2020-06-08 NOTE — PROGRESS NOTES
Date of Outreach Update:  Guero Weiss was seen and assessed. MEWS Score: 5 (06/08/20 0720)  Vitals:    06/08/20 0258 06/08/20 0611 06/08/20 0720 06/08/20 0908   BP: 149/76 149/76 134/73    Pulse: (!) 105 (!) 105 (!) 103    Resp: (!) 32 (!) 34 (!) 38    Temp: 97.9 °F (36.6 °C) 98.5 °F (36.9 °C) (!) 100.6 °F (38.1 °C) 99.9 °F (37.7 °C)   SpO2: 97% 93% 96%    Weight:       Height:             Pain Assessment  Pain Intensity 1: 0 (06/07/20 1958)        Patient Stated Pain Goal: 0      Previous Outreach assessment has been reviewed. There have been no significant clinical changes since the completion of the last dated Outreach assessment. Patient opens eyes to voice. Nonverbal. No purposeful movement. PERRL. Respirations remain rapid.  at bedside and reports that since last hospitalization, patient has been minimally verbal and not moving very much. Will continue to follow up per outreach protocol.     Signed By:   Kelly Ames RN    June 8, 2020 10:09 AM

## 2020-06-08 NOTE — PROGRESS NOTES
Administered Tylenol suppository d/t elevated temperature. Scant rectal bleeding noted, redness to area, EPC cream applied.

## 2020-06-08 NOTE — PROGRESS NOTES
SPEECH PATHOLOGY NOTE:    Attempted to see patient for PO trials this AM; however, patient not appropriate for PO trials at this time. Continue NPO with non-oral meds.     Janis Gutierrez MS, CCC-SLP

## 2020-06-08 NOTE — PROGRESS NOTES
Problem: Falls - Risk of  Goal: *Absence of Falls  Description: Document [facility] Fall Risk and appropriate interventions in the flowsheet.   Outcome: Progressing Towards Goal  Note: Fall Risk Interventions:  Mobility Interventions: Bed/chair exit alarm, Communicate number of staff needed for ambulation/transfer, OT consult for ADLs, Patient to call before getting OOB, PT Consult for mobility concerns, PT Consult for assist device competence, Strengthening exercises (ROM-active/passive)    Mentation Interventions: Adequate sleep, hydration, pain control, Bed/chair exit alarm, Door open when patient unattended, Evaluate medications/consider consulting pharmacy, Family/sitter at bedside, HELP ([address]) if available, More frequent rounding, Room close to nurse's station, Toileting rounds, Self-releasing belt    Medication Interventions: Assess postural VS orthostatic hypotension, Bed/chair exit alarm, Patient to call before getting OOB, Teach patient to arise slowly    Elimination Interventions: Bed/chair exit alarm, Call light in reach, Patient to call for help with toileting needs, Stay With Me (per policy), Toileting schedule/hourly rounds, Toilet paper/wipes in reach

## 2020-06-08 NOTE — PROGRESS NOTES
Critical Care Outreach Nurse Progress Report:    Subjective: In to assess pt secondary to recent RR. MEWS Score: 4 (06/08/20 1709)    Vitals:    06/08/20 1119 06/08/20 1433 06/08/20 1450 06/08/20 1709   BP: 138/66  127/65 131/63   Pulse: (!) 105  (!) 102 (!) 104   Resp: (!) 36  (!) 32 28   Temp: 98.5 °F (36.9 °C) 100.4 °F (38 °C) 100.2 °F (37.9 °C) 98.3 °F (36.8 °C)   SpO2: 96%  96%    Weight:       Height:              Objective: Pt resting in bed. Assessment: Pt alert, opens eyes spontaneously but nonverbal. Pt nodded \"yes\" when asked if she felt okay. RR slightly elevated but pt appears to be in NAD. Discussed pt with primary RN. O2 sat 97% on 8L HFNC. Plan: Will continue to follow per outreach program protocol. Arlyn Dowell RN.

## 2020-06-08 NOTE — PROGRESS NOTES
Interdisciplinary team rounds were held 6/8/2020 with the following team members:Care Management, Nursing, Nutrition, Physical Therapy and Physician. Plan of care discussed. See clinical pathway and/or care plan for interventions and desired outcomes.

## 2020-06-08 NOTE — PROGRESS NOTES
Care Management Interventions  PCP Verified by CM: Yes  Transition of Care Consult (CM Consult): SNF  Partner SNF: Yes  Physical Therapy Consult: Yes  Occupational Therapy Consult: No  Speech Therapy Consult: Yes  Current Support Network: Lives with Spouse  Confirm Follow Up Transport: Other (see comment)  Freedom of Choice List was Provided with Basic Dialogue that Supports the Patient's Individualized Plan of Care/Goals, Treatment Preferences and Shares the Quality Data Associated with the Providers?: Yes  [employer] Resource Information Provided?: No  Discharge Location  Discharge Placement: Skilled nursing facility  Patient's spouse at bedside. Spouse wants patient to return to [facility] for rehab when medically stable.

## 2020-06-08 NOTE — PROGRESS NOTES
Notified Dr. Eleuterio Anderson of patient's RR 36, , and responsive to touch via perfect serve.  No new orders at this time

## 2020-06-08 NOTE — PROGRESS NOTES
Hospitalist Note     Admit Date:  2020  5:03 PM   Name:  Jolynn Mascorro      Age:  66 y.o.    :  1945   MRN:  [id number]   PCP:  Ronak Ortiz MD    HPI/Subjective:   Reason for Admission: ROSA (acute kidney injury) [facility]) [N17.9]    Hospital Course:   Jolynn Mascorro is a 66 y.o. female with medical history significant for CAD, hypertensin, recent CVA with residual right sided weakness, diabetes from [facility] with worsening confusion and elevated creatinine. Patient was found to have urinary tract infection and started on empiric antibiotics. Response called on  AM as patient was found to have acute respiratory failure with hypoxia likely due to aspiration. Patient was suctioned and put on non-rebreather with saturation improving to the 96%. Patient has been lethargic with minimally responsive since the event. 20 :  Patient is seen and examined at bedside. No acute events reported overnight by nursing staff. Patient continued to be somnolent with minimally responsive. Barely opens eye to physical stimuli. Has an episode of temp of 100.6F in the AM.    Maikel,  at bedside this AM.  Code status as well as decision for possible feeding tube was discussed. He will discuss with the rest of the family to make the decision. She was functioning independently prior to CVA. Since the CVA, she has had right sided weakness with some aphasia and has been in [facility] for rehab. Unable to obtain ROS due to clinical status.     Objective:     Patient Vitals for the past 24 hrs:   Temp Pulse Resp BP SpO2   20 1450 100.2 °F (37.9 °C) (!) 102 (!) 32 127/65 96 %   20 1433 100.4 °F (38 °C)       20 1119 98.5 °F (36.9 °C) (!) 105 (!) 36 138/66 96 %   20 0908 99.9 °F (37.7 °C)       20 0720 (!) 100.6 °F (38.1 °C) (!) 103 (!) 38 134/73 96 %   20 0611 98.5 °F (36.9 °C) (!) 105 (!) 34 149/76 93 %   20 0258 97.9 °F (36.6 °C) (!) 105 (!) 32 149/76 97 %   06/07/20 2346 99.8 °F (37.7 °C) (!) 110 (!) 36 140/71 96 %   06/07/20 2304 99.9 °F (37.7 °C) (!) 116 (!) 36  96 %   06/07/20 2053 99.8 °F (37.7 °C) (!) 106 20 124/65 97 %     Oxygen Therapy  O2 Sat (%): 96 % (06/08/20 1450)  Pulse via Oximetry: 80 beats per minute (06/05/20 2030)  O2 Device: Hi flow nasal cannula (06/07/20 1329)  O2 Flow Rate (L/min): 5 l/min (06/07/20 1329)    Estimated body mass index is 33.98 kg/m² as calculated from the following:    Height as of this encounter: 5' 6\" (1.676 m). Weight as of this encounter: 95.5 kg (210 lb 8 oz). Intake/Output Summary (Last 24 hours) at 6/8/2020 1632  Last data filed at 6/8/2020 1330  Gross per 24 hour   Intake 800 ml   Output    Net 800 ml       *Note that automatically entered I/Os may not be accurate; dependent on patient compliance with collection and accurate  by techs. Physical Exam:   General:     Lethargic but arousable. Well nourished and Obese. Head:   normocephalic, atraumatic  Eyes, Ears, nose: PERRL. Normal conjunctiva  Neck:    supple, non-tender. Trachea midline. Lungs:   Coarse breath sounds, no wheezing  Cardiac:   RRR, Normal S1 and S2. Abdomen:   Soft, non distended, nontender, +BS, no guarding/rebound  Extremities:   Warm, dry. Trace edema   Skin:   No rashes, no jaundice  Neuro:  AAOx1.  Unable to assess  Psychiatric:  No anxiety, calm, cooperative    Data Review:  I have reviewed all labs, meds, and studies from the last 24 hours:    Recent Results (from the past 24 hour(s))   GLUCOSE, POC    Collection Time: 06/07/20  5:25 PM   Result Value Ref Range    Glucose (POC) 252 (H) 65 - 100 mg/dL   GLUCOSE, POC    Collection Time: 06/07/20  9:14 PM   Result Value Ref Range    Glucose (POC) 284 (H) 65 - 100 mg/dL   POC G3    Collection Time: 06/08/20 12:40 AM   Result Value Ref Range    Device: High Flow Nasal Cannula      pH (POC) 7.451 (H) 7.35 - 7.45      pCO2 (POC) 25.5 (L) 35 - 45 MMHG    pO2 (POC) 62 (L) 75 - 100 MMHG    HCO3 (POC) 17.7 (L) 22 - 26 MMOL/L    sO2 (POC) 93 (L) 95 - 98 %    Base deficit (POC) 5 mmol/L    Allens test (POC) YES      Site RIGHT RADIAL      Specimen type (POC) ARTERIAL      Performed by ArmsJenniferRRT     CO2, POC 18 MMOL/L    Flow rate (POC) 5.000 L/min    Respiratory comment: NurseNotified     COLLECT TIME 36     LACTIC ACID    Collection Time: 06/08/20 12:57 AM   Result Value Ref Range    Lactic acid 1.4 0.4 - 2.0 MMOL/L   AMMONIA    Collection Time: 06/08/20  6:25 AM   Result Value Ref Range    Ammonia 28 11 - 32 UMOL/L   POC G3    Collection Time: 06/08/20  6:27 AM   Result Value Ref Range    Device: High Flow Nasal Cannula      pH (POC) 7.392 7.35 - 7.45      pCO2 (POC) 30.2 (L) 35 - 45 MMHG    pO2 (POC) 101 (H) 75 - 100 MMHG    HCO3 (POC) 18.3 (L) 22 - 26 MMOL/L    sO2 (POC) 98 95 - 98 %    Base deficit (POC) 6 mmol/L    Allens test (POC) YES      Site RIGHT RADIAL      Specimen type (POC) ARTERIAL      Performed by ArmsJenniferRRT     CO2, POC 19 MMOL/L    Flow rate (POC) 10.000 L/min    Respiratory comment: NurseNotified     COLLECT TIME 622     CBC WITH AUTOMATED DIFF    Collection Time: 06/08/20  6:31 AM   Result Value Ref Range    WBC 17.3 (H) 4.3 - 11.1 K/uL    RBC 3.54 (L) 4.05 - 5.2 M/uL    HGB 11.1 (L) 11.7 - 15.4 g/dL    HCT 35.4 (L) 35.8 - 46.3 %    .0 (H) 79.6 - 97.8 FL    MCH 31.4 26.1 - 32.9 PG    MCHC 31.4 31.4 - 35.0 g/dL    RDW 14.4 11.9 - 14.6 %    PLATELET 659 444 - 803 K/uL    MPV 11.5 9.4 - 12.3 FL    ABSOLUTE NRBC 0.00 0.0 - 0.2 K/uL    DF AUTOMATED      NEUTROPHILS 66 43 - 78 %    LYMPHOCYTES 24 13 - 44 %    MONOCYTES 7 4.0 - 12.0 %    EOSINOPHILS 3 0.5 - 7.8 %    BASOPHILS 0 0.0 - 2.0 %    IMMATURE GRANULOCYTES 1 0.0 - 5.0 %    ABS. NEUTROPHILS 11.3 (H) 1.7 - 8.2 K/UL    ABS. LYMPHOCYTES 4.1 0.5 - 4.6 K/UL    ABS. MONOCYTES 1.2 0.1 - 1.3 K/UL    ABS. EOSINOPHILS 0.5 0.0 - 0.8 K/UL    ABS. BASOPHILS 0.1 0.0 - 0.2 K/UL    ABS. IMM.  GRANS. 0.1 0.0 - 0.5 K/UL METABOLIC PANEL, BASIC    Collection Time: 06/08/20  6:31 AM   Result Value Ref Range    Sodium 147 (H) 136 - 145 mmol/L    Potassium 4.6 3.5 - 5.1 mmol/L    Chloride 116 (H) 98 - 107 mmol/L    CO2 20 (L) 21 - 32 mmol/L    Anion gap 11 7 - 16 mmol/L    Glucose 369 (H) 65 - 100 mg/dL    BUN 98 (H) 8 - 23 MG/DL    Creatinine 2.93 (H) 0.6 - 1.0 MG/DL    GFR est AA 20 (L) >60 ml/min/1.73m2    GFR est non-AA 17 (L) >60 ml/min/1.73m2    Calcium 9.5 8.3 - 10.4 MG/DL   GLUCOSE, POC    Collection Time: 06/08/20  6:59 AM   Result Value Ref Range    Glucose (POC) 343 (H) 65 - 100 mg/dL   GLUCOSE, POC    Collection Time: 06/08/20 11:16 AM   Result Value Ref Range    Glucose (POC) 269 (H) 65 - 100 mg/dL   GLUCOSE, POC    Collection Time: 06/08/20  4:05 PM   Result Value Ref Range    Glucose (POC) 219 (H) 65 - 100 mg/dL        All Micro Results     Procedure Component Value Units Date/Time    CULTURE, BLOOD [[id number]]     Order Status:  Sent Specimen:  Blood     CULTURE, BLOOD [[id number]]     Order Status:  Sent Specimen:  Blood     CULTURE, URINE [[id number]]  (Abnormal) Collected:  06/05/20 1845    Order Status:  Completed Specimen:  Cath Urine Updated:  06/08/20 0841     Special Requests: NO SPECIAL REQUESTS        Culture result:       >100,000 COLONIES/mL GRAM NEGATIVE RODS                  >100,000 COLONIES/mL 2ND GRAM NEGATIVE GRAHAM                  IDENTIFICATION AND SUSCEPTIBILITY TO FOLLOW          CULTURE, BLOOD [[id number]] Collected:  06/05/20 1845    Order Status:  Completed Specimen:  Blood Updated:  06/08/20 0653     Special Requests: --        NO SPECIAL REQUESTS  RIGHT  FOREARM       Culture result: NO GROWTH 3 DAYS       CULTURE, BLOOD [[id number]] Collected:  06/05/20 1845    Order Status:  Completed Specimen:  Blood Updated:  06/08/20 0653     Special Requests: --        NO SPECIAL REQUESTS  LEFT  Antecubital       Culture result: NO GROWTH 3 DAYS             Current Meds:  Current Facility-Administered Medications   Medication Dose Route Frequency    piperacillin-tazobactam (ZOSYN) 3.375 g in 0.9% sodium chloride (MBP/ADV) 100 mL  3.375 g IntraVENous Q12H    acetaminophen (TYLENOL) suppository 650 mg  650 mg Rectal Q4H PRN    insulin regular (NOVOLIN R, HUMULIN R) injection   SubCUTAneous Q6H    dextrose 5 % - 0.45% NaCl infusion  50 mL/hr IntraVENous CONTINUOUS    hydrALAZINE (APRESOLINE) 20 mg/mL injection 10 mg  10 mg IntraVENous Q6H PRN    [START ON 6/14/2020] levothyroxine (SYNTHROID) injection 110 mcg  110 mcg IntraVENous DAILY    [Held by provider] insulin regular (NOVOLIN R, HUMULIN R) injection 4 Units  4 Units SubCUTAneous QPM    amLODIPine (NORVASC) tablet 10 mg  10 mg Oral DAILY    aspirin delayed-release tablet 81 mg  81 mg Oral DAILY    atorvastatin (LIPITOR) tablet 40 mg  40 mg Oral QHS    carvediloL (COREG) tablet 12.5 mg  12.5 mg Oral BID WITH MEALS    losartan (COZAAR) tablet 100 mg  100 mg Oral DAILY    ezetimibe (ZETIA) tablet 10 mg  10 mg Oral DAILY    hyoscyamine SL (LEVSIN/SL) tablet 0.125 mg  0.125 mg SubLINGual Q6H PRN    [Held by provider] insulin glargine (LANTUS) injection 18 Units  18 Units SubCUTAneous QHS    ondansetron (ZOFRAN ODT) tablet 4 mg  4 mg Oral Q8H PRN    sertraline (ZOLOFT) tablet 200 mg  200 mg Oral BID    pantoprazole (PROTONIX) tablet 40 mg  40 mg Oral ACB    ticagrelor (BRILINTA) tablet 90 mg  90 mg Oral Q12H    sodium chloride (NS) flush 5-40 mL  5-40 mL IntraVENous Q8H    sodium chloride (NS) flush 5-40 mL  5-40 mL IntraVENous PRN    acetaminophen (TYLENOL) tablet 650 mg  650 mg Oral Q4H PRN    ondansetron (ZOFRAN) injection 4 mg  4 mg IntraVENous Q4H PRN    magnesium hydroxide (MILK OF MAGNESIA) 400 mg/5 mL oral suspension 30 mL  30 mL Oral DAILY PRN    heparin (porcine) injection 5,000 Units  5,000 Units SubCUTAneous Q8H       Other Studies:    No results found. Assessment and Plan:      Active Hospital Problems    Diagnosis Date Noted    Hypernatremia 06/06/2020    ROSA (acute kidney injury) ([facility] [address] 06/05/2020    UTI (urinary tract infection) 06/05/2020    Acute metabolic encephalopathy 78/85/8841    Normocytic anemia 06/05/2020    Leukocytosis 05/26/2020           Plan:    Acute metabolic encephalopathy likely due to underlying infection(UTI, aspiration pna)  Prior Recent CVA with right sided deficit  - UA showed large leukocyte esterase with 4+ bacteria. - UCx (6/5): GNR and BCx (6/5): no growth to date  - ABG did not show hypercarbia to suspect the cause of encephalopathy  - repeat BCx  - repeat CT head    Acute Respiratory Failure with hypoxia likely due to aspiration  Fever and leukocytosis concerning for aspiration PNA  - CXR shows no acute changes  - NPO  - change abx to zosyn    ROSA on CKD Stage 3 (baseline Cr 1.50 - 2.59)  - IVF and monitor  - treat underlying UTI  - Renal U/S no obstruction or hydronephrosis    Hypernatremia  - likely due to receiving IVF upon admission  - start D5W 1/2 NS IVF   - monitor    Diet:  DIET NPO  DVT PPx: heparin SQ  Code: Full Code    Rosemarie Mendoza (Spouse [phone])  - spoke with SHENA at bedside. Medical Decision Making:    Labs/Imaging reviewed. Additional information obtained from nursing staff. Patient is high risk due to medical condition and comorbidities. Plan discussed with nursing staff. Plan discussed with patient/family. All questions/concerns were addressed. Pt/family agrees with the plan.          Signed By: Julia Hart MD     June 8, 2020

## 2020-06-09 LAB
ANION GAP SERPL CALC-SCNC: 11 MMOL/L (ref 7–16)
BACTERIA SPEC CULT: ABNORMAL
BACTERIA SPEC CULT: ABNORMAL
BASOPHILS # BLD: 0 K/UL (ref 0–0.2)
BASOPHILS NFR BLD: 0 % (ref 0–2)
BUN SERPL-MCNC: 89 MG/DL (ref 8–23)
CALCIUM SERPL-MCNC: 9.7 MG/DL (ref 8.3–10.4)
CHLORIDE SERPL-SCNC: 121 MMOL/L (ref 98–107)
CO2 SERPL-SCNC: 21 MMOL/L (ref 21–32)
CREAT SERPL-MCNC: 2.56 MG/DL (ref 0.6–1)
DIFFERENTIAL METHOD BLD: ABNORMAL
EOSINOPHIL # BLD: 0.6 K/UL (ref 0–0.8)
EOSINOPHIL NFR BLD: 5 % (ref 0.5–7.8)
ERYTHROCYTE [DISTWIDTH] IN BLOOD BY AUTOMATED COUNT: 14.3 % (ref 11.9–14.6)
GLUCOSE BLD STRIP.AUTO-MCNC: 187 MG/DL (ref 65–100)
GLUCOSE BLD STRIP.AUTO-MCNC: 239 MG/DL (ref 65–100)
GLUCOSE BLD STRIP.AUTO-MCNC: 254 MG/DL (ref 65–100)
GLUCOSE BLD STRIP.AUTO-MCNC: 266 MG/DL (ref 65–100)
GLUCOSE BLD STRIP.AUTO-MCNC: 268 MG/DL (ref 65–100)
GLUCOSE SERPL-MCNC: 233 MG/DL (ref 65–100)
HCT VFR BLD AUTO: 33.2 % (ref 35.8–46.3)
HGB BLD-MCNC: 10.9 G/DL (ref 11.7–15.4)
IMM GRANULOCYTES # BLD AUTO: 0.1 K/UL (ref 0–0.5)
IMM GRANULOCYTES NFR BLD AUTO: 1 % (ref 0–5)
LYMPHOCYTES # BLD: 2.9 K/UL (ref 0.5–4.6)
LYMPHOCYTES NFR BLD: 21 % (ref 13–44)
MCH RBC QN AUTO: 32.3 PG (ref 26.1–32.9)
MCHC RBC AUTO-ENTMCNC: 32.8 G/DL (ref 31.4–35)
MCV RBC AUTO: 98.5 FL (ref 79.6–97.8)
MONOCYTES # BLD: 0.8 K/UL (ref 0.1–1.3)
MONOCYTES NFR BLD: 6 % (ref 4–12)
NEUTS SEG # BLD: 9.4 K/UL (ref 1.7–8.2)
NEUTS SEG NFR BLD: 68 % (ref 43–78)
NRBC # BLD: 0 K/UL (ref 0–0.2)
PLATELET # BLD AUTO: 259 K/UL (ref 150–450)
PMV BLD AUTO: 11.5 FL (ref 9.4–12.3)
POTASSIUM SERPL-SCNC: 4.5 MMOL/L (ref 3.5–5.1)
PROCALCITONIN SERPL-MCNC: 0.45 NG/ML
RBC # BLD AUTO: 3.37 M/UL (ref 4.05–5.2)
SERVICE CMNT-IMP: ABNORMAL
SODIUM SERPL-SCNC: 153 MMOL/L (ref 136–145)
WBC # BLD AUTO: 13.9 K/UL (ref 4.3–11.1)

## 2020-06-09 PROCEDURE — 65270000029 HC RM PRIVATE

## 2020-06-09 PROCEDURE — 36415 COLL VENOUS BLD VENIPUNCTURE: CPT

## 2020-06-09 PROCEDURE — 80048 BASIC METABOLIC PNL TOTAL CA: CPT

## 2020-06-09 PROCEDURE — 77010033678 HC OXYGEN DAILY

## 2020-06-09 PROCEDURE — 84145 PROCALCITONIN (PCT): CPT

## 2020-06-09 PROCEDURE — 74011636637 HC RX REV CODE- 636/637: Performed by: INTERNAL MEDICINE

## 2020-06-09 PROCEDURE — 74011250636 HC RX REV CODE- 250/636: Performed by: FAMILY MEDICINE

## 2020-06-09 PROCEDURE — 94760 N-INVAS EAR/PLS OXIMETRY 1: CPT

## 2020-06-09 PROCEDURE — 74011000258 HC RX REV CODE- 258: Performed by: INTERNAL MEDICINE

## 2020-06-09 PROCEDURE — 74011250636 HC RX REV CODE- 250/636: Performed by: INTERNAL MEDICINE

## 2020-06-09 PROCEDURE — 85025 COMPLETE CBC W/AUTO DIFF WBC: CPT

## 2020-06-09 RX ORDER — VANCOMYCIN 2 GRAM/500 ML IN 0.9 % SODIUM CHLORIDE INTRAVENOUS
2000 ONCE
Status: COMPLETED | OUTPATIENT
Start: 2020-06-09 | End: 2020-06-09

## 2020-06-09 RX ORDER — DEXTROSE MONOHYDRATE AND SODIUM CHLORIDE 5; .225 G/100ML; G/100ML
50 INJECTION, SOLUTION INTRAVENOUS CONTINUOUS
Status: DISCONTINUED | OUTPATIENT
Start: 2020-06-09 | End: 2020-06-10

## 2020-06-09 RX ORDER — SODIUM CHLORIDE 0.9 % (FLUSH) 0.9 %
5-10 SYRINGE (ML) INJECTION AS NEEDED
Status: CANCELLED | OUTPATIENT
Start: 2020-06-09

## 2020-06-09 RX ADMIN — PIPERACILLIN AND TAZOBACTAM 3.38 G: 3; .375 INJECTION, POWDER, FOR SOLUTION INTRAVENOUS at 20:01

## 2020-06-09 RX ADMIN — HUMAN INSULIN 9 UNITS: 100 INJECTION, SOLUTION SUBCUTANEOUS at 05:54

## 2020-06-09 RX ADMIN — DEXTROSE MONOHYDRATE AND SODIUM CHLORIDE 50 ML/HR: 5; .225 INJECTION, SOLUTION INTRAVENOUS at 17:07

## 2020-06-09 RX ADMIN — VANCOMYCIN HYDROCHLORIDE 2000 MG: 10 INJECTION, POWDER, LYOPHILIZED, FOR SOLUTION INTRAVENOUS at 17:05

## 2020-06-09 RX ADMIN — HUMAN INSULIN 9 UNITS: 100 INJECTION, SOLUTION SUBCUTANEOUS at 11:55

## 2020-06-09 RX ADMIN — HUMAN INSULIN 6 UNITS: 100 INJECTION, SOLUTION SUBCUTANEOUS at 18:32

## 2020-06-09 RX ADMIN — HEPARIN SODIUM 5000 UNITS: 5000 INJECTION INTRAVENOUS; SUBCUTANEOUS at 14:35

## 2020-06-09 RX ADMIN — HEPARIN SODIUM 5000 UNITS: 5000 INJECTION INTRAVENOUS; SUBCUTANEOUS at 21:23

## 2020-06-09 RX ADMIN — HEPARIN SODIUM 5000 UNITS: 5000 INJECTION INTRAVENOUS; SUBCUTANEOUS at 05:54

## 2020-06-09 RX ADMIN — HUMAN INSULIN 3 UNITS: 100 INJECTION, SOLUTION SUBCUTANEOUS at 23:28

## 2020-06-09 RX ADMIN — PIPERACILLIN AND TAZOBACTAM 3.38 G: 3; .375 INJECTION, POWDER, FOR SOLUTION INTRAVENOUS at 09:56

## 2020-06-09 RX ADMIN — Medication 10 ML: at 14:00

## 2020-06-09 RX ADMIN — Medication 10 ML: at 21:23

## 2020-06-09 RX ADMIN — HUMAN INSULIN 9 UNITS: 100 INJECTION, SOLUTION SUBCUTANEOUS at 00:00

## 2020-06-09 RX ADMIN — Medication 10 ML: at 05:57

## 2020-06-09 NOTE — PROGRESS NOTES
Problem: Falls - Risk of  Goal: *Absence of Falls  Description: Document Clydene Bone Fall Risk and appropriate interventions in the flowsheet.   Outcome: Progressing Towards Goal  Note: Fall Risk Interventions:  Mobility Interventions: Bed/chair exit alarm    Mentation Interventions: Adequate sleep, hydration, pain control    Medication Interventions: Bed/chair exit alarm    Elimination Interventions: Bed/chair exit alarm

## 2020-06-09 NOTE — PROGRESS NOTES
Renaldo [facility] CRITICAL CARE OUTREACH NURSE PROGRESS REPORT      SUBJECTIVE: Called to assess patient secondary to recent Rapid Response. MEWS Score: 3 (06/08/20 2350)  Vitals:    06/08/20 2104 06/08/20 2350 06/09/20 0417 06/09/20 0726   BP:  127/65 125/59 122/58   Pulse:  90 98 (!) 105   Resp:  25 26 (!) 48   Temp:  99.5 °F (37.5 °C) 98.6 °F (37 °C) (!) 101.5 °F (38.6 °C)   SpO2: 96% 96% 97% 95%   Weight:       Height:              LAB DATA:    Recent Labs     06/09/20  0858 06/08/20  0631 06/07/20  0631   * 147* 152*   K 4.5 4.6 4.4   * 116* 121*   CO2 21 20* 21   AGAP 11 11 10   * 369* 182*   BUN 89* 98* 92*   CREA 2.56* 2.93* 2.36*   GFRAA 23* 20* 26*   GFRNA 19* 17* 21*   CA 9.7 9.5 9.6        Recent Labs     06/09/20  0858 06/08/20  0631 06/07/20  0631   WBC 13.9* 17.3* 11.8*   HGB 10.9* 11.1* 11.2*   HCT 33.2* 35.4* 35.1*    291 290          OBJECTIVE: On arrival to room, I found patient to be resting in bed. Pain Assessment  Pain Intensity 1: 0 (06/08/20 1951)        Patient Stated Pain Goal: 0        ASSESSMENT:  Patient opens eyes to stimulus, does not answer questions, follows some commands. Breathing unlabored, on 5L NC. O2 sat 94%. Breath sounds coarse. . No visible signs of distress. Patient feels warm, temp 98.5 axillary. Chart and labs reviewed. PLAN:  Will continue to follow per outreach protocol.

## 2020-06-09 NOTE — PROGRESS NOTES
Hospitalist Note     Admit Date:  2020  5:03 PM   Name:  Rachelle Stratton      Age:  66 y.o.    :  1945   MRN:  [id number]   PCP:  Allen Salcedo MD    HPI/Subjective:   Reason for Admission: ROSA (acute kidney injury) [facility]) [N17.9]    Hospital Course:   Rachelle Stratton is a 66 y.o. female with medical history significant for CAD, hypertensin, recent CVA with residual right sided weakness, diabetes from [facility] with worsening confusion and elevated creatinine. Patient was found to have urinary tract infection and started on empiric antibiotics. Response called on  AM as patient was found to have acute respiratory failure with hypoxia likely due to aspiration. Patient was suctioned and put on non-rebreather with saturation improving to the 96%. Patient has been lethargic with minimally responsive since the event. 20 :  Patient is seen and examined at bedside. No acute events reported overnight by nursing staff. Patient remained somnolent and minimally responsive. Snoring and appears comfortable. Opens eyes to voice and does not follow commands. More alert in the afternoon. Temp spike of 101.5F during the AM.    Unable to obtain ROS due to clinical status.       Objective:     Patient Vitals for the past 24 hrs:   Temp Pulse Resp BP SpO2   20 1107 (!) 101.1 °F (38.4 °C) (!) 110 (!) 45 143/83 98 %   20 0726 (!) 101.5 °F (38.6 °C) (!) 105 (!) 48 122/58 95 %   20 0417 98.6 °F (37 °C) 98 26 125/59 97 %   20 2350 99.5 °F (37.5 °C) 90 25 127/65 96 %   20 2104     96 %   20 1951 98.6 °F (37 °C) 91 26 141/71 97 %   20 1709 98.3 °F (36.8 °C) (!) 104 28 131/63    20 1450 100.2 °F (37.9 °C) (!) 102 (!) 32 127/65 96 %     Oxygen Therapy  O2 Sat (%): 98 % (20 1107)  Pulse via Oximetry: 89 beats per minute (20)  O2 Device: Hi flow nasal cannula;Humidifier (20)  O2 Flow Rate (L/min): 5 l/min (20 2104)    Estimated body mass index is 33.98 kg/m² as calculated from the following:    Height as of this encounter: 5' 6\" (1.676 m). Weight as of this encounter: 95.5 kg (210 lb 8 oz). Intake/Output Summary (Last 24 hours) at 6/9/2020 1447  Last data filed at 6/8/2020 1822  Gross per 24 hour   Intake    Output 300 ml   Net -300 ml       *Note that automatically entered I/Os may not be accurate; dependent on patient compliance with collection and accurate  by techs. Physical Exam:   General:     Lethargic but arousable. Well nourished and Obese. Head:   normocephalic, atraumatic  Eyes, Ears, nose: PERRL. Normal conjunctiva  Neck:    supple, non-tender. Trachea midline. Lungs:   Coarse breath sounds, no wheezing  Cardiac:   RRR, Normal S1 and S2. Abdomen:   Soft, non distended, nontender, +BS, no guarding/rebound  Extremities:   Warm, dry.  Trace edema   Skin:   No rashes, no jaundice  Neuro:  Somnolent  Psychiatric:  No anxiety, calm, cooperative    Data Review:  I have reviewed all labs, meds, and studies from the last 24 hours:    Recent Results (from the past 24 hour(s))   GLUCOSE, POC    Collection Time: 06/08/20  4:05 PM   Result Value Ref Range    Glucose (POC) 219 (H) 65 - 100 mg/dL   CULTURE, BLOOD    Collection Time: 06/08/20  6:07 PM   Result Value Ref Range    Special Requests: LEFT  HAND        Culture result: NO GROWTH AFTER 10 HOURS     CULTURE, BLOOD    Collection Time: 06/08/20  6:12 PM   Result Value Ref Range    Special Requests: RIGHT  HAND        Culture result: NO GROWTH AFTER 10 HOURS     GLUCOSE, POC    Collection Time: 06/08/20  9:22 PM   Result Value Ref Range    Glucose (POC) 260 (H) 65 - 100 mg/dL   GLUCOSE, POC    Collection Time: 06/08/20 11:48 PM   Result Value Ref Range    Glucose (POC) 268 (H) 65 - 100 mg/dL   GLUCOSE, POC    Collection Time: 06/09/20  5:40 AM   Result Value Ref Range    Glucose (POC) 266 (H) 65 - 100 mg/dL   CBC WITH AUTOMATED DIFF Collection Time: 06/09/20  8:58 AM   Result Value Ref Range    WBC 13.9 (H) 4.3 - 11.1 K/uL    RBC 3.37 (L) 4.05 - 5.2 M/uL    HGB 10.9 (L) 11.7 - 15.4 g/dL    HCT 33.2 (L) 35.8 - 46.3 %    MCV 98.5 (H) 79.6 - 97.8 FL    MCH 32.3 26.1 - 32.9 PG    MCHC 32.8 31.4 - 35.0 g/dL    RDW 14.3 11.9 - 14.6 %    PLATELET 486 298 - 532 K/uL    MPV 11.5 9.4 - 12.3 FL    ABSOLUTE NRBC 0.00 0.0 - 0.2 K/uL    DF AUTOMATED      NEUTROPHILS 68 43 - 78 %    LYMPHOCYTES 21 13 - 44 %    MONOCYTES 6 4.0 - 12.0 %    EOSINOPHILS 5 0.5 - 7.8 %    BASOPHILS 0 0.0 - 2.0 %    IMMATURE GRANULOCYTES 1 0.0 - 5.0 %    ABS. NEUTROPHILS 9.4 (H) 1.7 - 8.2 K/UL    ABS. LYMPHOCYTES 2.9 0.5 - 4.6 K/UL    ABS. MONOCYTES 0.8 0.1 - 1.3 K/UL    ABS. EOSINOPHILS 0.6 0.0 - 0.8 K/UL    ABS. BASOPHILS 0.0 0.0 - 0.2 K/UL    ABS. IMM.  GRANS. 0.1 0.0 - 0.5 K/UL   METABOLIC PANEL, BASIC    Collection Time: 06/09/20  8:58 AM   Result Value Ref Range    Sodium 153 (H) 136 - 145 mmol/L    Potassium 4.5 3.5 - 5.1 mmol/L    Chloride 121 (H) 98 - 107 mmol/L    CO2 21 21 - 32 mmol/L    Anion gap 11 7 - 16 mmol/L    Glucose 233 (H) 65 - 100 mg/dL    BUN 89 (H) 8 - 23 MG/DL    Creatinine 2.56 (H) 0.6 - 1.0 MG/DL    GFR est AA 23 (L) >60 ml/min/1.73m2    GFR est non-AA 19 (L) >60 ml/min/1.73m2    Calcium 9.7 8.3 - 10.4 MG/DL   GLUCOSE, POC    Collection Time: 06/09/20 11:08 AM   Result Value Ref Range    Glucose (POC) 254 (H) 65 - 100 mg/dL        All Micro Results     Procedure Component Value Units Date/Time    CULTURE, URINE [[id number]]  (Abnormal)  (Susceptibility) Collected:  06/05/20 1845    Order Status:  Completed Specimen:  Cath Urine Updated:  06/09/20 0710     Special Requests: NO SPECIAL REQUESTS        Culture result:       >100,000 COLONIES/mL PROTEUS MIRABILIS                  >100,000 COLONIES/mL KLEBSIELLA PNEUMONIAE          CULTURE, BLOOD [[id number]] Collected:  06/08/20 1807    Order Status:  Completed Specimen:  Blood Updated:  06/09/20 0630     Special Requests: --        LEFT  HAND       Culture result: NO GROWTH AFTER 10 HOURS       CULTURE, BLOOD [[id number]] Collected:  06/08/20 1812    Order Status:  Completed Specimen:  Blood Updated:  06/09/20 0630     Special Requests: --        RIGHT  HAND       Culture result: NO GROWTH AFTER 10 HOURS       CULTURE, BLOOD [[id number]] Collected:  06/05/20 1845    Order Status:  Completed Specimen:  Blood Updated:  06/09/20 0629     Special Requests: --        NO SPECIAL REQUESTS  RIGHT  FOREARM       Culture result: NO GROWTH 4 DAYS       CULTURE, BLOOD [[id number]] Collected:  06/05/20 1845    Order Status:  Completed Specimen:  Blood Updated:  06/09/20 0629     Special Requests: --        NO SPECIAL REQUESTS  LEFT  Antecubital       Culture result: NO GROWTH 4 DAYS             Current Meds:  Current Facility-Administered Medications   Medication Dose Route Frequency    lip protectant (BLISTEX) ointment 1 Each  1 Each Topical PRN    piperacillin-tazobactam (ZOSYN) 3.375 g in 0.9% sodium chloride (MBP/ADV) 100 mL  3.375 g IntraVENous Q12H    acetaminophen (TYLENOL) suppository 650 mg  650 mg Rectal Q4H PRN    insulin regular (NOVOLIN R, HUMULIN R) injection   SubCUTAneous Q6H    dextrose 5 % - 0.45% NaCl infusion  75 mL/hr IntraVENous CONTINUOUS    levalbuterol (XOPENEX) nebulizer soln 1.25 mg/3 mL  1.25 mg Nebulization Q4H PRN    hydrALAZINE (APRESOLINE) 20 mg/mL injection 10 mg  10 mg IntraVENous Q6H PRN    [START ON 6/14/2020] levothyroxine (SYNTHROID) injection 110 mcg  110 mcg IntraVENous DAILY    [Held by provider] insulin regular (NOVOLIN R, HUMULIN R) injection 4 Units  4 Units SubCUTAneous QPM    amLODIPine (NORVASC) tablet 10 mg  10 mg Oral DAILY    aspirin delayed-release tablet 81 mg  81 mg Oral DAILY    atorvastatin (LIPITOR) tablet 40 mg  40 mg Oral QHS    carvediloL (COREG) tablet 12.5 mg  12.5 mg Oral BID WITH MEALS    losartan (COZAAR) tablet 100 mg  100 mg Oral DAILY    ezetimibe (ZETIA) tablet 10 mg  10 mg Oral DAILY    hyoscyamine SL (LEVSIN/SL) tablet 0.125 mg  0.125 mg SubLINGual Q6H PRN    [Held by provider] insulin glargine (LANTUS) injection 18 Units  18 Units SubCUTAneous QHS    ondansetron (ZOFRAN ODT) tablet 4 mg  4 mg Oral Q8H PRN    sertraline (ZOLOFT) tablet 200 mg  200 mg Oral BID    pantoprazole (PROTONIX) tablet 40 mg  40 mg Oral ACB    ticagrelor (BRILINTA) tablet 90 mg  90 mg Oral Q12H    sodium chloride (NS) flush 5-40 mL  5-40 mL IntraVENous Q8H    sodium chloride (NS) flush 5-40 mL  5-40 mL IntraVENous PRN    acetaminophen (TYLENOL) tablet 650 mg  650 mg Oral Q4H PRN    ondansetron (ZOFRAN) injection 4 mg  4 mg IntraVENous Q4H PRN    magnesium hydroxide (MILK OF MAGNESIA) 400 mg/5 mL oral suspension 30 mL  30 mL Oral DAILY PRN    heparin (porcine) injection 5,000 Units  5,000 Units SubCUTAneous Q8H       Other Studies:  Nm Lung Scan Perf    Result Date: 5/25/2020  EXAM:  NM LUNG SCAN PERF INDICATION:  Elevated troponin weakness mild hypoxia COMPARISON:  None. TRACER: 4.6 mCi of Tc-99m MAA. FINDINGS: Perfusion lung imaging was performed following intravenous administration of  Tc 99m MAA. Images were obtained from the anterior, posterior and right and left anterior and posterior oblique projections. The perfusion is normal. No segmental or subsegmental defects are identified. Chest x-ray reveals an elevated right hemidiaphragm. IMPRESSION: Normal lung perfusion study. Xr Chest Sngl V    Result Date: 6/8/2020  CHEST X-RAY, one view. HISTORY:  Hypoxemia and increasing oxygen demand. TECHNIQUE:  AP upright portable view COMPARISON: Earlier today FINDINGS:   -The lungs: are clear. -The costophrenic angles: are sharp. -The heart size: is normal. -The pulmonary vasculature: is unremarkable. -Included portion of the upper abdomen: is unremarkable. -Bones: No gross bony lesions. -Other: None.      IMPRESSION:  Negative for acute change     Xr Chest Sngl V    Result Date: 6/8/2020   Portable view of the chest COMPARISON: June 7, 2020 Clinical history: Hypoxia. FINDINGS: Persistent elevation of the right hemidiaphragm. Lungs are underinflated. No pneumothorax, pulmonary edema or large pleural effusion. No definite pulmonary consolidation. Heart appears mildly enlarged. Mediastinal contour is within normal limits. Surrounding bones are unremarkable. IMPRESSION: 1. No pulmonary edema or definite pulmonary consolidation. 2. No significant change compared to exam.    Xr Chest Sngl V    Result Date: 6/7/2020  Chest X-ray INDICATION: Shortness of breath A portable AP view of the chest was obtained. FINDINGS: There is chronic elevation of the right diaphragm. There are no infiltrates or effusions. The heart size is normal.  The bony thorax is intact. IMPRESSION: No acute findings in the chest     Xr Chest Pa Lat    Result Date: 5/25/2020  EXAM: XR CHEST PA LAT INDICATION: Cough COMPARISON: None. FINDINGS: PA and lateral radiographs of the chest demonstrate clear lungs. The cardiac and mediastinal contours and pulmonary vascularity are normal. The bones and soft tissues are within normal limits. IMPRESSION: Normal chest.    Xr Abd (ap And Erect Or Decub)    Result Date: 5/25/2020  Abdominal films, 3 views. History: Nausea and vomiting Technique:  Supine and upright views of the abdomen on 3 images. Comparison: None Findings: Cholecystectomy. Nonspecific bowel gas pattern. No evidence of free air, organomegaly. Calcification located between the right transverse processes of L4 and L5. IMPRESSION: Negative for free air, ileus or obstruction. Possible right ureteral calculus. Correlate clinically. Xr Swallow Func Video    Result Date: 5/28/2020  Modified Barium Swallow INDICATION: Oropharyngeal dysphagia.  CVA Fluoro time: 3.15 minutes Spot films:  0 Fluoroscopy was used to evaluate pharyngeal function while the patient was given barium solutions and barium coated solids. FINDINGS: Severe global swallow deficits present. There is harriet aspiration of thin barium by teaspoon with poor cough response. Shallow penetration with nectar by teaspoon. Penetration with nectar by cup. Deep penetration with honey by cup. IMPRESSION: 1. Aspiration and deep penetration with multiple administered consistencies. 2.  Please see concurrent speech and language pathology note for complete description of findings. Mri Brain Wo Cont    Result Date: 5/27/2020  EXAMINATION: BRAIN MRI 5/27/2020 11:49 AM ACCESSION NUMBER: [id number] INDICATION: Worsening of old stroke COMPARISON: Head CT 5/27/2020 and 5/25/2020 TECHNIQUE: Multiplanar multisequence MRI of the brain without intravenous contrast. FINDINGS: There are numerous small and punctate areas of restricted diffusion scattered throughout the supratentorial brain parenchyma, worst in the right lentiform and caudate nuclei. There are multiple foci of restricted diffusion throughout both cerebellar hemispheres. There is a moderate-sized focus of restricted diffusion in the right aspect of the benjy. The preponderance of these areas demonstrate T2 hyperintensity. The constellation of findings is consistent with multiple supratentorial acute or early subacute infarctions involving multiple vascular distributions. None of the infarctions demonstrates significant associated mass effect or hemorrhagic conversion. The ventricles are within normal limits for the degree of global brain parenchymal volume loss. There is no midline shift. The basilar cisterns are patent. There is no cerebellar tonsillar ectopia or herniation. There are T2 hyperintensities in the periventricular and subcortical white matter and benjy, a nonspecific finding which likely represents chronic microangiopathy. Prior bilateral lens replacement. The expected large vascular flow voids are preserved. There are no suspicious osseous lesions. IMPRESSION: 1.  Numerous acute or early subacute infarctions scattered throughout the supratentorial and infratentorial brain parenchyma. These are most likely embolic infarctions from a central source. The most confluent areas of involvement include the right caudate and lentiform nuclei, the benjy, and the left cerebellar hemisphere. There is no evidence of significant mass effect or hemorrhagic conversion. 2. Moderate burden of chronic microangiopathy. 3. Discussed with Dr. Kemi Goode in person by Dr. Vidhi Poole at 11:45 AM 5/27/2020. VOICE DICTATED BY: Dr. Brittney Jc    Result Date: 6/8/2020  CT head without contrast History: ALTERED MENTAL STATUS, HX OF RECENT CVA. Technique: 5mm axial images were obtained from the skull base to the vertex without contrast. Radiation dose reduction techniques were used for this study: Our CT scanners use one or all of the following: Automated exposure control, adjustment of the mA and/or kVp according to patient's size, iterative reconstruction. Comparison: 05/27/2020 Findings: The ventricles and sulci are mildly prominent. There are no extra-axial fluid collections. No evidence of acute intraparenchymal hemorrhage or mass effect is identified. Patchy, confluent areas of decreased attenuation are noted within the supratentorial white matter. These are nonspecific findings but would be most compatible with moderately advanced chronic small vessel ischemic changes. There is no evidence to suggest an acute major territorial infarct. Extensive atherosclerotic changes are present of the vertebral basilar system. The bony calvarium is intact. The visualized mastoid air cells and paranasal sinuses are well pneumatized and aerated. Impression: Chronic findings without evidence of acute intracranial abnormality.      Ct Head Wo Cont    Result Date: 5/27/2020  HEAD CT WITHOUT CONTRAST  5/27/2020 HISTORY:   increased AMS  ; leukocytosis; myocardial infarction TECHNIQUE: Noncontrast axial images were obtained through the brain. All CT scans at this facility used dose modulation, interactive reconstruction and/or weight based dosing when appropriate to reduce radiation dose to as low as reasonably achievable. COMPARISON: May 25, 2020 FINDINGS: There is no acute intracranial hemorrhage, significant mass effect or CT evidence of acute large artery territorial infarction. Please note that a hyperacute infarct or small vessel infarct may not be apparent on initial CT imaging. Mild to moderate cerebral volume loss is present. Scattered areas of low-attenuation are present in the supratentorial white matter that are compatible with chronic small vessel ischemic disease. There is no hydrocephalus , intra-axial mass or abnormal extra-axial fluid collection. There are no displaced skull fractures. The mastoid air cells and paranasal sinuses are clear where imaged. IMPRESSION: Cerebral volume loss and white matter findings compatible with chronic small vessel ischemic disease. Ct Head Wo Cont    Result Date: 5/25/2020  CT of the head without contrast. CLINICAL INDICATION: Weakness, unable to walk PROCEDURE: Serial thin section axial images are obtained from the cranial vertex through the skull base without the administration of intravenous contrast. Radiation dose reduction techniques were used for this study. Our CT scanners use one or all of the following: Automated exposure control, adjusted of the mA and/or kV according to patient size, iterative reconstruction COMPARISON: No prior. FINDINGS: There is no acute intracranial hemorrhage, mass, or mass effect. No abnormal extra-axial fluid collections identified. There is no hydrocephalus. The basilar cisterns are widely patent. There is moderate to severe patchy subcortical, deep, and periventricular white matter hypoattenuation. An old infarct is noted in the right basal ganglia with a tiny focal old lacunar infarct in the left basal ganglia.  No skull fracture or aggressive osseous lesion noted. The mastoid air cells and included paranasal sinuses are clear. IMPRESSION: 1. No acute intracranial abnormality. Note, acute/subacute CVA cannot be excluded given the severity of the underlying white matter disease. 2. Moderate to severe bilateral white matter hypoattenuation most in keeping with chronic microangiopathic disease. 3. Old infarct in the right basal ganglia with an old lacunar infarct in the left basal ganglia. Us Retroperitoneum Comp    Result Date: 6/7/2020  INDICATION:  Renal insufficiency TECHNIQUE: Real-time sonography of the kidneys, retroperitoneum and bladder was performed with multiple static images obtained. FINDINGS: The right kidney measures 10.6 cm and the left kidney measures 10.5 cm in length. Both kidneys are slightly echogenic. There is no hydronephrosis. There is a small cyst in the upper pole of the left kidney. There are no significant renal masses. The aorta and IVC are normal in caliber. The urinary bladder is collapsed around a Jefferson catheter. IMPRESSION: Echogenic kidneys suggesting chronic renal disease. No evidence of obstruction. Xr Chest Port    Result Date: 5/25/2020  Portable chest x-ray CLINICAL INDICATION: Weakness FINDINGS: Single AP view the chest compared to a similar exam dated 5/25/2020 show the lungs to be expanded and clear. No pleural effusion or pneumothorax. The cardiac silhouette and mediastinum are unremarkable. The bones are osteopenic. IMPRESSION: No acute cardiopulmonary abnormality. Assessment and Plan:      Active Hospital Problems    Diagnosis Date Noted    Hypernatremia 06/06/2020    ROSA (acute kidney injury) ([address] 06/05/2020    UTI (urinary tract infection) 06/05/2020    Acute metabolic encephalopathy 17/98/9954    Normocytic anemia 06/05/2020    Leukocytosis 05/26/2020           Plan:    Acute metabolic encephalopathy likely due to underlying infection(UTI, aspiration pna)  Prior Recent CVA with right sided deficit  - CT Head (6/8) does not show any acute abnormalities. - UA showed large leukocyte esterase with 4+ bacteria and UCx (6/5) shows pan sensitive proteus. - ABG did not show hypercarbia to suspect the cause of encephalopathy and ammonia is wnl.  - BCx (6/5): no growth to date; Repeat (BCx 6/8) shows no growth so far. - treat underlying infection    Acute Respiratory Failure with hypoxia likely due to aspiration  Fever and leukocytosis concerning for aspiration PNA  - CXR shows no acute changes  - NPO  - c/w zosyn  - will give 1 dose of vancomycin given temp spike. ROSA on CKD Stage 3 (baseline Cr 1.50 - 2.59)  - IVF and monitor  - treat underlying UTI  - Renal U/S no obstruction or hydronephrosis    Hypernatremia  - c/w D5W 0.2 NS IVF   - monitor    Diet:  DIET NPO  DVT PPx: heparin SQ  Code: Full Code    Rosemarie Mendoza (Spouse [phone])  - spoke with Maikel at bedside. He said the family Zulema Ruiz and 3 sons) are in discussion regarding goals of care. At this time, they would like her to be FULL Code and ok with PEG tube if needed. Josh(son) will be coming tomorrow. Medical Decision Making:    Labs/Imaging reviewed. Additional information obtained from nursing staff. Patient is high risk due to medical condition and comorbidities. Plan discussed with nursing staff. Plan discussed with patient/family. All questions/concerns were addressed. Pt/family agrees with the plan.          Signed By: Julia Hart MD     June 9, 2020

## 2020-06-09 NOTE — PROGRESS NOTES
SPEECH PATHOLOGY NOTE:    Attempted to see patient for PO trials; however, patient sleeping soundly upon arrival and does not wake to name or light touch. Not appropriate for PO trials at this time. Continue NPO with non-oral meds.       Selena Zamorano MS, CCC-SLP

## 2020-06-09 NOTE — PROGRESS NOTES
Problem: Diabetes Self-Management  Goal: *Disease process and treatment process  Description: Define diabetes and identify own type of diabetes; list 3 options for treating diabetes. Outcome: Progressing Towards Goal  Goal: *Incorporating nutritional management into lifestyle  Description: Describe effect of type, amount and timing of food on blood glucose; list 3 methods for planning meals. Outcome: Progressing Towards Goal  Goal: *Incorporating physical activity into lifestyle  Description: State effect of exercise on blood glucose levels. Outcome: Progressing Towards Goal  Goal: *Developing strategies to promote health/change behavior  Description: Define the ABC's of diabetes; identify appropriate screenings, schedule and personal plan for screenings. Outcome: Progressing Towards Goal  Goal: *Using medications safely  Description: State effect of diabetes medications on diabetes; name diabetes medication taking, action and side effects.   Outcome: Progressing Towards Goal

## 2020-06-09 NOTE — PROGRESS NOTES
Date of Outreach Update:  Guero Tovar was seen and assessed. Previous Outreach assessment has been reviewed. There have been no significant clinical changes since the completion of the last dated Outreach assessment. Patient opens eyes to voice, no visible signs of distress. Breathing even and unlabored. O2 sat 93% on 5L NC, HR 96. Outreach follow up complete.     Signed By:   Mary Orellana    June 9, 2020 3:46 PM

## 2020-06-09 NOTE — PROGRESS NOTES
PT Daily Note:  Attempted to see patient for physical therapy this morning but patient was too drowsy to participate. Patient was snoring and did not even flinch with sternal rub. Will check back on patient at a later date/time if schedule permits.   Thank you,  Ingrid Villalobos, PTA

## 2020-06-09 NOTE — PROGRESS NOTES
Assessment complete via flow sheet. Pt responsive to voice and able to mouth her name. Lung sounds coarse to auscultation. S1 S2 auscultated. Pt on 8L NC. Bowel sounds active, abdomen soft. Denies other needs. Bed in lowest position, side rails up x3, call bell in reach. Instructed to call for assistance. Pt verbalized understanding. Plan of care reviewed with patient.

## 2020-06-10 ENCOUNTER — APPOINTMENT (OUTPATIENT)
Dept: GENERAL RADIOLOGY | Age: 75
DRG: 004 | End: 2020-06-10
Attending: INTERNAL MEDICINE
Payer: MEDICARE

## 2020-06-10 LAB
ANION GAP SERPL CALC-SCNC: 10 MMOL/L (ref 7–16)
BACTERIA SPEC CULT: NORMAL
BACTERIA SPEC CULT: NORMAL
BASOPHILS # BLD: 0 K/UL (ref 0–0.2)
BASOPHILS NFR BLD: 0 % (ref 0–2)
BUN SERPL-MCNC: 77 MG/DL (ref 8–23)
CALCIUM SERPL-MCNC: 9.2 MG/DL (ref 8.3–10.4)
CHLORIDE SERPL-SCNC: 126 MMOL/L (ref 98–107)
CO2 SERPL-SCNC: 19 MMOL/L (ref 21–32)
CREAT SERPL-MCNC: 2.24 MG/DL (ref 0.6–1)
DIFFERENTIAL METHOD BLD: ABNORMAL
EOSINOPHIL # BLD: 0.8 K/UL (ref 0–0.8)
EOSINOPHIL NFR BLD: 7 % (ref 0.5–7.8)
ERYTHROCYTE [DISTWIDTH] IN BLOOD BY AUTOMATED COUNT: 14.6 % (ref 11.9–14.6)
GLUCOSE BLD STRIP.AUTO-MCNC: 222 MG/DL (ref 65–100)
GLUCOSE BLD STRIP.AUTO-MCNC: 234 MG/DL (ref 65–100)
GLUCOSE BLD STRIP.AUTO-MCNC: 251 MG/DL (ref 65–100)
GLUCOSE SERPL-MCNC: 234 MG/DL (ref 65–100)
HCT VFR BLD AUTO: 32.8 % (ref 35.8–46.3)
HGB BLD-MCNC: 10.3 G/DL (ref 11.7–15.4)
IMM GRANULOCYTES # BLD AUTO: 0.1 K/UL (ref 0–0.5)
IMM GRANULOCYTES NFR BLD AUTO: 1 % (ref 0–5)
LYMPHOCYTES # BLD: 2.4 K/UL (ref 0.5–4.6)
LYMPHOCYTES NFR BLD: 19 % (ref 13–44)
MCH RBC QN AUTO: 31.6 PG (ref 26.1–32.9)
MCHC RBC AUTO-ENTMCNC: 31.4 G/DL (ref 31.4–35)
MCV RBC AUTO: 100.6 FL (ref 79.6–97.8)
MONOCYTES # BLD: 0.7 K/UL (ref 0.1–1.3)
MONOCYTES NFR BLD: 6 % (ref 4–12)
NEUTS SEG # BLD: 8.5 K/UL (ref 1.7–8.2)
NEUTS SEG NFR BLD: 68 % (ref 43–78)
NRBC # BLD: 0 K/UL (ref 0–0.2)
PLATELET # BLD AUTO: 232 K/UL (ref 150–450)
PMV BLD AUTO: 11.7 FL (ref 9.4–12.3)
POTASSIUM SERPL-SCNC: 4.3 MMOL/L (ref 3.5–5.1)
RBC # BLD AUTO: 3.26 M/UL (ref 4.05–5.2)
SERVICE CMNT-IMP: NORMAL
SERVICE CMNT-IMP: NORMAL
SODIUM SERPL-SCNC: 155 MMOL/L (ref 136–145)
WBC # BLD AUTO: 12.5 K/UL (ref 4.3–11.1)

## 2020-06-10 PROCEDURE — 74011250636 HC RX REV CODE- 250/636: Performed by: INTERNAL MEDICINE

## 2020-06-10 PROCEDURE — 74018 RADEX ABDOMEN 1 VIEW: CPT

## 2020-06-10 PROCEDURE — 74011636637 HC RX REV CODE- 636/637: Performed by: INTERNAL MEDICINE

## 2020-06-10 PROCEDURE — 74011250636 HC RX REV CODE- 250/636: Performed by: FAMILY MEDICINE

## 2020-06-10 PROCEDURE — 74011000250 HC RX REV CODE- 250: Performed by: INTERNAL MEDICINE

## 2020-06-10 PROCEDURE — 74011250637 HC RX REV CODE- 250/637: Performed by: INTERNAL MEDICINE

## 2020-06-10 PROCEDURE — 97530 THERAPEUTIC ACTIVITIES: CPT

## 2020-06-10 PROCEDURE — C9113 INJ PANTOPRAZOLE SODIUM, VIA: HCPCS | Performed by: INTERNAL MEDICINE

## 2020-06-10 PROCEDURE — 3E0G76Z INTRODUCTION OF NUTRITIONAL SUBSTANCE INTO UPPER GI, VIA NATURAL OR ARTIFICIAL OPENING: ICD-10-PCS | Performed by: INTERNAL MEDICINE

## 2020-06-10 PROCEDURE — 74011000258 HC RX REV CODE- 258: Performed by: INTERNAL MEDICINE

## 2020-06-10 PROCEDURE — 36415 COLL VENOUS BLD VENIPUNCTURE: CPT

## 2020-06-10 PROCEDURE — 80048 BASIC METABOLIC PNL TOTAL CA: CPT

## 2020-06-10 PROCEDURE — 85025 COMPLETE CBC W/AUTO DIFF WBC: CPT

## 2020-06-10 PROCEDURE — 65270000029 HC RM PRIVATE

## 2020-06-10 PROCEDURE — 82962 GLUCOSE BLOOD TEST: CPT

## 2020-06-10 RX ORDER — DEXTROSE MONOHYDRATE 50 MG/ML
50 INJECTION, SOLUTION INTRAVENOUS CONTINUOUS
Status: DISPENSED | OUTPATIENT
Start: 2020-06-10 | End: 2020-06-11

## 2020-06-10 RX ADMIN — HUMAN INSULIN 9 UNITS: 100 INJECTION, SOLUTION SUBCUTANEOUS at 05:33

## 2020-06-10 RX ADMIN — TICAGRELOR 90 MG: 90 TABLET ORAL at 21:51

## 2020-06-10 RX ADMIN — HEPARIN SODIUM 5000 UNITS: 5000 INJECTION INTRAVENOUS; SUBCUTANEOUS at 21:51

## 2020-06-10 RX ADMIN — PIPERACILLIN AND TAZOBACTAM 3.38 G: 3; .375 INJECTION, POWDER, FOR SOLUTION INTRAVENOUS at 21:51

## 2020-06-10 RX ADMIN — SODIUM CHLORIDE 40 MG: 9 INJECTION INTRAMUSCULAR; INTRAVENOUS; SUBCUTANEOUS at 08:50

## 2020-06-10 RX ADMIN — DEXTROSE MONOHYDRATE 50 ML/HR: 5 INJECTION, SOLUTION INTRAVENOUS at 17:51

## 2020-06-10 RX ADMIN — HEPARIN SODIUM 5000 UNITS: 5000 INJECTION INTRAVENOUS; SUBCUTANEOUS at 05:33

## 2020-06-10 RX ADMIN — Medication 10 ML: at 05:34

## 2020-06-10 RX ADMIN — HEPARIN SODIUM 5000 UNITS: 5000 INJECTION INTRAVENOUS; SUBCUTANEOUS at 15:48

## 2020-06-10 RX ADMIN — HUMAN INSULIN 6 UNITS: 100 INJECTION, SOLUTION SUBCUTANEOUS at 17:49

## 2020-06-10 RX ADMIN — HUMAN INSULIN 6 UNITS: 100 INJECTION, SOLUTION SUBCUTANEOUS at 12:45

## 2020-06-10 RX ADMIN — PIPERACILLIN AND TAZOBACTAM 3.38 G: 3; .375 INJECTION, POWDER, FOR SOLUTION INTRAVENOUS at 08:50

## 2020-06-10 RX ADMIN — Medication 5 ML: at 15:49

## 2020-06-10 NOTE — PROGRESS NOTES
Problem: Diabetes Self-Management  Goal: *Disease process and treatment process  Description: Define diabetes and identify own type of diabetes; list 3 options for treating diabetes. Outcome: Progressing Towards Goal  Goal: *Incorporating nutritional management into lifestyle  Description: Describe effect of type, amount and timing of food on blood glucose; list 3 methods for planning meals. Outcome: Progressing Towards Goal  Goal: *Incorporating physical activity into lifestyle  Description: State effect of exercise on blood glucose levels. Outcome: Progressing Towards Goal  Goal: *Developing strategies to promote health/change behavior  Description: Define the ABC's of diabetes; identify appropriate screenings, schedule and personal plan for screenings. Outcome: Progressing Towards Goal  Goal: *Using medications safely  Description: State effect of diabetes medications on diabetes; name diabetes medication taking, action and side effects. Outcome: Progressing Towards Goal  Goal: *Monitoring blood glucose, interpreting and using results  Description: Identify recommended blood glucose targets  and personal targets. Outcome: Progressing Towards Goal  Goal: *Prevention, detection, treatment of acute complications  Description: List symptoms of hyper- and hypoglycemia; describe how to treat low blood sugar and actions for lowering  high blood glucose level. Outcome: Progressing Towards Goal  Goal: *Prevention, detection and treatment of chronic complications  Description: Define the natural course of diabetes and describe the relationship of blood glucose levels to long term complications of diabetes.   Outcome: Progressing Towards Goal  Goal: *Developing strategies to address psychosocial issues  Description: Describe feelings about living with diabetes; identify support needed and support network  Outcome: Progressing Towards Goal  Goal: *Insulin pump training  Outcome: Progressing Towards Goal  Goal: *Sick day guidelines  Outcome: Progressing Towards Goal  Goal: *Patient Specific Goal (EDIT GOAL, INSERT TEXT)  Outcome: Progressing Towards Goal     Problem: Patient Education: Go to Patient Education Activity  Goal: Patient/Family Education  Outcome: Progressing Towards Goal     Problem: Falls - Risk of  Goal: *Absence of Falls  Description: Document Charles Sin Fall Risk and appropriate interventions in the flowsheet.   Outcome: Progressing Towards Goal  Note: Fall Risk Interventions:  Mobility Interventions: Bed/chair exit alarm, Communicate number of staff needed for ambulation/transfer, OT consult for ADLs, Patient to call before getting OOB, PT Consult for mobility concerns, PT Consult for assist device competence, Strengthening exercises (ROM-active/passive), Utilize walker, cane, or other assistive device    Mentation Interventions: Adequate sleep, hydration, pain control, Bed/chair exit alarm, Door open when patient unattended, Evaluate medications/consider consulting pharmacy, Familiar objects from home, Family/sitter at bedside, Gait belt with transfers/ambulation, Increase mobility, More frequent rounding, Reorient patient, Room close to nurse's station, Self-releasing belt, Toileting rounds, Update white board    Medication Interventions: Assess postural VS orthostatic hypotension, Bed/chair exit alarm, Evaluate medications/consider consulting pharmacy, Patient to call before getting OOB, Teach patient to arise slowly    Elimination Interventions: Bed/chair exit alarm, Call light in reach, Elevated toilet seat, Patient to call for help with toileting needs, Toilet paper/wipes in reach, Toileting schedule/hourly rounds              Problem: Patient Education: Go to Patient Education Activity  Goal: Patient/Family Education  Outcome: Progressing Towards Goal     Problem: Pressure Injury - Risk of  Goal: *Prevention of pressure injury  Description: Document Santiago Scale and appropriate interventions in the flowsheet. Outcome: Progressing Towards Goal  Note: Pressure Injury Interventions:  Sensory Interventions: Assess need for specialty bed, Assess changes in LOC, Avoid rigorous massage over bony prominences, Check visual cues for pain, Float heels, Discuss PT/OT consult with provider, Keep linens dry and wrinkle-free, Maintain/enhance activity level, Minimize linen layers, Pad between skin to skin, Monitor skin under medical devices, Pressure redistribution bed/mattress (bed type), Suspension boots, Turn and reposition approx. every two hours (pillows and wedges if needed)    Moisture Interventions: Apply protective barrier, creams and emollients    Activity Interventions: Chair cushion, Assess need for specialty bed, Increase time out of bed, Pressure redistribution bed/mattress(bed type), PT/OT evaluation    Mobility Interventions: Assess need for specialty bed, Chair cushion, Float heels, HOB 30 degrees or less, Pressure redistribution bed/mattress (bed type), PT/OT evaluation, Suspension boots, Trapeze to reposition, Turn and reposition approx. every two hours(pillow and wedges)    Nutrition Interventions: Document food/fluid/supplement intake, Discuss nutritional consult with provider, Offer support with meals,snacks and hydration    Friction and Shear Interventions: Apply protective barrier, creams and emollients, Feet elevated on foot rest, Foam dressings/transparent film/skin sealants, HOB 30 degrees or less, Lift sheet, Minimize layers, Sit at 90-degree angle, Transfer aides:transfer board/Kapil lift/ceiling lift, Transferring/repositioning devices                Problem: Patient Education: Go to Patient Education Activity  Goal: Patient/Family Education  Outcome: Progressing Towards Goal     Problem: Dysphagia (Adult)  Goal: *Speech Goal: (INSERT TEXT)  Description: LTG: Patient will tolerate least restrictive diet without overt signs or symptoms of airway compromise by discharge.    STG: Patient will tolerate pureed diet and honey-thick liquids without overt signs or symptoms of aspiration 100% of the time. STG: Patient will perform laryngeal strengthening exercises x10 each with 70% accuracy to improve strength and coordination of swallowing function. STG: Patient will participate in modified barium swallow study as clinically indicated.       Outcome: Progressing Towards Goal     Problem: Patient Education: Go to Patient Education Activity  Goal: Patient/Family Education  Outcome: Progressing Towards Goal

## 2020-06-10 NOTE — PROGRESS NOTES
Attempted to see patient. Patient in bed sleeping. Clinician attempted to awaken patient with verbal and tactile cues (sternal rub) patient continued to sleep soundly. Spoke with nurse who state this was \"normal\" state but was more alert this morning. Patient not appropriate at this time to PO intake trials. Will continue to monitor.     Beth Anthony, SLP

## 2020-06-10 NOTE — PROGRESS NOTES
Hourly rounds completed. Pt rested all throughout night. Still drowsy. Awakens to stimuli. No fever throughout night.  Pt resting in bed

## 2020-06-10 NOTE — PROGRESS NOTES
Problem: Mobility Impaired (Adult and Pediatric)  Goal: *Therapy Goal (Edit Goal, Insert Text)  Outcome: Progressing Towards Goal  Note: STG:  (1.)Ms. Carlos Sotomayor will move from supine to sit and sit to supine , scoot up and down, and roll side to side with MAXIMAL ASSIST within 4 treatment day(s). (2.)Ms. Carlos Sotomayor will transfer from bed to chair and chair to bed with MAXIMAL ASSIST using the least restrictive device within 4 treatment day(s). (3.)Ms. Carlos Sotomayor will ambulate with MAXIMAL ASSIST for 5 feet with the least restrictive device within 4 treatment day(s). LTG:  (1.)Ms. Carlos Sotomayor will move from supine to sit and sit to supine , scoot up and down, and roll side to side in bed with MODERATE ASSIST within 7 treatment day(s). (2.)Ms. Carlos Sotomayor will transfer from bed to chair and chair to bed with MODERATE ASSIST using the least restrictive device within 7 treatment day(s). (3.)Ms. Carlos Sotomayor will ambulate with MODERATE ASSIST for 25 feet with the least restrictive device within 7 treatment day(s). ________________________________________________________________________________________________       PHYSICAL THERAPY: Daily Note and PM 6/10/2020  INPATIENT: PT Visit Days : 2  [place] / [place] / Product Type: Medicare /       NAME/AGE/GENDER: Camilla Landry is a 66 y.o. female   PRIMARY DIAGNOSIS: ROSA [facility]acute kidney [facility]) ([facility] [G02.7] Acute metabolic encephalopathy Acute metabolic encephalopathy       ICD-10: Treatment Diagnosis:    · Generalized Muscle Weakness (M62.81)  · Other lack of cordination (R27.8)  · Difficulty in walking, Not elsewhere classified (R26.2)  · Other abnormalities of gait and mobility (R26.89)   Precaution/Allergies:  Ace inhibitors; Adhesive tape; Codeine; Other medication; Other omega-3s; and Sulfa (sulfonamide antibiotics)      ASSESSMENT:     Ms. Carlos Sotomayor presents supine asleep but woke with verbal stimulus.   Her son came and she responded to him appropriatly although minimally. We sat her up on the EOB requiring TA x2 with no participation on her part. She has trace muscle activity on her R side without functional use and none on her L. She sat EOB about 15 minutes requiring assist to maintain balance. She attempted to kick her R leg with limited ability. She was able to lift her head to  Look at my eyes and at her son but this as well was limited. I had met her a few weeks back and today her debility is that much worse. Very guarded potential at this time and no progress toward goals. This section established at most recent assessment   PROBLEM LIST (Impairments causing functional limitations):  1. Decreased Strength  2. Decreased ADL/Functional Activities  3. Decreased Transfer Abilities  4. Decreased Ambulation Ability/Technique  5. Decreased Balance   INTERVENTIONS PLANNED: (Benefits and precautions of physical therapy have been discussed with the patient.)  1. Balance Exercise  2. Bed Mobility  3. Gait Training  4. Group Therapy  5. Neuromuscular Re-education/Strengthening  6. Therapeutic Activites  7. Therapeutic Exercise/Strengthening     TREATMENT PLAN: Frequency/Duration: 3 times a week for duration of hospital stay  Rehabilitation Potential For Stated Goals: Good     REHAB RECOMMENDATIONS (at time of discharge pending progress):    Placement: It is my opinion, based on this patient's performance to date, that Ms. Benigno Huff may benefit from intensive therapy at a [address] [place] [address] after discharge due to the functional deficits listed above that are likely to improve with skilled rehabilitation and concerns that he/she may be unsafe to be unsupervised at home due to debility and decreased mobility.   Equipment:    None at this time              HISTORY:   History of Present Injury/Illness (Reason for Referral):  PER MD H&P   Guero Maggy Siu is a 66 y.o. female with a past medical history of CAD, DM type II, HTN who presents to the ER from [facility] with report of worsening confusion and elevated creatinine on labs. The patient is unable to contributed to history beyond denying pain or fever. Past Medical History/Comorbidities:   Ms. Mahsa Butler  has a past medical history of Arrhythmia, Arthritis, Asthma, CAD (coronary artery disease), Chronic pain, Diabetes ([address] GERD (gastroesophageal reflux disease), Hypertension, Menopause, Morbid obesity ([address] Stroke [facility]), Thyroid disease, and Unspecified adverse effect of anesthesia. She also has no past medical history of Aneurysm ([address] Autoimmune disease ([address] Cancer ([address] Chronic kidney disease, Coagulation defects, COPD, Difficult intubation, Heart failure ([address] Liver disease, Malignant hyperthermia due to anesthesia, Nausea & vomiting, Other ill-defined conditions(799.89), Pseudocholinesterase deficiency, Psychiatric disorder, PUD (peptic ulcer disease), Seizures ([address] Thromboembolus ([address] or Unspecified sleep apnea. Ms. Mahsa Butler  has a past surgical history that includes hx appendectomy; hx lap cholecystectomy; hx orthopaedic; hx knee arthroscopy; hx clemente and bso; hx breast biopsy; and pr cardiac surg procedure unlist (4/2010). Social History/Living Environment:   Home Environment: Private residence  # Steps to Enter: 10  One/Two Story Residence: Two story  # of Interior Steps: 20(patients room is on second floor)  Living Alone: No  Support Systems: Spouse/Significant Other/Partner  Patient Expects to be Discharged to[de-identified] Private residence  Current DME Used/Available at Home: Walker, rolling  Prior Level of Function/Work/Activity:  Per RN, lives with spouse and ambulatory with RW PTA. Has been declining recently. Dominant Side:         RIGHT    Personal Factors:          Sex:  female        Age:  66 y.o.    Number of Personal Factors/Comorbidities that affect the Plan of Care: 1-2: MODERATE COMPLEXITY   EXAMINATION:   Most Recent Physical Functioning:   Gross Assessment:                  Posture: Balance:  Sitting - Static: Poor (constant support)  Sitting - Dynamic: Poor (constant support) Bed Mobility:  Rolling: Total assistance;Assist x2  Supine to Sit: Total assistance;Assist x2  Sit to Supine: Total assistance;Assist x2  Wheelchair Mobility:     Transfers:     Gait:            Body Structures Involved:  1. Bones  2. Joints  3. Muscles  4. Ligaments Body Functions Affected:  1. Neuromusculoskeletal  2. Movement Related  3. Skin Related  4. Metobolic/Endocrine Activities and Participation Affected:  1. Communication  2. Mobility  3. Self Care  4. Domestic Life  5. Community, Social and [place]   Number of elements that affect the Plan of Care: 3: MODERATE COMPLEXITY   CLINICAL PRESENTATION:   Presentation: Evolving clinical presentation with changing clinical characteristics: MODERATE COMPLEXITY   CLINICAL DECISION MAKIN [facility] Inpatient Short Form  How much difficulty does the patient currently have. .. Unable A Lot A Little None   1. Turning over in bed (including adjusting bedclothes, sheets and blankets)? [x] 1   [] 2   [] 3   [] 4   2. Sitting down on and standing up from a chair with arms ( e.g., wheelchair, bedside commode, etc.)   [x] 1   [] 2   [] 3   [] 4   3. Moving from lying on back to sitting on the side of the bed? [] 1   [] 2   [] 3   [] 4   How much help from another person does the patient currently need. .. Total A Lot A Little None   4. Moving to and from a bed to a chair (including a wheelchair)? [x] 1   [] 2   [] 3   [] 4   5. Need to walk in hospital room? [x] 1   [] 2   [] 3   [] 4   6. Climbing 3-5 steps with a railing? [x] 1   [] 2   [] 3   [] 4   © , Trustees of [facility], under license to [place].  All rights reserved      Score:  Initial: 6 Most Recent: X (Date: -- )    Interpretation of Tool:  Represents activities that are increasingly more difficult (i.e. Bed mobility, Transfers, Gait).    Medical Necessity:     · Patient demonstrates   · fair and poor  ·  rehab potential due to higher previous functional level. Reason for Services/Other Comments:  · Patient continues to require skilled intervention due to   · medical complications, patient unable to attend/participate in therapy as expected, and decreased transfers, ambulation and mobility. · .   Use of outcome tool(s) and clinical judgement create a POC that gives a: Questionable prediction of patient's progress: MODERATE COMPLEXITY            TREATMENT:   (In addition to Assessment/Re-Assessment sessions the following treatments were rendered)   Pre-treatment Symptoms/Complaints:  0/10   Pain: Initial:   Pain Intensity 1: 0  Post Session:  0/10      Therapeutic Activity: (    25 mins): Therapeutic activities including Bed transfers and sitting dangling EOB on level ground to improve mobility, strength, balance, and coordination. Required maximal assist to promote static balance in sitting and total assist for all other mobility. Braces/Orthotics/Lines/Etc:   · IV  · farris catheter  · O2 Device: Room air  Treatment/Session Assessment:    · Response to Treatment:  above. · Interdisciplinary Collaboration:   o Physical Therapy Assistant  o Registered Nurse  o Rehabilitation Attendant  · After treatment position/precautions:   o Supine in bed  o Bed/Chair-wheels locked  o Bed in low position  o Call light within reach  o RN notified  o Family at bedside   · Compliance with Program/Exercises: Compliant all of the time  · Recommendations/Intent for next treatment session: \"Next visit will focus on advancements to more challenging activities and reduction in assistance provided\".   Total Treatment Duration:  PT Patient Time In/Time Out  Time In: 1405  Time Out: 1430    Rebecca Larose, PTA

## 2020-06-10 NOTE — PROGRESS NOTES
Nutrition:  Consult for tube feeding management (Hospitalist)    Assessment:  Food/Nutrition Patient History: Patient admitted from from [facility] with worsening confusion. She was recently admitted last month with elevated troponins and also found to have CVA. SLP was following during last admission and recommended puree diet with honey thick liquids by spoon. SLP following this admission. Diet of puree with honey liquids started 6/6. Patient made NPO 6/8. Noted that she had rapid called 6/7 for hypoxia and likely aspiration. Diet has not been able to be advanced due to patient not being able to participate in PO trials. NGT has been placed, awaiting KUB confirmation. PMH also significant for CAD, DM2, HTN. Called and spoke with spouse, Shantal Reese. He denies Omega-3 allergy listed. He states that she has been able to eat vikash Omega-3 containing foods such as salmon, walnuts, kidney beans. Also noted that Omega-3 are essential fatty acids. Likely that allergy listed in error. He states that she has not been eating well since last discharge from hospital and has had no more than bites of meals. He endorses weight loss, but not sure how much. He does recall last admission she weight ~235# at some point.   Abdomen: Unknown last BM, hypoactive BS  Lab Results   Component Value Date/Time    Sodium 155 (H) 06/10/2020 06:54 AM    Potassium 4.3 06/10/2020 06:54 AM    Chloride 126 (H) 06/10/2020 06:54 AM    CO2 19 (L) 06/10/2020 06:54 AM    Anion gap 10 06/10/2020 06:54 AM    Glucose 234 (H) 06/10/2020 06:54 AM    BUN 77 (H) 06/10/2020 06:54 AM    Creatinine 2.24 (H) 06/10/2020 06:54 AM    Calcium 9.2 06/10/2020 06:54 AM    Albumin 3.3 06/05/2020 05:23 PM    Phosphorus 2.3 05/25/2020 04:33 PM   POC glucose 187-254 last 24 hrs  Diet: DIET NPO    Pertinent Medications: IVF D5 @75 ml/hr, SSI (36 units yesterday, 15 so far today),   Enteral nutrition access: NGT  Anthropometrics:Height: 5' 6\" (167.6 cm),  Weight: 95.5 kg (210 lb 8 oz), Weight Source: Bed, Body mass index is 33.98 kg/m². BMI class of obese. Weight and BMI 6/5/2020 6/5/2020 6/1/2020 5/31/2020 5/30/2020   Weight 95.482 kg 104.781 kg 104.8 kg 106.2 kg 107 kg   BMI (calculated) 34 kg/m2 37.3 kg/m2 37.3 kg/m2 37.8 kg/m2 38.1 kg/m2     Weight and BMI 5/29/2020 5/28/2020 5/28/2020 5/27/2020 5/26/2020   Weight 105.4 kg 105.688 kg 105.8 kg 106.7 kg 106.9 kg   BMI (calculated) 37.5 kg/m2  37.7 kg/m2 38 kg/m2 38.1 kg/m2     Weight and BMI 5/26/2020 5/25/2020   Weight 105.7 kg 107.502 kg   BMI (calculated) 38.8 kg/m2 39.4 kg/m2   Due to EMR functionality, cannot verify weight variance as unknown weight source and unknown stated vs measured. Noted that previous weights from last admission, but also noted that patient has been on Lasix, so unable to determine fluid vs non-fluid associated weight loss. However, patient with 26# (11%) weight loss in last ~2.5 weeks. No edema currently noted. Macronutrient Needs: 95.5 kg CBW (Current body weight) bed scale  Estimated energy requirements:  3532-5168 kcal /day (15-20 kcal/kg)  Estimated protein requirements:  72-95 grams protein/day (20% kcal)  CHO limit per day: 263 grams CHO/day (55% calories)  Estimated fluid/day: 1.4-1.9 liters/day (~1ml/kcal/day)  Intake/Comparative Standards:NPO does not meet EEN or EPN    Nutrition Diagnosis:  Inadequate oral intake related to cognitive or neurological impairment and dysphagia as evidenced by recent stoke with modified diet last admission, diet recall per spouse, currently NPO. Intervention:  Meals and snacks: NPO or per SLP  Enteral Nutrition: Initiate Glucerna 1.5 via NGT at 20 ml/hour, progress by 10 ml/hour every 8 hours to goal rate of 60ml/hour. Water flush 40/hour. At goal will provide 1440 kcal (100% estimated calorie needs), 119 grams protein (>100% estimated protein needs), 192 grams CHO/day (does not exceed maximum CHO/day) and ~2L free fluid - for hypernatremia.     Vitamin and Mineral Supplement Therapy:  Nutrition support orders for electrolyte management replacement are activated and placed on the MAR. Will adjust BMP to daily, add Phos and Mg labs tomorrow. Continue IVF, will consider adjusting tomorrow pending AM labs. Nutrition Related Medication Management: Perfect Serve to Dr. Adilia Winter regarding bowel regimen. Coordination of Nutrition Care: Discussed with KYLIE Huitron and Rere Serve to Dr. Adilia Winter  Discharge Nutrition Plan: Too soon to determine.     [address], [address]

## 2020-06-11 ENCOUNTER — APPOINTMENT (OUTPATIENT)
Dept: CT IMAGING | Age: 75
DRG: 004 | End: 2020-06-11
Attending: INTERNAL MEDICINE
Payer: MEDICARE

## 2020-06-11 PROBLEM — J96.01 ACUTE RESPIRATORY FAILURE WITH HYPOXIA (HCC): Status: ACTIVE | Noted: 2020-06-11

## 2020-06-11 LAB
ALBUMIN SERPL-MCNC: 2.9 G/DL (ref 3.2–4.6)
ANION GAP SERPL CALC-SCNC: 12 MMOL/L (ref 7–16)
ANION GAP SERPL CALC-SCNC: 9 MMOL/L (ref 7–16)
BASOPHILS # BLD: 0.1 K/UL (ref 0–0.2)
BASOPHILS NFR BLD: 0 % (ref 0–2)
BUN SERPL-MCNC: 80 MG/DL (ref 8–23)
BUN SERPL-MCNC: 92 MG/DL (ref 8–23)
CALCIUM SERPL-MCNC: 9.2 MG/DL (ref 8.3–10.4)
CALCIUM SERPL-MCNC: 9.6 MG/DL (ref 8.3–10.4)
CHLORIDE SERPL-SCNC: 125 MMOL/L (ref 98–107)
CHLORIDE SERPL-SCNC: 127 MMOL/L (ref 98–107)
CO2 SERPL-SCNC: 17 MMOL/L (ref 21–32)
CO2 SERPL-SCNC: 18 MMOL/L (ref 21–32)
CREAT SERPL-MCNC: 2.67 MG/DL (ref 0.6–1)
CREAT SERPL-MCNC: 2.74 MG/DL (ref 0.6–1)
DIFFERENTIAL METHOD BLD: ABNORMAL
EOSINOPHIL # BLD: 0.6 K/UL (ref 0–0.8)
EOSINOPHIL NFR BLD: 5 % (ref 0.5–7.8)
ERYTHROCYTE [DISTWIDTH] IN BLOOD BY AUTOMATED COUNT: 14.9 % (ref 11.9–14.6)
GLUCOSE BLD STRIP.AUTO-MCNC: 268 MG/DL (ref 65–100)
GLUCOSE BLD STRIP.AUTO-MCNC: 315 MG/DL (ref 65–100)
GLUCOSE BLD STRIP.AUTO-MCNC: 325 MG/DL (ref 65–100)
GLUCOSE BLD STRIP.AUTO-MCNC: 357 MG/DL (ref 65–100)
GLUCOSE BLD STRIP.AUTO-MCNC: 420 MG/DL (ref 65–100)
GLUCOSE SERPL-MCNC: 292 MG/DL (ref 65–100)
GLUCOSE SERPL-MCNC: 372 MG/DL (ref 65–100)
HCT VFR BLD AUTO: 33.5 % (ref 35.8–46.3)
HGB BLD-MCNC: 10.4 G/DL (ref 11.7–15.4)
IMM GRANULOCYTES # BLD AUTO: 0.1 K/UL (ref 0–0.5)
IMM GRANULOCYTES NFR BLD AUTO: 1 % (ref 0–5)
LYMPHOCYTES # BLD: 2.8 K/UL (ref 0.5–4.6)
LYMPHOCYTES NFR BLD: 22 % (ref 13–44)
MAGNESIUM SERPL-MCNC: 3.2 MG/DL (ref 1.8–2.4)
MCH RBC QN AUTO: 31.7 PG (ref 26.1–32.9)
MCHC RBC AUTO-ENTMCNC: 31 G/DL (ref 31.4–35)
MCV RBC AUTO: 102.1 FL (ref 79.6–97.8)
MONOCYTES # BLD: 0.8 K/UL (ref 0.1–1.3)
MONOCYTES NFR BLD: 7 % (ref 4–12)
NEUTS SEG # BLD: 8.6 K/UL (ref 1.7–8.2)
NEUTS SEG NFR BLD: 66 % (ref 43–78)
NRBC # BLD: 0 K/UL (ref 0–0.2)
PHOSPHATE SERPL-MCNC: 2.6 MG/DL (ref 2.3–3.7)
PHOSPHATE SERPL-MCNC: 2.6 MG/DL (ref 2.3–3.7)
PLATELET # BLD AUTO: 238 K/UL (ref 150–450)
PMV BLD AUTO: 12 FL (ref 9.4–12.3)
POTASSIUM SERPL-SCNC: 4.3 MMOL/L (ref 3.5–5.1)
POTASSIUM SERPL-SCNC: 4.5 MMOL/L (ref 3.5–5.1)
RBC # BLD AUTO: 3.28 M/UL (ref 4.05–5.2)
SODIUM SERPL-SCNC: 154 MMOL/L (ref 136–145)
SODIUM SERPL-SCNC: 154 MMOL/L (ref 136–145)
WBC # BLD AUTO: 12.9 K/UL (ref 4.3–11.1)

## 2020-06-11 PROCEDURE — 80048 BASIC METABOLIC PNL TOTAL CA: CPT

## 2020-06-11 PROCEDURE — 74011000258 HC RX REV CODE- 258: Performed by: INTERNAL MEDICINE

## 2020-06-11 PROCEDURE — 84100 ASSAY OF PHOSPHORUS: CPT

## 2020-06-11 PROCEDURE — 74011636637 HC RX REV CODE- 636/637: Performed by: INTERNAL MEDICINE

## 2020-06-11 PROCEDURE — C9113 INJ PANTOPRAZOLE SODIUM, VIA: HCPCS | Performed by: INTERNAL MEDICINE

## 2020-06-11 PROCEDURE — 74011250636 HC RX REV CODE- 250/636: Performed by: INTERNAL MEDICINE

## 2020-06-11 PROCEDURE — 74011250636 HC RX REV CODE- 250/636: Performed by: FAMILY MEDICINE

## 2020-06-11 PROCEDURE — 71250 CT THORAX DX C-: CPT

## 2020-06-11 PROCEDURE — 77030040393 HC DRSG OPTIFOAM GENT MDII -B

## 2020-06-11 PROCEDURE — 74011000250 HC RX REV CODE- 250: Performed by: INTERNAL MEDICINE

## 2020-06-11 PROCEDURE — 82962 GLUCOSE BLOOD TEST: CPT

## 2020-06-11 PROCEDURE — 83735 ASSAY OF MAGNESIUM: CPT

## 2020-06-11 PROCEDURE — 65270000029 HC RM PRIVATE

## 2020-06-11 PROCEDURE — 36415 COLL VENOUS BLD VENIPUNCTURE: CPT

## 2020-06-11 PROCEDURE — 85025 COMPLETE CBC W/AUTO DIFF WBC: CPT

## 2020-06-11 PROCEDURE — 80069 RENAL FUNCTION PANEL: CPT

## 2020-06-11 PROCEDURE — 74011250637 HC RX REV CODE- 250/637: Performed by: INTERNAL MEDICINE

## 2020-06-11 RX ORDER — VANCOMYCIN HYDROCHLORIDE 1 G/20ML
INJECTION, POWDER, LYOPHILIZED, FOR SOLUTION INTRAVENOUS EVERY 24 HOURS
Status: DISCONTINUED | OUTPATIENT
Start: 2020-06-11 | End: 2020-06-11 | Stop reason: SDUPTHER

## 2020-06-11 RX ORDER — SODIUM CHLORIDE 450 MG/100ML
150 INJECTION, SOLUTION INTRAVENOUS CONTINUOUS
Status: DISCONTINUED | OUTPATIENT
Start: 2020-06-11 | End: 2020-06-13

## 2020-06-11 RX ORDER — GUAIFENESIN 100 MG/5ML
81 LIQUID (ML) ORAL DAILY
Status: DISCONTINUED | OUTPATIENT
Start: 2020-06-11 | End: 2020-06-14

## 2020-06-11 RX ORDER — VANCOMYCIN 2 GRAM/500 ML IN 0.9 % SODIUM CHLORIDE INTRAVENOUS
2000 ONCE
Status: COMPLETED | OUTPATIENT
Start: 2020-06-11 | End: 2020-06-11

## 2020-06-11 RX ADMIN — HEPARIN SODIUM 5000 UNITS: 5000 INJECTION INTRAVENOUS; SUBCUTANEOUS at 21:34

## 2020-06-11 RX ADMIN — Medication 5 ML: at 21:42

## 2020-06-11 RX ADMIN — VANCOMYCIN HYDROCHLORIDE 2000 MG: 10 INJECTION, POWDER, LYOPHILIZED, FOR SOLUTION INTRAVENOUS at 18:11

## 2020-06-11 RX ADMIN — HUMAN INSULIN 9 UNITS: 100 INJECTION, SOLUTION SUBCUTANEOUS at 00:00

## 2020-06-11 RX ADMIN — PIPERACILLIN AND TAZOBACTAM 3.38 G: 3; .375 INJECTION, POWDER, FOR SOLUTION INTRAVENOUS at 07:54

## 2020-06-11 RX ADMIN — SODIUM CHLORIDE 40 MG: 9 INJECTION INTRAMUSCULAR; INTRAVENOUS; SUBCUTANEOUS at 08:16

## 2020-06-11 RX ADMIN — TICAGRELOR 90 MG: 90 TABLET ORAL at 12:05

## 2020-06-11 RX ADMIN — SODIUM CHLORIDE 150 ML/HR: 450 INJECTION, SOLUTION INTRAVENOUS at 21:52

## 2020-06-11 RX ADMIN — HEPARIN SODIUM 5000 UNITS: 5000 INJECTION INTRAVENOUS; SUBCUTANEOUS at 15:20

## 2020-06-11 RX ADMIN — HUMAN INSULIN 15 UNITS: 100 INJECTION, SOLUTION SUBCUTANEOUS at 12:58

## 2020-06-11 RX ADMIN — TICAGRELOR 90 MG: 90 TABLET ORAL at 21:43

## 2020-06-11 RX ADMIN — HUMAN INSULIN 12 UNITS: 100 INJECTION, SOLUTION SUBCUTANEOUS at 06:31

## 2020-06-11 RX ADMIN — ASPIRIN 81 MG 81 MG: 81 TABLET ORAL at 12:05

## 2020-06-11 RX ADMIN — Medication 10 ML: at 15:23

## 2020-06-11 RX ADMIN — PIPERACILLIN AND TAZOBACTAM 3.38 G: 3; .375 INJECTION, POWDER, FOR SOLUTION INTRAVENOUS at 21:34

## 2020-06-11 NOTE — PROGRESS NOTES
Nutrition:  Consult for tube feeding management (Hospitalist)    Assessment:  Food/Nutrition Patient History: Patient admitted from from [facility] with worsening confusion. She was recently admitted last month with elevated troponins and also found to have CVA. SLP was following during last admission and recommended puree diet with honey thick liquids by spoon. SLP following this admission. Diet of puree with honey liquids started 6/6. Patient made NPO 6/8. Noted that she had rapid called 6/7 for hypoxia and likely aspiration. Diet has not been able to be advanced due to patient not being able to participate in PO trials. NGT has been placed, awaiting KUB confirmation. PMH also significant for CAD, DM2, HTN. Patient lethargic today. Spoke with RN and MD. Per MD family likely to pursue PEG. TF at goal per RN. Abdomen: Last BM 6/10, hypoactive BS  Lab Results   Component Value Date/Time    Sodium 154 (H) 06/11/2020 08:07 AM    Potassium 4.3 06/11/2020 08:07 AM    Chloride 125 (H) 06/11/2020 08:07 AM    CO2 17 (L) 06/11/2020 08:07 AM    Anion gap 12 06/11/2020 08:07 AM    Glucose 372 (H) 06/11/2020 08:07 AM    BUN 80 (H) 06/11/2020 08:07 AM    Creatinine 2.67 (H) 06/11/2020 08:07 AM    Calcium 9.2 06/11/2020 08:07 AM    Albumin 3.3 06/05/2020 05:23 PM    Phosphorus 2.6 06/11/2020 08:07 AM   Mg 3.2  POC glucose 222-357 last 24 hrs  Diet: DIET NPO  DIET TUBE FEEDING Opne order for details. Do not start until NGT placement confirmed. Keep HOB > 30 degrees. Check residuals every 4 hours. Hold TF for > 500 ml x 1 or 250 ml x 2 consecutive checks. Also place patient on right side if residual is . .. Pertinent Medications: IVF D5 @75 ml/hr - now discontinued, SSI (22 units yesterday, 21 so far today), Protonix, Zosyn  Enteral nutrition access: NGT  Anthropometrics:Height: 5' 6\" (167.6 cm),  Weight: 95.5 kg (210 lb 8 oz), Weight Source: Bed, Body mass index is 33.98 kg/m². BMI class of obese.   No edema currently noted. Macronutrient Needs: 95.5 kg CBW (Current body weight) bed scale  Estimated energy requirements:  6539-6351 kcal /day (15-20 kcal/kg)  Estimated protein requirements:  72-95 grams protein/day (20% kcal)  CHO limit per day: 263 grams CHO/day (55% calories)  Estimated fluid/day: 1.4-1.9 liters/day (~1ml/kcal/day)  Intake/Comparative Standards: TF at goal, providing 1440 kcal (100% estimated calorie needs), 119 grams protein (>100% estimated protein needs), 192 grams CHO/day (does not exceed maximum CHO/day) and ~2L free fluid - for hypernatremia. Intervention:  Meals and snacks: NPO or per SLP  Enteral Nutrition: Continue Glucerna 1.5 via NGT at  goal rate of 60ml/hour. Increase water flush to 60/hour. At goal will provide 1440 kcal (100% estimated calorie needs), 119 grams protein (>100% estimated protein needs), 192 grams CHO/day (does not exceed maximum CHO/day) and ~2.5L free fluid - (>100% for hypernatremia). Vitamin and Mineral Supplement Therapy:  Nutrition support orders for electrolyte management replacement are activated and placed on the MAR. BMP to daily, repeat Phos and Mg labs tomorrow. Continue IVF, will consider adjusting tomorrow pending AM labs. Nutrition Related Medication Management: Perfect Serve to Dr. Dior Rai regarding bowel regimen. Coordination of Nutrition Care: Discussed with Tito Gamble RN and Dr. Dior Rai  Discharge Nutrition Plan: Too soon to determine.     [address] [facility] [address] [place] [address], [address], [address] [facility]

## 2020-06-11 NOTE — CONSULTS
[facility] CONSULT NOTE    Admission Date:  6/5/2020    Admission Diagnosis:  ROSA (acute kidney injury) ([facility] [address] [N17.9]    Consulting physician:  Tonia Wallace  Reason for consult:  ROSA  Subjective:   History of Present Illness:    Ms. Nguyen Nichols is a 67 yo F with a PMH of HTN, DM and CAD who was brought over to the ER for altered mental status and ROSA. Patient was apparently pleasantly confused on admission, but has declined over the course of her hospitalization and on my exam today she is non-responsive. Hospital course has been complicated by an aspiration event as well as a UTI. Na has steadily increase since admission with worsening BUN/Cr. Past Medical History:   Diagnosis Date    Arrhythmia     recurrent SVT    Arthritis     Asthma     stress induced- well controlled    CAD (coronary artery disease)     \"leaking valves\"    Chronic pain     shoulder    Diabetes ([facility] [address]     adv -160.  type 2 IDDM; does not take home sqbs- hypo at 80    GERD (gastroesophageal reflux disease)     Hypertension     well controlled with meds    Menopause     Morbid obesity ([facility] [address]     Stroke ([facility] [address]     6-2012, mini stroke    Thyroid disease     hypo    Unspecified adverse effect of anesthesia     woke up during every surgery      Past Surgical History:   Procedure Laterality Date    CARDIAC SURG PROCEDURE UNLIST  4/2010    AVNRT ablation times three    HX APPENDECTOMY      HX BREAST BIOPSY      bilateral breast bx    HX KNEE ARTHROSCOPY      left knee    HX LAP CHOLECYSTECTOMY      HX ORTHOPAEDIC      rt rotator cuff and bicep tendon repair    HX EMELY AND BSO      hysterectomy      Current Facility-Administered Medications   Medication Dose Route Frequency    aspirin chewable tablet 81 mg  81 mg Oral DAILY    vancomycin (VANCOCIN) 2000 mg in  ml infusion  2,000 mg IntraVENous ONCE    Vancomycin Intermittent Dosing Placeholder   Other Rx Dosing/Monitoring    ticagrelor (BRILINTA) tablet 90 mg  90 mg Per NG tube Q12H    lip protectant (BLISTEX) ointment 1 Each  1 Each Topical PRN    pantoprazole (PROTONIX) 40 mg in 0.9% sodium chloride 10 mL injection  40 mg IntraVENous DAILY    piperacillin-tazobactam (ZOSYN) 3.375 g in 0.9% sodium chloride (MBP/ADV) 100 mL  3.375 g IntraVENous Q12H    acetaminophen (TYLENOL) suppository 650 mg  650 mg Rectal Q4H PRN    insulin regular (NOVOLIN R, HUMULIN R) injection   SubCUTAneous Q6H    levalbuterol (XOPENEX) nebulizer soln 1.25 mg/3 mL  1.25 mg Nebulization Q4H PRN    hydrALAZINE (APRESOLINE) 20 mg/mL injection 10 mg  10 mg IntraVENous Q6H PRN    [START ON 6/14/2020] levothyroxine (SYNTHROID) injection 110 mcg  110 mcg IntraVENous DAILY    [Held by provider] insulin regular (NOVOLIN R, HUMULIN R) injection 4 Units  4 Units SubCUTAneous QPM    [Held by provider] amLODIPine (NORVASC) tablet 10 mg  10 mg Oral DAILY    [Held by provider] atorvastatin (LIPITOR) tablet 40 mg  40 mg Oral QHS    [Held by provider] carvediloL (COREG) tablet 12.5 mg  12.5 mg Oral BID WITH MEALS    [Held by provider] losartan (COZAAR) tablet 100 mg  100 mg Oral DAILY    [Held by provider] ezetimibe (ZETIA) tablet 10 mg  10 mg Oral DAILY    hyoscyamine SL (LEVSIN/SL) tablet 0.125 mg  0.125 mg SubLINGual Q6H PRN    [Held by provider] insulin glargine (LANTUS) injection 18 Units  18 Units SubCUTAneous QHS    ondansetron (ZOFRAN ODT) tablet 4 mg  4 mg Oral Q8H PRN    [Held by provider] sertraline (ZOLOFT) tablet 200 mg  200 mg Oral BID    sodium chloride (NS) flush 5-40 mL  5-40 mL IntraVENous Q8H    sodium chloride (NS) flush 5-40 mL  5-40 mL IntraVENous PRN    acetaminophen (TYLENOL) tablet 650 mg  650 mg Oral Q4H PRN    ondansetron (ZOFRAN) injection 4 mg  4 mg IntraVENous Q4H PRN    magnesium hydroxide (MILK OF MAGNESIA) 400 mg/5 mL oral suspension 30 mL  30 mL Oral DAILY PRN    heparin (porcine) injection 5,000 Units  5,000 Units SubCUTAneous Q8H     Allergies   Allergen Reactions  Ace Inhibitors Swelling     Only to some    Adhesive Tape Rash    Codeine Nausea and Vomiting    Other Medication Swelling     Unknown blood pressure medication per pt. Pt also allergic to predforte eye drops, causes pain that is severe    Other Omega-3s Other (comments)     All oral glucose medications, hives and couldn't breathe well    Sulfa (Sulfonamide Antibiotics) Nausea and Vomiting      Social History     Tobacco Use    Smoking status: Former Smoker     Packs/day: 2.00     Years: 13.00     Pack years: 26.00     Last attempt to quit: 1970     Years since quittin.1    Smokeless tobacco: Never Used    Tobacco comment: quit 36 years ago   Substance Use Topics    Alcohol use: No      Family History   Problem Relation Age of Onset    Other Brother     Cancer Mother     Heart Disease Mother     Breast Cancer Mother 79    Heart Disease Father     Malignant Hyperthermia Neg Hx     Pseudocholinesterase Deficiency Neg Hx     Delayed Awakening Neg Hx     Post-op Nausea/Vomiting Neg Hx     Emergence Delirium Neg Hx     Post-op Cognitive Dysfunction Neg Hx         Review of Systems- UTO    Objective:   Vitals:    20 0319 20 0815 20 1210 20 1525   BP: 158/66 118/55 124/57 134/65   Pulse: (!) 105 (!) 111 (!) 107 (!) 111   Resp: 18 20 (!) 86 (!) 42   Temp: 99.9 °F (37.7 °C) (!) 100.5 °F (38.1 °C) (!) 101.6 °F (38.7 °C) 98.4 °F (36.9 °C)   SpO2: 94% 92% 93% 93%   Weight:       Height:           Intake/Output Summary (Last 24 hours) at 2020 1858  Last data filed at 2020 0330  Gross per 24 hour   Intake    Output 1200 ml   Net -1200 ml       Physical Exam  GEN : nonresponsive  HEENT: anicteric sclerae, eomi. Mucous membranes are dry  Neck - supple without JVD, no thyromegaly. No lymphadenopathy.   CV - tachy, regular rhythm, no murmur, no rub  Lung - tachypneic, shallow breathing, supplemental oxygen  Chest wall - normal appearance  Abd - soft, nontender, bowel sounds present, no hepatosplenomegaly  Ext - no clubbing, no cyanosis, no edema  Neurologic - not responsive  Skin - no rashes, no purpura, no ecchymoses  Psychiatric: not interactive    Data Review:   Recent Labs     06/11/20  0807 06/10/20  0654 06/09/20  0858   WBC 12.9* 12.5* 13.9*   HGB 10.4* 10.3* 10.9*   HCT 33.5* 32.8* 33.2*    232 259     Recent Labs     06/11/20  1737 06/11/20  0807 06/10/20  0654   * 154* 155*   K 4.5 4.3 4.3   * 125* 126*   CO2 18* 17* 19*   BUN 92* 80* 77*   CREA 2.74* 2.67* 2.24*   * 372* 234*   CA 9.6 9.2 9.2   MG  --  3.2*  --    PHOS 2.6 2.6  --      No results for input(s): PH, PCO2, PO2, PCO2 in the last 72 hours. Problem List:     Patient Active Problem List    Diagnosis Date Noted    Acute respiratory failure with hypoxia ([facility] [address] 06/11/2020    Hypernatremia 06/06/2020    ROSA (acute kidney injury) ([facility] [address] [facility] 06/05/2020    UTI (urinary tract infection) 06/05/2020    Acute metabolic encephalopathy 71/36/0090    Normocytic anemia 06/05/2020    Leukocytosis 05/26/2020       Assessment and Plan:    ROSA-   I suspect 2/2 to volume depletion 2/2 given her altered mental status. Will check urine studies. Starting patient on 1/2 NS @150cc/hr. Got dosed Vanc 2gm on the 9th. Stopped current Vanc infusion and check Vanc level with AM. Labs. Hypernatremia-  Na up to 154. On Free water through NG tube. Starting on 1/2 NS as well.     UTI-  abx per hospitalist.  Chen Hannah level in Amanda Spencer MD

## 2020-06-11 NOTE — PROGRESS NOTES
Interdisciplinary team rounds were held 6/11/2020 with the following team members:Care Management, Nursing and Physician. Plan of care discussed. See clinical pathway and/or care plan for interventions and desired outcomes.

## 2020-06-11 NOTE — PROGRESS NOTES
Pharmacokinetic Consult to Pharmacist    Shiela Julian is a 66 y.o. female being treated for sepsis with vancomycin. Height: 5' 6\" (167.6 cm)  Weight: 95.5 kg (210 lb 8 oz)  Lab Results   Component Value Date/Time    BUN 80 (H) 06/11/2020 08:07 AM    Creatinine 2.67 (H) 06/11/2020 08:07 AM    WBC 12.9 (H) 06/11/2020 08:07 AM    Procalcitonin 0.45 06/09/2020 08:58 AM    Lactic acid 1.4 06/08/2020 12:57 AM      Estimated Creatinine Clearance: 20.6 mL/min (A) (based on SCr of 2.67 mg/dL (H)). No results found for: Hansa Magana    Day 1 of vancomycin. Goal trough is 15-20. Will give 2000 mg one time dose tonight, then dose intermittently based on levels. Will continue to follow patient.       Thank you,  Shailesh Ashley, PharmD  Clinical Pharmacy PGY1 Resident   [phone]

## 2020-06-11 NOTE — PROGRESS NOTES
SPEECH PATHOLOGY NOTE:    Attempted to see patient for dysphagia treatment this AM, but patient too lethargic for participation. RN reports improved alertness later in the afternoon. Will follow up at later time today. ADDENDUM: Second speech therapy attempt this PM. Only brief eye opening in response to verbal and tactile stim. She remains too drowsy for po intake at this time. Speech to continue following.      [address] CCC-SLP

## 2020-06-11 NOTE — PROGRESS NOTES
Hospitalist Note     Admit Date:  2020  5:03 PM   Name:  Gabby Ayala      Age:  66 y.o.    :  1945   MRN:  [id number]   PCP:  Birdie Galo MD    HPI/Subjective:   Reason for Admission: ROSA (acute kidney injury) [facility]) [N17.9]    Hospital Course:   Gabby Ayala is a 66 y.o. female with medical history significant for CAD, hypertensin, recent CVA with residual right sided weakness, diabetes from [facility] with worsening confusion and elevated creatinine. Patient was found to have urinary tract infection and started on empiric antibiotics. Response called on 6 AM as patient was found to have acute respiratory failure with hypoxia likely due to aspiration. Patient was suctioned and put on non-rebreather with saturation improving to the 96%. Patient has been lethargic with minimally responsive since the event. 20 :  Patient is seen and examined at bedside. No acute events reported overnight by nursing staff. Patient continues to have period of being alert/awake and somnolent/minimally responsive. She opens eyes to voice but does not follow commands. Vitals stable with sats in 95% on 5L. Continues to have temp spikes with TMax of 101.6F at 12pm . Unable to obtain ROS due to clinical status.       Objective:     Patient Vitals for the past 24 hrs:   Temp Pulse Resp BP SpO2   20 1210 (!) 101.6 °F (38.7 °C) (!) 107 (!) 86 124/57 93 %   20 0815 (!) 100.5 °F (38.1 °C) (!) 111 20 118/55 92 %   20 0319 99.9 °F (37.7 °C) (!) 105 18 158/66 94 %   20 0022 99.1 °F (37.3 °C) (!) 109 18 136/59 94 %   06/10/20 1945 98.8 °F (37.1 °C) (!) 101 16 135/73 97 %     Oxygen Therapy  O2 Sat (%): 93 % (20 1210)  Pulse via Oximetry: 90 beats per minute (20)  O2 Device: Hi flow nasal cannula (06/10/20 0730)  O2 Flow Rate (L/min): 5 l/min (06/10/20 0730)    Estimated body mass index is 33.98 kg/m² as calculated from the following:    Height as of this encounter: 5' 6\" (1.676 m). Weight as of this encounter: 95.5 kg (210 lb 8 oz). Intake/Output Summary (Last 24 hours) at 6/11/2020 1634  Last data filed at 6/11/2020 0330  Gross per 24 hour   Intake 60 ml   Output 1200 ml   Net -1140 ml       *Note that automatically entered I/Os may not be accurate; dependent on patient compliance with collection and accurate  by techs. Physical Exam:   General:     Lethargic but arousable. Well nourished and Obese. Head:   normocephalic, atraumatic  Eyes, Ears, nose: PERRL. Normal conjunctiva  Neck:    supple, non-tender. Trachea midline. Lungs:   Coarse breath sounds, no wheezing  Cardiac:   RRR, Normal S1 and S2. Abdomen:   Soft, non distended, nontender, +BS, no guarding/rebound  Extremities:   Warm, dry.  Trace edema   Skin:   No rashes, no jaundice  Neuro:  Somnolent  Psychiatric:  No anxiety, calm, cooperative    Data Review:  I have reviewed all labs, meds, and studies from the last 24 hours:    Recent Results (from the past 24 hour(s))   GLUCOSE, POC    Collection Time: 06/10/20  5:24 PM   Result Value Ref Range    Glucose (POC) 234 (H) 65 - 100 mg/dL   GLUCOSE, POC    Collection Time: 06/11/20 12:17 AM   Result Value Ref Range    Glucose (POC) 268 (H) 65 - 100 mg/dL   GLUCOSE, POC    Collection Time: 06/11/20  6:08 AM   Result Value Ref Range    Glucose (POC) 357 (H) 65 - 100 mg/dL   CBC WITH AUTOMATED DIFF    Collection Time: 06/11/20  8:07 AM   Result Value Ref Range    WBC 12.9 (H) 4.3 - 11.1 K/uL    RBC 3.28 (L) 4.05 - 5.2 M/uL    HGB 10.4 (L) 11.7 - 15.4 g/dL    HCT 33.5 (L) 35.8 - 46.3 %    .1 (H) 79.6 - 97.8 FL    MCH 31.7 26.1 - 32.9 PG    MCHC 31.0 (L) 31.4 - 35.0 g/dL    RDW 14.9 (H) 11.9 - 14.6 %    PLATELET 968 798 - 953 K/uL    MPV 12.0 9.4 - 12.3 FL    ABSOLUTE NRBC 0.00 0.0 - 0.2 K/uL    DF AUTOMATED      NEUTROPHILS 66 43 - 78 %    LYMPHOCYTES 22 13 - 44 %    MONOCYTES 7 4.0 - 12.0 %    EOSINOPHILS 5 0.5 - 7.8 % BASOPHILS 0 0.0 - 2.0 %    IMMATURE GRANULOCYTES 1 0.0 - 5.0 %    ABS. NEUTROPHILS 8.6 (H) 1.7 - 8.2 K/UL    ABS. LYMPHOCYTES 2.8 0.5 - 4.6 K/UL    ABS. MONOCYTES 0.8 0.1 - 1.3 K/UL    ABS. EOSINOPHILS 0.6 0.0 - 0.8 K/UL    ABS. BASOPHILS 0.1 0.0 - 0.2 K/UL    ABS. IMM.  GRANS. 0.1 0.0 - 0.5 K/UL   METABOLIC PANEL, BASIC    Collection Time: 06/11/20  8:07 AM   Result Value Ref Range    Sodium 154 (H) 136 - 145 mmol/L    Potassium 4.3 3.5 - 5.1 mmol/L    Chloride 125 (H) 98 - 107 mmol/L    CO2 17 (L) 21 - 32 mmol/L    Anion gap 12 7 - 16 mmol/L    Glucose 372 (H) 65 - 100 mg/dL    BUN 80 (H) 8 - 23 MG/DL    Creatinine 2.67 (H) 0.6 - 1.0 MG/DL    GFR est AA 22 (L) >60 ml/min/1.73m2    GFR est non-AA 18 (L) >60 ml/min/1.73m2    Calcium 9.2 8.3 - 10.4 MG/DL   PHOSPHORUS    Collection Time: 06/11/20  8:07 AM   Result Value Ref Range    Phosphorus 2.6 2.3 - 3.7 MG/DL   MAGNESIUM    Collection Time: 06/11/20  8:07 AM   Result Value Ref Range    Magnesium 3.2 (H) 1.8 - 2.4 mg/dL   GLUCOSE, POC    Collection Time: 06/11/20 12:13 PM   Result Value Ref Range    Glucose (POC) 420 (H) 65 - 100 mg/dL        All Micro Results     Procedure Component Value Units Date/Time    CULTURE, BLOOD [[id number]] Collected:  06/08/20 1812    Order Status:  Completed Specimen:  Blood Updated:  06/11/20 0635     Special Requests: --        RIGHT  HAND       Culture result: NO GROWTH 3 DAYS       CULTURE, BLOOD [[id number]] Collected:  06/08/20 1807    Order Status:  Completed Specimen:  Blood Updated:  06/11/20 0635     Special Requests: --        LEFT  HAND       Culture result: NO GROWTH 3 DAYS       CULTURE, BLOOD [[id number]] Collected:  06/05/20 1845    Order Status:  Completed Specimen:  Blood Updated:  06/10/20 0631     Special Requests: --        NO SPECIAL REQUESTS  LEFT  Antecubital       Culture result: NO GROWTH 5 DAYS       CULTURE, BLOOD [[id number]] Collected:  06/05/20 1845    Order Status:  Completed Specimen:  Blood Updated: 06/10/20 0631     Special Requests: --        NO SPECIAL REQUESTS  RIGHT  FOREARM       Culture result: NO GROWTH 5 DAYS       MSSA/MRSA SC BY PCR, NASAL SWAB [[id number]]     Order Status:  Sent Specimen:  Nasal swab     CULTURE, URINE [[id number]]  (Abnormal)  (Susceptibility) Collected:  06/05/20 1845    Order Status:  Completed Specimen:  Cath Urine Updated:  06/09/20 0710     Special Requests: NO SPECIAL REQUESTS        Culture result:       >100,000 COLONIES/mL PROTEUS MIRABILIS                  >100,000 COLONIES/mL KLEBSIELLA PNEUMONIAE                Current Meds:  Current Facility-Administered Medications   Medication Dose Route Frequency    aspirin chewable tablet 81 mg  81 mg Oral DAILY    vancomycin (VANCOCIN) in 0.9% sodium chloride 250 mL infusion   IntraVENous Q24H    ticagrelor (BRILINTA) tablet 90 mg  90 mg Per NG tube Q12H    lip protectant (BLISTEX) ointment 1 Each  1 Each Topical PRN    pantoprazole (PROTONIX) 40 mg in 0.9% sodium chloride 10 mL injection  40 mg IntraVENous DAILY    piperacillin-tazobactam (ZOSYN) 3.375 g in 0.9% sodium chloride (MBP/ADV) 100 mL  3.375 g IntraVENous Q12H    acetaminophen (TYLENOL) suppository 650 mg  650 mg Rectal Q4H PRN    insulin regular (NOVOLIN R, HUMULIN R) injection   SubCUTAneous Q6H    levalbuterol (XOPENEX) nebulizer soln 1.25 mg/3 mL  1.25 mg Nebulization Q4H PRN    hydrALAZINE (APRESOLINE) 20 mg/mL injection 10 mg  10 mg IntraVENous Q6H PRN    [START ON 6/14/2020] levothyroxine (SYNTHROID) injection 110 mcg  110 mcg IntraVENous DAILY    [Held by provider] insulin regular (NOVOLIN R, HUMULIN R) injection 4 Units  4 Units SubCUTAneous QPM    [Held by provider] amLODIPine (NORVASC) tablet 10 mg  10 mg Oral DAILY    [Held by provider] atorvastatin (LIPITOR) tablet 40 mg  40 mg Oral QHS    [Held by provider] carvediloL (COREG) tablet 12.5 mg  12.5 mg Oral BID WITH MEALS    [Held by provider] losartan (COZAAR) tablet 100 mg  100 mg Oral DAILY  [Held by provider] ezetimibe (ZETIA) tablet 10 mg  10 mg Oral DAILY    hyoscyamine SL (LEVSIN/SL) tablet 0.125 mg  0.125 mg SubLINGual Q6H PRN    [Held by provider] insulin glargine (LANTUS) injection 18 Units  18 Units SubCUTAneous QHS    ondansetron (ZOFRAN ODT) tablet 4 mg  4 mg Oral Q8H PRN    [Held by provider] sertraline (ZOLOFT) tablet 200 mg  200 mg Oral BID    sodium chloride (NS) flush 5-40 mL  5-40 mL IntraVENous Q8H    sodium chloride (NS) flush 5-40 mL  5-40 mL IntraVENous PRN    acetaminophen (TYLENOL) tablet 650 mg  650 mg Oral Q4H PRN    ondansetron (ZOFRAN) injection 4 mg  4 mg IntraVENous Q4H PRN    magnesium hydroxide (MILK OF MAGNESIA) 400 mg/5 mL oral suspension 30 mL  30 mL Oral DAILY PRN    heparin (porcine) injection 5,000 Units  5,000 Units SubCUTAneous Q8H       Other Studies:  Nm Lung Scan Perf    Result Date: 5/25/2020  EXAM:  NM LUNG SCAN PERF INDICATION:  Elevated troponin weakness mild hypoxia COMPARISON:  None. TRACER: 4.6 mCi of Tc-99m MAA. FINDINGS: Perfusion lung imaging was performed following intravenous administration of  Tc 99m MAA. Images were obtained from the anterior, posterior and right and left anterior and posterior oblique projections. The perfusion is normal. No segmental or subsegmental defects are identified. Chest x-ray reveals an elevated right hemidiaphragm. IMPRESSION: Normal lung perfusion study. Xr Chest Sngl V    Result Date: 6/8/2020  CHEST X-RAY, one view. HISTORY:  Hypoxemia and increasing oxygen demand. TECHNIQUE:  AP upright portable view COMPARISON: Earlier today FINDINGS:   -The lungs: are clear. -The costophrenic angles: are sharp. -The heart size: is normal. -The pulmonary vasculature: is unremarkable. -Included portion of the upper abdomen: is unremarkable. -Bones: No gross bony lesions. -Other: None.      IMPRESSION:  Negative for acute change     Xr Chest Sngl V    Result Date: 6/8/2020   Portable view of the chest COMPARISON: June 7, 2020 Clinical history: Hypoxia. FINDINGS: Persistent elevation of the right hemidiaphragm. Lungs are underinflated. No pneumothorax, pulmonary edema or large pleural effusion. No definite pulmonary consolidation. Heart appears mildly enlarged. Mediastinal contour is within normal limits. Surrounding bones are unremarkable. IMPRESSION: 1. No pulmonary edema or definite pulmonary consolidation. 2. No significant change compared to exam.    Xr Chest Sngl V    Result Date: 6/7/2020  Chest X-ray INDICATION: Shortness of breath A portable AP view of the chest was obtained. FINDINGS: There is chronic elevation of the right diaphragm. There are no infiltrates or effusions. The heart size is normal.  The bony thorax is intact. IMPRESSION: No acute findings in the chest     Xr Chest Pa Lat    Result Date: 5/25/2020  EXAM: XR CHEST PA LAT INDICATION: Cough COMPARISON: None. FINDINGS: PA and lateral radiographs of the chest demonstrate clear lungs. The cardiac and mediastinal contours and pulmonary vascularity are normal. The bones and soft tissues are within normal limits. IMPRESSION: Normal chest.    Xr Abd (ap And Erect Or Decub)    Result Date: 5/25/2020  Abdominal films, 3 views. History: Nausea and vomiting Technique:  Supine and upright views of the abdomen on 3 images. Comparison: None Findings: Cholecystectomy. Nonspecific bowel gas pattern. No evidence of free air, organomegaly. Calcification located between the right transverse processes of L4 and L5. IMPRESSION: Negative for free air, ileus or obstruction. Possible right ureteral calculus. Correlate clinically. Xr Swallow Func Video    Result Date: 5/28/2020  Modified Barium Swallow INDICATION: Oropharyngeal dysphagia. CVA Fluoro time: 3.15 minutes Spot films:  0 Fluoroscopy was used to evaluate pharyngeal function while the patient was given barium solutions and barium coated solids. FINDINGS: Severe global swallow deficits present. There is harriet aspiration of thin barium by teaspoon with poor cough response. Shallow penetration with nectar by teaspoon. Penetration with nectar by cup. Deep penetration with honey by cup. IMPRESSION: 1. Aspiration and deep penetration with multiple administered consistencies. 2.  Please see concurrent speech and language pathology note for complete description of findings. Mri Brain Wo Cont    Result Date: 5/27/2020  EXAMINATION: BRAIN MRI 5/27/2020 11:49 AM ACCESSION NUMBER: [id number] INDICATION: Worsening of old stroke COMPARISON: Head CT 5/27/2020 and 5/25/2020 TECHNIQUE: Multiplanar multisequence MRI of the brain without intravenous contrast. FINDINGS: There are numerous small and punctate areas of restricted diffusion scattered throughout the supratentorial brain parenchyma, worst in the right lentiform and caudate nuclei. There are multiple foci of restricted diffusion throughout both cerebellar hemispheres. There is a moderate-sized focus of restricted diffusion in the right aspect of the benjy. The preponderance of these areas demonstrate T2 hyperintensity. The constellation of findings is consistent with multiple supratentorial acute or early subacute infarctions involving multiple vascular distributions. None of the infarctions demonstrates significant associated mass effect or hemorrhagic conversion. The ventricles are within normal limits for the degree of global brain parenchymal volume loss. There is no midline shift. The basilar cisterns are patent. There is no cerebellar tonsillar ectopia or herniation. There are T2 hyperintensities in the periventricular and subcortical white matter and benjy, a nonspecific finding which likely represents chronic microangiopathy. Prior bilateral lens replacement. The expected large vascular flow voids are preserved. There are no suspicious osseous lesions. IMPRESSION: 1.  Numerous acute or early subacute infarctions scattered throughout the supratentorial and infratentorial brain parenchyma. These are most likely embolic infarctions from a central source. The most confluent areas of involvement include the right caudate and lentiform nuclei, the benjy, and the left cerebellar hemisphere. There is no evidence of significant mass effect or hemorrhagic conversion. 2. Moderate burden of chronic microangiopathy. 3. Discussed with Dr. Ira Rosen in person by Dr. Hoa Mcnamara at 11:45 AM 5/27/2020. VOICE DICTATED BY: Dr. Nasir Hughes    Result Date: 6/8/2020  CT head without contrast History: ALTERED MENTAL STATUS, HX OF RECENT CVA. Technique: 5mm axial images were obtained from the skull base to the vertex without contrast. Radiation dose reduction techniques were used for this study: Our CT scanners use one or all of the following: Automated exposure control, adjustment of the mA and/or kVp according to patient's size, iterative reconstruction. Comparison: 05/27/2020 Findings: The ventricles and sulci are mildly prominent. There are no extra-axial fluid collections. No evidence of acute intraparenchymal hemorrhage or mass effect is identified. Patchy, confluent areas of decreased attenuation are noted within the supratentorial white matter. These are nonspecific findings but would be most compatible with moderately advanced chronic small vessel ischemic changes. There is no evidence to suggest an acute major territorial infarct. Extensive atherosclerotic changes are present of the vertebral basilar system. The bony calvarium is intact. The visualized mastoid air cells and paranasal sinuses are well pneumatized and aerated. Impression: Chronic findings without evidence of acute intracranial abnormality. Ct Head Wo Cont    Result Date: 5/27/2020  HEAD CT WITHOUT CONTRAST  5/27/2020 HISTORY:   increased AMS  ; leukocytosis; myocardial infarction TECHNIQUE: Noncontrast axial images were obtained through the brain.   All CT scans at this facility used dose modulation, interactive reconstruction and/or weight based dosing when appropriate to reduce radiation dose to as low as reasonably achievable. COMPARISON: May 25, 2020 FINDINGS: There is no acute intracranial hemorrhage, significant mass effect or CT evidence of acute large artery territorial infarction. Please note that a hyperacute infarct or small vessel infarct may not be apparent on initial CT imaging. Mild to moderate cerebral volume loss is present. Scattered areas of low-attenuation are present in the supratentorial white matter that are compatible with chronic small vessel ischemic disease. There is no hydrocephalus , intra-axial mass or abnormal extra-axial fluid collection. There are no displaced skull fractures. The mastoid air cells and paranasal sinuses are clear where imaged. IMPRESSION: Cerebral volume loss and white matter findings compatible with chronic small vessel ischemic disease. Ct Head Wo Cont    Result Date: 5/25/2020  CT of the head without contrast. CLINICAL INDICATION: Weakness, unable to walk PROCEDURE: Serial thin section axial images are obtained from the cranial vertex through the skull base without the administration of intravenous contrast. Radiation dose reduction techniques were used for this study. Our CT scanners use one or all of the following: Automated exposure control, adjusted of the mA and/or kV according to patient size, iterative reconstruction COMPARISON: No prior. FINDINGS: There is no acute intracranial hemorrhage, mass, or mass effect. No abnormal extra-axial fluid collections identified. There is no hydrocephalus. The basilar cisterns are widely patent. There is moderate to severe patchy subcortical, deep, and periventricular white matter hypoattenuation. An old infarct is noted in the right basal ganglia with a tiny focal old lacunar infarct in the left basal ganglia. No skull fracture or aggressive osseous lesion noted.  The mastoid air cells and included paranasal sinuses are clear. IMPRESSION: 1. No acute intracranial abnormality. Note, acute/subacute CVA cannot be excluded given the severity of the underlying white matter disease. 2. Moderate to severe bilateral white matter hypoattenuation most in keeping with chronic microangiopathic disease. 3. Old infarct in the right basal ganglia with an old lacunar infarct in the left basal ganglia. Us Retroperitoneum Comp    Result Date: 6/7/2020  INDICATION:  Renal insufficiency TECHNIQUE: Real-time sonography of the kidneys, retroperitoneum and bladder was performed with multiple static images obtained. FINDINGS: The right kidney measures 10.6 cm and the left kidney measures 10.5 cm in length. Both kidneys are slightly echogenic. There is no hydronephrosis. There is a small cyst in the upper pole of the left kidney. There are no significant renal masses. The aorta and IVC are normal in caliber. The urinary bladder is collapsed around a Jefferson catheter. IMPRESSION: Echogenic kidneys suggesting chronic renal disease. No evidence of obstruction. Xr Chest Port    Result Date: 5/25/2020  Portable chest x-ray CLINICAL INDICATION: Weakness FINDINGS: Single AP view the chest compared to a similar exam dated 5/25/2020 show the lungs to be expanded and clear. No pleural effusion or pneumothorax. The cardiac silhouette and mediastinum are unremarkable. The bones are osteopenic. IMPRESSION: No acute cardiopulmonary abnormality. Assessment and Plan:      Active Hospital Problems    Diagnosis Date Noted    Acute respiratory failure with hypoxia ([address] 06/11/2020    Hypernatremia 06/06/2020    ROSA (acute kidney injury) ([address] 06/05/2020    UTI (urinary tract infection) 06/05/2020    Acute metabolic encephalopathy 36/41/3327    Normocytic anemia 06/05/2020    Leukocytosis 05/26/2020           Plan:    Acute metabolic encephalopathy likely due to underlying infection(UTI, aspiration pna)  Prior Recent CVA with right sided deficit  - CT Head (6/8) does not show any acute abnormalities. - UA showed large leukocyte esterase with 4+ bacteria and UCx (6/5) shows pan sensitive proteus. - ABG did not show hypercarbia to suspect the cause of encephalopathy and ammonia is wnl.  - BCx (6/5): no growth to date; Repeat (BCx 6/8) shows no growth so far. - treat underlying infection    Acute Respiratory Failure with hypoxia likely due to aspiration  Fever and leukocytosis concerning for aspiration PNA  - CXR shows no acute changes.  - BCx (6/8) is neg so far. - NPO  - c/w zosyn  - start on vancomycin given temp spikes despite being on zosyn  - CT chest to evaluate given her clinical status(fever, o2 requirement)    UTI  - UCx positive for proteus  - c/w abx as above    ROSA on CKD Stage 3 (baseline Cr 1.50 - 2.59)  - IVF and monitor  - Renal U/S no obstruction or hydronephrosis  - Nephrology Consult for worsening ROSA    Hypernatremia  - on NGT with free water flushes      Diet:  DIET NPO  DIET TUBE FEEDING  DVT PPx: heparin SQ  Code: Full Code    Mark Anthony Cam (Spouse [phone])  Angely Mccarty (son) at bedside. Updated and addressed all questions/concern. Medical Decision Making:    Labs/Imaging reviewed. Additional information obtained from nursing staff. Patient is high risk due to medical condition and comorbidities. Plan discussed with nursing staff. Plan discussed with patient/family. All questions/concerns were addressed. Pt/family agrees with the plan.          Signed By: Jerilyn Kam MD     June 11, 2020

## 2020-06-12 LAB
ANION GAP SERPL CALC-SCNC: 9 MMOL/L (ref 7–16)
BUN SERPL-MCNC: 81 MG/DL (ref 8–23)
CALCIUM SERPL-MCNC: 9.4 MG/DL (ref 8.3–10.4)
CHLORIDE SERPL-SCNC: 126 MMOL/L (ref 98–107)
CO2 SERPL-SCNC: 18 MMOL/L (ref 21–32)
CREAT SERPL-MCNC: 2.35 MG/DL (ref 0.6–1)
GLUCOSE BLD STRIP.AUTO-MCNC: 243 MG/DL (ref 65–100)
GLUCOSE BLD STRIP.AUTO-MCNC: 288 MG/DL (ref 65–100)
GLUCOSE BLD STRIP.AUTO-MCNC: 295 MG/DL (ref 65–100)
GLUCOSE BLD STRIP.AUTO-MCNC: 313 MG/DL (ref 65–100)
GLUCOSE BLD STRIP.AUTO-MCNC: 328 MG/DL (ref 65–100)
GLUCOSE BLD STRIP.AUTO-MCNC: 345 MG/DL (ref 65–100)
GLUCOSE SERPL-MCNC: 316 MG/DL (ref 65–100)
MAGNESIUM SERPL-MCNC: 3.1 MG/DL (ref 1.8–2.4)
PHOSPHATE SERPL-MCNC: 3.1 MG/DL (ref 2.3–3.7)
POTASSIUM SERPL-SCNC: 4.4 MMOL/L (ref 3.5–5.1)
SODIUM SERPL-SCNC: 153 MMOL/L (ref 136–145)
VANCOMYCIN SERPL-MCNC: 20.7 UG/ML

## 2020-06-12 PROCEDURE — C9113 INJ PANTOPRAZOLE SODIUM, VIA: HCPCS | Performed by: INTERNAL MEDICINE

## 2020-06-12 PROCEDURE — 94760 N-INVAS EAR/PLS OXIMETRY 1: CPT

## 2020-06-12 PROCEDURE — 74011000258 HC RX REV CODE- 258: Performed by: INTERNAL MEDICINE

## 2020-06-12 PROCEDURE — 99497 ADVNCD CARE PLAN 30 MIN: CPT | Performed by: NURSE PRACTITIONER

## 2020-06-12 PROCEDURE — 84100 ASSAY OF PHOSPHORUS: CPT

## 2020-06-12 PROCEDURE — 74011250637 HC RX REV CODE- 250/637: Performed by: INTERNAL MEDICINE

## 2020-06-12 PROCEDURE — 80202 ASSAY OF VANCOMYCIN: CPT

## 2020-06-12 PROCEDURE — 74011250636 HC RX REV CODE- 250/636: Performed by: INTERNAL MEDICINE

## 2020-06-12 PROCEDURE — 74011000250 HC RX REV CODE- 250: Performed by: INTERNAL MEDICINE

## 2020-06-12 PROCEDURE — 65270000029 HC RM PRIVATE

## 2020-06-12 PROCEDURE — 83735 ASSAY OF MAGNESIUM: CPT

## 2020-06-12 PROCEDURE — 76450000000

## 2020-06-12 PROCEDURE — 80048 BASIC METABOLIC PNL TOTAL CA: CPT

## 2020-06-12 PROCEDURE — 99498 ADVNCD CARE PLAN ADDL 30 MIN: CPT | Performed by: NURSE PRACTITIONER

## 2020-06-12 PROCEDURE — 95816 EEG AWAKE AND DROWSY: CPT | Performed by: INTERNAL MEDICINE

## 2020-06-12 PROCEDURE — 95816 EEG AWAKE AND DROWSY: CPT | Performed by: PSYCHIATRY & NEUROLOGY

## 2020-06-12 PROCEDURE — 74011636637 HC RX REV CODE- 636/637: Performed by: INTERNAL MEDICINE

## 2020-06-12 PROCEDURE — 99222 1ST HOSP IP/OBS MODERATE 55: CPT | Performed by: NURSE PRACTITIONER

## 2020-06-12 PROCEDURE — 82962 GLUCOSE BLOOD TEST: CPT

## 2020-06-12 PROCEDURE — 74011250636 HC RX REV CODE- 250/636: Performed by: FAMILY MEDICINE

## 2020-06-12 PROCEDURE — 36415 COLL VENOUS BLD VENIPUNCTURE: CPT

## 2020-06-12 RX ORDER — VANCOMYCIN/0.9 % SOD CHLORIDE 750 MG/250
750 PLASTIC BAG, INJECTION (ML) INTRAVENOUS ONCE
Status: COMPLETED | OUTPATIENT
Start: 2020-06-12 | End: 2020-06-12

## 2020-06-12 RX ORDER — INSULIN GLARGINE 100 [IU]/ML
30 INJECTION, SOLUTION SUBCUTANEOUS
Status: DISCONTINUED | OUTPATIENT
Start: 2020-06-12 | End: 2020-06-13

## 2020-06-12 RX ADMIN — HUMAN INSULIN 9 UNITS: 100 INJECTION, SOLUTION SUBCUTANEOUS at 23:28

## 2020-06-12 RX ADMIN — PIPERACILLIN AND TAZOBACTAM 3.38 G: 3; .375 INJECTION, POWDER, FOR SOLUTION INTRAVENOUS at 08:13

## 2020-06-12 RX ADMIN — Medication 10 ML: at 14:00

## 2020-06-12 RX ADMIN — HEPARIN SODIUM 5000 UNITS: 5000 INJECTION INTRAVENOUS; SUBCUTANEOUS at 21:32

## 2020-06-12 RX ADMIN — INSULIN GLARGINE 30 UNITS: 100 INJECTION, SOLUTION SUBCUTANEOUS at 23:28

## 2020-06-12 RX ADMIN — Medication 10 ML: at 21:33

## 2020-06-12 RX ADMIN — TICAGRELOR 90 MG: 90 TABLET ORAL at 21:34

## 2020-06-12 RX ADMIN — HEPARIN SODIUM 5000 UNITS: 5000 INJECTION INTRAVENOUS; SUBCUTANEOUS at 14:00

## 2020-06-12 RX ADMIN — HEPARIN SODIUM 5000 UNITS: 5000 INJECTION INTRAVENOUS; SUBCUTANEOUS at 05:47

## 2020-06-12 RX ADMIN — SODIUM CHLORIDE 150 ML/HR: 450 INJECTION, SOLUTION INTRAVENOUS at 05:58

## 2020-06-12 RX ADMIN — ASPIRIN 81 MG 81 MG: 81 TABLET ORAL at 08:13

## 2020-06-12 RX ADMIN — TICAGRELOR 90 MG: 90 TABLET ORAL at 08:13

## 2020-06-12 RX ADMIN — HUMAN INSULIN 12 UNITS: 100 INJECTION, SOLUTION SUBCUTANEOUS at 06:00

## 2020-06-12 RX ADMIN — SODIUM CHLORIDE 150 ML/HR: 450 INJECTION, SOLUTION INTRAVENOUS at 19:37

## 2020-06-12 RX ADMIN — HUMAN INSULIN 9 UNITS: 100 INJECTION, SOLUTION SUBCUTANEOUS at 01:13

## 2020-06-12 RX ADMIN — Medication 10 ML: at 05:46

## 2020-06-12 RX ADMIN — VANCOMYCIN HYDROCHLORIDE 750 MG: 1 INJECTION, POWDER, LYOPHILIZED, FOR SOLUTION INTRAVENOUS at 19:38

## 2020-06-12 RX ADMIN — SODIUM CHLORIDE 40 MG: 9 INJECTION INTRAMUSCULAR; INTRAVENOUS; SUBCUTANEOUS at 08:13

## 2020-06-12 RX ADMIN — HUMAN INSULIN 6 UNITS: 100 INJECTION, SOLUTION SUBCUTANEOUS at 18:12

## 2020-06-12 RX ADMIN — HUMAN INSULIN 12 UNITS: 100 INJECTION, SOLUTION SUBCUTANEOUS at 11:47

## 2020-06-12 RX ADMIN — PIPERACILLIN AND TAZOBACTAM 3.38 G: 3; .375 INJECTION, POWDER, FOR SOLUTION INTRAVENOUS at 21:26

## 2020-06-12 NOTE — PROGRESS NOTES
Pharmacokinetic Consult to Pharmacist    Rachelle Viral is a 66 y.o. female being treated for sepsis with vancomycin. Height: 5' 6\" (167.6 cm)  Weight: 95.5 kg (210 lb 8 oz)  Lab Results   Component Value Date/Time    BUN 81 (H) 06/12/2020 08:14 AM    Creatinine 2.35 (H) 06/12/2020 08:14 AM    WBC 12.9 (H) 06/11/2020 08:07 AM    Procalcitonin 0.45 06/09/2020 08:58 AM    Lactic acid 1.4 06/08/2020 12:57 AM      Estimated Creatinine Clearance: 23.4 mL/min (A) (based on SCr of 2.35 mg/dL (H)). Lab Results   Component Value Date/Time    Vancomycin, random 20.7 06/12/2020 08:14 AM       Day 2 of vancomycin. Goal trough is 15-20. Vancomycin random this am was 20.7, the patient did not receive the whole 2000 mg dose last evening. Will allow more time for clearance and re-dose with 750 mg this evening. Repeat a random 6/13 and continue to dose intermittently. Will continue to follow patient.       Thank you,  Ayesha Winters, PharmD, [phone]  Clinical Pharmacy Specialist  [phone]

## 2020-06-12 NOTE — PROGRESS NOTES
Nutrition: Follow-up  Consult for tube feeding management (Hospitalist)    Assessment:  Food/Nutrition Patient History: Patient admitted from from [facility] with worsening confusion. She was recently admitted last month with elevated troponins and also found to have CVA. SLP was following during last admission and recommended puree diet with honey thick liquids by spoon. SLP following this admission. Diet of puree with honey liquids started 6/6. Patient made NPO 6/8. Noted that she had rapid called 6/7 for hypoxia and likely aspiration. Diet has not been able to be advanced due to patient not being able to participate in PO trials. NGT has been placed, awaiting KUB confirmation. PMH also significant for CAD, DM2, HTN. Patient seen in follow-up today with  at bedside. She remains lethargic. TF infusing without issues.  states that they are considering PEG. He is asking if she will always need feeds 24 hrs per day. Abdomen: Last BM 6/10, hypoactive BS  Lab Results   Component Value Date/Time    Sodium 153 (H) 06/12/2020 08:14 AM    Potassium 4.4 06/12/2020 08:14 AM    Chloride 126 (H) 06/12/2020 08:14 AM    CO2 18 (L) 06/12/2020 08:14 AM    Anion gap 9 06/12/2020 08:14 AM    Glucose 316 (H) 06/12/2020 08:14 AM    BUN 81 (H) 06/12/2020 08:14 AM    Creatinine 2.35 (H) 06/12/2020 08:14 AM    Calcium 9.4 06/12/2020 08:14 AM    Albumin 2.9 (L) 06/11/2020 05:37 PM    Phosphorus 3.1 06/12/2020 08:14 AM   Mg 3.1  POC glucose 288-420 last 24 hrs  Labs remarkable for Na remains elevated, Elevated glucose, elevated BUN and creatinine  Diet: DIET NPO  DIET TUBE FEEDING Opne order for details. Do not start until NGT placement confirmed. Keep HOB > 30 degrees. Check residuals every 4 hours. Hold TF for > 500 ml x 1 or 250 ml x 2 consecutive checks. Also place patient on right side if residual is . ..     Pertinent Medications: IVF 1/2 NS started yesterday at 150 ml/hr, SSI (36 units yesterday, 21 so far today), Protonix, Zosyn  Enteral nutrition access: NGT  Anthropometrics:Height: 5' 6\" (167.6 cm),  Weight: 95.5 kg (210 lb 8 oz), Weight Source: Bed, Body mass index is 33.98 kg/m². BMI class of obese. No edema currently noted. Macronutrient Needs: 95.5 kg CBW (Current body weight) bed scale  Estimated energy requirements:  5908-0947 kcal /day (15-20 kcal/kg)  Estimated protein requirements:  72-95 grams protein/day (20% kcal)  CHO limit per day: 263 grams CHO/day (55% calories)  Estimated fluid/day: 1.4-1.9 liters/day (~1ml/kcal/day)  Intake/Comparative Standards: TF at goal, providing 1440 kcal (100% estimated calorie needs), 119 grams protein (>100% estimated protein needs), 192 grams CHO/day (does not exceed maximum CHO/day) and ~2.5L free fluid (>100% for hypernatremia). Intervention:  Meals and snacks: NPO or per SLP  Enteral Nutrition: Continue Glucerna 1.5 via NGT at rate of 60ml/hour with free water flush tat 60/hour. Explained to  that currently TF needs to run continuous as blood sugars are still elevated and also because of access. Explained that if PEG is pursued, may be able to transition to bolus feeds pending medical course. Vitamin and Mineral Supplement Therapy:  Nutrition support orders for electrolyte management replacement are activated and placed on the MAR. BMP to daily, repeat Phos and Mg labs 6/15. Coordination of Nutrition Care: Discussed with Ivana Seth RN  Discharge Nutrition Plan: Too soon to determine.     [address] [facility] [address] [place] [address], [address], [address]

## 2020-06-12 NOTE — PROCEDURES
Routine EEG Report    Reason for EEG: Altered mental status      Current Facility-Administered Medications:     vancomycin (VANCOCIN) 750 mg in  mL infusion, 750 mg, IntraVENous, ONCE, Declan Ball MD    insulin glargine (LANTUS) injection 30 Units, 30 Units, SubCUTAneous, QHS, Declan Ball MD    aspirin chewable tablet 81 mg, 81 mg, Oral, DAILY, Declan Ball MD, 81 mg at 06/12/20 0813    Vancomycin Intermittent Dosing Placeholder, , Other, Rx Dosing/Monitoring, Chacha Ball MD    0.45% sodium chloride infusion, 150 mL/hr, IntraVENous, CONTINUOUS, [facility], Last Rate: 150 mL/hr at 06/12/20 0558, 150 mL/hr at 06/12/20 0558    ticagrelor (BRILINTA) tablet 90 mg, 90 mg, Per NG tube, Q12H, Declan Ball MD, 90 mg at 06/12/20 0813    lip protectant (BLISTEX) ointment 1 Each, 1 Each, Topical, PRN, Tito Ngo MD    pantoprazole (PROTONIX) 40 mg in 0.9% sodium chloride 10 mL injection, 40 mg, IntraVENous, DAILY, Declan Ball MD, 40 mg at 06/12/20 0813    piperacillin-tazobactam (ZOSYN) 3.375 g in 0.9% sodium chloride (MBP/ADV) 100 mL, 3.375 g, IntraVENous, Q12H, Declan Ball MD, Last Rate: 25 mL/hr at 06/12/20 0813, 3.375 g at 06/12/20 0813    acetaminophen (TYLENOL) suppository 650 mg, 650 mg, Rectal, Q4H PRN, Declan Ball MD, 650 mg at 06/08/20 1431    insulin regular (NOVOLIN R, HUMULIN R) injection, , SubCUTAneous, Q6H, Declan Ball MD, 12 Units at 06/12/20 1147    levalbuterol (XOPENEX) nebulizer soln 1.25 mg/3 mL, 1.25 mg, Nebulization, Q4H PRN, Tito Ngo MD    hydrALAZINE (APRESOLINE) 20 mg/mL injection 10 mg, 10 mg, IntraVENous, Q6H PRN, Tito Ngo MD    [START ON 6/14/2020] levothyroxine (SYNTHROID) injection 110 mcg, 110 mcg, IntraVENous, DAILY, Declan Ball MD    [Held by provider] insulin regular (NOVOLIN R, HUMULIN R) injection 4 Units, 4 Units, SubCUTAneous, QPM, Declan Ball MD, 4 Units at 06/06/20 1630    [Held by provider] amLODIPine (NORVASC) tablet 10 mg, 10 mg, Oral, DAILY, Miah Kim MD, Stopped at 06/09/20 0900    [Held by provider] atorvastatin (LIPITOR) tablet 40 mg, 40 mg, Oral, QHS, Miah Kim MD, Stopped at 06/05/20 2300    [Held by provider] carvediloL (COREG) tablet 12.5 mg, 12.5 mg, Oral, BID WITH MEALS, Miah Kim MD, Stopped at 06/07/20 1700    [Held by provider] losartan (COZAAR) tablet 100 mg, 100 mg, Oral, DAILY, Miah Kim MD, Stopped at 06/09/20 0900    [Held by provider] ezetimibe (ZETIA) tablet 10 mg, 10 mg, Oral, DAILY, Meena HSIEH MD, Stopped at 06/09/20 0900    hyoscyamine SL (LEVSIN/SL) tablet 0.125 mg, 0.125 mg, SubLINGual, Q6H PRN, Miah Kim MD    ondansetron (ZOFRAN ODT) tablet 4 mg, 4 mg, Oral, Q8H PRN, Miah Kim MD    [Held by provider] sertraline (ZOLOFT) tablet 200 mg, 200 mg, Oral, BID, Meena HSIEH MD, Stopped at 06/07/20 1800    sodium chloride (NS) flush 5-40 mL, 5-40 mL, IntraVENous, Q8H, Meena HSIEH MD, 10 mL at 06/12/20 0546    sodium chloride (NS) flush 5-40 mL, 5-40 mL, IntraVENous, PRN, Miah Kim MD    acetaminophen (TYLENOL) tablet 650 mg, 650 mg, Oral, Q4H PRN, Miah Kim MD    ondansetron [facility]) injection 4 mg, 4 mg, IntraVENous, Q4H PRN, Miah Kim MD    magnesium hydroxide (MILK OF MAGNESIA) 400 mg/5 mL oral suspension 30 mL, 30 mL, Oral, DAILY PRN, Miah Kim MD    heparin (porcine) injection 5,000 Units, 5,000 Units, SubCUTAneous, Q8H, Miah Kim MD, 5,000 Units at 06/12/20 7973      Technical Summary: This EEG was performed with a 32-channel digital EEG machine with electrodes placed according to the international 10-20 system of placement. Background:   During the maximal awake state no posterior dominant rhythm was seen. Anterior background consisted of disorganized medium amplitude activity in the primarily in the theta range with occasional intermixed alpha frequencies.      Hyperventilation: Hyperventilation was not performed due to medical condition    Photic Stimulation: Photic stimulation was not performed due to medical condition    Sleep: None    Events: None    Interictal abnormalities: There are some sharply contoured waveforms that are most prominent in the parasagittal head region. Impression: The results of this EEG are abnormal.  There is slowing of the background consistent with a mild to moderate degree of encephalopathy, nonspecific in origin. There are no lateralizing features.   There are some sharply contoured waveforms that are most prominent in the parasagittal head region which are of unclear clinical significance but could potentially increase the patient's risk of seizures

## 2020-06-12 NOTE — PROGRESS NOTES
Hospitalist Note     Admit Date:  2020  5:03 PM   Name:  Shiela Jordan      Age:  66 y.o.    :  1945   MRN:  [id number]   PCP:  Hosea Irwin MD    HPI/Subjective:   Reason for Admission: ROSA (acute kidney injury) [facility]) [N17.9]    Hospital Course:   Shiela Jordan is a 66 y.o. female with medical history significant for CAD, hypertensin, recent CVA with residual right sided weakness, diabetes from [facility] and the [facility] with worsening confusion and elevated creatinine. Patient was found to have urinary tract infection and started on empiric antibiotics. Response called on 6 AM as patient was found to have acute respiratory failure with hypoxia likely due to aspiration. Patient was suctioned and put on non-rebreather with saturation improving to the 96%. Patient has been lethargic with minimally responsive since the event. 20 :  Patient is seen and examined at bedside. No acute events reported overnight by nursing staff. Snoring but easily arousable. Patient continues to have period of being alert/awake and somnolent/minimally responsive. She opens eyes to voice but does not follow commands. Vitals stable with sats in 95% on 5L. Continues to have temp spikes with TMax of 101.6F at 12pm . Unable to obtain ROS due to clinical status.       Objective:     Patient Vitals for the past 24 hrs:   Temp Pulse Resp BP SpO2   20 0715 99.8 °F (37.7 °C) (!) 101 22 143/63 90 %   20 0443 99.4 °F (37.4 °C) 99 20 123/64 92 %   20 0047 100.3 °F (37.9 °C) (!) 103 24 141/73 94 %   20 2028 100.3 °F (37.9 °C) (!) 105 22 148/68 93 %   20 1525 98.4 °F (36.9 °C) (!) 111 (!) 42 134/65 93 %   20 1210 (!) 101.6 °F (38.7 °C) (!) 107 (!) 86 124/57 93 %     Oxygen Therapy  O2 Sat (%): 90 % (20 0715)  Pulse via Oximetry: 90 beats per minute (20)  O2 Device: Hi flow nasal cannula (20)  O2 Flow Rate (L/min): 5 l/min (20)    Estimated body mass index is 33.98 kg/m² as calculated from the following:    Height as of this encounter: 5' 6\" (1.676 m). Weight as of this encounter: 95.5 kg (210 lb 8 oz). Intake/Output Summary (Last 24 hours) at 6/12/2020 1122  Last data filed at 6/12/2020 0445  Gross per 24 hour   Intake    Output 700 ml   Net -700 ml       *Note that automatically entered I/Os may not be accurate; dependent on patient compliance with collection and accurate  by techs. Physical Exam:   General:     Lethargic but arousable. Well nourished and Obese. Head:   normocephalic, atraumatic  Eyes, Ears, nose: PERRL. Normal conjunctiva  Neck:    supple, non-tender. Trachea midline. Lungs:   Coarse breath sounds, no wheezing  Cardiac:   RRR, Normal S1 and S2. Abdomen:   Soft, non distended, nontender, +BS, no guarding/rebound  Extremities:   Warm, dry.  Trace edema   Skin:   No rashes, no jaundice  Neuro:  Somnolent  Psychiatric:  No anxiety, calm, cooperative    Data Review:  I have reviewed all labs, meds, and studies from the last 24 hours:    Recent Results (from the past 24 hour(s))   GLUCOSE, POC    Collection Time: 06/11/20 12:13 PM   Result Value Ref Range    Glucose (POC) 420 (H) 65 - 100 mg/dL   RENAL FUNCTION PANEL    Collection Time: 06/11/20  5:37 PM   Result Value Ref Range    Sodium 154 (H) 136 - 145 mmol/L    Potassium 4.5 3.5 - 5.1 mmol/L    Chloride 127 (H) 98 - 107 mmol/L    CO2 18 (L) 21 - 32 mmol/L    Anion gap 9 7 - 16 mmol/L    Glucose 292 (H) 65 - 100 mg/dL    BUN 92 (H) 8 - 23 MG/DL    Creatinine 2.74 (H) 0.6 - 1.0 MG/DL    GFR est AA 22 (L) >60 ml/min/1.73m2    GFR est non-AA 18 (L) >60 ml/min/1.73m2    Calcium 9.6 8.3 - 10.4 MG/DL    Phosphorus 2.6 2.3 - 3.7 MG/DL    Albumin 2.9 (L) 3.2 - 4.6 g/dL   GLUCOSE, POC    Collection Time: 06/11/20  7:12 PM   Result Value Ref Range    Glucose (POC) 315 (H) 65 - 100 mg/dL   GLUCOSE, POC    Collection Time: 06/11/20  9:21 PM   Result Value Ref Range Glucose (POC) 325 (H) 65 - 100 mg/dL   GLUCOSE, POC    Collection Time: 06/12/20  1:12 AM   Result Value Ref Range    Glucose (POC) 288 (H) 65 - 100 mg/dL   GLUCOSE, POC    Collection Time: 06/12/20  6:15 AM   Result Value Ref Range    Glucose (POC) 313 (H) 65 - 100 mg/dL   GLUCOSE, POC    Collection Time: 06/12/20  7:25 AM   Result Value Ref Range    Glucose (POC) 345 (H) 65 - 985 mg/dL   METABOLIC PANEL, BASIC    Collection Time: 06/12/20  8:14 AM   Result Value Ref Range    Sodium 153 (H) 136 - 145 mmol/L    Potassium 4.4 3.5 - 5.1 mmol/L    Chloride 126 (H) 98 - 107 mmol/L    CO2 18 (L) 21 - 32 mmol/L    Anion gap 9 7 - 16 mmol/L    Glucose 316 (H) 65 - 100 mg/dL    BUN 81 (H) 8 - 23 MG/DL    Creatinine 2.35 (H) 0.6 - 1.0 MG/DL    GFR est AA 26 (L) >60 ml/min/1.73m2    GFR est non-AA 21 (L) >60 ml/min/1.73m2    Calcium 9.4 8.3 - 10.4 MG/DL   MAGNESIUM    Collection Time: 06/12/20  8:14 AM   Result Value Ref Range    Magnesium 3.1 (H) 1.8 - 2.4 mg/dL   PHOSPHORUS    Collection Time: 06/12/20  8:14 AM   Result Value Ref Range    Phosphorus 3.1 2.3 - 3.7 MG/DL   VANCOMYCIN, RANDOM    Collection Time: 06/12/20  8:14 AM   Result Value Ref Range    Vancomycin, random 20.7 UG/ML        All Micro Results     Procedure Component Value Units Date/Time    CULTURE, BLOOD [[id number]] Collected:  06/08/20 1812    Order Status:  Completed Specimen:  Blood Updated:  06/11/20 0635     Special Requests: --        RIGHT  HAND       Culture result: NO GROWTH 3 DAYS       CULTURE, BLOOD [[id number]] Collected:  06/08/20 1807    Order Status:  Completed Specimen:  Blood Updated:  06/11/20 0635     Special Requests: --        LEFT  HAND       Culture result: NO GROWTH 3 DAYS       CULTURE, BLOOD [[id number]] Collected:  06/05/20 1845    Order Status:  Completed Specimen:  Blood Updated:  06/10/20 0631     Special Requests: --        NO SPECIAL REQUESTS  LEFT  Antecubital       Culture result: NO GROWTH 5 DAYS       CULTURE, BLOOD [[id number]] Collected:  06/05/20 1845    Order Status:  Completed Specimen:  Blood Updated:  06/10/20 0631     Special Requests: --        NO SPECIAL REQUESTS  RIGHT  FOREARM       Culture result: NO GROWTH 5 DAYS       MSSA/MRSA SC BY PCR, NASAL SWAB [[id number]] Collected:  06/09/20 1545    Order Status:  Canceled Specimen:  Nasal swab     CULTURE, URINE [[id number]]  (Abnormal)  (Susceptibility) Collected:  06/05/20 1845    Order Status:  Completed Specimen:  Cath Urine Updated:  06/09/20 0710     Special Requests: NO SPECIAL REQUESTS        Culture result:       >100,000 COLONIES/mL PROTEUS MIRABILIS                  >100,000 COLONIES/mL KLEBSIELLA PNEUMONIAE                Current Meds:  Current Facility-Administered Medications   Medication Dose Route Frequency    vancomycin (VANCOCIN) 750 mg in  mL infusion  750 mg IntraVENous ONCE    aspirin chewable tablet 81 mg  81 mg Oral DAILY    Vancomycin Intermittent Dosing Placeholder   Other Rx Dosing/Monitoring    0.45% sodium chloride infusion  150 mL/hr IntraVENous CONTINUOUS    ticagrelor (BRILINTA) tablet 90 mg  90 mg Per NG tube Q12H    lip protectant (BLISTEX) ointment 1 Each  1 Each Topical PRN    pantoprazole (PROTONIX) 40 mg in 0.9% sodium chloride 10 mL injection  40 mg IntraVENous DAILY    piperacillin-tazobactam (ZOSYN) 3.375 g in 0.9% sodium chloride (MBP/ADV) 100 mL  3.375 g IntraVENous Q12H    acetaminophen (TYLENOL) suppository 650 mg  650 mg Rectal Q4H PRN    insulin regular (NOVOLIN R, HUMULIN R) injection   SubCUTAneous Q6H    levalbuterol (XOPENEX) nebulizer soln 1.25 mg/3 mL  1.25 mg Nebulization Q4H PRN    hydrALAZINE (APRESOLINE) 20 mg/mL injection 10 mg  10 mg IntraVENous Q6H PRN    [START ON 6/14/2020] levothyroxine (SYNTHROID) injection 110 mcg  110 mcg IntraVENous DAILY    [Held by provider] insulin regular (NOVOLIN R, HUMULIN R) injection 4 Units  4 Units SubCUTAneous QPM    [Held by provider] amLODIPine (NORVASC) tablet 10 mg  10 mg Oral DAILY    [Held by provider] atorvastatin (LIPITOR) tablet 40 mg  40 mg Oral QHS    [Held by provider] carvediloL (COREG) tablet 12.5 mg  12.5 mg Oral BID WITH MEALS    [Held by provider] losartan (COZAAR) tablet 100 mg  100 mg Oral DAILY    [Held by provider] ezetimibe (ZETIA) tablet 10 mg  10 mg Oral DAILY    hyoscyamine SL (LEVSIN/SL) tablet 0.125 mg  0.125 mg SubLINGual Q6H PRN    [Held by provider] insulin glargine (LANTUS) injection 18 Units  18 Units SubCUTAneous QHS    ondansetron (ZOFRAN ODT) tablet 4 mg  4 mg Oral Q8H PRN    [Held by provider] sertraline (ZOLOFT) tablet 200 mg  200 mg Oral BID    sodium chloride (NS) flush 5-40 mL  5-40 mL IntraVENous Q8H    sodium chloride (NS) flush 5-40 mL  5-40 mL IntraVENous PRN    acetaminophen (TYLENOL) tablet 650 mg  650 mg Oral Q4H PRN    ondansetron (ZOFRAN) injection 4 mg  4 mg IntraVENous Q4H PRN    magnesium hydroxide (MILK OF MAGNESIA) 400 mg/5 mL oral suspension 30 mL  30 mL Oral DAILY PRN    heparin (porcine) injection 5,000 Units  5,000 Units SubCUTAneous Q8H       Other Studies:  Nm Lung Scan Perf    Result Date: 5/25/2020  EXAM:  NM LUNG SCAN PERF INDICATION:  Elevated troponin weakness mild hypoxia COMPARISON:  None. TRACER: 4.6 mCi of Tc-99m MAA. FINDINGS: Perfusion lung imaging was performed following intravenous administration of  Tc 99m MAA. Images were obtained from the anterior, posterior and right and left anterior and posterior oblique projections. The perfusion is normal. No segmental or subsegmental defects are identified. Chest x-ray reveals an elevated right hemidiaphragm. IMPRESSION: Normal lung perfusion study. Xr Chest Sngl V    Result Date: 6/8/2020  CHEST X-RAY, one view. HISTORY:  Hypoxemia and increasing oxygen demand.  TECHNIQUE:  AP upright portable view COMPARISON: Earlier today FINDINGS:   -The lungs: are clear. -The costophrenic angles: are sharp. -The heart size: is normal. -The pulmonary vasculature: is unremarkable. -Included portion of the upper abdomen: is unremarkable. -Bones: No gross bony lesions. -Other: None. IMPRESSION:  Negative for acute change     Xr Chest Sngl V    Result Date: 6/8/2020   Portable view of the chest COMPARISON: June 7, 2020 Clinical history: Hypoxia. FINDINGS: Persistent elevation of the right hemidiaphragm. Lungs are underinflated. No pneumothorax, pulmonary edema or large pleural effusion. No definite pulmonary consolidation. Heart appears mildly enlarged. Mediastinal contour is within normal limits. Surrounding bones are unremarkable. IMPRESSION: 1. No pulmonary edema or definite pulmonary consolidation. 2. No significant change compared to exam.    Xr Chest Sngl V    Result Date: 6/7/2020  Chest X-ray INDICATION: Shortness of breath A portable AP view of the chest was obtained. FINDINGS: There is chronic elevation of the right diaphragm. There are no infiltrates or effusions. The heart size is normal.  The bony thorax is intact. IMPRESSION: No acute findings in the chest     Xr Chest Pa Lat    Result Date: 5/25/2020  EXAM: XR CHEST PA LAT INDICATION: Cough COMPARISON: None. FINDINGS: PA and lateral radiographs of the chest demonstrate clear lungs. The cardiac and mediastinal contours and pulmonary vascularity are normal. The bones and soft tissues are within normal limits. IMPRESSION: Normal chest.    Xr Abd (ap And Erect Or Decub)    Result Date: 5/25/2020  Abdominal films, 3 views. History: Nausea and vomiting Technique:  Supine and upright views of the abdomen on 3 images. Comparison: None Findings: Cholecystectomy. Nonspecific bowel gas pattern. No evidence of free air, organomegaly. Calcification located between the right transverse processes of L4 and L5. IMPRESSION: Negative for free air, ileus or obstruction. Possible right ureteral calculus. Correlate clinically.      Xr Swallow Func Video    Result Date: 5/28/2020  Modified Barium Swallow INDICATION: Oropharyngeal dysphagia. CVA Fluoro time: 3.15 minutes Spot films:  0 Fluoroscopy was used to evaluate pharyngeal function while the patient was given barium solutions and barium coated solids. FINDINGS: Severe global swallow deficits present. There is harriet aspiration of thin barium by teaspoon with poor cough response. Shallow penetration with nectar by teaspoon. Penetration with nectar by cup. Deep penetration with honey by cup. IMPRESSION: 1. Aspiration and deep penetration with multiple administered consistencies. 2.  Please see concurrent speech and language pathology note for complete description of findings. Mri Brain Wo Cont    Result Date: 5/27/2020  EXAMINATION: BRAIN MRI 5/27/2020 11:49 AM ACCESSION NUMBER: [id number] INDICATION: Worsening of old stroke COMPARISON: Head CT 5/27/2020 and 5/25/2020 TECHNIQUE: Multiplanar multisequence MRI of the brain without intravenous contrast. FINDINGS: There are numerous small and punctate areas of restricted diffusion scattered throughout the supratentorial brain parenchyma, worst in the right lentiform and caudate nuclei. There are multiple foci of restricted diffusion throughout both cerebellar hemispheres. There is a moderate-sized focus of restricted diffusion in the right aspect of the benjy. The preponderance of these areas demonstrate T2 hyperintensity. The constellation of findings is consistent with multiple supratentorial acute or early subacute infarctions involving multiple vascular distributions. None of the infarctions demonstrates significant associated mass effect or hemorrhagic conversion. The ventricles are within normal limits for the degree of global brain parenchymal volume loss. There is no midline shift. The basilar cisterns are patent. There is no cerebellar tonsillar ectopia or herniation.  There are T2 hyperintensities in the periventricular and subcortical white matter and benjy, a nonspecific finding which likely represents chronic microangiopathy. Prior bilateral lens replacement. The expected large vascular flow voids are preserved. There are no suspicious osseous lesions. IMPRESSION: 1. Numerous acute or early subacute infarctions scattered throughout the supratentorial and infratentorial brain parenchyma. These are most likely embolic infarctions from a central source. The most confluent areas of involvement include the right caudate and lentiform nuclei, the benjy, and the left cerebellar hemisphere. There is no evidence of significant mass effect or hemorrhagic conversion. 2. Moderate burden of chronic microangiopathy. 3. Discussed with Dr. Ganesh Dunbar in person by Dr. Felix Every at 11:45 AM 5/27/2020. VOICE DICTATED BY: Dr. Rolando Agarwal    Result Date: 6/8/2020  CT head without contrast History: ALTERED MENTAL STATUS, HX OF RECENT CVA. Technique: 5mm axial images were obtained from the skull base to the vertex without contrast. Radiation dose reduction techniques were used for this study: Our CT scanners use one or all of the following: Automated exposure control, adjustment of the mA and/or kVp according to patient's size, iterative reconstruction. Comparison: 05/27/2020 Findings: The ventricles and sulci are mildly prominent. There are no extra-axial fluid collections. No evidence of acute intraparenchymal hemorrhage or mass effect is identified. Patchy, confluent areas of decreased attenuation are noted within the supratentorial white matter. These are nonspecific findings but would be most compatible with moderately advanced chronic small vessel ischemic changes. There is no evidence to suggest an acute major territorial infarct. Extensive atherosclerotic changes are present of the vertebral basilar system. The bony calvarium is intact. The visualized mastoid air cells and paranasal sinuses are well pneumatized and aerated.      Impression: Chronic findings without evidence of acute intracranial abnormality. Ct Head Wo Cont    Result Date: 5/27/2020  HEAD CT WITHOUT CONTRAST  5/27/2020 HISTORY:   increased AMS  ; leukocytosis; myocardial infarction TECHNIQUE: Noncontrast axial images were obtained through the brain. All CT scans at this facility used dose modulation, interactive reconstruction and/or weight based dosing when appropriate to reduce radiation dose to as low as reasonably achievable. COMPARISON: May 25, 2020 FINDINGS: There is no acute intracranial hemorrhage, significant mass effect or CT evidence of acute large artery territorial infarction. Please note that a hyperacute infarct or small vessel infarct may not be apparent on initial CT imaging. Mild to moderate cerebral volume loss is present. Scattered areas of low-attenuation are present in the supratentorial white matter that are compatible with chronic small vessel ischemic disease. There is no hydrocephalus , intra-axial mass or abnormal extra-axial fluid collection. There are no displaced skull fractures. The mastoid air cells and paranasal sinuses are clear where imaged. IMPRESSION: Cerebral volume loss and white matter findings compatible with chronic small vessel ischemic disease. Ct Head Wo Cont    Result Date: 5/25/2020  CT of the head without contrast. CLINICAL INDICATION: Weakness, unable to walk PROCEDURE: Serial thin section axial images are obtained from the cranial vertex through the skull base without the administration of intravenous contrast. Radiation dose reduction techniques were used for this study. Our CT scanners use one or all of the following: Automated exposure control, adjusted of the mA and/or kV according to patient size, iterative reconstruction COMPARISON: No prior. FINDINGS: There is no acute intracranial hemorrhage, mass, or mass effect. No abnormal extra-axial fluid collections identified. There is no hydrocephalus. The basilar cisterns are widely patent.  There is moderate to severe patchy subcortical, deep, and periventricular white matter hypoattenuation. An old infarct is noted in the right basal ganglia with a tiny focal old lacunar infarct in the left basal ganglia. No skull fracture or aggressive osseous lesion noted. The mastoid air cells and included paranasal sinuses are clear. IMPRESSION: 1. No acute intracranial abnormality. Note, acute/subacute CVA cannot be excluded given the severity of the underlying white matter disease. 2. Moderate to severe bilateral white matter hypoattenuation most in keeping with chronic microangiopathic disease. 3. Old infarct in the right basal ganglia with an old lacunar infarct in the left basal ganglia. Us Retroperitoneum Comp    Result Date: 6/7/2020  INDICATION:  Renal insufficiency TECHNIQUE: Real-time sonography of the kidneys, retroperitoneum and bladder was performed with multiple static images obtained. FINDINGS: The right kidney measures 10.6 cm and the left kidney measures 10.5 cm in length. Both kidneys are slightly echogenic. There is no hydronephrosis. There is a small cyst in the upper pole of the left kidney. There are no significant renal masses. The aorta and IVC are normal in caliber. The urinary bladder is collapsed around a Jefferson catheter. IMPRESSION: Echogenic kidneys suggesting chronic renal disease. No evidence of obstruction. Xr Chest Port    Result Date: 5/25/2020  Portable chest x-ray CLINICAL INDICATION: Weakness FINDINGS: Single AP view the chest compared to a similar exam dated 5/25/2020 show the lungs to be expanded and clear. No pleural effusion or pneumothorax. The cardiac silhouette and mediastinum are unremarkable. The bones are osteopenic. IMPRESSION: No acute cardiopulmonary abnormality. Assessment and Plan:      Active Hospital Problems    Diagnosis Date Noted    Acute respiratory failure with hypoxia ([address] 06/11/2020    Hypernatremia 06/06/2020    [facility]A [facility]acute kidney [facility]) ([facility] 06/05/2020    UTI (urinary tract infection) 06/05/2020    Acute metabolic encephalopathy 43/09/2111    Normocytic anemia 06/05/2020    Leukocytosis 05/26/2020           Plan:    Acute metabolic encephalopathy likely due to underlying infection(UTI, aspiration pna)  Prior Recent CVA with right sided deficit  - CT Head (6/8) does not show any acute abnormalities. - UA showed large leukocyte esterase with 4+ bacteria and UCx (6/5) shows pan sensitive proteus/klebsiella. - ABG did not show hypercarbia to suspect the cause of encephalopathy and ammonia is wnl.  - BCx (6/5): no growth to date; Repeat (BCx 6/8) shows no growth so far. - treat underlying infection  - EEG to r/o seizures  - will get GI for PEG once patient's condition improves  - Palliative for Goals of care    Acute Respiratory Failure with hypoxia likely due to aspiration  Fever and leukocytosis concerning for aspiration PNA  - CXR shows no acute changes. CT Chest shows infiltrate on right lung consistent with Aspiration PNA  - BCx (6/8) is neg so far. - NPO  - c/w zosyn (6/08 - )  - c/w vancomycin (6/11 - ) pharmacy dose per renal function  - CT chest to evaluate given her clinical status(fever, o2 requirement)    UTI  - UCx positive for proteus and klebsiella  - c/w abx as above    ROSA on CKD Stage 3 (baseline Cr 1.50 - 2.59)  - IVF and monitor  - Renal U/S no obstruction or hydronephrosis  - Nephrology Consult     Hypernatremia  - on NGT with free water flushes  - hypotonic NS. Diet:  DIET NPO  DIET TUBE FEEDING  DVT PPx: heparin SQ  Code: Full Code    [phone] (Spouse [phone]): at bedside today. Updated. Addressed all concerns and questions. Medical Decision Making:    Labs/Imaging reviewed. Additional information obtained from nursing staff. Patient is high risk due to medical condition and comorbidities. Plan discussed with nursing staff. Plan discussed with patient/family. All questions/concerns were addressed.  Pt/family agrees with the plan.          Signed By: Stephen Adler MD     June 12, 2020

## 2020-06-12 NOTE — PROGRESS NOTES
Shift assessment complete. Respirations present. Even and unlabored. No s/s of distress. No S/S of pain at this time. Pt resting quietly. See Doc Flowsheet for assessment details. Bed alarm in progress. Door open for observation. Will continue to monitor.

## 2020-06-12 NOTE — PROGRESS NOTES
Problem: Diabetes Self-Management  Goal: *Disease process and treatment process  Description: Define diabetes and identify own type of diabetes; list 3 options for treating diabetes. Outcome: Progressing Towards Goal  Goal: *Incorporating nutritional management into lifestyle  Description: Describe effect of type, amount and timing of food on blood glucose; list 3 methods for planning meals. Outcome: Progressing Towards Goal  Goal: *Incorporating physical activity into lifestyle  Description: State effect of exercise on blood glucose levels. Outcome: Progressing Towards Goal  Goal: *Developing strategies to promote health/change behavior  Description: Define the ABC's of diabetes; identify appropriate screenings, schedule and personal plan for screenings. Outcome: Progressing Towards Goal  Goal: *Using medications safely  Description: State effect of diabetes medications on diabetes; name diabetes medication taking, action and side effects. Outcome: Progressing Towards Goal  Goal: *Monitoring blood glucose, interpreting and using results  Description: Identify recommended blood glucose targets  and personal targets. Outcome: Progressing Towards Goal  Goal: *Prevention, detection, treatment of acute complications  Description: List symptoms of hyper- and hypoglycemia; describe how to treat low blood sugar and actions for lowering  high blood glucose level. Outcome: Progressing Towards Goal  Goal: *Prevention, detection and treatment of chronic complications  Description: Define the natural course of diabetes and describe the relationship of blood glucose levels to long term complications of diabetes.   Outcome: Progressing Towards Goal  Goal: *Developing strategies to address psychosocial issues  Description: Describe feelings about living with diabetes; identify support needed and support network  Outcome: Progressing Towards Goal  Goal: *Insulin pump training  Outcome: Progressing Towards Goal  Goal: *Sick day guidelines  Outcome: Progressing Towards Goal  Goal: *Patient Specific Goal (EDIT GOAL, INSERT TEXT)  Outcome: Progressing Towards Goal     Problem: Patient Education: Go to Patient Education Activity  Goal: Patient/Family Education  Outcome: Progressing Towards Goal     Problem: Falls - Risk of  Goal: *Absence of Falls  Description: Document Gypsy Momin Fall Risk and appropriate interventions in the flowsheet. Outcome: Progressing Towards Goal  Note: Fall Risk Interventions:  Mobility Interventions: Bed/chair exit alarm, Patient to call before getting OOB, PT Consult for mobility concerns, Utilize walker, cane, or other assistive device    Mentation Interventions: Adequate sleep, hydration, pain control, Bed/chair exit alarm, Door open when patient unattended, Increase mobility, More frequent rounding, Reorient patient, Room close to nurse's station    Medication Interventions: Assess postural VS orthostatic hypotension, Bed/chair exit alarm, Evaluate medications/consider consulting pharmacy, Patient to call before getting OOB, Teach patient to arise slowly    Elimination Interventions: Bed/chair exit alarm, Call light in reach, Patient to call for help with toileting needs, Stay With Me (per policy)              Problem: Patient Education: Go to Patient Education Activity  Goal: Patient/Family Education  Outcome: Progressing Towards Goal     Problem: Pressure Injury - Risk of  Goal: *Prevention of pressure injury  Description: Document Santiago Scale and appropriate interventions in the flowsheet.   Outcome: Progressing Towards Goal  Note: Pressure Injury Interventions:  Sensory Interventions: Assess changes in LOC    Moisture Interventions: Check for incontinence Q2 hours and as needed    Activity Interventions: Pressure redistribution bed/mattress(bed type)    Mobility Interventions: Pressure redistribution bed/mattress (bed type)    Nutrition Interventions: Document food/fluid/supplement intake    Friction and Shear Interventions: Apply protective barrier, creams and emollients, Feet elevated on foot rest, Foam dressings/transparent film/skin sealants, HOB 30 degrees or less, Lift sheet                Problem: Patient Education: Go to Patient Education Activity  Goal: Patient/Family Education  Outcome: Progressing Towards Goal     Problem: Dysphagia (Adult)  Goal: *Speech Goal: (INSERT TEXT)  Description: LTG: Patient will tolerate least restrictive diet without overt signs or symptoms of airway compromise by discharge. STG: Patient will tolerate pureed diet and honey-thick liquids without overt signs or symptoms of aspiration 100% of the time. STG: Patient will perform laryngeal strengthening exercises x10 each with 70% accuracy to improve strength and coordination of swallowing function. STG: Patient will participate in modified barium swallow study as clinically indicated.       Outcome: Progressing Towards Goal     Problem: Patient Education: Go to Patient Education Activity  Goal: Patient/Family Education  Outcome: Progressing Towards Goal     Problem: Nutrition Deficit  Goal: *Optimize nutritional status  Outcome: Progressing Towards Goal

## 2020-06-12 NOTE — PROGRESS NOTES
[facility] Progress Note    Follow-Up on: ROSA, hypernatremia    ROS:  confused    Exam:  Vitals:    06/11/20 2028 06/12/20 0047 06/12/20 0443 06/12/20 0715   BP: 148/68 141/73 123/64 143/63   Pulse: (!) 105 (!) 103 99 (!) 101   Resp: 22 24 20 22   Temp: 100.3 °F (37.9 °C) 100.3 °F (37.9 °C) 99.4 °F (37.4 °C) 99.8 °F (37.7 °C)   SpO2: 93% 94% 92% 90%   Weight:       Height:             Intake/Output Summary (Last 24 hours) at 6/12/2020 1053  Last data filed at 6/12/2020 0445  Gross per 24 hour   Intake    Output 700 ml   Net -700 ml       Wt Readings from Last 3 Encounters:   06/05/20 95.5 kg (210 lb 8 oz)   06/01/20 104.8 kg (231 lb 0.7 oz)   05/27/20 106.7 kg (235 lb 3.7 oz)       GEN - in no distress  CV - regular, no murmur, no rub  Lung - clear bilaterally  Abd - soft, nontender  Ext - no edema    Recent Labs     06/11/20  0807 06/10/20  0654   WBC 12.9* 12.5*   HGB 10.4* 10.3*   HCT 33.5* 32.8*    232        Recent Labs     06/12/20  0814 06/11/20  1737 06/11/20  0807   * 154* 154*   K 4.4 4.5 4.3   * 127* 125*   CO2 18* 18* 17*   BUN 81* 92* 80*   CREA 2.35* 2.74* 2.67*   CA 9.4 9.6 9.2   * 292* 372*   MG 3.1*  --  3.2*   PHOS 3.1 2.6 2.6       Assessment / Plan:  Principal Problem:    Acute metabolic encephalopathy (3/1/5966)    Active Problems:    Leukocytosis (5/26/2020)      ROSA (acute kidney injury) ([facility] [address] (6/5/2020)      UTI (urinary tract infection) (6/5/2020)      Normocytic anemia (6/5/2020)      Hypernatremia (6/6/2020)      Acute respiratory failure with hypoxia (HCC) (6/11/2020)      1. ROSA/CKD  - Baseline Cr probably mid to upper 1's  - Suspect volume depletion   - Cr improving with IVF  2. Hypernatremia - continue hypotonic saline  3. UTI  4.  Encephalopathy

## 2020-06-12 NOTE — CONSULTS
Palliative Care    Patient: Naya Jung MRN: [id number]  SSN: [id number]    YOB: 1945  Age: 66 y.o. Sex: female       Date of Request: 6/12/20  Date of Consult:  6/12/2020  Reason for Consult:  goals of care  Requesting Physician: Dr Adilia Winter     Assessment/Plan:     Principal Diagnosis:    Encephalopathy, Unspecified  G93.40    Additional Diagnoses:   · Advance Care Planning Counseling Z71.89  · Dyspnea  R06.00  · Encounter for Palliative Care  Z51.5    Palliative Performance Scale (PPS):  PPS: 20    Medical Decision Making:   Reviewed and summarized notes from admission to present. Discussed case with appropriate providers: RN Unknown Kehr, Dr Harmeet Ortega laboratory and x-ray data from admission to present. The pt is sleeping, and is unresponsive to loud voice and sternal rub. She is receiving feeding via NGT. Her Na has remained elevated (153 today) despite efforts to bring it down. An EEG is to be done this afternoon, which may give additional information. Per RN Unknown Kehr, Mr Benigno Huff has told her that their 61th wedding anniversary is coming up soon, and he hopes to celebrate it with his wife. I left a phone message for Mr Benigno Huff, introducing myself and Palliative Care, and asking him to return my call. 1445 - Met with Mr Benigno Huff. He has a good understanding of his wife's multiple conditions as well as her high risk of decline. At the same time he wants to give her \"a chance to recover\". Mr Benigno Huff expressed his appreciation that Dr Adilia Winter will help him know if his wife's condition is improving or if any particular treatments are no longer effective. At Mr Benigno Huff' request, we reviewed the steps of an attempt at resuscitation. Mr Benigno Huff wishes for his wife to remain full code at this time. In addition, he explicitly asked that the pt be given a heart monitor.   I texted to Dr Adilia Winter with this request.      Shortly after I left the pt's room, RN Unknown Kehr reported that the pt had awoken and was talking. Will continue to follow. Will discuss findings with members of the interdisciplinary team.      Thank you for this referral.          .   In addition to the E&M described above, a 60--minute ACP meeting was spent discussing the pt's wishes concerning end of life care. 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 Admitted for UTI, ROSA on CKD 3, acute metabolic encephalopathy. Has become unable to work with Speech, PT/OT. On 6/7 a Rapid Response was called for hypoxia and sats improved to 96%. Pt has remained lethargic since the event. Is not following commands. NGT placed on 6/11/20. Advance Directive: No       Code Status:  Full Code            Health Care Power of : No - Patient does not have a [address]  Ene Suarez is surrogate. Past Medical History:   Diagnosis Date    Arrhythmia     recurrent SVT    Arthritis     Asthma     stress induced- well controlled    CAD (coronary artery disease)     \"leaking valves\"    Chronic pain     shoulder    Diabetes ([address]     adv -160.  type 2 IDDM; does not take home sqbs- hypo at 80    GERD (gastroesophageal reflux disease)     Hypertension     well controlled with meds    Menopause     Morbid obesity ([address]     Stroke ([address]     6-2012, mini stroke    Thyroid disease     hypo    Unspecified adverse effect of anesthesia     woke up during every surgery      Past Surgical History:   Procedure Laterality Date    CARDIAC SURG PROCEDURE UNLIST  4/2010    AVNRT ablation times three    HX APPENDECTOMY  HX BREAST BIOPSY      bilateral breast bx    HX KNEE ARTHROSCOPY      left knee    HX LAP CHOLECYSTECTOMY      HX ORTHOPAEDIC      rt rotator cuff and bicep tendon repair    HX EMELY AND BSO      hysterectomy     Family History   Problem Relation Age of Onset    Other Brother     Cancer Mother     Heart Disease Mother     Breast Cancer Mother 79    Heart Disease Father     Malignant Hyperthermia Neg Hx     Pseudocholinesterase Deficiency Neg Hx     Delayed Awakening Neg Hx     Post-op Nausea/Vomiting Neg Hx     Emergence Delirium Neg Hx     Post-op Cognitive Dysfunction Neg Hx       Social History     Tobacco Use    Smoking status: Former Smoker     Packs/day: 2.00     Years: 13.00     Pack years: 26.00     Last attempt to quit: 1970     Years since quittin.1    Smokeless tobacco: Never Used    Tobacco comment: quit 36 years ago   Substance Use Topics    Alcohol use: No     Prior to Admission medications    Medication Sig Start Date End Date Taking? Authorizing Provider   ticagrelor (BRILINTA) 90 mg tablet Take 1 Tab by mouth every twelve (12) hours every twelve (12) hours. 20   Richard Nesbitt MD   nitroglycerin (NITROSTAT) 0.4 mg SL tablet 1 Tab by SubLINGual route every five (5) minutes as needed for Chest Pain. Up to 3 doses. 20   Richard Nesbitt MD   atorvastatin (LIPITOR) 40 mg tablet Take 1 Tab by mouth nightly. 20   Richard Nesbitt MD   insulin glargine (Lantus U-100 Insulin) 100 unit/mL injection 18 Units by SubCUTAneous route nightly. 20   Richard Nesbitt MD   traZODone (DESYREL) 100 mg tablet Take 100 mg by mouth nightly. Provider, Historical   insulin degludec Randee Solitario FlexTouch U-100) 100 unit/mL (3 mL) inpn 18 Units by SubCUTAneous route nightly. Provider, Historical   insulin aspart U-100 (NovoLOG Flexpen U-100 Insulin) 100 unit/mL (3 mL) inpn 4 Units by SubCUTAneous route every evening.     Provider, Historical   acetaminophen (Tylenol Extra Strength) 500 mg tablet Take 1,000 mg by mouth two (2) times daily as needed for Pain. Provider, Historical   amLODIPine (Norvasc) 10 mg tablet Take 10 mg by mouth daily. Provider, Historical   carvediloL (Coreg) 25 mg tablet Take 12.5 mg by mouth two (2) times daily (with meals). Provider, Historical   furosemide (Lasix) 40 mg tablet Take  by mouth daily. Provider, Historical   OTHER,NON-FORMULARY, 18 Units by SubCUTAneous route once. dinner   Indications: diabetes, tresiva    Provider, Historical   ondansetron (ZOFRAN ODT) 4 mg disintegrating tablet Take 1 Tab by mouth every eight (8) hours as needed for Nausea. 5/25/20   Shanika Edmond DO   nitrofurantoin, macrocrystal-monohydrate, (MACROBID) 100 mg capsule Take 1 Cap by mouth two (2) times a day. 6/1/18   Salvador Rubin MD   hyoscyamine SL (LEVSIN/SL) 0.125 mg SL tablet 1 Tab by SubLINGual route every six (6) hours as needed for Cramping. 6/1/18   Salvador Rubin MD   ezetimibe (Zetia) 10 mg tablet Take  by mouth. Other, MD Willian   aspirin delayed-release 81 mg tablet Take 81 mg by mouth daily. Take day of surgery per anesthesia protocol. Provider, Historical   albuterol (PROVENTIL HFA) 90 mcg/actuation inhaler Take 2 Puffs by inhalation daily as needed. Take day of surgery per anesthesia protocol. BRING DAY OF SURGERY    Provider, Historical   Cholecalciferol, Vitamin D3, (VITAMIN D3) 1,000 unit cap Take 1 Cap by mouth daily. Stop seven days prior to surgery per anesthesia protocol. Provider, Historical   multivitamin (ONE A DAY) tablet Take 1 Tab by mouth daily. Take day of surgery per anesthesia protocol. Provider, Historical   ASPIRIN/CAFFEINE (ANACIN PO) Take 2 Tabs by mouth two (2) times a day. Stop seven days prior to surgery per anesthesia protocol. Indications: last dose 3-30-10 7/20/10   Provider, Historical   COZAAR 100 mg Tab Take 100 mg by mouth daily. Take 1/2 tablet twice daily.     Other, MD Willian   sertraline (ZOLOFT) 50 mg tablet Take 200 mg by mouth two (2) times a day. Take day of surgery per anesthesia protocol. 7/20/10   Other, MD Willian   CENESTIN 0.9 mg Tab Take  by mouth daily. Take day of surgery per anesthesia protocol. 7/20/10   Willian Guaman MD   PRILOSEC 20 mg capsule Take 20 mg by mouth daily. Take day of surgery per anesthesia protocol. 7/20/10   Willian Guaman MD   SYNTHROID 175 mcg tablet Take 150 mcg by mouth daily. Take day of surgery per anesthesia protocol. 7/20/10   Other, MD Willian       Allergies   Allergen Reactions    Ace Inhibitors Swelling     Only to some    Adhesive Tape Rash    Codeine Nausea and Vomiting    Other Medication Swelling     Unknown blood pressure medication per pt. Pt also allergic to predforte eye drops, causes pain that is severe    Other Omega-3s Other (comments)     All oral glucose medications, hives and couldn't breathe well    Sulfa (Sulfonamide Antibiotics) Nausea and Vomiting        Review of Systems:  Review of systems not obtained due to patient factors: unable to respond     Objective:     Visit Vitals  /70   Pulse 100   Temp 100.4 °F (38 °C)   Resp 28   Ht 5' 6\" (1.676 m)   Wt 210 lb 8 oz (95.5 kg)   SpO2 93%   BMI 33.98 kg/m²        Physical Exam:    General:  Unresponsive. No acute distress. Eyes:  Conjunctivae/corneas clear    Nose: Nares normal. Septum midline. NGT. Neck: Supple, symmetrical, trachea midline, no JVD   Lungs:   Rhonchi bilaterally, unlabored on O2 via NC   Heart:  Regular rate and rhythm, no murmur    Abdomen:   Soft, non-tender, non-distended   Extremities: Bilateral foot drop, atraumatic, no cyanosis or edema   Skin: Skin color, texture, turgor normal. No rash or lesions. Neurologic: Somnolent   Psych: Unresponsive.       Assessment:     Hospital Problems  Never Reviewed          Codes Class Noted POA    Acute respiratory failure with hypoxia ([facility] ICD-10-CM: J96.01  ICD-9-CM: 518.81  6/11/2020 Unknown Hypernatremia ICD-10-CM: E87.0  ICD-9-CM: 276.0  6/6/2020 Unknown        ROSA (acute kidney injury) ([facility] [address] ICD-10-CM: N17.9  ICD-9-CM: 584.9  6/5/2020 Yes        UTI (urinary tract infection) ICD-10-CM: N39.0  ICD-9-CM: 599.0  6/5/2020 Yes        * (Principal) Acute metabolic encephalopathy [facility]: G93.41  ICD-9-CM: 348.31  6/5/2020 Yes        Normocytic anemia ICD-10-CM: D64.9  ICD-9-CM: 285.9  6/5/2020 Yes        Leukocytosis ICD-10-CM: L54.306  ICD-9-CM: 288.60  5/26/2020 Yes              Signed By: Tracy Lemus NP     June 12, 2020

## 2020-06-12 NOTE — PROGRESS NOTES
Interdisciplinary team rounds were held 6/12/2020 with the following team members:Care Management, Nursing and Physician. Plan of care discussed. See clinical pathway and/or care plan for interventions and desired outcomes.

## 2020-06-12 NOTE — PROGRESS NOTES
SPEECH PATHOLOGY NOTE:    Attempted to see patient for dysphagia treatment this PM; however, patient getting EEG, and RN reports patient is not appropriate for PO trials due to mentation. Speech will continue to follow.       Gudelia Agarwal MS, CCC-SLP

## 2020-06-12 NOTE — PROGRESS NOTES
PT NOTE     Guero Guerin receiving EEG upon PT arrival to room. Will check back in as PT schedule allows.   Thank you,     EVERARDO CespedesT

## 2020-06-13 LAB
AMMONIA PLAS-SCNC: 29 UMOL/L (ref 11–32)
ANION GAP SERPL CALC-SCNC: 10 MMOL/L (ref 7–16)
BACTERIA SPEC CULT: NORMAL
BACTERIA SPEC CULT: NORMAL
BASOPHILS # BLD: 0 K/UL (ref 0–0.2)
BASOPHILS NFR BLD: 0 % (ref 0–2)
BUN SERPL-MCNC: 54 MG/DL (ref 8–23)
CALCIUM SERPL-MCNC: 8.7 MG/DL (ref 8.3–10.4)
CHLORIDE SERPL-SCNC: 122 MMOL/L (ref 98–107)
CO2 SERPL-SCNC: 19 MMOL/L (ref 21–32)
CREAT SERPL-MCNC: 1.72 MG/DL (ref 0.6–1)
DIFFERENTIAL METHOD BLD: ABNORMAL
EOSINOPHIL # BLD: 0.6 K/UL (ref 0–0.8)
EOSINOPHIL NFR BLD: 5 % (ref 0.5–7.8)
ERYTHROCYTE [DISTWIDTH] IN BLOOD BY AUTOMATED COUNT: 15.1 % (ref 11.9–14.6)
GLUCOSE BLD STRIP.AUTO-MCNC: 260 MG/DL (ref 65–100)
GLUCOSE BLD STRIP.AUTO-MCNC: 262 MG/DL (ref 65–100)
GLUCOSE BLD STRIP.AUTO-MCNC: 291 MG/DL (ref 65–100)
GLUCOSE BLD STRIP.AUTO-MCNC: 302 MG/DL (ref 65–100)
GLUCOSE SERPL-MCNC: 291 MG/DL (ref 65–100)
HCT VFR BLD AUTO: 27.9 % (ref 35.8–46.3)
HGB BLD-MCNC: 8.5 G/DL (ref 11.7–15.4)
IMM GRANULOCYTES # BLD AUTO: 0.1 K/UL (ref 0–0.5)
IMM GRANULOCYTES NFR BLD AUTO: 1 % (ref 0–5)
LYMPHOCYTES # BLD: 2.6 K/UL (ref 0.5–4.6)
LYMPHOCYTES NFR BLD: 21 % (ref 13–44)
MCH RBC QN AUTO: 30.9 PG (ref 26.1–32.9)
MCHC RBC AUTO-ENTMCNC: 30.5 G/DL (ref 31.4–35)
MCV RBC AUTO: 101.5 FL (ref 79.6–97.8)
MONOCYTES # BLD: 0.6 K/UL (ref 0.1–1.3)
MONOCYTES NFR BLD: 5 % (ref 4–12)
NEUTS SEG # BLD: 8.2 K/UL (ref 1.7–8.2)
NEUTS SEG NFR BLD: 68 % (ref 43–78)
NRBC # BLD: 0 K/UL (ref 0–0.2)
PLATELET # BLD AUTO: 183 K/UL (ref 150–450)
PMV BLD AUTO: 12.1 FL (ref 9.4–12.3)
POTASSIUM SERPL-SCNC: 4.6 MMOL/L (ref 3.5–5.1)
RBC # BLD AUTO: 2.75 M/UL (ref 4.05–5.2)
SERVICE CMNT-IMP: NORMAL
SERVICE CMNT-IMP: NORMAL
SODIUM SERPL-SCNC: 151 MMOL/L (ref 136–145)
VANCOMYCIN SERPL-MCNC: 14 UG/ML
WBC # BLD AUTO: 12.1 K/UL (ref 4.3–11.1)

## 2020-06-13 PROCEDURE — 74011000258 HC RX REV CODE- 258: Performed by: INTERNAL MEDICINE

## 2020-06-13 PROCEDURE — 36415 COLL VENOUS BLD VENIPUNCTURE: CPT

## 2020-06-13 PROCEDURE — 80048 BASIC METABOLIC PNL TOTAL CA: CPT

## 2020-06-13 PROCEDURE — 74011250636 HC RX REV CODE- 250/636: Performed by: INTERNAL MEDICINE

## 2020-06-13 PROCEDURE — 74011000250 HC RX REV CODE- 250: Performed by: INTERNAL MEDICINE

## 2020-06-13 PROCEDURE — C9113 INJ PANTOPRAZOLE SODIUM, VIA: HCPCS | Performed by: INTERNAL MEDICINE

## 2020-06-13 PROCEDURE — 74011636637 HC RX REV CODE- 636/637: Performed by: INTERNAL MEDICINE

## 2020-06-13 PROCEDURE — 94760 N-INVAS EAR/PLS OXIMETRY 1: CPT

## 2020-06-13 PROCEDURE — 74011250637 HC RX REV CODE- 250/637: Performed by: INTERNAL MEDICINE

## 2020-06-13 PROCEDURE — 85025 COMPLETE CBC W/AUTO DIFF WBC: CPT

## 2020-06-13 PROCEDURE — 77010033678 HC OXYGEN DAILY

## 2020-06-13 PROCEDURE — 65660000000 HC RM CCU STEPDOWN

## 2020-06-13 PROCEDURE — 82140 ASSAY OF AMMONIA: CPT

## 2020-06-13 PROCEDURE — 80202 ASSAY OF VANCOMYCIN: CPT

## 2020-06-13 PROCEDURE — 82962 GLUCOSE BLOOD TEST: CPT

## 2020-06-13 PROCEDURE — 74011250636 HC RX REV CODE- 250/636: Performed by: FAMILY MEDICINE

## 2020-06-13 RX ORDER — INSULIN GLARGINE 100 [IU]/ML
35 INJECTION, SOLUTION SUBCUTANEOUS
Status: DISCONTINUED | OUTPATIENT
Start: 2020-06-13 | End: 2020-06-14

## 2020-06-13 RX ADMIN — PIPERACILLIN AND TAZOBACTAM 3.38 G: 3; .375 INJECTION, POWDER, FOR SOLUTION INTRAVENOUS at 21:06

## 2020-06-13 RX ADMIN — Medication 10 ML: at 21:09

## 2020-06-13 RX ADMIN — SODIUM CHLORIDE 40 MG: 9 INJECTION INTRAMUSCULAR; INTRAVENOUS; SUBCUTANEOUS at 09:00

## 2020-06-13 RX ADMIN — VANCOMYCIN HYDROCHLORIDE 1000 MG: 1 INJECTION, POWDER, LYOPHILIZED, FOR SOLUTION INTRAVENOUS at 23:26

## 2020-06-13 RX ADMIN — INSULIN GLARGINE 35 UNITS: 100 INJECTION, SOLUTION SUBCUTANEOUS at 22:40

## 2020-06-13 RX ADMIN — HUMAN INSULIN 6 UNITS: 100 INJECTION, SOLUTION SUBCUTANEOUS at 13:20

## 2020-06-13 RX ADMIN — HEPARIN SODIUM 5000 UNITS: 5000 INJECTION INTRAVENOUS; SUBCUTANEOUS at 05:29

## 2020-06-13 RX ADMIN — HUMAN INSULIN 12 UNITS: 100 INJECTION, SOLUTION SUBCUTANEOUS at 05:28

## 2020-06-13 RX ADMIN — HUMAN INSULIN 9 UNITS: 100 INJECTION, SOLUTION SUBCUTANEOUS at 19:18

## 2020-06-13 RX ADMIN — HEPARIN SODIUM 5000 UNITS: 5000 INJECTION INTRAVENOUS; SUBCUTANEOUS at 19:11

## 2020-06-13 RX ADMIN — PIPERACILLIN AND TAZOBACTAM 3.38 G: 3; .375 INJECTION, POWDER, FOR SOLUTION INTRAVENOUS at 10:22

## 2020-06-13 RX ADMIN — TICAGRELOR 90 MG: 90 TABLET ORAL at 20:31

## 2020-06-13 RX ADMIN — TICAGRELOR 90 MG: 90 TABLET ORAL at 09:40

## 2020-06-13 RX ADMIN — ASPIRIN 81 MG 81 MG: 81 TABLET ORAL at 09:40

## 2020-06-13 NOTE — PROGRESS NOTES
HOB elevated, no acute distress, pale , resp. shallow, SOB,  scattered rhonchi, nasal cannula 6 liters, responded to name verbal and nodding head, generalized edema, upper/trunk and LES, NG tube left nare, continuous feeding residual  5 ml. - - -

## 2020-06-13 NOTE — PROGRESS NOTES
Shift assessment done. Pt in bed, sleeping. Respirations even. Coarse, crackles heard on both lung fields upon auscultation. Abdomen soft, with hypoactive bowel sounds. No acute signs of distress noted. Call light in reach. Safety measures provided. Will continue to monitor.

## 2020-06-13 NOTE — PROGRESS NOTES
Hospitalist Note     Admit Date:  2020  5:03 PM   Name:  Coby Gutierrez      Age:  66 y.o.    :  1945   MRN:  [id number]   PCP:  Leonard Grey MD    HPI/Subjective:   Reason for Admission: ROSA (acute kidney injury) [facility]) [N17.9]    Hospital Course:   Coby Gutierrez is a 66 y.o. female with medical history significant for CAD, hypertensin, recent CVA with residual right sided weakness, diabetes from [facility] with worsening confusion and elevated creatinine. Patient was found to have urinary tract infection and started on empiric antibiotics. Response called on 6/7 AM as patient was found to have acute respiratory failure with hypoxia likely due to aspiration. Patient was suctioned and put on non-rebreather with saturation improving to the 96%. Patient has been lethargic with minimally responsive since the event. 20 :  Patient is seen and examined at bedside. No acute events reported overnight by nursing staff. Patient easily arousable today opens eyes to command. Attempts to answer questions and follow simple commands.  and son at bedside. Afebrile for over 48 hours. Unable to obtain ROS due to clinical status. Objective:     Patient Vitals for the past 24 hrs:   Temp Pulse Resp BP SpO2   20 1430 97.7 °F (36.5 °C) 79 22 134/74 96 %   20 1135 99.5 °F (37.5 °C) 85 24 160/62 96 %   20 0719 (!) 101 °F (38.3 °C) 87 30 167/68 91 %   20 0245 98.6 °F (37 °C) 91 22 158/68 91 %   20 2315 99.3 °F (37.4 °C) 95 30 162/67 92 %   20     91 %   20 98.8 °F (37.1 °C) 95 22 155/75 93 %     Oxygen Therapy  O2 Sat (%): 96 % (20 1430)  Pulse via Oximetry: 90 beats per minute (20)  O2 Device: Hi flow nasal cannula (20)  O2 Flow Rate (L/min): 5 l/min (20)    Estimated body mass index is 33.98 kg/m² as calculated from the following:    Height as of this encounter: 5' 6\" (1.676 m).     Weight as of this encounter: 95.5 kg (210 lb 8 oz). Intake/Output Summary (Last 24 hours) at 6/13/2020 1633  Last data filed at 6/13/2020 1216  Gross per 24 hour   Intake 2699 ml   Output 1000 ml   Net 1699 ml       *Note that automatically entered I/Os may not be accurate; dependent on patient compliance with collection and accurate  by techs. Physical Exam:   General:     Lethargic but arousable. Well nourished and Obese. Head:   normocephalic, atraumatic  Eyes, Ears, nose: PERRL. Normal conjunctiva  Neck:    supple, non-tender. Trachea midline. Lungs:   Coarse breath sounds, no wheezing  Cardiac:   RRR, Normal S1 and S2. Abdomen:   Soft, non distended, nontender, +BS, no guarding/rebound  Extremities:   Warm, dry.  Trace edema   Skin:   No rashes, no jaundice  Neuro:  Somnolent  Psychiatric:  No anxiety, calm, cooperative    Data Review:  I have reviewed all labs, meds, and studies from the last 24 hours:    Recent Results (from the past 24 hour(s))   GLUCOSE, POC    Collection Time: 06/12/20  4:43 PM   Result Value Ref Range    Glucose (POC) 243 (H) 65 - 100 mg/dL   GLUCOSE, POC    Collection Time: 06/12/20 11:16 PM   Result Value Ref Range    Glucose (POC) 295 (H) 65 - 100 mg/dL   GLUCOSE, POC    Collection Time: 06/13/20  5:27 AM   Result Value Ref Range    Glucose (POC) 302 (H) 65 - 324 mg/dL   METABOLIC PANEL, BASIC    Collection Time: 06/13/20  7:29 AM   Result Value Ref Range    Sodium 151 (H) 136 - 145 mmol/L    Potassium 4.6 3.5 - 5.1 mmol/L    Chloride 122 (H) 98 - 107 mmol/L    CO2 19 (L) 21 - 32 mmol/L    Anion gap 10 7 - 16 mmol/L    Glucose 291 (H) 65 - 100 mg/dL    BUN 54 (H) 8 - 23 MG/DL    Creatinine 1.72 (H) 0.6 - 1.0 MG/DL    GFR est AA 37 (L) >60 ml/min/1.73m2    GFR est non-AA 31 (L) >60 ml/min/1.73m2    Calcium 8.7 8.3 - 10.4 MG/DL   CBC WITH AUTOMATED DIFF    Collection Time: 06/13/20  7:29 AM   Result Value Ref Range    WBC 12.1 (H) 4.3 - 11.1 K/uL    RBC 2.75 (L) 4.05 - 5.2 M/uL    HGB 8.5 (L) 11.7 - 15.4 g/dL    HCT 27.9 (L) 35.8 - 46.3 %    .5 (H) 79.6 - 97.8 FL    MCH 30.9 26.1 - 32.9 PG    MCHC 30.5 (L) 31.4 - 35.0 g/dL    RDW 15.1 (H) 11.9 - 14.6 %    PLATELET 338 499 - 884 K/uL    MPV 12.1 9.4 - 12.3 FL    ABSOLUTE NRBC 0.00 0.0 - 0.2 K/uL    DF AUTOMATED      NEUTROPHILS 68 43 - 78 %    LYMPHOCYTES 21 13 - 44 %    MONOCYTES 5 4.0 - 12.0 %    EOSINOPHILS 5 0.5 - 7.8 %    BASOPHILS 0 0.0 - 2.0 %    IMMATURE GRANULOCYTES 1 0.0 - 5.0 %    ABS. NEUTROPHILS 8.2 1.7 - 8.2 K/UL    ABS. LYMPHOCYTES 2.6 0.5 - 4.6 K/UL    ABS. MONOCYTES 0.6 0.1 - 1.3 K/UL    ABS. EOSINOPHILS 0.6 0.0 - 0.8 K/UL    ABS. BASOPHILS 0.0 0.0 - 0.2 K/UL    ABS. IMM.  GRANS. 0.1 0.0 - 0.5 K/UL   AMMONIA    Collection Time: 06/13/20  7:29 AM   Result Value Ref Range    Ammonia 29 11 - 32 UMOL/L   GLUCOSE, POC    Collection Time: 06/13/20 11:45 AM   Result Value Ref Range    Glucose (POC) 260 (H) 65 - 100 mg/dL   GLUCOSE, POC    Collection Time: 06/13/20  2:26 PM   Result Value Ref Range    Glucose (POC) 262 (H) 65 - 100 mg/dL        All Micro Results     Procedure Component Value Units Date/Time    CULTURE, BLOOD [[id number]] Collected:  06/08/20 1812    Order Status:  Completed Specimen:  Blood Updated:  06/13/20 0634     Special Requests: --        RIGHT  HAND       Culture result: NO GROWTH 5 DAYS       CULTURE, BLOOD [[id number]] Collected:  06/08/20 1807    Order Status:  Completed Specimen:  Blood Updated:  06/13/20 0634     Special Requests: --        LEFT  HAND       Culture result: NO GROWTH 5 DAYS       CULTURE, BLOOD [[id number]] Collected:  06/05/20 1845    Order Status:  Completed Specimen:  Blood Updated:  06/10/20 0631     Special Requests: --        NO SPECIAL REQUESTS  LEFT  Antecubital       Culture result: NO GROWTH 5 DAYS       CULTURE, BLOOD [[id number]] Collected:  06/05/20 1845    Order Status:  Completed Specimen:  Blood Updated:  06/10/20 0631     Special Requests: --        NO SPECIAL REQUESTS  RIGHT  FOREARM       Culture result: NO GROWTH 5 DAYS       MSSA/MRSA SC BY PCR, NASAL SWAB [[id number]] Collected:  06/09/20 1545    Order Status:  Canceled Specimen:  Nasal swab     CULTURE, URINE [[id number]]  (Abnormal)  (Susceptibility) Collected:  06/05/20 1845    Order Status:  Completed Specimen:  Cath Urine Updated:  06/09/20 0710     Special Requests: NO SPECIAL REQUESTS        Culture result:       >100,000 COLONIES/mL PROTEUS MIRABILIS                  >100,000 COLONIES/mL KLEBSIELLA PNEUMONIAE                Current Meds:  Current Facility-Administered Medications   Medication Dose Route Frequency    insulin glargine (LANTUS) injection 35 Units  35 Units SubCUTAneous QHS    aspirin chewable tablet 81 mg  81 mg Oral DAILY    Vancomycin Intermittent Dosing Placeholder   Other Rx Dosing/Monitoring    ticagrelor (BRILINTA) tablet 90 mg  90 mg Per NG tube Q12H    lip protectant (BLISTEX) ointment 1 Each  1 Each Topical PRN    pantoprazole (PROTONIX) 40 mg in 0.9% sodium chloride 10 mL injection  40 mg IntraVENous DAILY    piperacillin-tazobactam (ZOSYN) 3.375 g in 0.9% sodium chloride (MBP/ADV) 100 mL  3.375 g IntraVENous Q12H    acetaminophen (TYLENOL) suppository 650 mg  650 mg Rectal Q4H PRN    insulin regular (NOVOLIN R, HUMULIN R) injection   SubCUTAneous Q6H    levalbuterol (XOPENEX) nebulizer soln 1.25 mg/3 mL  1.25 mg Nebulization Q4H PRN    hydrALAZINE (APRESOLINE) 20 mg/mL injection 10 mg  10 mg IntraVENous Q6H PRN    [START ON 6/14/2020] levothyroxine (SYNTHROID) injection 110 mcg  110 mcg IntraVENous DAILY    [Held by provider] insulin regular (NOVOLIN R, HUMULIN R) injection 4 Units  4 Units SubCUTAneous QPM    [Held by provider] amLODIPine (NORVASC) tablet 10 mg  10 mg Oral DAILY    [Held by provider] atorvastatin (LIPITOR) tablet 40 mg  40 mg Oral QHS    [Held by provider] carvediloL (COREG) tablet 12.5 mg  12.5 mg Oral BID WITH MEALS    [Held by provider] losartan (COZAAR) tablet 100 mg  100 mg Oral DAILY    [Held by provider] ezetimibe (ZETIA) tablet 10 mg  10 mg Oral DAILY    hyoscyamine SL (LEVSIN/SL) tablet 0.125 mg  0.125 mg SubLINGual Q6H PRN    ondansetron (ZOFRAN ODT) tablet 4 mg  4 mg Oral Q8H PRN    [Held by provider] sertraline (ZOLOFT) tablet 200 mg  200 mg Oral BID    sodium chloride (NS) flush 5-40 mL  5-40 mL IntraVENous Q8H    sodium chloride (NS) flush 5-40 mL  5-40 mL IntraVENous PRN    acetaminophen (TYLENOL) tablet 650 mg  650 mg Oral Q4H PRN    ondansetron (ZOFRAN) injection 4 mg  4 mg IntraVENous Q4H PRN    magnesium hydroxide (MILK OF MAGNESIA) 400 mg/5 mL oral suspension 30 mL  30 mL Oral DAILY PRN    heparin (porcine) injection 5,000 Units  5,000 Units SubCUTAneous Q8H       Other Studies:  Nm Lung Scan Perf    Result Date: 5/25/2020  EXAM:  NM LUNG SCAN PERF INDICATION:  Elevated troponin weakness mild hypoxia COMPARISON:  None. TRACER: 4.6 mCi of Tc-99m MAA. FINDINGS: Perfusion lung imaging was performed following intravenous administration of  Tc 99m MAA. Images were obtained from the anterior, posterior and right and left anterior and posterior oblique projections. The perfusion is normal. No segmental or subsegmental defects are identified. Chest x-ray reveals an elevated right hemidiaphragm. IMPRESSION: Normal lung perfusion study. Xr Chest Sngl V    Result Date: 6/8/2020  CHEST X-RAY, one view. HISTORY:  Hypoxemia and increasing oxygen demand. TECHNIQUE:  AP upright portable view COMPARISON: Earlier today FINDINGS:   -The lungs: are clear. -The costophrenic angles: are sharp. -The heart size: is normal. -The pulmonary vasculature: is unremarkable. -Included portion of the upper abdomen: is unremarkable. -Bones: No gross bony lesions. -Other: None. IMPRESSION:  Negative for acute change     Xr Chest Sngl V    Result Date: 6/8/2020   Portable view of the chest COMPARISON: June 7, 2020 Clinical history: Hypoxia.  FINDINGS: Persistent elevation of the right hemidiaphragm. Lungs are underinflated. No pneumothorax, pulmonary edema or large pleural effusion. No definite pulmonary consolidation. Heart appears mildly enlarged. Mediastinal contour is within normal limits. Surrounding bones are unremarkable. IMPRESSION: 1. No pulmonary edema or definite pulmonary consolidation. 2. No significant change compared to exam.    Xr Chest Sngl V    Result Date: 6/7/2020  Chest X-ray INDICATION: Shortness of breath A portable AP view of the chest was obtained. FINDINGS: There is chronic elevation of the right diaphragm. There are no infiltrates or effusions. The heart size is normal.  The bony thorax is intact. IMPRESSION: No acute findings in the chest     Xr Chest Pa Lat    Result Date: 5/25/2020  EXAM: XR CHEST PA LAT INDICATION: Cough COMPARISON: None. FINDINGS: PA and lateral radiographs of the chest demonstrate clear lungs. The cardiac and mediastinal contours and pulmonary vascularity are normal. The bones and soft tissues are within normal limits. IMPRESSION: Normal chest.    Xr Abd (ap And Erect Or Decub)    Result Date: 5/25/2020  Abdominal films, 3 views. History: Nausea and vomiting Technique:  Supine and upright views of the abdomen on 3 images. Comparison: None Findings: Cholecystectomy. Nonspecific bowel gas pattern. No evidence of free air, organomegaly. Calcification located between the right transverse processes of L4 and L5. IMPRESSION: Negative for free air, ileus or obstruction. Possible right ureteral calculus. Correlate clinically. Xr Swallow Func Video    Result Date: 5/28/2020  Modified Barium Swallow INDICATION: Oropharyngeal dysphagia. CVA Fluoro time: 3.15 minutes Spot films:  0 Fluoroscopy was used to evaluate pharyngeal function while the patient was given barium solutions and barium coated solids. FINDINGS: Severe global swallow deficits present.  There is harriet aspiration of thin barium by teaspoon with poor cough response. Shallow penetration with nectar by teaspoon. Penetration with nectar by cup. Deep penetration with honey by cup. IMPRESSION: 1. Aspiration and deep penetration with multiple administered consistencies. 2.  Please see concurrent speech and language pathology note for complete description of findings. Mri Brain Wo Cont    Result Date: 5/27/2020  EXAMINATION: BRAIN MRI 5/27/2020 11:49 AM ACCESSION NUMBER: [id number] INDICATION: Worsening of old stroke COMPARISON: Head CT 5/27/2020 and 5/25/2020 TECHNIQUE: Multiplanar multisequence MRI of the brain without intravenous contrast. FINDINGS: There are numerous small and punctate areas of restricted diffusion scattered throughout the supratentorial brain parenchyma, worst in the right lentiform and caudate nuclei. There are multiple foci of restricted diffusion throughout both cerebellar hemispheres. There is a moderate-sized focus of restricted diffusion in the right aspect of the benjy. The preponderance of these areas demonstrate T2 hyperintensity. The constellation of findings is consistent with multiple supratentorial acute or early subacute infarctions involving multiple vascular distributions. None of the infarctions demonstrates significant associated mass effect or hemorrhagic conversion. The ventricles are within normal limits for the degree of global brain parenchymal volume loss. There is no midline shift. The basilar cisterns are patent. There is no cerebellar tonsillar ectopia or herniation. There are T2 hyperintensities in the periventricular and subcortical white matter and benjy, a nonspecific finding which likely represents chronic microangiopathy. Prior bilateral lens replacement. The expected large vascular flow voids are preserved. There are no suspicious osseous lesions. IMPRESSION: 1. Numerous acute or early subacute infarctions scattered throughout the supratentorial and infratentorial brain parenchyma. These are most likely embolic infarctions from a central source. The most confluent areas of involvement include the right caudate and lentiform nuclei, the benjy, and the left cerebellar hemisphere. There is no evidence of significant mass effect or hemorrhagic conversion. 2. Moderate burden of chronic microangiopathy. 3. Discussed with Dr. Florinda Lopez in person by Dr. Mindy Rascon at 11:45 AM 5/27/2020. VOICE DICTATED BY: Dr. Ismael Fajardo    Result Date: 6/8/2020  CT head without contrast History: ALTERED MENTAL STATUS, HX OF RECENT CVA. Technique: 5mm axial images were obtained from the skull base to the vertex without contrast. Radiation dose reduction techniques were used for this study: Our CT scanners use one or all of the following: Automated exposure control, adjustment of the mA and/or kVp according to patient's size, iterative reconstruction. Comparison: 05/27/2020 Findings: The ventricles and sulci are mildly prominent. There are no extra-axial fluid collections. No evidence of acute intraparenchymal hemorrhage or mass effect is identified. Patchy, confluent areas of decreased attenuation are noted within the supratentorial white matter. These are nonspecific findings but would be most compatible with moderately advanced chronic small vessel ischemic changes. There is no evidence to suggest an acute major territorial infarct. Extensive atherosclerotic changes are present of the vertebral basilar system. The bony calvarium is intact. The visualized mastoid air cells and paranasal sinuses are well pneumatized and aerated. Impression: Chronic findings without evidence of acute intracranial abnormality. Ct Head Wo Cont    Result Date: 5/27/2020  HEAD CT WITHOUT CONTRAST  5/27/2020 HISTORY:   increased AMS  ; leukocytosis; myocardial infarction TECHNIQUE: Noncontrast axial images were obtained through the brain.   All CT scans at this facility used dose modulation, interactive reconstruction and/or weight based dosing when appropriate to reduce radiation dose to as low as reasonably achievable. COMPARISON: May 25, 2020 FINDINGS: There is no acute intracranial hemorrhage, significant mass effect or CT evidence of acute large artery territorial infarction. Please note that a hyperacute infarct or small vessel infarct may not be apparent on initial CT imaging. Mild to moderate cerebral volume loss is present. Scattered areas of low-attenuation are present in the supratentorial white matter that are compatible with chronic small vessel ischemic disease. There is no hydrocephalus , intra-axial mass or abnormal extra-axial fluid collection. There are no displaced skull fractures. The mastoid air cells and paranasal sinuses are clear where imaged. IMPRESSION: Cerebral volume loss and white matter findings compatible with chronic small vessel ischemic disease. Ct Head Wo Cont    Result Date: 5/25/2020  CT of the head without contrast. CLINICAL INDICATION: Weakness, unable to walk PROCEDURE: Serial thin section axial images are obtained from the cranial vertex through the skull base without the administration of intravenous contrast. Radiation dose reduction techniques were used for this study. Our CT scanners use one or all of the following: Automated exposure control, adjusted of the mA and/or kV according to patient size, iterative reconstruction COMPARISON: No prior. FINDINGS: There is no acute intracranial hemorrhage, mass, or mass effect. No abnormal extra-axial fluid collections identified. There is no hydrocephalus. The basilar cisterns are widely patent. There is moderate to severe patchy subcortical, deep, and periventricular white matter hypoattenuation. An old infarct is noted in the right basal ganglia with a tiny focal old lacunar infarct in the left basal ganglia. No skull fracture or aggressive osseous lesion noted.  The mastoid air cells and included paranasal sinuses are clear.     IMPRESSION: 1. No acute intracranial abnormality. Note, acute/subacute CVA cannot be excluded given the severity of the underlying white matter disease. 2. Moderate to severe bilateral white matter hypoattenuation most in keeping with chronic microangiopathic disease. 3. Old infarct in the right basal ganglia with an old lacunar infarct in the left basal ganglia. Us Retroperitoneum Comp    Result Date: 6/7/2020  INDICATION:  Renal insufficiency TECHNIQUE: Real-time sonography of the kidneys, retroperitoneum and bladder was performed with multiple static images obtained. FINDINGS: The right kidney measures 10.6 cm and the left kidney measures 10.5 cm in length. Both kidneys are slightly echogenic. There is no hydronephrosis. There is a small cyst in the upper pole of the left kidney. There are no significant renal masses. The aorta and IVC are normal in caliber. The urinary bladder is collapsed around a Jefferson catheter. IMPRESSION: Echogenic kidneys suggesting chronic renal disease. No evidence of obstruction. Xr Chest Port    Result Date: 5/25/2020  Portable chest x-ray CLINICAL INDICATION: Weakness FINDINGS: Single AP view the chest compared to a similar exam dated 5/25/2020 show the lungs to be expanded and clear. No pleural effusion or pneumothorax. The cardiac silhouette and mediastinum are unremarkable. The bones are osteopenic. IMPRESSION: No acute cardiopulmonary abnormality. Assessment and Plan:      Active Hospital Problems    Diagnosis Date Noted    Acute respiratory failure with hypoxia ([address] 06/11/2020    Hypernatremia 06/06/2020    ROSA (acute kidney injury) ([address] 06/05/2020    UTI (urinary tract infection) 06/05/2020    Acute metabolic encephalopathy 37/45/6241    Normocytic anemia 06/05/2020    Leukocytosis 05/26/2020           Plan:    Acute metabolic encephalopathy likely due to underlying infection(UTI, aspiration pna)  Prior Recent CVA with right sided deficit  - CT Head (6/8) does not show any acute abnormalities. - UA showed large leukocyte esterase with 4+ bacteria and UCx (6/5) shows pan sensitive proteus/klebsiella. - ABG did not show hypercarbia to suspect the cause of encephalopathy and ammonia is wnl.  - BCx (6/5): no growth to date; Repeat (BCx 6/8) shows no growth so far. - treat underlying infection  - EEG to r/o seizures  - will get GI for PEG once patient's condition improves  - Palliative for Goals of care    Acute Respiratory Failure with hypoxia likely due to aspiration  Fever and leukocytosis concerning for aspiration PNA  - CXR shows no acute changes. CT chest shows  RLL pneumonia likely aspiration pneumonia  - BCx (6/8) is neg so far. - NPO  - c/w zosyn (6/08 - )  - c/w vancomycin (6/11 - ) pharmacy dose per renal function    UTI  - UCx positive for proteus and klebsiella  - c/w abx as above    ROSA on CKD Stage 3 (baseline Cr 1.50 - 2.59)  Hypernatremia (improving)  - IVF and monitor  - Renal U/S no obstruction or hydronephrosis  - Nephrology recs appreciated  - on NGT with free water flushes    Diet:  DIET NPO  DIET TUBE FEEDING  DVT PPx: heparin SQ  Code: Full Code    Vernadine Bing (Spouse [phone]): at bedside today. Updated. Addressed all concerns and questions. Medical Decision Making:    Labs/Imaging reviewed. Additional information obtained from nursing staff. Patient is high risk due to medical condition and comorbidities. Plan discussed with nursing staff. Plan discussed with patient/family. All questions/concerns were addressed. Pt/family agrees with the plan.          Signed By: Irma Petty MD     June 13, 2020

## 2020-06-13 NOTE — PROGRESS NOTES
Calm, clammy, not  responding to tactile stimuli, shallow, resp 20, diaphragmatic breathing, blood pressure 152/90, pulse 82, physician notified.

## 2020-06-13 NOTE — PROGRESS NOTES
Brief visit with family of patient  Encouraged with presence and words of hope  They were thankful for the support    Chana Christian, staff

## 2020-06-14 ENCOUNTER — APPOINTMENT (OUTPATIENT)
Dept: GENERAL RADIOLOGY | Age: 75
DRG: 004 | End: 2020-06-14
Attending: INTERNAL MEDICINE
Payer: MEDICARE

## 2020-06-14 PROBLEM — J69.0 ASPIRATION PNEUMONIA (HCC): Status: ACTIVE | Noted: 2020-06-14

## 2020-06-14 LAB
ANION GAP SERPL CALC-SCNC: 10 MMOL/L (ref 7–16)
BUN SERPL-MCNC: 43 MG/DL (ref 8–23)
CALCIUM SERPL-MCNC: 9.1 MG/DL (ref 8.3–10.4)
CHLORIDE SERPL-SCNC: 119 MMOL/L (ref 98–107)
CO2 SERPL-SCNC: 20 MMOL/L (ref 21–32)
CREAT SERPL-MCNC: 1.49 MG/DL (ref 0.6–1)
GLUCOSE BLD STRIP.AUTO-MCNC: 190 MG/DL (ref 65–100)
GLUCOSE BLD STRIP.AUTO-MCNC: 257 MG/DL (ref 65–100)
GLUCOSE BLD STRIP.AUTO-MCNC: 283 MG/DL (ref 65–100)
GLUCOSE BLD STRIP.AUTO-MCNC: 293 MG/DL (ref 65–100)
GLUCOSE BLD STRIP.AUTO-MCNC: 300 MG/DL (ref 65–100)
GLUCOSE SERPL-MCNC: 250 MG/DL (ref 65–100)
POTASSIUM SERPL-SCNC: 4.5 MMOL/L (ref 3.5–5.1)
SODIUM SERPL-SCNC: 149 MMOL/L (ref 136–145)

## 2020-06-14 PROCEDURE — 94760 N-INVAS EAR/PLS OXIMETRY 1: CPT

## 2020-06-14 PROCEDURE — 74011250637 HC RX REV CODE- 250/637: Performed by: INTERNAL MEDICINE

## 2020-06-14 PROCEDURE — 71045 X-RAY EXAM CHEST 1 VIEW: CPT

## 2020-06-14 PROCEDURE — 74011636637 HC RX REV CODE- 636/637: Performed by: INTERNAL MEDICINE

## 2020-06-14 PROCEDURE — 65660000000 HC RM CCU STEPDOWN

## 2020-06-14 PROCEDURE — 74011250636 HC RX REV CODE- 250/636: Performed by: INTERNAL MEDICINE

## 2020-06-14 PROCEDURE — 82962 GLUCOSE BLOOD TEST: CPT

## 2020-06-14 PROCEDURE — 36415 COLL VENOUS BLD VENIPUNCTURE: CPT

## 2020-06-14 PROCEDURE — 77010033678 HC OXYGEN DAILY

## 2020-06-14 PROCEDURE — 77030040832 HC IRR TRAY MDII -A

## 2020-06-14 PROCEDURE — 74011000258 HC RX REV CODE- 258: Performed by: INTERNAL MEDICINE

## 2020-06-14 PROCEDURE — 80048 BASIC METABOLIC PNL TOTAL CA: CPT

## 2020-06-14 PROCEDURE — 74011000250 HC RX REV CODE- 250: Performed by: INTERNAL MEDICINE

## 2020-06-14 PROCEDURE — 99223 1ST HOSP IP/OBS HIGH 75: CPT | Performed by: INTERNAL MEDICINE

## 2020-06-14 PROCEDURE — 74011250636 HC RX REV CODE- 250/636: Performed by: FAMILY MEDICINE

## 2020-06-14 PROCEDURE — C9113 INJ PANTOPRAZOLE SODIUM, VIA: HCPCS | Performed by: INTERNAL MEDICINE

## 2020-06-14 RX ORDER — GUAIFENESIN 600 MG/1
600 TABLET, EXTENDED RELEASE ORAL EVERY 12 HOURS
Status: DISCONTINUED | OUTPATIENT
Start: 2020-06-14 | End: 2020-06-14

## 2020-06-14 RX ORDER — INSULIN GLARGINE 100 [IU]/ML
40 INJECTION, SOLUTION SUBCUTANEOUS
Status: DISCONTINUED | OUTPATIENT
Start: 2020-06-14 | End: 2020-06-23 | Stop reason: HOSPADM

## 2020-06-14 RX ORDER — SODIUM CHLORIDE FOR INHALATION 3 %
4 VIAL, NEBULIZER (ML) INHALATION ONCE
Status: COMPLETED | OUTPATIENT
Start: 2020-06-14 | End: 2020-06-14

## 2020-06-14 RX ORDER — GUAIFENESIN 100 MG/5ML
300 SOLUTION ORAL EVERY 6 HOURS
Status: COMPLETED | OUTPATIENT
Start: 2020-06-14 | End: 2020-06-15

## 2020-06-14 RX ORDER — GUAIFENESIN 100 MG/5ML
30 SOLUTION ORAL EVERY 12 HOURS
Status: DISCONTINUED | OUTPATIENT
Start: 2020-06-14 | End: 2020-06-14

## 2020-06-14 RX ORDER — GUAIFENESIN 100 MG/5ML
81 LIQUID (ML) ORAL DAILY
Status: DISCONTINUED | OUTPATIENT
Start: 2020-06-15 | End: 2020-06-23 | Stop reason: HOSPADM

## 2020-06-14 RX ADMIN — HEPARIN SODIUM 5000 UNITS: 5000 INJECTION INTRAVENOUS; SUBCUTANEOUS at 13:43

## 2020-06-14 RX ADMIN — HEPARIN SODIUM 5000 UNITS: 5000 INJECTION INTRAVENOUS; SUBCUTANEOUS at 22:39

## 2020-06-14 RX ADMIN — SODIUM CHLORIDE SOLN NEBU 3% 4 ML: 3 NEBU SOLN at 12:00

## 2020-06-14 RX ADMIN — HUMAN INSULIN 3 UNITS: 100 INJECTION, SOLUTION SUBCUTANEOUS at 22:41

## 2020-06-14 RX ADMIN — LEVOTHYROXINE SODIUM ANHYDROUS 110 MCG: 100 INJECTION, POWDER, LYOPHILIZED, FOR SOLUTION INTRAVENOUS at 09:00

## 2020-06-14 RX ADMIN — PIPERACILLIN AND TAZOBACTAM 3.38 G: 3; .375 INJECTION, POWDER, FOR SOLUTION INTRAVENOUS at 17:07

## 2020-06-14 RX ADMIN — HUMAN INSULIN 12 UNITS: 100 INJECTION, SOLUTION SUBCUTANEOUS at 13:42

## 2020-06-14 RX ADMIN — HUMAN INSULIN 9 UNITS: 100 INJECTION, SOLUTION SUBCUTANEOUS at 01:59

## 2020-06-14 RX ADMIN — SODIUM CHLORIDE 40 MG: 9 INJECTION INTRAMUSCULAR; INTRAVENOUS; SUBCUTANEOUS at 08:37

## 2020-06-14 RX ADMIN — PIPERACILLIN AND TAZOBACTAM 3.38 G: 3; .375 INJECTION, POWDER, FOR SOLUTION INTRAVENOUS at 08:37

## 2020-06-14 RX ADMIN — HEPARIN SODIUM 5000 UNITS: 5000 INJECTION INTRAVENOUS; SUBCUTANEOUS at 04:49

## 2020-06-14 RX ADMIN — INSULIN GLARGINE 40 UNITS: 100 INJECTION, SOLUTION SUBCUTANEOUS at 22:40

## 2020-06-14 RX ADMIN — HUMAN INSULIN 9 UNITS: 100 INJECTION, SOLUTION SUBCUTANEOUS at 17:06

## 2020-06-14 RX ADMIN — GUAIFENESIN 300 MG: 100 SOLUTION ORAL at 17:06

## 2020-06-14 RX ADMIN — Medication 10 ML: at 13:43

## 2020-06-14 RX ADMIN — ASPIRIN 81 MG 81 MG: 81 TABLET ORAL at 08:36

## 2020-06-14 RX ADMIN — Medication 5 ML: at 22:46

## 2020-06-14 RX ADMIN — TICAGRELOR 90 MG: 90 TABLET ORAL at 22:46

## 2020-06-14 RX ADMIN — TICAGRELOR 90 MG: 90 TABLET ORAL at 08:36

## 2020-06-14 NOTE — PROGRESS NOTES
[facility] Progress Note    Follow-Up on: ROSA/CKD, hypernatremia    ROS:  Confused but a little more alert    Exam:  Vitals:    06/13/20 2047 06/13/20 2342 06/14/20 0154 06/14/20 0818   BP:  155/65 142/64 146/77   Pulse:  77 80 79   Resp:  18 20 20   Temp:  97.6 °F (36.4 °C) 98.4 °F (36.9 °C) 98.5 °F (36.9 °C)   SpO2: 96% 99% 99% 96%   Weight:       Height:             Intake/Output Summary (Last 24 hours) at 6/14/2020 1016  Last data filed at 6/14/2020 0750  Gross per 24 hour   Intake 2586 ml   Output 400 ml   Net 2186 ml       Wt Readings from Last 3 Encounters:   06/05/20 95.5 kg (210 lb 8 oz)   06/01/20 104.8 kg (231 lb 0.7 oz)   05/27/20 106.7 kg (235 lb 3.7 oz)       GEN - lethargic  CV - regular, no murmur, no rub  Lung - coarse upper airway sounds bilaterally  Abd - soft, nontender  Ext - no edema    Recent Labs     06/13/20  0729   WBC 12.1*   HGB 8.5*   HCT 27.9*           Recent Labs     06/14/20  0804 06/13/20  0729 06/12/20  0814 06/11/20  1737   * 151* 153* 154*   K 4.5 4.6 4.4 4.5   * 122* 126* 127*   CO2 20* 19* 18* 18*   BUN 43* 54* 81* 92*   CREA 1.49* 1.72* 2.35* 2.74*   CA 9.1 8.7 9.4 9.6   * 291* 316* 292*   MG  --   --  3.1*  --    PHOS  --   --  3.1 2.6       Assessment / Plan:  Principal Problem:    Acute metabolic encephalopathy (9/9/9893)    Active Problems:    Leukocytosis (5/26/2020)      ROSA (acute kidney injury) ([address] (6/5/2020)      UTI (urinary tract infection) (6/5/2020)      Normocytic anemia (6/5/2020)      Hypernatremia (6/6/2020)      Acute respiratory failure with hypoxia ([facility]) (6/11/2020)      1. ROSA/CKD  - Baseline Cr probably mid to upper 1's  - Suspect volume depletion   - Cr improving, likely at or near baseline today  2. Hypernatremia - continue free water, improving  3. UTI  4.  Encephalopathy

## 2020-06-14 NOTE — PROGRESS NOTES
Continues to rest quietly, resp even, unlab with O2 intact, NG intact to left nare, facial expression relaxed with no distress noted during report given to Shaniqua Siddiqi RN.

## 2020-06-14 NOTE — PROGRESS NOTES
Resting quietly, resp even, unlab with O2 intact, arousing briefly, nodding appropriately in answer to a question. Skin pale, warm, dry. Head of bed elevated. NG intact to left nare with Glucerna infusing at 60 ml/hr without difficulty, tolerating well. Assessed as noted. Facial expression relaxed with no distress noted. Call light in reach, door open.

## 2020-06-14 NOTE — PROGRESS NOTES
Incontinent of large, liquid, brownish green stool with care given and repositioning with pillows to support. Head of bed remaining elevated. No c/o. No distress noted.

## 2020-06-14 NOTE — PROGRESS NOTES
Hospitalist Note     Admit Date:  2020  5:03 PM   Name:  Coby Gutierrez      Age:  66 y.o.    :  1945   MRN:  [id number]   PCP:  Leonard Grey MD    HPI/Subjective:   Reason for Admission: ROSA (acute kidney injury) [facility]) [N17.9]    Hospital Course:   Coby Gutierrez is a 66 y.o. female with medical history significant for CAD, hypertensin, recent CVA with residual right sided weakness, diabetes from [facility] with worsening confusion and elevated creatinine. Patient was found to have urinary tract infection and started on empiric antibiotics. Response called on  AM as patient was found to have acute respiratory failure with hypoxia likely due to aspiration. Patient was suctioned and put on non-rebreather with saturation improving to the 96%. Patient has been lethargic with minimally responsive since the event. Patient had multiple days of temp spikes and antibiotics have been broadened. CT head was neg for any acute intracranial abnormalities. CXR and CT showed infiltrate in the RLL confirming suspiration for aspiration pna. Due to decreased renal function, nephrology was consulted. Workup revealed no obstructive cause. Her renal function has been improving on IV fluids. Will also monitor hyponatremia during hospital stay and has been improved with free water flushes and hypotonic saline infusion. Pulmonary was consulted due to high oxygen requirement despite being treated by antibiotics for 5 days. 20 :  Patient is seen and examined at bedside. No acute events reported overnight by nursing staff. Patient is alert, awake with  at bedside. Patient says \"hi\" upon me entering the room. Patient attempts to respond to some simple questions. Does not endorse any pain or any discomfort at this time. On 5 L O2 nasal cannula with saturation around 95%. Continues to have secretions more in the upper airways.   Had a febrile episode of 101.0F yesterday AM.    ROS limited due to clinical condition. Objective:     Patient Vitals for the past 24 hrs:   Temp Pulse Resp BP SpO2   06/14/20 0818 98.5 °F (36.9 °C) 79 20 146/77 96 %   06/14/20 0154 98.4 °F (36.9 °C) 80 20 142/64 99 %   06/13/20 2342 97.6 °F (36.4 °C) 77 18 155/65 99 %   06/13/20 2047     96 %   06/13/20 2017 98.4 °F (36.9 °C) 79 20 154/65 94 %   06/13/20 1430 97.7 °F (36.5 °C) 79 22 134/74 96 %   06/13/20 1135 99.5 °F (37.5 °C) 85 24 160/62 96 %     Oxygen Therapy  O2 Sat (%): 96 % (06/14/20 0818)  Pulse via Oximetry: 77 beats per minute (06/13/20 2047)  O2 Device: Hi flow nasal cannula;Humidifier (06/13/20 2047)  O2 Flow Rate (L/min): 5 l/min (06/13/20 2047)    Estimated body mass index is 33.98 kg/m² as calculated from the following:    Height as of this encounter: 5' 6\" (1.676 m). Weight as of this encounter: 95.5 kg (210 lb 8 oz). Intake/Output Summary (Last 24 hours) at 6/14/2020 1102  Last data filed at 6/14/2020 0750  Gross per 24 hour   Intake 2646 ml   Output 400 ml   Net 2246 ml       *Note that automatically entered I/Os may not be accurate; dependent on patient compliance with collection and accurate  by [employer]. Physical Exam:   General:     Alert, awake. Well nourished and Obese. Head:   normocephalic, atraumatic  Eyes, Ears, nose: PERRL. Normal conjunctiva  Neck:    supple, non-tender. Trachea midline. Lungs:   Coarse breath sounds, no wheezing  Cardiac:   RRR, Normal S1 and S2. Abdomen:   Soft, non distended, nontender, +BS, no guarding/rebound  Extremities:   Warm, dry.  Trace edema   Skin:   No rashes, no jaundice  Neuro:  Somnolent  Psychiatric:  No anxiety, calm, cooperative    Data Review:  I have reviewed all labs, meds, and studies from the last 24 hours:    Recent Results (from the past 24 hour(s))   GLUCOSE, POC    Collection Time: 06/13/20 11:45 AM   Result Value Ref Range    Glucose (POC) 260 (H) 65 - 100 mg/dL   GLUCOSE, POC    Collection Time: 06/13/20  2:26 PM Result Value Ref Range    Glucose (POC) 262 (H) 65 - 100 mg/dL   VANCOMYCIN, RANDOM    Collection Time: 06/13/20  8:03 PM   Result Value Ref Range    Vancomycin, random 14.0 UG/ML   GLUCOSE, POC    Collection Time: 06/13/20 10:24 PM   Result Value Ref Range    Glucose (POC) 291 (H) 65 - 100 mg/dL   GLUCOSE, POC    Collection Time: 06/14/20  1:40 AM   Result Value Ref Range    Glucose (POC) 283 (H) 65 - 084 mg/dL   METABOLIC PANEL, BASIC    Collection Time: 06/14/20  8:04 AM   Result Value Ref Range    Sodium 149 (H) 136 - 145 mmol/L    Potassium 4.5 3.5 - 5.1 mmol/L    Chloride 119 (H) 98 - 107 mmol/L    CO2 20 (L) 21 - 32 mmol/L    Anion gap 10 7 - 16 mmol/L    Glucose 250 (H) 65 - 100 mg/dL    BUN 43 (H) 8 - 23 MG/DL    Creatinine 1.49 (H) 0.6 - 1.0 MG/DL    GFR est AA 44 (L) >60 ml/min/1.73m2    GFR est non-AA 36 (L) >60 ml/min/1.73m2    Calcium 9.1 8.3 - 10.4 MG/DL   GLUCOSE, POC    Collection Time: 06/14/20  9:12 AM   Result Value Ref Range    Glucose (POC) 257 (H) 65 - 100 mg/dL        All Micro Results     Procedure Component Value Units Date/Time    CULTURE, BLOOD [[id number]] Collected:  06/08/20 1812    Order Status:  Completed Specimen:  Blood Updated:  06/13/20 0634     Special Requests: --        RIGHT  HAND       Culture result: NO GROWTH 5 DAYS       CULTURE, BLOOD [[id number]] Collected:  06/08/20 1807    Order Status:  Completed Specimen:  Blood Updated:  06/13/20 0634     Special Requests: --        LEFT  HAND       Culture result: NO GROWTH 5 DAYS       CULTURE, BLOOD [[id number]] Collected:  06/05/20 1845    Order Status:  Completed Specimen:  Blood Updated:  06/10/20 0631     Special Requests: --        NO SPECIAL REQUESTS  LEFT  Antecubital       Culture result: NO GROWTH 5 DAYS       CULTURE, BLOOD [[id number]] Collected:  06/05/20 1845    Order Status:  Completed Specimen:  Blood Updated:  06/10/20 0631     Special Requests: --        NO SPECIAL REQUESTS  RIGHT  FOREARM       Culture result: NO GROWTH 5 DAYS       MSSA/MRSA SC BY PCR, NASAL SWAB [[id number]] Collected:  06/09/20 1545    Order Status:  Canceled Specimen:  Nasal swab     CULTURE, URINE [[id number]]  (Abnormal)  (Susceptibility) Collected:  06/05/20 1845    Order Status:  Completed Specimen:  Cath Urine Updated:  06/09/20 0710     Special Requests: NO SPECIAL REQUESTS        Culture result:       >100,000 COLONIES/mL PROTEUS MIRABILIS                  >100,000 COLONIES/mL KLEBSIELLA PNEUMONIAE                Current Meds:  Current Facility-Administered Medications   Medication Dose Route Frequency    piperacillin-tazobactam (ZOSYN) 3.375 g in 0.9% sodium chloride (MBP/ADV) 100 mL  3.375 g IntraVENous Q8H    insulin glargine (LANTUS) injection 35 Units  35 Units SubCUTAneous QHS    aspirin chewable tablet 81 mg  81 mg Oral DAILY    Vancomycin Intermittent Dosing Placeholder   Other Rx Dosing/Monitoring    ticagrelor (BRILINTA) tablet 90 mg  90 mg Per NG tube Q12H    lip protectant (BLISTEX) ointment 1 Each  1 Each Topical PRN    pantoprazole (PROTONIX) 40 mg in 0.9% sodium chloride 10 mL injection  40 mg IntraVENous DAILY    acetaminophen (TYLENOL) suppository 650 mg  650 mg Rectal Q4H PRN    insulin regular (NOVOLIN R, HUMULIN R) injection   SubCUTAneous Q6H    levalbuterol (XOPENEX) nebulizer soln 1.25 mg/3 mL  1.25 mg Nebulization Q4H PRN    hydrALAZINE (APRESOLINE) 20 mg/mL injection 10 mg  10 mg IntraVENous Q6H PRN    levothyroxine (SYNTHROID) injection 110 mcg  110 mcg IntraVENous DAILY    [Held by provider] insulin regular (NOVOLIN R, HUMULIN R) injection 4 Units  4 Units SubCUTAneous QPM    [Held by provider] amLODIPine (NORVASC) tablet 10 mg  10 mg Oral DAILY    [Held by provider] atorvastatin (LIPITOR) tablet 40 mg  40 mg Oral QHS    [Held by provider] carvediloL (COREG) tablet 12.5 mg  12.5 mg Oral BID WITH MEALS    [Held by provider] losartan (COZAAR) tablet 100 mg  100 mg Oral DAILY    [Held by provider] ezetimibe (ZETIA) tablet 10 mg  10 mg Oral DAILY    hyoscyamine SL (LEVSIN/SL) tablet 0.125 mg  0.125 mg SubLINGual Q6H PRN    ondansetron (ZOFRAN ODT) tablet 4 mg  4 mg Oral Q8H PRN    [Held by provider] sertraline (ZOLOFT) tablet 200 mg  200 mg Oral BID    sodium chloride (NS) flush 5-40 mL  5-40 mL IntraVENous Q8H    sodium chloride (NS) flush 5-40 mL  5-40 mL IntraVENous PRN    acetaminophen (TYLENOL) tablet 650 mg  650 mg Oral Q4H PRN    ondansetron (ZOFRAN) injection 4 mg  4 mg IntraVENous Q4H PRN    magnesium hydroxide (MILK OF MAGNESIA) 400 mg/5 mL oral suspension 30 mL  30 mL Oral DAILY PRN    heparin (porcine) injection 5,000 Units  5,000 Units SubCUTAneous Q8H       Other Studies:  Nm Lung Scan Perf    Result Date: 5/25/2020  EXAM:  NM LUNG SCAN PERF INDICATION:  Elevated troponin weakness mild hypoxia COMPARISON:  None. TRACER: 4.6 mCi of Tc-99m MAA. FINDINGS: Perfusion lung imaging was performed following intravenous administration of  Tc 99m MAA. Images were obtained from the anterior, posterior and right and left anterior and posterior oblique projections. The perfusion is normal. No segmental or subsegmental defects are identified. Chest x-ray reveals an elevated right hemidiaphragm. IMPRESSION: Normal lung perfusion study. Xr Chest Sngl V    Result Date: 6/14/2020  EXAM: TEMPORARY INDICATION: Respiratory failure COMPARISON: 6/8/2020 FINDINGS: A portable AP radiograph of the chest was obtained at 0522 hours. The patient is on a cardiac monitor. Bibasilar airspace disease unchanged. The cardiac and mediastinal contours and pulmonary vascularity are normal.  The bones and soft tissues are grossly within normal limits. IMPRESSION: Bibasilar atelectasis unchanged. Xr Chest Sngl V    Result Date: 6/8/2020  CHEST X-RAY, one view. HISTORY:  Hypoxemia and increasing oxygen demand.  TECHNIQUE:  AP upright portable view COMPARISON: Earlier today FINDINGS:   -The lungs: are clear. -The costophrenic angles: are sharp. -The heart size: is normal. -The pulmonary vasculature: is unremarkable. -Included portion of the upper abdomen: is unremarkable. -Bones: No gross bony lesions. -Other: None. IMPRESSION:  Negative for acute change     Xr Chest Sngl V    Result Date: 6/8/2020   Portable view of the chest COMPARISON: June 7, 2020 Clinical history: Hypoxia. FINDINGS: Persistent elevation of the right hemidiaphragm. Lungs are underinflated. No pneumothorax, pulmonary edema or large pleural effusion. No definite pulmonary consolidation. Heart appears mildly enlarged. Mediastinal contour is within normal limits. Surrounding bones are unremarkable. IMPRESSION: 1. No pulmonary edema or definite pulmonary consolidation. 2. No significant change compared to exam.    Xr Chest Sngl V    Result Date: 6/7/2020  Chest X-ray INDICATION: Shortness of breath A portable AP view of the chest was obtained. FINDINGS: There is chronic elevation of the right diaphragm. There are no infiltrates or effusions. The heart size is normal.  The bony thorax is intact. IMPRESSION: No acute findings in the chest     Xr Chest Pa Lat    Result Date: 5/25/2020  EXAM: XR CHEST PA LAT INDICATION: Cough COMPARISON: None. FINDINGS: PA and lateral radiographs of the chest demonstrate clear lungs. The cardiac and mediastinal contours and pulmonary vascularity are normal. The bones and soft tissues are within normal limits. IMPRESSION: Normal chest.    Xr Abd (kub)    Result Date: 6/10/2020  History: NG tube placement Single view abdomen FINDINGS: There is an NG tube which terminates to the right of midline, likely in the distal stomach. Clips are present from prior cholecystectomy. There is elevation of the right hemidiaphragm. The lung bases are clear. The bowel gas pattern is nonspecific. IMPRESSION: NG tube as described. Xr Abd (ap And Erect Or Decub)    Result Date: 5/25/2020  Abdominal films, 3 views.  History: Nausea and vomiting Technique:  Supine and upright views of the abdomen on 3 images. Comparison: None Findings: Cholecystectomy. Nonspecific bowel gas pattern. No evidence of free air, organomegaly. Calcification located between the right transverse processes of L4 and L5. IMPRESSION: Negative for free air, ileus or obstruction. Possible right ureteral calculus. Correlate clinically. Xr Swallow Func Video    Result Date: 5/28/2020  Modified Barium Swallow INDICATION: Oropharyngeal dysphagia. CVA Fluoro time: 3.15 minutes Spot films:  0 Fluoroscopy was used to evaluate pharyngeal function while the patient was given barium solutions and barium coated solids. FINDINGS: Severe global swallow deficits present. There is harriet aspiration of thin barium by teaspoon with poor cough response. Shallow penetration with nectar by teaspoon. Penetration with nectar by cup. Deep penetration with honey by cup. IMPRESSION: 1. Aspiration and deep penetration with multiple administered consistencies. 2.  Please see concurrent speech and language pathology note for complete description of findings. Mri Brain Wo Cont    Result Date: 5/27/2020  EXAMINATION: BRAIN MRI 5/27/2020 11:49 AM ACCESSION NUMBER: [id number] INDICATION: Worsening of old stroke COMPARISON: Head CT 5/27/2020 and 5/25/2020 TECHNIQUE: Multiplanar multisequence MRI of the brain without intravenous contrast. FINDINGS: There are numerous small and punctate areas of restricted diffusion scattered throughout the supratentorial brain parenchyma, worst in the right lentiform and caudate nuclei. There are multiple foci of restricted diffusion throughout both cerebellar hemispheres. There is a moderate-sized focus of restricted diffusion in the right aspect of the benjy. The preponderance of these areas demonstrate T2 hyperintensity.  The constellation of findings is consistent with multiple supratentorial acute or early subacute infarctions involving multiple vascular distributions. None of the infarctions demonstrates significant associated mass effect or hemorrhagic conversion. The ventricles are within normal limits for the degree of global brain parenchymal volume loss. There is no midline shift. The basilar cisterns are patent. There is no cerebellar tonsillar ectopia or herniation. There are T2 hyperintensities in the periventricular and subcortical white matter and benjy, a nonspecific finding which likely represents chronic microangiopathy. Prior bilateral lens replacement. The expected large vascular flow voids are preserved. There are no suspicious osseous lesions. IMPRESSION: 1. Numerous acute or early subacute infarctions scattered throughout the supratentorial and infratentorial brain parenchyma. These are most likely embolic infarctions from a central source. The most confluent areas of involvement include the right caudate and lentiform nuclei, the benjy, and the left cerebellar hemisphere. There is no evidence of significant mass effect or hemorrhagic conversion. 2. Moderate burden of chronic microangiopathy. 3. Discussed with Dr. Nanette Lan in person by Dr. Neto Gonzalez at 11:45 AM 5/27/2020. VOICE DICTATED BY: Dr. Sathya Hawkins    Result Date: 6/8/2020  CT head without contrast History: ALTERED MENTAL STATUS, HX OF RECENT CVA. Technique: 5mm axial images were obtained from the skull base to the vertex without contrast. Radiation dose reduction techniques were used for this study: Our CT scanners use one or all of the following: Automated exposure control, adjustment of the mA and/or kVp according to patient's size, iterative reconstruction. Comparison: 05/27/2020 Findings: The ventricles and sulci are mildly prominent. There are no extra-axial fluid collections. No evidence of acute intraparenchymal hemorrhage or mass effect is identified. Patchy, confluent areas of decreased attenuation are noted within the supratentorial white matter.  These are nonspecific findings but would be most compatible with moderately advanced chronic small vessel ischemic changes. There is no evidence to suggest an acute major territorial infarct. Extensive atherosclerotic changes are present of the vertebral basilar system. The bony calvarium is intact. The visualized mastoid air cells and paranasal sinuses are well pneumatized and aerated. Impression: Chronic findings without evidence of acute intracranial abnormality. Ct Head Wo Cont    Result Date: 5/27/2020  HEAD CT WITHOUT CONTRAST  5/27/2020 HISTORY:   increased AMS  ; leukocytosis; myocardial infarction TECHNIQUE: Noncontrast axial images were obtained through the brain. All CT scans at this facility used dose modulation, interactive reconstruction and/or weight based dosing when appropriate to reduce radiation dose to as low as reasonably achievable. COMPARISON: May 25, 2020 FINDINGS: There is no acute intracranial hemorrhage, significant mass effect or CT evidence of acute large artery territorial infarction. Please note that a hyperacute infarct or small vessel infarct may not be apparent on initial CT imaging. Mild to moderate cerebral volume loss is present. Scattered areas of low-attenuation are present in the supratentorial white matter that are compatible with chronic small vessel ischemic disease. There is no hydrocephalus , intra-axial mass or abnormal extra-axial fluid collection. There are no displaced skull fractures. The mastoid air cells and paranasal sinuses are clear where imaged. IMPRESSION: Cerebral volume loss and white matter findings compatible with chronic small vessel ischemic disease.      Ct Head Wo Cont    Result Date: 5/25/2020  CT of the head without contrast. CLINICAL INDICATION: Weakness, unable to walk PROCEDURE: Serial thin section axial images are obtained from the cranial vertex through the skull base without the administration of intravenous contrast. Radiation dose reduction techniques were used for this study. Our CT scanners use one or all of the following: Automated exposure control, adjusted of the mA and/or kV according to patient size, iterative reconstruction COMPARISON: No prior. FINDINGS: There is no acute intracranial hemorrhage, mass, or mass effect. No abnormal extra-axial fluid collections identified. There is no hydrocephalus. The basilar cisterns are widely patent. There is moderate to severe patchy subcortical, deep, and periventricular white matter hypoattenuation. An old infarct is noted in the right basal ganglia with a tiny focal old lacunar infarct in the left basal ganglia. No skull fracture or aggressive osseous lesion noted. The mastoid air cells and included paranasal sinuses are clear. IMPRESSION: 1. No acute intracranial abnormality. Note, acute/subacute CVA cannot be excluded given the severity of the underlying white matter disease. 2. Moderate to severe bilateral white matter hypoattenuation most in keeping with chronic microangiopathic disease. 3. Old infarct in the right basal ganglia with an old lacunar infarct in the left basal ganglia. Ct Chest Wo Cont    Result Date: 6/11/2020  NONCONTRAST CHEST CT CLINICAL HISTORY:  Fever with high oxygen demand with leukocytosis and clinical concern for aspiration. TECHNIQUE:  Without contrast administration, the chest was scanned with spiral technique. Radiation dose reduction was achieved using one or all of the following techniques: automated exposure control, weight-based dosing, iterative reconstruction. COMPARISON:  Portable chest of June 8, 2020 and CT of September 10, 2013. FINDINGS:  There is infiltrate and volume loss in the right lower lobe associated with endobronchial debris consistent with aspiration. Minimal left basilar atelectasis is present as well. No pathologically enlarged lymph nodes or abnormal fluid collection.   The epigastrium appears unremarkable status post cholecystectomy. IMPRESSION:  RIGHT LOWER LOBE ASPIRATION PNEUMONIA AND MINIMAL LEFT BASILAR ATELECTASIS. Us Retroperitoneum Comp    Result Date: 6/7/2020  INDICATION:  Renal insufficiency TECHNIQUE: Real-time sonography of the kidneys, retroperitoneum and bladder was performed with multiple static images obtained. FINDINGS: The right kidney measures 10.6 cm and the left kidney measures 10.5 cm in length. Both kidneys are slightly echogenic. There is no hydronephrosis. There is a small cyst in the upper pole of the left kidney. There are no significant renal masses. The aorta and IVC are normal in caliber. The urinary bladder is collapsed around a Jefferson catheter. IMPRESSION: Echogenic kidneys suggesting chronic renal disease. No evidence of obstruction. Xr Chest Port    Result Date: 5/25/2020  Portable chest x-ray CLINICAL INDICATION: Weakness FINDINGS: Single AP view the chest compared to a similar exam dated 5/25/2020 show the lungs to be expanded and clear. No pleural effusion or pneumothorax. The cardiac silhouette and mediastinum are unremarkable. The bones are osteopenic. IMPRESSION: No acute cardiopulmonary abnormality. Assessment and Plan: Active Hospital Problems    Diagnosis Date Noted    Acute respiratory failure with hypoxia ([address] 06/11/2020    Hypernatremia 06/06/2020    ROSA (acute kidney injury) ([address] 06/05/2020    UTI (urinary tract infection) 06/05/2020    Acute metabolic encephalopathy 96/41/4828    Normocytic anemia 06/05/2020    Leukocytosis 05/26/2020           Plan:    Acute metabolic encephalopathy likely due to underlying infection(UTI, aspiration pna)  Prior Recent CVA with right sided deficit  - CT Head (6/8) does not show any acute abnormalities. - UA showed large leukocyte esterase with 4+ bacteria and UCx (6/5) shows pan sensitive proteus/klebsiella.    - ABG did not show hypercarbia to suspect the cause of encephalopathy and ammonia is wnl.  - BCx (6/5): no growth to date; Repeat (BCx 6/8) shows no growth so far. - treat underlying infection  - EEG to r/o seizures  - will get GI for PEG once patient's condition improves  - Palliative for Goals of care    Acute Respiratory Failure with hypoxia likely due to aspiration  Fever and leukocytosis concerning for aspiration PNA  - CXR shows no acute changes. CT chest shows  RLL pneumonia likely aspiration pneumonia  - BCx (6/8) is neg so far. - NPO  - c/w zosyn (6/08 - )  - c/w vancomycin (6/11 - ) pharmacy dose per renal function  - pulmonary consult for help given high O2 requirement. UTI  - UCx positive for proteus and klebsiella that's pan sensitive  - c/w abx as above    ROSA on CKD Stage 3 (baseline Cr 1.50 - 2.59)  Hypernatremia (improving)  Renal U/S no obstruction or hydronephrosis  - Nephrology on board  - on NGT with free water flushes    Diet:  DIET NPO  DIET TUBE FEEDING  DVT PPx: heparin SQ  Code: Full Code    Sung Herrera (Spouse [phone]): at bedside today. Updated. Addressed all concerns and questions. Medical Decision Making:    Labs/Imaging reviewed. Additional information obtained from nursing staff. Patient is high risk due to medical condition and comorbidities. Plan discussed with nursing staff. Plan discussed with patient/family. All questions/concerns were addressed. Pt/family agrees with the plan.          Signed By: Rupa Flores MD     June 14, 2020

## 2020-06-14 NOTE — CONSULTS
CONSULT NOTE    Guero Gutierrez    6/14/2020    Date of Admission:  6/5/2020    The patient's chart is reviewed and the patient is discussed with the staff. Subjective:     Patient is a 66 y.o.  female seen and evaluated at the request of Dr. Disha Moraes.  66 y.o. female with medical history significant for CAD, hypertension, recent CVA with residual right sided weakness, diabetes from [facility] with worsening confusion and elevated creatinine. Patient was found to have urinary tract infection and started on empiric antibiotics. Response called on 6/7 AM as patient was found to have acute respiratory failure with hypoxia likely due to aspiration. Patient was suctioned and put on non-rebreather with saturation improving to the 96%. Patient has been lethargic with minimally responsive since the event. Patient had multiple days of temp spikes and antibiotics have been broadened. CT head was neg for any acute intracranial abnormalities. CXR and CT showed infiltrate in the RLL confirming suspiration for aspiration pna. Due to decreased renal function, nephrology was consulted. Workup revealed no obstructive cause. Her renal function has been improving on IV fluids. Will also monitor hyponatremia during hospital stay and has been improved with free water flushes and hypotonic saline infusion. Pulmonary was consulted due to high oxygen requirement despite being treated by antibiotics for 5 days. Patient has been started o vancomycin and zosyn. She is On O2 at 5L/min via NC. Review of Systems  A comprehensive review of systems was negative except for that written in the HPI.     Patient Active Problem List   Diagnosis Code    Leukocytosis D72.829    ROSA (acute kidney injury) ([facility] [address] N17.9    UTI (urinary tract infection) N39.0    Acute metabolic encephalopathy H13.72    Normocytic anemia D64.9    Hypernatremia E87.0    Acute respiratory failure with hypoxia (HCC) J96.01 Prior to Admission Medications   Prescriptions Last Dose Informant Patient Reported? Taking? ASPIRIN/CAFFEINE (ANACIN PO)   Yes No   Sig: Take 2 Tabs by mouth two (2) times a day. Stop seven days prior to surgery per anesthesia protocol. Indications: last dose 310   CENESTIN 0.9 mg Tab   Yes No   Sig: Take  by mouth daily. Take day of surgery per anesthesia protocol. COZAAR 100 mg Tab   Yes No   Sig: Take 100 mg by mouth daily. Take 1/2 tablet twice daily. Cholecalciferol, Vitamin D3, (VITAMIN D3) 1,000 unit cap   Yes No   Sig: Take 1 Cap by mouth daily. Stop seven days prior to surgery per anesthesia protocol. OTHER,NON-FORMULARY,   Yes No   Si Units by SubCUTAneous route once. dinner   Indications: diabetes, tresiva   PRILOSEC 20 mg capsule   Yes No   Sig: Take 20 mg by mouth daily. Take day of surgery per anesthesia protocol. SYNTHROID 175 mcg tablet   Yes No   Sig: Take 150 mcg by mouth daily. Take day of surgery per anesthesia protocol. acetaminophen (Tylenol Extra Strength) 500 mg tablet   Yes No   Sig: Take 1,000 mg by mouth two (2) times daily as needed for Pain. albuterol (PROVENTIL HFA) 90 mcg/actuation inhaler   Yes No   Sig: Take 2 Puffs by inhalation daily as needed. Take day of surgery per anesthesia protocol. BRING DAY OF SURGERY   amLODIPine (Norvasc) 10 mg tablet   Yes No   Sig: Take 10 mg by mouth daily. aspirin delayed-release 81 mg tablet   Yes No   Sig: Take 81 mg by mouth daily. Take day of surgery per anesthesia protocol. atorvastatin (LIPITOR) 40 mg tablet   No No   Sig: Take 1 Tab by mouth nightly. carvediloL (Coreg) 25 mg tablet   Yes No   Sig: Take 12.5 mg by mouth two (2) times daily (with meals). ezetimibe (Zetia) 10 mg tablet   Yes No   Sig: Take  by mouth. furosemide (Lasix) 40 mg tablet   Yes No   Sig: Take  by mouth daily.    hyoscyamine SL (LEVSIN/SL) 0.125 mg SL tablet   No No   Si Tab by SubLINGual route every six (6) hours as needed for Cramping. insulin aspart U-100 (NovoLOG Flexpen U-100 Insulin) 100 unit/mL (3 mL) inpn   Yes No   Si Units by SubCUTAneous route every evening. insulin degludec Yadira Rossi FlexTouch U-100) 100 unit/mL (3 mL) inpn   Yes No   Si Units by SubCUTAneous route nightly. insulin glargine (Lantus U-100 Insulin) 100 unit/mL injection   No No   Si Units by SubCUTAneous route nightly. multivitamin (ONE A DAY) tablet   Yes No   Sig: Take 1 Tab by mouth daily. Take day of surgery per anesthesia protocol. nitrofurantoin, macrocrystal-monohydrate, (MACROBID) 100 mg capsule   No No   Sig: Take 1 Cap by mouth two (2) times a day. nitroglycerin (NITROSTAT) 0.4 mg SL tablet   No No   Si Tab by SubLINGual route every five (5) minutes as needed for Chest Pain. Up to 3 doses. ondansetron (ZOFRAN ODT) 4 mg disintegrating tablet   No No   Sig: Take 1 Tab by mouth every eight (8) hours as needed for Nausea. sertraline (ZOLOFT) 50 mg tablet   Yes No   Sig: Take 200 mg by mouth two (2) times a day. Take day of surgery per anesthesia protocol. ticagrelor (BRILINTA) 90 mg tablet   No No   Sig: Take 1 Tab by mouth every twelve (12) hours every twelve (12) hours. traZODone (DESYREL) 100 mg tablet   Yes No   Sig: Take 100 mg by mouth nightly. Facility-Administered Medications: None       Past Medical History:   Diagnosis Date    Arrhythmia     recurrent SVT    Arthritis     Asthma     stress induced- well controlled    CAD (coronary artery disease)     \"leaking valves\"    Chronic pain     shoulder    Diabetes ([facility] [address]     adv -160.  type 2 IDDM; does not take home sqbs- hypo at 80    GERD (gastroesophageal reflux disease)     Hypertension     well controlled with meds    Menopause     Morbid obesity ([facility] [address]     Stroke ([facility] [address]     , mini stroke    Thyroid disease     hypo    Unspecified adverse effect of anesthesia     woke up during every surgery     Past Surgical History:   Procedure Laterality Date    CARDIAC SURG PROCEDURE UNLIST  2010    AVNRT ablation times three    HX APPENDECTOMY      HX BREAST BIOPSY      bilateral breast bx    HX KNEE ARTHROSCOPY      left knee    HX LAP CHOLECYSTECTOMY      HX ORTHOPAEDIC      rt rotator cuff and bicep tendon repair    HX EMELY AND BSO      hysterectomy     Social History     Socioeconomic History    Marital status:      Spouse name: Not on file    Number of children: Not on file    Years of education: Not on file    Highest education level: Not on file   Occupational History    Not on file   Social Needs    Financial resource strain: Not on file    Food insecurity     Worry: Not on file     Inability: Not on file    Transportation needs     Medical: Not on file     Non-medical: Not on file   Tobacco Use    Smoking status: Former Smoker     Packs/day: 2.00     Years: 13.00     Pack years: 26.00     Last attempt to quit: 1970     Years since quittin.1    Smokeless tobacco: Never Used    Tobacco comment: quit 36 years ago   Substance and Sexual Activity    Alcohol use: No    Drug use: No    Sexual activity: Not on file   Lifestyle    Physical activity     Days per week: Not on file     Minutes per session: Not on file    Stress: Not on file   Relationships    Social connections     Talks on phone: Not on file     Gets together: Not on file     Attends Congregation service: Not on file     Active member of club or organization: Not on file     Attends meetings of clubs or organizations: Not on file     Relationship status: Not on file    Intimate partner violence     Fear of current or ex partner: Not on file     Emotionally abused: Not on file     Physically abused: Not on file     Forced sexual activity: Not on file   Other Topics Concern    Not on file   Social History Narrative    Not on file     Family History   Problem Relation Age of Onset    Other Brother     Cancer Mother     Heart Disease Mother     Breast Cancer Mother 79    Heart Disease Father     Malignant Hyperthermia Neg Hx     Pseudocholinesterase Deficiency Neg Hx     Delayed Awakening Neg Hx     Post-op Nausea/Vomiting Neg Hx     Emergence Delirium Neg Hx     Post-op Cognitive Dysfunction Neg Hx      Allergies   Allergen Reactions    Ace Inhibitors Swelling     Only to some    Adhesive Tape Rash    Codeine Nausea and Vomiting    Other Medication Swelling     Unknown blood pressure medication per pt.   Pt also allergic to predforte eye drops, causes pain that is severe    Other Omega-3s Other (comments)     All oral glucose medications, hives and couldn't breathe well    Sulfa (Sulfonamide Antibiotics) Nausea and Vomiting       Current Facility-Administered Medications   Medication Dose Route Frequency    piperacillin-tazobactam (ZOSYN) 3.375 g in 0.9% sodium chloride (MBP/ADV) 100 mL  3.375 g IntraVENous Q8H    [START ON 6/15/2020] aspirin chewable tablet 81 mg  81 mg Per NG tube DAILY    guaiFENesin (ROBITUSSIN) 100 mg/5 mL oral liquid 300 mg  300 mg Per NG tube Q6H    insulin glargine (LANTUS) injection 35 Units  35 Units SubCUTAneous QHS    Vancomycin Intermittent Dosing Placeholder   Other Rx Dosing/Monitoring    ticagrelor (BRILINTA) tablet 90 mg  90 mg Per NG tube Q12H    lip protectant (BLISTEX) ointment 1 Each  1 Each Topical PRN    pantoprazole (PROTONIX) 40 mg in 0.9% sodium chloride 10 mL injection  40 mg IntraVENous DAILY    acetaminophen (TYLENOL) suppository 650 mg  650 mg Rectal Q4H PRN    insulin regular (NOVOLIN R, HUMULIN R) injection   SubCUTAneous Q6H    levalbuterol (XOPENEX) nebulizer soln 1.25 mg/3 mL  1.25 mg Nebulization Q4H PRN    hydrALAZINE (APRESOLINE) 20 mg/mL injection 10 mg  10 mg IntraVENous Q6H PRN    levothyroxine (SYNTHROID) injection 110 mcg  110 mcg IntraVENous DAILY    [Held by provider] insulin regular (NOVOLIN R, HUMULIN R) injection 4 Units  4 Units SubCUTAneous QPM  [Held by provider] amLODIPine (NORVASC) tablet 10 mg  10 mg Oral DAILY    [Held by provider] atorvastatin (LIPITOR) tablet 40 mg  40 mg Oral QHS    [Held by provider] carvediloL (COREG) tablet 12.5 mg  12.5 mg Oral BID WITH MEALS    [Held by provider] losartan (COZAAR) tablet 100 mg  100 mg Oral DAILY    [Held by provider] ezetimibe (ZETIA) tablet 10 mg  10 mg Oral DAILY    hyoscyamine SL (LEVSIN/SL) tablet 0.125 mg  0.125 mg SubLINGual Q6H PRN    ondansetron (ZOFRAN ODT) tablet 4 mg  4 mg Oral Q8H PRN    [Held by provider] sertraline (ZOLOFT) tablet 200 mg  200 mg Oral BID    sodium chloride (NS) flush 5-40 mL  5-40 mL IntraVENous Q8H    sodium chloride (NS) flush 5-40 mL  5-40 mL IntraVENous PRN    acetaminophen (TYLENOL) tablet 650 mg  650 mg Oral Q4H PRN    ondansetron (ZOFRAN) injection 4 mg  4 mg IntraVENous Q4H PRN    magnesium hydroxide (MILK OF MAGNESIA) 400 mg/5 mL oral suspension 30 mL  30 mL Oral DAILY PRN    heparin (porcine) injection 5,000 Units  5,000 Units SubCUTAneous Q8H         Objective:     Vitals:    06/14/20 0154 06/14/20 0818 06/14/20 1154 06/14/20 1155   BP: 142/64 146/77 158/73    Pulse: 80 79 76    Resp: 20 20 18    Temp: 98.4 °F (36.9 °C) 98.5 °F (36.9 °C) 98.5 °F (36.9 °C)    SpO2: 99% 96% 96% 96%   Weight:       Height:           PHYSICAL EXAM     Gen:  the patient is well developed and in no acute distress, mildly tachypneic and non verbal to me  HEENT:  Sclera clear, pupils equal, oral mucosa moist  Lungs: bilateral rhonchi and cough  Heart:  RRR without M,G,R  Abd: soft and non-tender; with positive bowel sounds. Ext: warm without cyanosis. There is 1+ lower leg edema. Skin:  no jaundice or rashes, no wounds   Neuro: R paresis   Psychiatric:  alert and oriented x 1    Chest X-ray:      FINDINGS:  There is infiltrate and volume loss in the right lower lobe  associated with endobronchial debris consistent with aspiration.   Minimal left  basilar atelectasis is present as well. No pathologically enlarged lymph nodes  or abnormal fluid collection. The epigastrium appears unremarkable status post  cholecystectomy.     IMPRESSION  IMPRESSION:  RIGHT LOWER LOBE ASPIRATION PNEUMONIA AND MINIMAL LEFT BASILAR  ATELECTASIS. Recent Labs     06/13/20  0729   WBC 12.1*   HGB 8.5*   HCT 27.9*        Recent Labs     06/14/20  0804 06/13/20  0729 06/12/20  0814 06/11/20  1737   * 151* 153* 154*   K 4.5 4.6 4.4 4.5   * 122* 126* 127*   * 291* 316* 292*   CO2 20* 19* 18* 18*   BUN 43* 54* 81* 92*   CREA 1.49* 1.72* 2.35* 2.74*   MG  --   --  3.1*  --    PHOS  --   --  3.1 2.6   CA 9.1 8.7 9.4 9.6   ALB  --   --   --  2.9*     No results for input(s): PH, PCO2, PO2, HCO3 in the last 72 hours. Assessment:  (Medical Decision Making)     Patient Active Problem List   Diagnosis Code    Leukocytosis D72.829    ROSA (acute kidney injury) ([facility] [address] N17.9    UTI (urinary tract infection) N39.0    Acute metabolic encephalopathy N93.28    Normocytic anemia D64.9    Hypernatremia E87.0    Acute respiratory failure with hypoxia (HCC) J96.01         Plan:  (Medical Decision Making)     Hospital Problems  Never Reviewed          Codes Class Noted POA    Acute respiratory failure with hypoxia ([facility] [address] ICD-10-CM: J96.01  ICD-9-CM: 518.81  6/11/2020 Unknown    Due to aspiration PNA. Patient on appropriate ABX. Hypoxia due to PNA and atelectasis ansd Rx is supportive. Patient scheduled for PEG  Implement aspiration precautions. Supplement O2 to keep O2 sats 90% +  Hypoxia should improve over time. If able patient should perform flutter valve Rx.   Mobilize if able    ROSA (acute kidney injury) ([facility] [address] ICD-10-CM: N17.9  ICD-9-CM: 584.9  6/5/2020 Yes    Improved with IVF    UTI (urinary tract infection) ICD-10-CM: N39.0  ICD-9-CM: 599.0  6/5/2020 Yes    On ABX    * (Principal) Acute metabolic encephalopathy EMC-40-QM: G93.41  ICD-9-CM: 348.31  6/5/2020 Yes    better Leukocytosis ICD-10-CM: G25.361  ICD-9-CM: 288.60  5/26/2020 Yes    Due to above          Thank you very much for this referral.  We appreciate the opportunity to participate in this patient's care. Will follow along with above stated plan.     Paz Francisco MD

## 2020-06-15 PROBLEM — E11.9 DIABETES MELLITUS TYPE 2, CONTROLLED (HCC): Status: ACTIVE | Noted: 2020-06-15

## 2020-06-15 LAB
ANION GAP SERPL CALC-SCNC: 9 MMOL/L (ref 7–16)
BASOPHILS # BLD: 0 K/UL (ref 0–0.2)
BASOPHILS NFR BLD: 0 % (ref 0–2)
BUN SERPL-MCNC: 39 MG/DL (ref 8–23)
CALCIUM SERPL-MCNC: 9.2 MG/DL (ref 8.3–10.4)
CHLORIDE SERPL-SCNC: 117 MMOL/L (ref 98–107)
CO2 SERPL-SCNC: 21 MMOL/L (ref 21–32)
CREAT SERPL-MCNC: 1.49 MG/DL (ref 0.6–1)
DIFFERENTIAL METHOD BLD: ABNORMAL
EOSINOPHIL # BLD: 0.5 K/UL (ref 0–0.8)
EOSINOPHIL NFR BLD: 5 % (ref 0.5–7.8)
ERYTHROCYTE [DISTWIDTH] IN BLOOD BY AUTOMATED COUNT: 15.5 % (ref 11.9–14.6)
GLUCOSE BLD STRIP.AUTO-MCNC: 162 MG/DL (ref 65–100)
GLUCOSE BLD STRIP.AUTO-MCNC: 163 MG/DL (ref 65–100)
GLUCOSE BLD STRIP.AUTO-MCNC: 210 MG/DL (ref 65–100)
GLUCOSE BLD STRIP.AUTO-MCNC: 235 MG/DL (ref 65–100)
GLUCOSE SERPL-MCNC: 154 MG/DL (ref 65–100)
HCT VFR BLD AUTO: 25.1 % (ref 35.8–46.3)
HGB BLD-MCNC: 8.1 G/DL (ref 11.7–15.4)
IMM GRANULOCYTES # BLD AUTO: 0.1 K/UL (ref 0–0.5)
IMM GRANULOCYTES NFR BLD AUTO: 1 % (ref 0–5)
LYMPHOCYTES # BLD: 2.5 K/UL (ref 0.5–4.6)
LYMPHOCYTES NFR BLD: 25 % (ref 13–44)
MCH RBC QN AUTO: 32 PG (ref 26.1–32.9)
MCHC RBC AUTO-ENTMCNC: 32.3 G/DL (ref 31.4–35)
MCV RBC AUTO: 99.2 FL (ref 79.6–97.8)
MONOCYTES # BLD: 0.6 K/UL (ref 0.1–1.3)
MONOCYTES NFR BLD: 6 % (ref 4–12)
NEUTS SEG # BLD: 6.3 K/UL (ref 1.7–8.2)
NEUTS SEG NFR BLD: 62 % (ref 43–78)
NRBC # BLD: 0.02 K/UL (ref 0–0.2)
PLATELET # BLD AUTO: 197 K/UL (ref 150–450)
PMV BLD AUTO: 11.7 FL (ref 9.4–12.3)
POTASSIUM SERPL-SCNC: 4.4 MMOL/L (ref 3.5–5.1)
RBC # BLD AUTO: 2.53 M/UL (ref 4.05–5.2)
SODIUM SERPL-SCNC: 147 MMOL/L (ref 136–145)
VANCOMYCIN SERPL-MCNC: 13.7 UG/ML
WBC # BLD AUTO: 10.1 K/UL (ref 4.3–11.1)

## 2020-06-15 PROCEDURE — 74011250636 HC RX REV CODE- 250/636: Performed by: FAMILY MEDICINE

## 2020-06-15 PROCEDURE — 85025 COMPLETE CBC W/AUTO DIFF WBC: CPT

## 2020-06-15 PROCEDURE — 99233 SBSQ HOSP IP/OBS HIGH 50: CPT | Performed by: INTERNAL MEDICINE

## 2020-06-15 PROCEDURE — C9113 INJ PANTOPRAZOLE SODIUM, VIA: HCPCS | Performed by: INTERNAL MEDICINE

## 2020-06-15 PROCEDURE — 80048 BASIC METABOLIC PNL TOTAL CA: CPT

## 2020-06-15 PROCEDURE — 99232 SBSQ HOSP IP/OBS MODERATE 35: CPT | Performed by: NURSE PRACTITIONER

## 2020-06-15 PROCEDURE — 82962 GLUCOSE BLOOD TEST: CPT

## 2020-06-15 PROCEDURE — 74011000258 HC RX REV CODE- 258: Performed by: INTERNAL MEDICINE

## 2020-06-15 PROCEDURE — 65660000000 HC RM CCU STEPDOWN

## 2020-06-15 PROCEDURE — 77030040393 HC DRSG OPTIFOAM GENT MDII -B

## 2020-06-15 PROCEDURE — 97530 THERAPEUTIC ACTIVITIES: CPT

## 2020-06-15 PROCEDURE — 80202 ASSAY OF VANCOMYCIN: CPT

## 2020-06-15 PROCEDURE — 74011250636 HC RX REV CODE- 250/636: Performed by: INTERNAL MEDICINE

## 2020-06-15 PROCEDURE — 36415 COLL VENOUS BLD VENIPUNCTURE: CPT

## 2020-06-15 PROCEDURE — 74011636637 HC RX REV CODE- 636/637: Performed by: INTERNAL MEDICINE

## 2020-06-15 PROCEDURE — 74011250637 HC RX REV CODE- 250/637: Performed by: INTERNAL MEDICINE

## 2020-06-15 PROCEDURE — 77030018798 HC PMP KT ENTRL FED COVD -A

## 2020-06-15 PROCEDURE — 74011000250 HC RX REV CODE- 250: Performed by: INTERNAL MEDICINE

## 2020-06-15 RX ADMIN — GUAIFENESIN 300 MG: 100 SOLUTION ORAL at 00:31

## 2020-06-15 RX ADMIN — HEPARIN SODIUM 5000 UNITS: 5000 INJECTION INTRAVENOUS; SUBCUTANEOUS at 22:48

## 2020-06-15 RX ADMIN — VANCOMYCIN HYDROCHLORIDE 1000 MG: 1 INJECTION, POWDER, LYOPHILIZED, FOR SOLUTION INTRAVENOUS at 06:40

## 2020-06-15 RX ADMIN — PIPERACILLIN AND TAZOBACTAM 3.38 G: 3; .375 INJECTION, POWDER, FOR SOLUTION INTRAVENOUS at 00:30

## 2020-06-15 RX ADMIN — HUMAN INSULIN 3 UNITS: 100 INJECTION, SOLUTION SUBCUTANEOUS at 03:17

## 2020-06-15 RX ADMIN — TICAGRELOR 90 MG: 90 TABLET ORAL at 08:34

## 2020-06-15 RX ADMIN — PIPERACILLIN AND TAZOBACTAM 3.38 G: 3; .375 INJECTION, POWDER, FOR SOLUTION INTRAVENOUS at 17:51

## 2020-06-15 RX ADMIN — TICAGRELOR 90 MG: 90 TABLET ORAL at 22:49

## 2020-06-15 RX ADMIN — GUAIFENESIN 300 MG: 100 SOLUTION ORAL at 22:50

## 2020-06-15 RX ADMIN — Medication 10 ML: at 22:49

## 2020-06-15 RX ADMIN — GUAIFENESIN 300 MG: 100 SOLUTION ORAL at 17:50

## 2020-06-15 RX ADMIN — PIPERACILLIN AND TAZOBACTAM 3.38 G: 3; .375 INJECTION, POWDER, FOR SOLUTION INTRAVENOUS at 08:34

## 2020-06-15 RX ADMIN — HUMAN INSULIN 3 UNITS: 100 INJECTION, SOLUTION SUBCUTANEOUS at 08:35

## 2020-06-15 RX ADMIN — INSULIN GLARGINE 40 UNITS: 100 INJECTION, SOLUTION SUBCUTANEOUS at 22:49

## 2020-06-15 RX ADMIN — GUAIFENESIN 300 MG: 100 SOLUTION ORAL at 06:44

## 2020-06-15 RX ADMIN — ASPIRIN 81 MG 81 MG: 81 TABLET ORAL at 08:34

## 2020-06-15 RX ADMIN — GUAIFENESIN 300 MG: 100 SOLUTION ORAL at 13:58

## 2020-06-15 RX ADMIN — HEPARIN SODIUM 5000 UNITS: 5000 INJECTION INTRAVENOUS; SUBCUTANEOUS at 06:38

## 2020-06-15 RX ADMIN — HUMAN INSULIN 6 UNITS: 100 INJECTION, SOLUTION SUBCUTANEOUS at 22:48

## 2020-06-15 RX ADMIN — SODIUM CHLORIDE 40 MG: 9 INJECTION INTRAMUSCULAR; INTRAVENOUS; SUBCUTANEOUS at 08:34

## 2020-06-15 RX ADMIN — Medication 10 ML: at 14:00

## 2020-06-15 RX ADMIN — LEVOTHYROXINE SODIUM ANHYDROUS 110 MCG: 100 INJECTION, POWDER, LYOPHILIZED, FOR SOLUTION INTRAVENOUS at 09:04

## 2020-06-15 RX ADMIN — HEPARIN SODIUM 5000 UNITS: 5000 INJECTION INTRAVENOUS; SUBCUTANEOUS at 13:58

## 2020-06-15 RX ADMIN — HUMAN INSULIN 3 UNITS: 100 INJECTION, SOLUTION SUBCUTANEOUS at 17:52

## 2020-06-15 NOTE — CONSULTS
PROGRESS NOTE 12 [facility] 6/15/2020 Date of Admission:  6/5/2020 The patient's chart is reviewed and the patient is discussed with the staff. 66 y.o. CF evaluated at the request of Dr. Bayron Torres with chronic medical:  CAD, HTN, recent CVA with residual right sided weakness, DM, hx chronic CVA with left side weankness. Arrived from [facility] with worsening confusion and elevated creatinine. Was found to have UTI and started on empiric antibiotics. On 6/7 AM was found to have acute respiratory failure with hypoxia likely due to aspiration. Was suctioned, placed on NRB with sat  improving to 96%. Has been lethargic and minimally responsive since the event with fevers and antibiotics escalated. CT head was neg for any acute intracranial abnormalities and CXR and CT showed RLL infiltrate confirming suspiration for aspiration pneumonia. Due to worsening renal function, nephrology was consulted and workup revealed no obstruction. Renal function has improved on IVFs. Noted to be hyponatremia and hypotonic saline infusion. Pulmonary was consulted due to high O2 requirement despite being treated by antibiotics for 5 days. Patient has been started o vancomycin and zosyn. She is On O2 at 5L/min via NC. Subjective:  
 
Lying in bed, awakens but falls back asleep without stimulation. When asked how she was feeling she spoke in a whisper voice \"fine\" but had not other verbal responses. Nodding responses and appears appropriate. Had a wet cough several times on exam but did not produce any sputum. Temp max 100.2 yesterday. Having incontinent diarrhea stools. Receiving FTs via NG with 60ml/hr and free water 60 ml/hr. Weakly able to move right hand, arm and right toes but has no left extremity movements. Review of Systems Unobtainable due to patient status. Current Facility-Administered Medications Medication Dose Route Frequency  vancomycin (VANCOCIN) 1,000 mg in 0.9% sodium chloride (MBP/ADV) 250 mL  1,000 mg IntraVENous ONCE  piperacillin-tazobactam (ZOSYN) 3.375 g in 0.9% sodium chloride (MBP/ADV) 100 mL  3.375 g IntraVENous Q8H  
 aspirin chewable tablet 81 mg  81 mg Per NG tube DAILY  guaiFENesin (ROBITUSSIN) 100 mg/5 mL oral liquid 300 mg  300 mg Per NG tube Q6H  
 insulin glargine (LANTUS) injection 40 Units  40 Units SubCUTAneous QHS  Vancomycin Intermittent Dosing Placeholder   Other Rx Dosing/Monitoring  ticagrelor (BRILINTA) tablet 90 mg  90 mg Per NG tube Q12H  lip protectant (BLISTEX) ointment 1 Each  1 Each Topical PRN  pantoprazole (PROTONIX) 40 mg in 0.9% sodium chloride 10 mL injection  40 mg IntraVENous DAILY  acetaminophen (TYLENOL) suppository 650 mg  650 mg Rectal Q4H PRN  
 insulin regular (NOVOLIN R, HUMULIN R) injection   SubCUTAneous Q6H  
 levalbuterol (XOPENEX) nebulizer soln 1.25 mg/3 mL  1.25 mg Nebulization Q4H PRN  
 hydrALAZINE (APRESOLINE) 20 mg/mL injection 10 mg  10 mg IntraVENous Q6H PRN  
 levothyroxine (SYNTHROID) injection 110 mcg  110 mcg IntraVENous DAILY  [Held by provider] insulin regular (NOVOLIN R, HUMULIN R) injection 4 Units  4 Units SubCUTAneous QPM  
 [Held by provider] amLODIPine (NORVASC) tablet 10 mg  10 mg Oral DAILY  [Held by provider] atorvastatin (LIPITOR) tablet 40 mg  40 mg Oral QHS  [Held by provider] carvediloL (COREG) tablet 12.5 mg  12.5 mg Oral BID WITH MEALS  
 [Held by provider] losartan (COZAAR) tablet 100 mg  100 mg Oral DAILY  [Held by provider] ezetimibe (ZETIA) tablet 10 mg  10 mg Oral DAILY  hyoscyamine SL (LEVSIN/SL) tablet 0.125 mg  0.125 mg SubLINGual Q6H PRN  
 ondansetron (ZOFRAN ODT) tablet 4 mg  4 mg Oral Q8H PRN  
 [Held by provider] sertraline (ZOLOFT) tablet 200 mg  200 mg Oral BID  sodium chloride (NS) flush 5-40 mL  5-40 mL IntraVENous Q8H  
 sodium chloride (NS) flush 5-40 mL  5-40 mL IntraVENous PRN  
  acetaminophen (TYLENOL) tablet 650 mg  650 mg Oral Q4H PRN  
 ondansetron (ZOFRAN) injection 4 mg  4 mg IntraVENous Q4H PRN  
 magnesium hydroxide (MILK OF MAGNESIA) 400 mg/5 mL oral suspension 30 mL  30 mL Oral DAILY PRN  
 heparin (porcine) injection 5,000 Units  5,000 Units SubCUTAneous Q8H Objective:  
 
Vitals:  
 06/14/20 1526 06/14/20 2006 06/15/20 0003 06/15/20 0129 BP: 142/71 142/69 145/65 138/65 Pulse: 80 88 84 83 Resp: 18 20 18 18 Temp: 99.1 °F (37.3 °C) 100.2 °F (37.9 °C) 99 °F (37.2 °C) 98.6 °F (37 °C) SpO2: 95% 99% 95% 96% Weight:      
Height: PHYSICAL EXAM  
 
Gen:  the patient is obese and in no acute distress, NC 5L, sat 96% HEENT:  Sclera clear, pupils equal, oral mucosa moist 
Lungs: scattered anterior crackles, wet, nonproductive cough Heart:  RRR without M,G,R 
Abd: soft with positive bowel sounds, NG with TFs and having loose diarrhea stools Ext: warm without cyanosis. There is trace upper and lower extremity edema. Skin:  no jaundice or rashes, no wounds Neuro: No left side movements, right side weakness Psychiatric:  Arouses to stimulation, weak verbal response at times, Chest X-ray:   
6/14/20:  Bibasilar atelectasis unchanged Chest CT 6/11/20:   
RIGHT LOWER LOBE ASPIRATION PNEUMONIA AND MINIMAL LEFT BASILAR ATELECTASIS. Recent Labs  
  06/15/20 
0508 06/13/20 
9576 WBC 10.1 12.1* HGB 8.1* 8.5* HCT 25.1* 27.9*  
 183 Recent Labs  
  06/15/20 
0508 06/14/20 
0804 06/13/20 
0729 06/12/20 
0894 * 149* 151* 153* K 4.4 4.5 4.6 4.4  
* 119* 122* 126* * 250* 291* 316* CO2 21 20* 19* 18* BUN 39* 43* 54* 81* CREA 1.49* 1.49* 1.72* 2.35* MG  --   --   --  3.1*  
PHOS  --   --   --  3.1 CA 9.2 9.1 8.7 9.4 No results for input(s): PH, PCO2, PO2, HCO3 in the last 72 hours. Assessment:  (Medical Decision Making) Patient Active Problem List  
Diagnosis Code  Leukocytosis D72.829  
  ROSA (acute kidney injury) ([facility] N17.9  
 UTI (urinary tract infection) N39.0  Acute metabolic encephalopathy L34.12  
 Normocytic anemia D64.9  Hypernatremia E87.0  Acute respiratory failure with hypoxia ([facility]) J96.01  
 Aspiration pneumonia ([facility] J69.0  Diabetes mellitus type 2, controlled ([facility] E11.9 Plan:  (Medical Decision Making) Hospital Problems  Date Reviewed: 6/15/2020 Codes Class Noted POA Diabetes mellitus type 2, controlled ([facility] ICD-10-CM: E11.9 ICD-9-CM: 250.00  6/15/2020 Yes Chronic--glucose ranges 163-300, on SSI and Lantus--per primary team  
 Aspiration pneumonia ([place] [id number] ICD-10-CM: J69.0 ICD-9-CM: 507.0  6/14/2020 Unknown Remains on Zosyn and Vancomycin Acute respiratory failure with hypoxia [facility]) ICD-10-CM: J96.01 
ICD-9-CM: 518.81  6/11/2020 Unknown On NC 5L--wean O2 as tolearted Hypernatremia ICD-10-CM: E87.0 ICD-9-CM: 276.0  6/6/2020 Unknown Na down to 147 ROSA (acute kidney injury) ([facility] ICD-10-CM: N17.9 ICD-9-CM: 584.9  6/5/2020 Yes Per nephrology--creat down to 1.49  
 UTI (urinary tract infection) ICD-10-CM: N39.0 ICD-9-CM: 599.0  6/5/2020 Yes Day 7 f Zosyn and Vancomycin * (Principal) Acute metabolic encephalopathy OZD-68-HO: G93.41 
ICD-9-CM: 348.31  6/5/2020 Yes Nodding appropriately Normocytic anemia ICD-10-CM: D64.9 ICD-9-CM: 285.9  6/5/2020 Yes  
 hgb down to 8.1--following CBC Leukocytosis ICD-10-CM: Q49.035 ICD-9-CM: 288.60  5/26/2020 Yes WBC down to 10.1  
  
 
--Zosyn, Vancomycin day 7 
--Urine culture:  6/5/20:  Proteus mirabilis and Klebsiella pneumoniae --Blood cultures 6/8/20:  No growth--final 
--Hgb down to 8.1--follow --Speech following--has NG, planning PEG 
--Nephrology following, Creat down to 1.49 
--Recommend repeat discussion with patient and family to be sure they are willing to proceed with PEG.   Due to weak cough and poor airway clearance she may likely has chronic aspiration even. May need trach for airway protection and would be best if done at the same time.   
 
Jessica Jaramillo, NP

## 2020-06-15 NOTE — PROGRESS NOTES
Resting quietly, awake, resp even, unlab, skin warm, dry with generalized edema noted. NG intact to left nare with Glucerna infusing at 60 ml/hr, tolerating well, no residual feeding. Assessed as noted. No verbal response, but nodded appropriately when spoken to, eyes focusing, following commands. No c/o. Facial expression relaxed with no distress noted.

## 2020-06-15 NOTE — PROGRESS NOTES
[facility] Progress Note    Follow-Up on: ROSA/CKD, hypernatremia    ROS:  Confused but a little more alert    Exam:  Vitals:    06/15/20 0003 06/15/20 0322 06/15/20 0800 06/15/20 1130   BP: 145/65 138/65 131/56 135/57   Pulse: 84 83 83 81   Resp: 18 18 16 18   Temp: 99 °F (37.2 °C) 98.6 °F (37 °C) 99 °F (37.2 °C) 98.6 °F (37 °C)   SpO2: 95% 96% 99% 98%   Weight:       Height:             Intake/Output Summary (Last 24 hours) at 6/15/2020 1236  Last data filed at 6/15/2020 0006  Gross per 24 hour   Intake 60 ml   Output 301 ml   Net -241 ml       Wt Readings from Last 3 Encounters:   06/05/20 95.5 kg (210 lb 8 oz)   06/01/20 104.8 kg (231 lb 0.7 oz)   05/27/20 106.7 kg (235 lb 3.7 oz)       GEN - lethargic  CV - regular, no murmur, no rub  Lung - coarse upper airway sounds bilaterally  Abd - soft, nontender  Ext - no edema    Recent Labs     06/15/20  0508 06/13/20  0729   WBC 10.1 12.1*   HGB 8.1* 8.5*   HCT 25.1* 27.9*    183        Recent Labs     06/15/20  0508 06/14/20  0804 06/13/20  0729   * 149* 151*   K 4.4 4.5 4.6   * 119* 122*   CO2 21 20* 19*   BUN 39* 43* 54*   CREA 1.49* 1.49* 1.72*   CA 9.2 9.1 8.7   * 250* 291*       Assessment / Plan:  Principal Problem:    Acute metabolic encephalopathy (5/8/1173)    Active Problems:    Leukocytosis (5/26/2020)      ROSA (acute kidney injury) ([facility] (6/5/2020)      UTI (urinary tract infection) (6/5/2020)      Normocytic anemia (6/5/2020)      Hypernatremia (6/6/2020)      Acute respiratory failure with hypoxia (HCC) (6/11/2020)      Aspiration pneumonia ([facility] [address] (6/14/2020)      Diabetes mellitus type 2, controlled ([facility] [address] (6/15/2020)      1. ROSA/CKD  - Baseline Cr probably mid to upper 1's  - Suspect volume depletion   - Cr improving,   2. Hypernatremia - continue free water, improving  3. UTI  4.  Encephalopathy

## 2020-06-15 NOTE — PROGRESS NOTES
Anjel Bertrand, PCT informed to reassess pt for BM in progress, for phylicia care and brief change, agreed.

## 2020-06-15 NOTE — PROGRESS NOTES
Nutrition: Follow-up  Consult for tube feeding management (Hospitalist)    Assessment:  Food/Nutrition Patient History: Patient admitted from from [facility] with worsening confusion. She was recently admitted last month with elevated troponins and also found to have CVA. SLP was following during last admission and recommended puree diet with honey thick liquids by spoon. SLP following this admission. Diet of puree with honey liquids started 6/6. Patient made NPO 6/8. Noted that she had rapid called 6/7 for hypoxia and likely aspiration. Diet has not been able to be advanced due to patient not being able to participate in PO trials. NGT has been placed, awaiting KUB confirmation. Per pulmonary note, likely with chronic aspiration due to weak cough and poor airway clearance, may need trach if/when PEG pursued. PMH also significant for CAD, DM2, HTN. Patient seen in follow-up today. She is more alert today. She nods her head when asking if she is okay. She shakes her head when asking if she needs anything. She smiles when RD talking about meeting spouse last week. Spoke with RN. Pump stopped working this am. She has called for a new one. She states that patient has been tolerating TF.  She reports last BM this am.  Abdomen: Last BM 6/15- watery, active BS  Edema: General anasarca, 3+ pitting to BUE, 2+ BLE  Lab Results   Component Value Date/Time    Sodium 147 (H) 06/15/2020 05:08 AM    Potassium 4.4 06/15/2020 05:08 AM    Chloride 117 (H) 06/15/2020 05:08 AM    CO2 21 06/15/2020 05:08 AM    Anion gap 9 06/15/2020 05:08 AM    Glucose 154 (H) 06/15/2020 05:08 AM    BUN 39 (H) 06/15/2020 05:08 AM    Creatinine 1.49 (H) 06/15/2020 05:08 AM    Calcium 9.2 06/15/2020 05:08 AM    Albumin 2.9 (L) 06/11/2020 05:37 PM    Phosphorus 3.1 06/12/2020 08:14 AM   POC glucose 163-300 last 24 hrs  Labs remarkable for Na remains elevated but trending down, Elevated glucose but much improved, elevated BUN and creatinine but improving  Diet: DIET NPO  DIET TUBE FEEDING Opne order for details. Do not start until NGT placement confirmed. Keep HOB > 30 degrees. Check residuals every 4 hours. Hold TF for > 500 ml x 1 or 250 ml x 2 consecutive checks. Also place patient on right side if residual is . .. Pertinent Medications: Lantus 40 units per day SSI (30 units yesterday, 3 so far today), Protonix, Zosyn, synthroid, Milk of Mag PRN  Enteral nutrition access: NGT  Anthropometrics:Height: 5' 6\" (167.6 cm),  Weight: 95.5 kg (210 lb 8 oz), Weight Source: Bed, Body mass index is 33.98 kg/m². BMI class of obese. No edema currently noted. Macronutrient Needs: 95.5 kg CBW (Current body weight) bed scale  Estimated energy requirements:  9749-3715 kcal /day (15-20 kcal/kg)  Estimated protein requirements:  72-95 grams protein/day (20% kcal)  CHO limit per day: 263 grams CHO/day (55% calories)  Estimated fluid/day: 1.4-1.9 liters/day (~1ml/kcal/day)  Intake/Comparative Standards: TF at goal, providing 1440 kcal (100% estimated calorie needs), 119 grams protein (>100% estimated protein needs), 192 grams CHO/day (does not exceed maximum CHO/day) and ~2.5L free fluid (>100% for hypernatremia). Intervention:  Meals and snacks: NPO or per SLP  Enteral Nutrition: Continue Glucerna 1.5 via NGT at rate of 60ml/hour with free water flush at 60/hour. Will consider decreased water to maintenance fluids once hypernatremia resolved. Vitamin and Mineral Supplement Therapy:  Nutrition support orders for electrolyte management replacement are activated and placed on the MAR. Coordination of Nutrition Care: Discussed with Sonam Jimenez RN  Discharge Nutrition Plan: Too soon to determine.     [address], [address]

## 2020-06-15 NOTE — PROGRESS NOTES
Pharmacokinetic Consult to Pharmacist    Divya Hinds is a 66 y.o. female being treated for sepsis with vancomycin. Height: 5' 6\" (167.6 cm)  Weight: 95.5 kg (210 lb 8 oz)  Lab Results   Component Value Date/Time    BUN 39 (H) 06/15/2020 05:08 AM    Creatinine 1.49 (H) 06/15/2020 05:08 AM    WBC 10.1 06/15/2020 05:08 AM    Procalcitonin 0.45 06/09/2020 08:58 AM    Lactic acid 1.4 06/08/2020 12:57 AM      Estimated Creatinine Clearance: 36.8 mL/min (A) (based on SCr of 1.49 mg/dL (H)). Lab Results   Component Value Date/Time    Vancomycin, random 13.7 06/15/2020 05:08 AM       Day 5 of vancomycin. Goal trough is 15-20. Vancomycin random this am was 13.7, and the patient received a one time dose of 1000 mg. Will continue to follow patient.       Thank you,  Chrissy Brooks, PharmD, [address]  Clinical Pharmacy Specialist  [phone]

## 2020-06-15 NOTE — PROGRESS NOTES
SPEECH PATHOLOGY NOTE:    Patient is well know to this service from previous admissions and throughout this admission. Most modified barium swallow study completed 5/28/20 with recommendations for puree diet/honey thick liquids by tsp only with known risks of aspiration with all intake. Since that time patient has been re-admitted with RLL infiltrates. She has been unable to participate in speech therapy since 6/9/20 and has been NPO with alternative meals of nutrition via NG. PEG placement is planned. Discussed with Dr. Lupe Meléndez who also recommends consideration for trach placement if in line with goals of care. Speech therapy to sign off as patient has severe chronic dysphagia and is unable to participate in skilled therapy. Please consider re-consult patient's condition improves or if additional services are indicated at later time/date.      [address] CCC-SLP

## 2020-06-15 NOTE — PROGRESS NOTES
Problem: Diabetes Self-Management  Goal: *Disease process and treatment process  Description: Define diabetes and identify own type of diabetes; list 3 options for treating diabetes. Outcome: Progressing Towards Goal  Goal: *Incorporating nutritional management into lifestyle  Description: Describe effect of type, amount and timing of food on blood glucose; list 3 methods for planning meals. Outcome: Progressing Towards Goal  Goal: *Incorporating physical activity into lifestyle  Description: State effect of exercise on blood glucose levels. Outcome: Progressing Towards Goal  Goal: *Developing strategies to promote health/change behavior  Description: Define the ABC's of diabetes; identify appropriate screenings, schedule and personal plan for screenings. Outcome: Progressing Towards Goal  Goal: *Using medications safely  Description: State effect of diabetes medications on diabetes; name diabetes medication taking, action and side effects. Outcome: Progressing Towards Goal  Goal: *Monitoring blood glucose, interpreting and using results  Description: Identify recommended blood glucose targets  and personal targets. Outcome: Progressing Towards Goal  Goal: *Prevention, detection, treatment of acute complications  Description: List symptoms of hyper- and hypoglycemia; describe how to treat low blood sugar and actions for lowering  high blood glucose level. Outcome: Progressing Towards Goal  Goal: *Prevention, detection and treatment of chronic complications  Description: Define the natural course of diabetes and describe the relationship of blood glucose levels to long term complications of diabetes.   Outcome: Progressing Towards Goal  Goal: *Developing strategies to address psychosocial issues  Description: Describe feelings about living with diabetes; identify support needed and support network  Outcome: Progressing Towards Goal  Goal: *Insulin pump training  Outcome: Progressing Towards Goal  Goal: *Sick day guidelines  Outcome: Progressing Towards Goal  Goal: *Patient Specific Goal (EDIT GOAL, INSERT TEXT)  Outcome: Progressing Towards Goal     Problem: Patient Education: Go to Patient Education Activity  Goal: Patient/Family Education  Outcome: Progressing Towards Goal     Problem: Falls - Risk of  Goal: *Absence of Falls  Description: Document Danne Lobe Fall Risk and appropriate interventions in the flowsheet. Outcome: Progressing Towards Goal  Note: Fall Risk Interventions:  Mobility Interventions: Communicate number of staff needed for ambulation/transfer, OT consult for ADLs, PT Consult for mobility concerns    Mentation Interventions: Adequate sleep, hydration, pain control, Door open when patient unattended, More frequent rounding, Toileting rounds    Medication Interventions: Evaluate medications/consider consulting pharmacy    Elimination Interventions: Call light in reach, Toileting schedule/hourly rounds              Problem: Patient Education: Go to Patient Education Activity  Goal: Patient/Family Education  Outcome: Progressing Towards Goal     Problem: Pressure Injury - Risk of  Goal: *Prevention of pressure injury  Description: Document Santiago Scale and appropriate interventions in the flowsheet.   Outcome: Progressing Towards Goal  Note: Pressure Injury Interventions:  Sensory Interventions: Assess changes in LOC    Moisture Interventions: Absorbent underpads    Activity Interventions: Pressure redistribution bed/mattress(bed type)    Mobility Interventions: HOB 30 degrees or less    Nutrition Interventions: Document food/fluid/supplement intake    Friction and Shear Interventions: Minimize layers                Problem: Patient Education: Go to Patient Education Activity  Goal: Patient/Family Education  Outcome: Progressing Towards Goal     Problem: Dysphagia (Adult)  Goal: *Speech Goal: (INSERT TEXT)  Description: LTG: Patient will tolerate least restrictive diet without overt signs or symptoms of airway compromise by discharge. STG: Patient will tolerate pureed diet and honey-thick liquids without overt signs or symptoms of aspiration 100% of the time. STG: Patient will perform laryngeal strengthening exercises x10 each with 70% accuracy to improve strength and coordination of swallowing function. STG: Patient will participate in modified barium swallow study as clinically indicated.       Outcome: Progressing Towards Goal     Problem: Patient Education: Go to Patient Education Activity  Goal: Patient/Family Education  Outcome: Progressing Towards Goal     Problem: Nutrition Deficit  Goal: *Optimize nutritional status  Outcome: Progressing Towards Goal

## 2020-06-15 NOTE — PROGRESS NOTES
PROGRESS NOTE    Guero Smith    6/15/2020    Date of Admission:  6/5/2020    The patient's chart is reviewed and the patient is discussed with the staff. 66 y.o. CF evaluated at the request of Dr. Nikolai Gary with chronic medical:  CAD, HTN, recent CVA with residual right sided weakness, DM, hx chronic CVA with left side weankness. Arrived from [facility] with worsening confusion and elevated creatinine. Was found to have UTI and started on empiric antibiotics. On 6/7 AM was found to have acute respiratory failure with hypoxia likely due to aspiration. Was suctioned, placed on NRB with sat  improving to 96%. Has been lethargic and minimally responsive since the event with fevers and antibiotics escalated. CT head was neg for any acute intracranial abnormalities and CXR and CT showed RLL infiltrate confirming suspiration for aspiration pneumonia. Due to worsening renal function, nephrology was consulted and workup revealed no obstruction. Renal function has improved on IVFs. Noted to be hyponatremia and hypotonic saline infusion. Pulmonary was consulted due to high O2 requirement despite being treated by antibiotics for 5 days. Patient has been started o vancomycin and zosyn. She is On O2 at 5L/min via NC. Subjective:     Lying in bed, awakens but falls back asleep without stimulation. When asked how she was feeling she spoke in a whisper voice \"fine\" but had not other verbal responses. Nodding responses and appears appropriate. Had a wet cough several times on exam but did not produce any sputum. Temp max 100.2 yesterday. Having incontinent diarrhea stools. Receiving FTs via NG with 60ml/hr and free water 60 ml/hr. Weakly able to move right hand, arm and right toes but has no left extremity movements. Review of Systems  Unobtainable due to patient status.       Current Facility-Administered Medications   Medication Dose Route Frequency    piperacillin-tazobactam (ZOSYN) 3.375 g in 0.9% sodium chloride (MBP/ADV) 100 mL  3.375 g IntraVENous Q8H    aspirin chewable tablet 81 mg  81 mg Per NG tube DAILY    guaiFENesin (ROBITUSSIN) 100 mg/5 mL oral liquid 300 mg  300 mg Per NG tube Q6H    insulin glargine (LANTUS) injection 40 Units  40 Units SubCUTAneous QHS    Vancomycin Intermittent Dosing Placeholder   Other Rx Dosing/Monitoring    ticagrelor (BRILINTA) tablet 90 mg  90 mg Per NG tube Q12H    lip protectant (BLISTEX) ointment 1 Each  1 Each Topical PRN    pantoprazole (PROTONIX) 40 mg in 0.9% sodium chloride 10 mL injection  40 mg IntraVENous DAILY    acetaminophen (TYLENOL) suppository 650 mg  650 mg Rectal Q4H PRN    insulin regular (NOVOLIN R, HUMULIN R) injection   SubCUTAneous Q6H    levalbuterol (XOPENEX) nebulizer soln 1.25 mg/3 mL  1.25 mg Nebulization Q4H PRN    hydrALAZINE (APRESOLINE) 20 mg/mL injection 10 mg  10 mg IntraVENous Q6H PRN    levothyroxine (SYNTHROID) injection 110 mcg  110 mcg IntraVENous DAILY    [Held by provider] insulin regular (NOVOLIN R, HUMULIN R) injection 4 Units  4 Units SubCUTAneous QPM    [Held by provider] amLODIPine (NORVASC) tablet 10 mg  10 mg Oral DAILY    [Held by provider] atorvastatin (LIPITOR) tablet 40 mg  40 mg Oral QHS    [Held by provider] carvediloL (COREG) tablet 12.5 mg  12.5 mg Oral BID WITH MEALS    [Held by provider] losartan (COZAAR) tablet 100 mg  100 mg Oral DAILY    [Held by provider] ezetimibe (ZETIA) tablet 10 mg  10 mg Oral DAILY    hyoscyamine SL (LEVSIN/SL) tablet 0.125 mg  0.125 mg SubLINGual Q6H PRN    ondansetron (ZOFRAN ODT) tablet 4 mg  4 mg Oral Q8H PRN    [Held by provider] sertraline (ZOLOFT) tablet 200 mg  200 mg Oral BID    sodium chloride (NS) flush 5-40 mL  5-40 mL IntraVENous Q8H    sodium chloride (NS) flush 5-40 mL  5-40 mL IntraVENous PRN    acetaminophen (TYLENOL) tablet 650 mg  650 mg Oral Q4H PRN    ondansetron (ZOFRAN) injection 4 mg  4 mg IntraVENous Q4H PRN    magnesium hydroxide (MILK OF MAGNESIA) 400 mg/5 mL oral suspension 30 mL  30 mL Oral DAILY PRN    heparin (porcine) injection 5,000 Units  5,000 Units SubCUTAneous Q8H         Objective:     Vitals:    06/14/20 1526 06/14/20 2006 06/15/20 0003 06/15/20 0322   BP: 142/71 142/69 145/65 138/65   Pulse: 80 88 84 83   Resp: 18 20 18 18   Temp: 99.1 °F (37.3 °C) 100.2 °F (37.9 °C) 99 °F (37.2 °C) 98.6 °F (37 °C)   SpO2: 95% 99% 95% 96%   Weight:       Height:           PHYSICAL EXAM     Gen:  the patient is obese and in no acute distress, NC 5L, sat 96%  HEENT:  Sclera clear, pupils equal, oral mucosa moist  Lungs: scattered anterior crackles, wet, nonproductive cough  Heart:  RRR without M,G,R  Abd: soft with positive bowel sounds, NG with TFs and having loose diarrhea stools  Ext: warm without cyanosis. There is trace upper and lower extremity edema. Skin:  no jaundice or rashes, no wounds   Neuro: No left side movements, right side weakness   Psychiatric:  Arouses to stimulation, weak verbal response at times,     Chest X-ray:    6/14/20:  Bibasilar atelectasis unchanged      Chest CT 6/11/20:    RIGHT LOWER LOBE ASPIRATION PNEUMONIA AND MINIMAL LEFT BASILAR ATELECTASIS. Recent Labs     06/15/20  0508 06/13/20  0729   WBC 10.1 12.1*   HGB 8.1* 8.5*   HCT 25.1* 27.9*    183     Recent Labs     06/15/20  0508 06/14/20  0804 06/13/20  0729   * 149* 151*   K 4.4 4.5 4.6   * 119* 122*   * 250* 291*   CO2 21 20* 19*   BUN 39* 43* 54*   CREA 1.49* 1.49* 1.72*   CA 9.2 9.1 8.7     No results for input(s): PH, PCO2, PO2, HCO3 in the last 72 hours.       Assessment:  (Medical Decision Making)     Patient Active Problem List   Diagnosis Code    Leukocytosis D72.829    ROSA (acute kidney injury) ([facility] [address] N17.9    UTI (urinary tract infection) N39.0    Acute metabolic encephalopathy N77.73    Normocytic anemia D64.9    Hypernatremia E87.0    Acute respiratory failure with hypoxia (HCC) J96.01    Aspiration pneumonia ([facility] J69.0    Diabetes mellitus type 2, controlled ([place] [address] E11.9         Plan:  (Medical Decision Making)     Hospital Problems  Date Reviewed: 6/15/2020          Codes Class Noted POA    Diabetes mellitus type 2, controlled ([place] [address] ICD-10-CM: E11.9  ICD-9-CM: 250.00  6/15/2020 Yes    Chronic--glucose ranges 163-300, on SSI and Lantus--per primary team    Aspiration pneumonia ([facility] ICD-10-CM: J69.0  ICD-9-CM: 507.0  6/14/2020 Unknown    Remains on Zosyn and Vancomycin     Acute respiratory failure with hypoxia ([facility] ICD-10-CM: J96.01  ICD-9-CM: 518.81  6/11/2020 Unknown    On NC 5L--wean O2 as tolearted    Hypernatremia ICD-10-CM: E87.0  ICD-9-CM: 276.0  6/6/2020 Unknown    Na down to 147    ROSA (acute kidney injury) ([facility] ICD-10-CM: N17.9  ICD-9-CM: 584.9  6/5/2020 Yes    Per nephrology--creat down to 1.49    UTI (urinary tract infection) ICD-10-CM: N39.0  ICD-9-CM: 599.0  6/5/2020 Yes    Day 7 f Zosyn and Vancomycin     * (Principal) Acute metabolic encephalopathy TTL-03-IS: G93.41  ICD-9-CM: 348.31  6/5/2020 Yes    Nodding appropriately     Normocytic anemia ICD-10-CM: D64.9  ICD-9-CM: 285.9  6/5/2020 Yes    hgb down to 8.1--following CBC    Leukocytosis ICD-10-CM: D72.829  ICD-9-CM: 288.60  5/26/2020 Yes    WBC down to 10.1        --Zosyn, Vancomycin day 7  --Urine culture:  6/5/20:  Proteus mirabilis and Klebsiella pneumoniae  --Blood cultures 6/8/20:  No growth--final  --Hgb down to 8.1--follow  --Speech following--has NG, planning PEG  --Nephrology following, Creat down to 1.49  --Recommend repeat discussion with patient and family to be sure they are willing to proceed with PEG. Due to weak cough and poor airway clearance she may likely has chronic aspiration even. May need trach for airway protection and would be best if done at the same time. Floyd Figueroa, NP    Lungs: b/l rhonchi  Heart S1 and S2 audible, no murmers or rubs appreciated  Other     --Still very weak.  High risk for spermiating. Will need to consider trach with PEG as well. --Did note CT recently and has markedly elevated Right diaphragm. --Compared to neurological examination from prior visit in May, now appears to have marked worsening of strength which is a good prognostic indictor for further decline/decompensation. --Would recommend family and PMD discuss goals of care and long term planning. I have spoken with and examined the patient. I have reviewed the history, examination, assessment, and plan and agree with the above. Milana Wynne MD      This note was signed electronically. Errors are unfortunately her likely due to dictation software.

## 2020-06-15 NOTE — PROGRESS NOTES
Hospitalist Note     Admit Date:  2020  5:03 PM   Name:  Jorge Lebron      Age:  66 y.o.    :  1945   MRN:  [id number]   PCP:  Lizeth Cabral MD    HPI/Subjective:   Reason for Admission: ROSA (acute kidney injury) [facility]) [N17.9]    Hospital Course:   Jorge Lebron is a 66 y.o. female with medical history significant for CAD, hypertensin, recent CVA with residual right sided weakness, diabetes from [facility] with worsening confusion and elevated creatinine. Patient was found to have urinary tract infection and started on empiric antibiotics. Response called on 6/7 AM as patient was found to have acute respiratory failure with hypoxia likely due to aspiration. Patient was suctioned and put on non-rebreather with saturation improving to the 96%. Patient has been lethargic with minimally responsive since the event. Patient had multiple days of temp spikes and antibiotics have been broadened. CT head was neg for any acute intracranial abnormalities. CXR and CT showed infiltrate in the RLL confirming suspicion for aspiration pna. Due to decreased renal function, nephrology was consulted. Workup revealed no obstructive cause. Her renal function and hypernatremia has been improving on IV fluids and free water flushes. Pulmonary was consulted due to persistent high oxygen requirement despite being treated by antibiotics. 06/15/20 :  Patient is seen and examined at bedside. No acute events reported overnight by nursing staff. Patient has been more alert and awake lately but still very weak. Still has lots of secretions in the upper airways and too weak to clear it on her own. ROS limited due to clinical condition.     Objective:     Patient Vitals for the past 24 hrs:   Temp Pulse Resp BP SpO2   06/15/20 1130 98.6 °F (37 °C) 81 18 135/57 98 %   06/15/20 0800 99 °F (37.2 °C) 83 16 131/56 99 %   06/15/20 0322 98.6 °F (37 °C) 83 18 138/65 96 %   06/15/20 0003 99 °F (37.2 °C) 84 18 145/65 95 %   06/14/20 2006 100.2 °F (37.9 °C) 88 20 142/69 99 %   06/14/20 1526 99.1 °F (37.3 °C) 80 18 142/71 95 %     Oxygen Therapy  O2 Sat (%): 98 % (06/15/20 1130)  Pulse via Oximetry: 71 beats per minute (06/14/20 1155)  O2 Device: Room air (06/15/20 1355)  O2 Flow Rate (L/min): 5 l/min (06/14/20 1155)  FIO2 (%): 40 % (06/14/20 1155)    Estimated body mass index is 33.98 kg/m² as calculated from the following:    Height as of this encounter: 5' 6\" (1.676 m). Weight as of this encounter: 95.5 kg (210 lb 8 oz). Intake/Output Summary (Last 24 hours) at 6/15/2020 1500  Last data filed at 6/15/2020 0006  Gross per 24 hour   Intake 60 ml   Output 301 ml   Net -241 ml       *Note that automatically entered I/Os may not be accurate; dependent on patient compliance with collection and accurate  by techs. Physical Exam:   General:     Alert, awake. Well nourished and Obese. Head:   normocephalic, atraumatic  Eyes, Ears, nose: PERRL. Normal conjunctiva  Neck:    supple, non-tender. Trachea midline. Lungs:   Coarse breath sounds and crackles. no wheezing  Cardiac:   RRR, Normal S1 and S2. Abdomen:   Soft, non distended, nontender, +BS, no guarding/rebound  Extremities:   Warm, dry.  Trace edema   Skin:   No rashes, no jaundice  Neuro:  Somnolent  Psychiatric:  No anxiety, calm, cooperative    Data Review:  I have reviewed all labs, meds, and studies from the last 24 hours:    Recent Results (from the past 24 hour(s))   GLUCOSE, POC    Collection Time: 06/14/20  3:05 PM   Result Value Ref Range    Glucose (POC) 293 (H) 65 - 100 mg/dL   GLUCOSE, POC    Collection Time: 06/14/20  9:31 PM   Result Value Ref Range    Glucose (POC) 190 (H) 65 - 100 mg/dL   GLUCOSE, POC    Collection Time: 06/15/20  3:13 AM   Result Value Ref Range    Glucose (POC) 163 (H) 65 - 751 mg/dL   METABOLIC PANEL, BASIC    Collection Time: 06/15/20  5:08 AM   Result Value Ref Range    Sodium 147 (H) 136 - 145 mmol/L    Potassium 4.4 3.5 - 5.1 mmol/L    Chloride 117 (H) 98 - 107 mmol/L    CO2 21 21 - 32 mmol/L    Anion gap 9 7 - 16 mmol/L    Glucose 154 (H) 65 - 100 mg/dL    BUN 39 (H) 8 - 23 MG/DL    Creatinine 1.49 (H) 0.6 - 1.0 MG/DL    GFR est AA 44 (L) >60 ml/min/1.73m2    GFR est non-AA 36 (L) >60 ml/min/1.73m2    Calcium 9.2 8.3 - 10.4 MG/DL   CBC WITH AUTOMATED DIFF    Collection Time: 06/15/20  5:08 AM   Result Value Ref Range    WBC 10.1 4.3 - 11.1 K/uL    RBC 2.53 (L) 4.05 - 5.2 M/uL    HGB 8.1 (L) 11.7 - 15.4 g/dL    HCT 25.1 (L) 35.8 - 46.3 %    MCV 99.2 (H) 79.6 - 97.8 FL    MCH 32.0 26.1 - 32.9 PG    MCHC 32.3 31.4 - 35.0 g/dL    RDW 15.5 (H) 11.9 - 14.6 %    PLATELET 310 801 - 864 K/uL    MPV 11.7 9.4 - 12.3 FL    ABSOLUTE NRBC 0.02 0.0 - 0.2 K/uL    DF AUTOMATED      NEUTROPHILS 62 43 - 78 %    LYMPHOCYTES 25 13 - 44 %    MONOCYTES 6 4.0 - 12.0 %    EOSINOPHILS 5 0.5 - 7.8 %    BASOPHILS 0 0.0 - 2.0 %    IMMATURE GRANULOCYTES 1 0.0 - 5.0 %    ABS. NEUTROPHILS 6.3 1.7 - 8.2 K/UL    ABS. LYMPHOCYTES 2.5 0.5 - 4.6 K/UL    ABS. MONOCYTES 0.6 0.1 - 1.3 K/UL    ABS. EOSINOPHILS 0.5 0.0 - 0.8 K/UL    ABS. BASOPHILS 0.0 0.0 - 0.2 K/UL    ABS. IMM.  GRANS. 0.1 0.0 - 0.5 K/UL   VANCOMYCIN, RANDOM    Collection Time: 06/15/20  5:08 AM   Result Value Ref Range    Vancomycin, random 13.7 UG/ML   GLUCOSE, POC    Collection Time: 06/15/20 11:32 AM   Result Value Ref Range    Glucose (POC) 162 (H) 65 - 100 mg/dL        All Micro Results     Procedure Component Value Units Date/Time    CULTURE, BLOOD [[id number]] Collected:  06/08/20 1812    Order Status:  Completed Specimen:  Blood Updated:  06/13/20 0634     Special Requests: --        RIGHT  HAND       Culture result: NO GROWTH 5 DAYS       CULTURE, BLOOD [[id number]] Collected:  06/08/20 1807    Order Status:  Completed Specimen:  Blood Updated:  06/13/20 0634     Special Requests: --        LEFT  HAND       Culture result: NO GROWTH 5 DAYS       CULTURE, BLOOD [[id number]] Collected:  06/05/20 1848 Order Status:  Completed Specimen:  Blood Updated:  06/10/20 0631     Special Requests: --        NO SPECIAL REQUESTS  LEFT  Antecubital       Culture result: NO GROWTH 5 DAYS       CULTURE, BLOOD [[id number]] Collected:  06/05/20 1845    Order Status:  Completed Specimen:  Blood Updated:  06/10/20 0631     Special Requests: --        NO SPECIAL REQUESTS  RIGHT  FOREARM       Culture result: NO GROWTH 5 DAYS       MSSA/MRSA SC BY PCR, NASAL SWAB [[id number]] Collected:  06/09/20 1545    Order Status:  Canceled Specimen:  Nasal swab     CULTURE, URINE [[id number]]  (Abnormal)  (Susceptibility) Collected:  06/05/20 1845    Order Status:  Completed Specimen:  Cath Urine Updated:  06/09/20 0710     Special Requests: NO SPECIAL REQUESTS        Culture result:       >100,000 COLONIES/mL PROTEUS MIRABILIS                  >100,000 COLONIES/mL KLEBSIELLA PNEUMONIAE                Current Meds:  Current Facility-Administered Medications   Medication Dose Route Frequency    piperacillin-tazobactam (ZOSYN) 3.375 g in 0.9% sodium chloride (MBP/ADV) 100 mL  3.375 g IntraVENous Q8H    aspirin chewable tablet 81 mg  81 mg Per NG tube DAILY    guaiFENesin (ROBITUSSIN) 100 mg/5 mL oral liquid 300 mg  300 mg Per NG tube Q6H    insulin glargine (LANTUS) injection 40 Units  40 Units SubCUTAneous QHS    Vancomycin Intermittent Dosing Placeholder   Other Rx Dosing/Monitoring    ticagrelor (BRILINTA) tablet 90 mg  90 mg Per NG tube Q12H    lip protectant (BLISTEX) ointment 1 Each  1 Each Topical PRN    pantoprazole (PROTONIX) 40 mg in 0.9% sodium chloride 10 mL injection  40 mg IntraVENous DAILY    acetaminophen (TYLENOL) suppository 650 mg  650 mg Rectal Q4H PRN    insulin regular (NOVOLIN R, HUMULIN R) injection   SubCUTAneous Q6H    levalbuterol (XOPENEX) nebulizer soln 1.25 mg/3 mL  1.25 mg Nebulization Q4H PRN    hydrALAZINE (APRESOLINE) 20 mg/mL injection 10 mg  10 mg IntraVENous Q6H PRN    levothyroxine (SYNTHROID) injection 110 mcg  110 mcg IntraVENous DAILY    [Held by provider] insulin regular (NOVOLIN R, HUMULIN R) injection 4 Units  4 Units SubCUTAneous QPM    [Held by provider] amLODIPine (NORVASC) tablet 10 mg  10 mg Oral DAILY    [Held by provider] atorvastatin (LIPITOR) tablet 40 mg  40 mg Oral QHS    [Held by provider] carvediloL (COREG) tablet 12.5 mg  12.5 mg Oral BID WITH MEALS    [Held by provider] losartan (COZAAR) tablet 100 mg  100 mg Oral DAILY    [Held by provider] ezetimibe (ZETIA) tablet 10 mg  10 mg Oral DAILY    hyoscyamine SL (LEVSIN/SL) tablet 0.125 mg  0.125 mg SubLINGual Q6H PRN    ondansetron (ZOFRAN ODT) tablet 4 mg  4 mg Oral Q8H PRN    [Held by provider] sertraline (ZOLOFT) tablet 200 mg  200 mg Oral BID    sodium chloride (NS) flush 5-40 mL  5-40 mL IntraVENous Q8H    sodium chloride (NS) flush 5-40 mL  5-40 mL IntraVENous PRN    acetaminophen (TYLENOL) tablet 650 mg  650 mg Oral Q4H PRN    ondansetron (ZOFRAN) injection 4 mg  4 mg IntraVENous Q4H PRN    magnesium hydroxide (MILK OF MAGNESIA) 400 mg/5 mL oral suspension 30 mL  30 mL Oral DAILY PRN    heparin (porcine) injection 5,000 Units  5,000 Units SubCUTAneous Q8H       Other Studies:    Nm Lung Scan Perf    Result Date: 5/25/2020  EXAM:  NM LUNG SCAN PERF INDICATION:  Elevated troponin weakness mild hypoxia COMPARISON:  None. TRACER: 4.6 mCi of Tc-99m MAA. FINDINGS: Perfusion lung imaging was performed following intravenous administration of  Tc 99m MAA. Images were obtained from the anterior, posterior and right and left anterior and posterior oblique projections. The perfusion is normal. No segmental or subsegmental defects are identified. Chest x-ray reveals an elevated right hemidiaphragm. IMPRESSION: Normal lung perfusion study.      Xr Chest Sngl V    Result Date: 6/14/2020  EXAM: TEMPORARY INDICATION: Respiratory failure COMPARISON: 6/8/2020 FINDINGS: A portable AP radiograph of the chest was obtained at [facility] hours. The patient is on a cardiac monitor. Bibasilar airspace disease unchanged. The cardiac and mediastinal contours and pulmonary vascularity are normal.  The bones and soft tissues are grossly within normal limits. IMPRESSION: Bibasilar atelectasis unchanged. Xr Chest Sngl V    Result Date: 6/8/2020  CHEST X-RAY, one view. HISTORY:  Hypoxemia and increasing oxygen demand. TECHNIQUE:  AP upright portable view COMPARISON: Earlier today FINDINGS:   -The lungs: are clear. -The costophrenic angles: are sharp. -The heart size: is normal. -The pulmonary vasculature: is unremarkable. -Included portion of the upper abdomen: is unremarkable. -Bones: No gross bony lesions. -Other: None. IMPRESSION:  Negative for acute change     Xr Chest Sngl V    Result Date: 6/8/2020   Portable view of the chest COMPARISON: June 7, 2020 Clinical history: Hypoxia. FINDINGS: Persistent elevation of the right hemidiaphragm. Lungs are underinflated. No pneumothorax, pulmonary edema or large pleural effusion. No definite pulmonary consolidation. Heart appears mildly enlarged. Mediastinal contour is within normal limits. Surrounding bones are unremarkable. IMPRESSION: 1. No pulmonary edema or definite pulmonary consolidation. 2. No significant change compared to exam.    Xr Chest Sngl V    Result Date: 6/7/2020  Chest X-ray INDICATION: Shortness of breath A portable AP view of the chest was obtained. FINDINGS: There is chronic elevation of the right diaphragm. There are no infiltrates or effusions. The heart size is normal.  The bony thorax is intact. IMPRESSION: No acute findings in the chest     Xr Chest Pa Lat    Result Date: 5/25/2020  EXAM: XR CHEST PA LAT INDICATION: Cough COMPARISON: None. FINDINGS: PA and lateral radiographs of the chest demonstrate clear lungs. The cardiac and mediastinal contours and pulmonary vascularity are normal. The bones and soft tissues are within normal limits.      IMPRESSION: Normal chest.    Xr Abd (kub)    Result Date: 6/10/2020  History: NG tube placement Single view abdomen FINDINGS: There is an NG tube which terminates to the right of midline, likely in the distal stomach. Clips are present from prior cholecystectomy. There is elevation of the right hemidiaphragm. The lung bases are clear. The bowel gas pattern is nonspecific. IMPRESSION: NG tube as described. Xr Abd (ap And Erect Or Decub)    Result Date: 5/25/2020  Abdominal films, 3 views. History: Nausea and vomiting Technique:  Supine and upright views of the abdomen on 3 images. Comparison: None Findings: Cholecystectomy. Nonspecific bowel gas pattern. No evidence of free air, organomegaly. Calcification located between the right transverse processes of L4 and L5. IMPRESSION: Negative for free air, ileus or obstruction. Possible right ureteral calculus. Correlate clinically. Xr Swallow Func Video    Result Date: 5/28/2020  Modified Barium Swallow INDICATION: Oropharyngeal dysphagia. CVA Fluoro time: 3.15 minutes Spot films:  0 Fluoroscopy was used to evaluate pharyngeal function while the patient was given barium solutions and barium coated solids. FINDINGS: Severe global swallow deficits present. There is harriet aspiration of thin barium by teaspoon with poor cough response. Shallow penetration with nectar by teaspoon. Penetration with nectar by cup. Deep penetration with honey by cup. IMPRESSION: 1. Aspiration and deep penetration with multiple administered consistencies. 2.  Please see concurrent speech and language pathology note for complete description of findings.      Mri Brain Wo Cont    Result Date: 5/27/2020  EXAMINATION: BRAIN MRI 5/27/2020 11:49 AM ACCESSION NUMBER: [id number] INDICATION: Worsening of old stroke COMPARISON: Head CT 5/27/2020 and 5/25/2020 TECHNIQUE: Multiplanar multisequence MRI of the brain without intravenous contrast. FINDINGS: There are numerous small and punctate areas of restricted diffusion scattered throughout the supratentorial brain parenchyma, worst in the right lentiform and caudate nuclei. There are multiple foci of restricted diffusion throughout both cerebellar hemispheres. There is a moderate-sized focus of restricted diffusion in the right aspect of the benjy. The preponderance of these areas demonstrate T2 hyperintensity. The constellation of findings is consistent with multiple supratentorial acute or early subacute infarctions involving multiple vascular distributions. None of the infarctions demonstrates significant associated mass effect or hemorrhagic conversion. The ventricles are within normal limits for the degree of global brain parenchymal volume loss. There is no midline shift. The basilar cisterns are patent. There is no cerebellar tonsillar ectopia or herniation. There are T2 hyperintensities in the periventricular and subcortical white matter and benjy, a nonspecific finding which likely represents chronic microangiopathy. Prior bilateral lens replacement. The expected large vascular flow voids are preserved. There are no suspicious osseous lesions. IMPRESSION: 1. Numerous acute or early subacute infarctions scattered throughout the supratentorial and infratentorial brain parenchyma. These are most likely embolic infarctions from a central source. The most confluent areas of involvement include the right caudate and lentiform nuclei, the benjy, and the left cerebellar hemisphere. There is no evidence of significant mass effect or hemorrhagic conversion. 2. Moderate burden of chronic microangiopathy. 3. Discussed with Dr. Cesar Cody in person by Dr. Erik Gabriel at 11:45 AM 5/27/2020. VOICE DICTATED BY: Dr. Mendez Or    Result Date: 6/8/2020  CT head without contrast History: ALTERED MENTAL STATUS, HX OF RECENT CVA.  Technique: 5mm axial images were obtained from the skull base to the vertex without contrast. Radiation dose reduction techniques were used for this study: Our CT scanners use one or all of the following: Automated exposure control, adjustment of the mA and/or kVp according to patient's size, iterative reconstruction. Comparison: 05/27/2020 Findings: The ventricles and sulci are mildly prominent. There are no extra-axial fluid collections. No evidence of acute intraparenchymal hemorrhage or mass effect is identified. Patchy, confluent areas of decreased attenuation are noted within the supratentorial white matter. These are nonspecific findings but would be most compatible with moderately advanced chronic small vessel ischemic changes. There is no evidence to suggest an acute major territorial infarct. Extensive atherosclerotic changes are present of the vertebral basilar system. The bony calvarium is intact. The visualized mastoid air cells and paranasal sinuses are well pneumatized and aerated. Impression: Chronic findings without evidence of acute intracranial abnormality. Ct Head Wo Cont    Result Date: 5/27/2020  HEAD CT WITHOUT CONTRAST  5/27/2020 HISTORY:   increased AMS  ; leukocytosis; myocardial infarction TECHNIQUE: Noncontrast axial images were obtained through the brain. All CT scans at this facility used dose modulation, interactive reconstruction and/or weight based dosing when appropriate to reduce radiation dose to as low as reasonably achievable. COMPARISON: May 25, 2020 FINDINGS: There is no acute intracranial hemorrhage, significant mass effect or CT evidence of acute large artery territorial infarction. Please note that a hyperacute infarct or small vessel infarct may not be apparent on initial CT imaging. Mild to moderate cerebral volume loss is present. Scattered areas of low-attenuation are present in the supratentorial white matter that are compatible with chronic small vessel ischemic disease. There is no hydrocephalus , intra-axial mass or abnormal extra-axial fluid collection.  There are no displaced skull fractures. The mastoid air cells and paranasal sinuses are clear where imaged. IMPRESSION: Cerebral volume loss and white matter findings compatible with chronic small vessel ischemic disease. Ct Head Wo Cont    Result Date: 5/25/2020  CT of the head without contrast. CLINICAL INDICATION: Weakness, unable to walk PROCEDURE: Serial thin section axial images are obtained from the cranial vertex through the skull base without the administration of intravenous contrast. Radiation dose reduction techniques were used for this study. Our CT scanners use one or all of the following: Automated exposure control, adjusted of the mA and/or kV according to patient size, iterative reconstruction COMPARISON: No prior. FINDINGS: There is no acute intracranial hemorrhage, mass, or mass effect. No abnormal extra-axial fluid collections identified. There is no hydrocephalus. The basilar cisterns are widely patent. There is moderate to severe patchy subcortical, deep, and periventricular white matter hypoattenuation. An old infarct is noted in the right basal ganglia with a tiny focal old lacunar infarct in the left basal ganglia. No skull fracture or aggressive osseous lesion noted. The mastoid air cells and included paranasal sinuses are clear. IMPRESSION: 1. No acute intracranial abnormality. Note, acute/subacute CVA cannot be excluded given the severity of the underlying white matter disease. 2. Moderate to severe bilateral white matter hypoattenuation most in keeping with chronic microangiopathic disease. 3. Old infarct in the right basal ganglia with an old lacunar infarct in the left basal ganglia. Ct Chest Wo Cont    Result Date: 6/11/2020  NONCONTRAST CHEST CT CLINICAL HISTORY:  Fever with high oxygen demand with leukocytosis and clinical concern for aspiration. TECHNIQUE:  Without contrast administration, the chest was scanned with spiral technique.   Radiation dose reduction was achieved using one or all of the following techniques: automated exposure control, weight-based dosing, iterative reconstruction. COMPARISON:  Portable chest of June 8, 2020 and CT of September 10, 2013. FINDINGS:  There is infiltrate and volume loss in the right lower lobe associated with endobronchial debris consistent with aspiration. Minimal left basilar atelectasis is present as well. No pathologically enlarged lymph nodes or abnormal fluid collection. The epigastrium appears unremarkable status post cholecystectomy. IMPRESSION:  RIGHT LOWER LOBE ASPIRATION PNEUMONIA AND MINIMAL LEFT BASILAR ATELECTASIS. Us Retroperitoneum Comp    Result Date: 6/7/2020  INDICATION:  Renal insufficiency TECHNIQUE: Real-time sonography of the kidneys, retroperitoneum and bladder was performed with multiple static images obtained. FINDINGS: The right kidney measures 10.6 cm and the left kidney measures 10.5 cm in length. Both kidneys are slightly echogenic. There is no hydronephrosis. There is a small cyst in the upper pole of the left kidney. There are no significant renal masses. The aorta and IVC are normal in caliber. The urinary bladder is collapsed around a Jefferson catheter. IMPRESSION: Echogenic kidneys suggesting chronic renal disease. No evidence of obstruction. Xr Chest Port    Result Date: 5/25/2020  Portable chest x-ray CLINICAL INDICATION: Weakness FINDINGS: Single AP view the chest compared to a similar exam dated 5/25/2020 show the lungs to be expanded and clear. No pleural effusion or pneumothorax. The cardiac silhouette and mediastinum are unremarkable. The bones are osteopenic. IMPRESSION: No acute cardiopulmonary abnormality. Assessment and Plan:      Active Hospital Problems    Diagnosis Date Noted    Diabetes mellitus type 2, controlled ([address] 06/15/2020    Aspiration pneumonia ([address] 06/14/2020    Acute respiratory failure with hypoxia (HCC) 06/11/2020    Hypernatremia 06/06/2020    ROSA (acute kidney injury) [facility] 06/05/2020    UTI (urinary tract infection) 06/05/2020    Acute metabolic encephalopathy 99/44/4391    Normocytic anemia 06/05/2020    Leukocytosis 05/26/2020     Plan:    Acute metabolic encephalopathy likely due to underlying infection(UTI, aspiration pna, recent prior CVA)   Prior Recent CVA with right sided deficit  - CT Head (6/8) does not show any acute abnormalities. - ABG did not show hypercarbia to suspect the cause of encephalopathy and ammonia is wnl.  - BCx (6/5): no growth to date; Repeat (BCx 6/8) shows no growth so far. - EEG : no seizures but high risk for seizures  - per- Palliative for Goals of care    Acute Respiratory Failure with hypoxia likely due to aspiration  Fever and leukocytosis concerning for aspiration PNA  - CXR shows no acute changes. CT chest shows  RLL pneumonia likely aspiration pneumonia. - BCx (6/8) is neg so far. - COVID (neg)  - NPO  - c/w zosyn (6/08 - )  - c/w vancomycin (6/11 - ) pharmacy dose per renal function  - pulmonary consult appreciated: will likely need PEG and Trach given her poor mentation, poor cough reflex to clear secretions. UTI  - UCx positive for proteus and klebsiella that's pan sensitive  - c/w abx as above    ROSA on CKD Stage 3 (baseline Cr 1.50 - 2.59) (improving)  Hypernatremia (improving)  Renal U/S no obstruction or hydronephrosis  - Nephrology on board  - on NGT with free water flushes    Diet:  DIET NPO  DIET TUBE FEEDING  DVT PPx: heparin SQ  Code: Full Code    Home Delvalle (Spouse [phone]): at bedside today. Updated. Addressed all concerns and questions. Medical Decision Making:    Labs/Imaging reviewed. Additional information obtained from nursing staff. Patient is high risk due to medical condition and comorbidities. Plan discussed with nursing staff. Plan discussed with patient/family. All questions/concerns were addressed. Pt/family agrees with the plan.          Signed By: Mundo Alegria MD     Emma 15, 2020

## 2020-06-15 NOTE — PROGRESS NOTES
Continues to rest quietly, resp even, unlab with O2, arousing easily at intervals during the night with appropriate non-verbal responses to nurse, nodding head, following commands, eyes focusing on nsg personnel. Some movement of RUE at times. Gauze dsg applied to left lower abdominal puncture site with occasional blood oozing; pt receiving heparin injections. No BMs during the night, until at this time during bedside report given to Abbey Bull RN. No distress noted.

## 2020-06-15 NOTE — PROGRESS NOTES
Palliative Care Progress Note    Patient: Erika Parker MRN: [id number]  SSN: [id number]    YOB: 1945  Age: 66 y.o. Sex: female       Assessment/Plan:     Chief Complaint/Interval History: Pt now more alert     Principal Diagnosis:    · Debility, Unspecified  R53.81    Additional Diagnoses:   · Advance Care Planning Counseling Z71.89  · Dyspnea  R06.00  · Dysphagia  R13.10  · Counseling, Encounter for Medical Advice  Z71.9  · Encounter for Palliative Care  Z51.5    Palliative Performance Scale (PPS)  PPS: 20    Medical Decision Making:   Reviewed and summarized notes from last palliative care visit to present. Discussed case with appropriate providers. Reviewed lab and X-ray data from last palliative care visit to present. Pt has continued to be awake and somewhat responsive since Friday. She is currently asleep. She continues to use O2 and to receive feeds via NGT. Spoke on phone with the pt's . Gave him update on wife today. Reviewed indications for PEG and possibly for trach due to O2 needs, so that he can discuss these issues with his sons. Mr Geneva Hassan said that he will be here tomorrow; will meet then. Will continue to follow. Will discuss findings with members of the interdisciplinary team.         More than 50% of this 25 minute visit was spent counseling and coordination of care as outlined above. Subjective:     Review of Systems:  Review of systems not obtained due to patient factors: pt has limited ability to respond. Objective:     Visit Vitals  /57 (BP 1 Location: Left arm, BP Patient Position: At rest;Head of bed elevated (Comment degrees))   Pulse 81   Temp 98.6 °F (37 °C)   Resp 18   Ht 5' 6\" (1.676 m)   Wt 210 lb 8 oz (95.5 kg)   SpO2 98%   BMI 33.98 kg/m²       Physical Exam:    General:  Sleeping. No acute distress. Eyes:  Conjunctivae/corneas clear    Nose: Nares normal. Septum midline.    Neck: Supple, symmetrical, trachea midline, no JVD   Lungs:   Clear to auscultation bilaterally, unlabored   Heart:  Regular rate and rhythm, no murmur    Abdomen:   Soft, non-tender, non-distended   Extremities: Normal, atraumatic, no cyanosis or edema   Skin: Skin color, texture, turgor normal. No rash or lesions.    Neurologic: Nonfocal   Psych: Sleeping     Signed By: Jerry Stanford NP     Emma 15, 2020

## 2020-06-15 NOTE — PROGRESS NOTES
Problem: Mobility Impaired (Adult and Pediatric)  Goal: *Therapy Goal (Edit Goal, Insert Text)  Outcome: Progressing Towards Goal  Note: STG:  (1.)Ms. David Musa will move from supine to sit and sit to supine , scoot up and down, and roll side to side with MAXIMAL ASSIST within 4 treatment day(s). (2.)Ms. David Musa will transfer from bed to chair and chair to bed with MAXIMAL ASSIST using the least restrictive device within 4 treatment day(s). (3.)Ms. David Musa will ambulate with MAXIMAL ASSIST for 5 feet with the least restrictive device within 4 treatment day(s). LTG:  (1.)Ms. Dvaid Musa will move from supine to sit and sit to supine , scoot up and down, and roll side to side in bed with MODERATE ASSIST within 7 treatment day(s). (2.)Ms. David Musa will transfer from bed to chair and chair to bed with MODERATE ASSIST using the least restrictive device within 7 treatment day(s). (3.)Ms. David Musa will ambulate with MODERATE ASSIST for 25 feet with the least restrictive device within 7 treatment day(s). ________________________________________________________________________________________________       PHYSICAL THERAPY: Daily Note and PM 6/15/2020  INPATIENT: PT Visit Days : 3  [place] / [place] / Product Type: Medicare /       NAME/AGE/GENDER: Catherine Orellana is a 66 y.o. female   PRIMARY DIAGNOSIS: ROSA (acute kidney injury) ([facility] [X44.0] Acute metabolic encephalopathy Acute metabolic encephalopathy       ICD-10: Treatment Diagnosis:    · Generalized Muscle Weakness (M62.81)  · Other lack of cordination (R27.8)  · Difficulty in walking, Not elsewhere classified (R26.2)  · Other abnormalities of gait and mobility (R26.89)   Precaution/Allergies:  Ace inhibitors; Adhesive tape; Codeine; Other medication; Other omega-3s; and Sulfa (sulfonamide antibiotics)      ASSESSMENT:     Ms. David Musa presents in supine and is shifted to right.  She is initially quite drowsy, did slightly perk up during session however mostly non verbal and very limited communication. Worked on positioning in bed and propping R UE on pillow for support. Assisted with moving bed to chair position- pt with poor seated balance during any attempts at forward weight shift. Attempted to perform some AROM with right LE, mostly AAROM/PROM to B LEs/UEs. Ankles resting in plantarflexion and may benefit from boots to prevent further decline in ROM. Pain when right digits extended. No active movement on left side and overall very little command following or ability to actively participate with ROM/mobility. Pt positioned comfortably in chair position. No real progress noted today, very guarded rehab potential.     This section established at most recent assessment   PROBLEM LIST (Impairments causing functional limitations):  1. Decreased Strength  2. Decreased ADL/Functional Activities  3. Decreased Transfer Abilities  4. Decreased Ambulation Ability/Technique  5. Decreased Balance   INTERVENTIONS PLANNED: (Benefits and precautions of physical therapy have been discussed with the patient.)  1. Balance Exercise  2. Bed Mobility  3. Gait Training  4. Group Therapy  5. Neuromuscular Re-education/Strengthening  6. Therapeutic Activites  7. Therapeutic Exercise/Strengthening     TREATMENT PLAN: Frequency/Duration: 3 times a week for duration of hospital stay  Rehabilitation Potential For Stated Goals: Good     REHAB RECOMMENDATIONS (at time of discharge pending progress):    Placement: It is my opinion, based on this patient's performance to date, that Ms. Alla Nichols may benefit from intensive therapy at a [address] [place] [address] after discharge due to the functional deficits listed above that are likely to improve with skilled rehabilitation and concerns that he/she may be unsafe to be unsupervised at home due to debility and decreased mobility.   Equipment:    None at this time              HISTORY:   History of Present Injury/Illness (Reason for Referral):  PER MD H&P   Sharonlinden Vallejo is a 66 y.o. female with a past medical history of CAD, DM type II, HTN who presents to the ER from [facility] with report of worsening confusion and elevated creatinine on labs. The patient is unable to contributed to history beyond denying pain or fever. Past Medical History/Comorbidities:   Ms. Geneva Hassan  has a past medical history of Arrhythmia, Arthritis, Asthma, CAD (coronary artery disease), Chronic pain, Diabetes ([address] GERD (gastroesophageal reflux disease), Hypertension, Menopause, Morbid obesity ([address] Stroke [facility]), Thyroid disease, and Unspecified adverse effect of anesthesia. She also has no past medical history of Aneurysm ([address] Autoimmune disease ([address] Cancer ([address] Chronic kidney disease, Coagulation defects, COPD, Difficult intubation, Heart failure ([address] Liver disease, Malignant hyperthermia due to anesthesia, Nausea & vomiting, Other ill-defined conditions(799.89), Pseudocholinesterase deficiency, Psychiatric disorder, PUD (peptic ulcer disease), Seizures ([address] Thromboembolus ([address] or Unspecified sleep apnea. Ms. Geneva Hassan  has a past surgical history that includes hx appendectomy; hx lap cholecystectomy; hx orthopaedic; hx knee arthroscopy; hx clemente and bso; hx breast biopsy; and pr cardiac surg procedure unlist (4/2010). Social History/Living Environment:   Home Environment: Private residence  # Steps to Enter: 10  One/Two Story Residence: Two story  # of Interior Steps: 20(patients room is on second floor)  Living Alone: No  Support Systems: Spouse/Significant Other/Partner  Patient Expects to be Discharged to[de-identified] Private residence  Current DME Used/Available at Home: Walker, rolling  Prior Level of Function/Work/Activity:  Per RN, lives with spouse and ambulatory with [employer]. Has been declining recently. Dominant Side:         RIGHT    Personal Factors:          Sex:  female        Age:  66 y.o.    Number of Personal Factors/Comorbidities that affect the Plan of Care: 1-2: MODERATE COMPLEXITY   EXAMINATION:   Most Recent Physical Functioning:   Gross Assessment:                  Posture:     Balance:  Sitting: Impaired  Sitting - Static: Poor (constant support) Bed Mobility:  Rolling: Total assistance  Scooting: Total assistance  Interventions: Verbal cues; Visual cues; Safety awareness training; Tactile cues;Manual cues  Duration: 15 Minutes  Wheelchair Mobility:     Transfers:     Gait:            Body Structures Involved:  1. Bones  2. Joints  3. Muscles  4. Ligaments Body Functions Affected:  1. Neuromusculoskeletal  2. Movement Related  3. Skin Related  4. Metobolic/Endocrine Activities and Participation Affected:  1. Communication  2. Mobility  3. Self Care  4. Domestic Life  5. Community, Social and [place]   Number of elements that affect the Plan of Care: 3: MODERATE COMPLEXITY   CLINICAL PRESENTATION:   Presentation: Evolving clinical presentation with changing clinical characteristics: MODERATE COMPLEXITY   CLINICAL DECISION MAKIN [facility] Inpatient Short Form  How much difficulty does the patient currently have. .. Unable A Lot A Little None   1. Turning over in bed (including adjusting bedclothes, sheets and blankets)? [x] 1   [] 2   [] 3   [] 4   2. Sitting down on and standing up from a chair with arms ( e.g., wheelchair, bedside commode, etc.)   [x] 1   [] 2   [] 3   [] 4   3. Moving from lying on back to sitting on the side of the bed? [] 1   [] 2   [] 3   [] 4   How much help from another person does the patient currently need. .. Total A Lot A Little None   4. Moving to and from a bed to a chair (including a wheelchair)? [x] 1   [] 2   [] 3   [] 4   5. Need to walk in hospital room? [x] 1   [] 2   [] 3   [] 4   6. Climbing 3-5 steps with a railing? [x] 1   [] 2   [] 3   [] 4   © , Trustees of [address] [place] [address] [address] [id number], under license to "[employer],".  All rights reserved      Score: Initial: 6 Most Recent: X (Date: -- )    Interpretation of Tool:  Represents activities that are increasingly more difficult (i.e. Bed mobility, Transfers, Gait). Medical Necessity:     · Patient demonstrates   · fair and poor  ·  rehab potential due to higher previous functional level. Reason for Services/Other Comments:  · Patient continues to require skilled intervention due to   · medical complications, patient unable to attend/participate in therapy as expected, and decreased transfers, ambulation and mobility. · .   Use of outcome tool(s) and clinical judgement create a POC that gives a: Questionable prediction of patient's progress: MODERATE COMPLEXITY            TREATMENT:   (In addition to Assessment/Re-Assessment sessions the following treatments were rendered)   Pre-treatment Symptoms/Complaints: pt able to shake/nod head appropriately at times   Pain: Initial:   Pain Intensity 1: 0  Post Session:  0/10      Therapeutic Activity: (15 Minutes   ):  Therapeutic activities including Bed mobility (rolling, scooting), bed to chair position, seated balance, seated ROM (active, active-assisted, and passive) to B UEs/LEs and sitting dangling EOB on level ground to improve mobility, strength, balance, and coordination. Required minimal to maximal assist to promote motor control of right, upper extremity(s), lower extremity(s) and would need total assist for all other mobility.     Braces/Orthotics/Lines/Etc:   · IV  · farris catheter  · O2 Device: Room air  Treatment/Session Assessment:    · Response to Treatment:  No real progress  · Interdisciplinary Collaboration:   o Physical Therapist  o Registered Nurse  · After treatment position/precautions:   o Supine in bed  o Bed/Chair-wheels locked  o Bed in low position  o Call light within reach  o RN notified  o Family at bedside  o sitting in chair position   · Compliance with Program/Exercises: Compliant all of the time  · Recommendations/Intent for next treatment session: \"Next visit will focus on advancements to more challenging activities and reduction in assistance provided\".   Total Treatment Duration:  PT Patient Time In/Time Out  Time In: 1340  Time Out: 255 [address]

## 2020-06-16 PROBLEM — R13.10 DYSPHAGIA: Status: ACTIVE | Noted: 2020-06-16

## 2020-06-16 LAB
ANION GAP SERPL CALC-SCNC: 9 MMOL/L (ref 7–16)
BASOPHILS # BLD: 0 K/UL (ref 0–0.2)
BASOPHILS NFR BLD: 0 % (ref 0–2)
BUN SERPL-MCNC: 35 MG/DL (ref 8–23)
CALCIUM SERPL-MCNC: 8.9 MG/DL (ref 8.3–10.4)
CHLORIDE SERPL-SCNC: 112 MMOL/L (ref 98–107)
CO2 SERPL-SCNC: 21 MMOL/L (ref 21–32)
CREAT SERPL-MCNC: 1.4 MG/DL (ref 0.6–1)
DIFFERENTIAL METHOD BLD: ABNORMAL
EOSINOPHIL # BLD: 0.5 K/UL (ref 0–0.8)
EOSINOPHIL NFR BLD: 5 % (ref 0.5–7.8)
ERYTHROCYTE [DISTWIDTH] IN BLOOD BY AUTOMATED COUNT: 16 % (ref 11.9–14.6)
GLUCOSE BLD STRIP.AUTO-MCNC: 225 MG/DL (ref 65–100)
GLUCOSE BLD STRIP.AUTO-MCNC: 235 MG/DL (ref 65–100)
GLUCOSE BLD STRIP.AUTO-MCNC: 252 MG/DL (ref 65–100)
GLUCOSE BLD STRIP.AUTO-MCNC: 264 MG/DL (ref 65–100)
GLUCOSE BLD STRIP.AUTO-MCNC: 301 MG/DL (ref 65–100)
GLUCOSE SERPL-MCNC: 236 MG/DL (ref 65–100)
HCT VFR BLD AUTO: 24.8 % (ref 35.8–46.3)
HGB BLD-MCNC: 8.2 G/DL (ref 11.7–15.4)
IMM GRANULOCYTES # BLD AUTO: 0.1 K/UL (ref 0–0.5)
IMM GRANULOCYTES NFR BLD AUTO: 1 % (ref 0–5)
LYMPHOCYTES # BLD: 2.3 K/UL (ref 0.5–4.6)
LYMPHOCYTES NFR BLD: 22 % (ref 13–44)
MCH RBC QN AUTO: 32.7 PG (ref 26.1–32.9)
MCHC RBC AUTO-ENTMCNC: 33.1 G/DL (ref 31.4–35)
MCV RBC AUTO: 98.8 FL (ref 79.6–97.8)
MONOCYTES # BLD: 0.6 K/UL (ref 0.1–1.3)
MONOCYTES NFR BLD: 6 % (ref 4–12)
NEUTS SEG # BLD: 6.7 K/UL (ref 1.7–8.2)
NEUTS SEG NFR BLD: 66 % (ref 43–78)
NRBC # BLD: 0 K/UL (ref 0–0.2)
PLATELET # BLD AUTO: 215 K/UL (ref 150–450)
PMV BLD AUTO: 11.8 FL (ref 9.4–12.3)
POTASSIUM SERPL-SCNC: 4.7 MMOL/L (ref 3.5–5.1)
RBC # BLD AUTO: 2.51 M/UL (ref 4.05–5.2)
SODIUM SERPL-SCNC: 142 MMOL/L (ref 136–145)
VANCOMYCIN SERPL-MCNC: 14.5 UG/ML
WBC # BLD AUTO: 10.3 K/UL (ref 4.3–11.1)

## 2020-06-16 PROCEDURE — 74011250636 HC RX REV CODE- 250/636: Performed by: FAMILY MEDICINE

## 2020-06-16 PROCEDURE — 65660000000 HC RM CCU STEPDOWN

## 2020-06-16 PROCEDURE — 74011250636 HC RX REV CODE- 250/636: Performed by: INTERNAL MEDICINE

## 2020-06-16 PROCEDURE — 85025 COMPLETE CBC W/AUTO DIFF WBC: CPT

## 2020-06-16 PROCEDURE — 74011636637 HC RX REV CODE- 636/637: Performed by: INTERNAL MEDICINE

## 2020-06-16 PROCEDURE — 74011000250 HC RX REV CODE- 250: Performed by: INTERNAL MEDICINE

## 2020-06-16 PROCEDURE — 77030018798 HC PMP KT ENTRL FED COVD -A

## 2020-06-16 PROCEDURE — C9113 INJ PANTOPRAZOLE SODIUM, VIA: HCPCS | Performed by: INTERNAL MEDICINE

## 2020-06-16 PROCEDURE — 74011000258 HC RX REV CODE- 258: Performed by: INTERNAL MEDICINE

## 2020-06-16 PROCEDURE — 99232 SBSQ HOSP IP/OBS MODERATE 35: CPT | Performed by: INTERNAL MEDICINE

## 2020-06-16 PROCEDURE — 94760 N-INVAS EAR/PLS OXIMETRY 1: CPT

## 2020-06-16 PROCEDURE — 80202 ASSAY OF VANCOMYCIN: CPT

## 2020-06-16 PROCEDURE — 82962 GLUCOSE BLOOD TEST: CPT

## 2020-06-16 PROCEDURE — 77010033678 HC OXYGEN DAILY

## 2020-06-16 PROCEDURE — 74011250637 HC RX REV CODE- 250/637: Performed by: INTERNAL MEDICINE

## 2020-06-16 PROCEDURE — 36415 COLL VENOUS BLD VENIPUNCTURE: CPT

## 2020-06-16 PROCEDURE — 80048 BASIC METABOLIC PNL TOTAL CA: CPT

## 2020-06-16 RX ADMIN — HEPARIN SODIUM 5000 UNITS: 5000 INJECTION INTRAVENOUS; SUBCUTANEOUS at 12:54

## 2020-06-16 RX ADMIN — HEPARIN SODIUM 5000 UNITS: 5000 INJECTION INTRAVENOUS; SUBCUTANEOUS at 04:49

## 2020-06-16 RX ADMIN — Medication 10 ML: at 21:41

## 2020-06-16 RX ADMIN — Medication 10 ML: at 04:50

## 2020-06-16 RX ADMIN — HUMAN INSULIN 12 UNITS: 100 INJECTION, SOLUTION SUBCUTANEOUS at 14:00

## 2020-06-16 RX ADMIN — HEPARIN SODIUM 5000 UNITS: 5000 INJECTION INTRAVENOUS; SUBCUTANEOUS at 20:47

## 2020-06-16 RX ADMIN — INSULIN GLARGINE 40 UNITS: 100 INJECTION, SOLUTION SUBCUTANEOUS at 21:41

## 2020-06-16 RX ADMIN — TICAGRELOR 90 MG: 90 TABLET ORAL at 09:02

## 2020-06-16 RX ADMIN — HUMAN INSULIN 6 UNITS: 100 INJECTION, SOLUTION SUBCUTANEOUS at 03:38

## 2020-06-16 RX ADMIN — SODIUM CHLORIDE 40 MG: 9 INJECTION INTRAMUSCULAR; INTRAVENOUS; SUBCUTANEOUS at 09:02

## 2020-06-16 RX ADMIN — PIPERACILLIN AND TAZOBACTAM 3.38 G: 3; .375 INJECTION, POWDER, FOR SOLUTION INTRAVENOUS at 09:02

## 2020-06-16 RX ADMIN — PIPERACILLIN AND TAZOBACTAM 3.38 G: 3; .375 INJECTION, POWDER, FOR SOLUTION INTRAVENOUS at 01:06

## 2020-06-16 RX ADMIN — ASPIRIN 81 MG 81 MG: 81 TABLET ORAL at 09:02

## 2020-06-16 RX ADMIN — PIPERACILLIN AND TAZOBACTAM 3.38 G: 3; .375 INJECTION, POWDER, FOR SOLUTION INTRAVENOUS at 16:45

## 2020-06-16 RX ADMIN — HUMAN INSULIN 6 UNITS: 100 INJECTION, SOLUTION SUBCUTANEOUS at 08:00

## 2020-06-16 RX ADMIN — HUMAN INSULIN 9 UNITS: 100 INJECTION, SOLUTION SUBCUTANEOUS at 20:47

## 2020-06-16 RX ADMIN — VANCOMYCIN HYDROCHLORIDE 1000 MG: 1 INJECTION, POWDER, LYOPHILIZED, FOR SOLUTION INTRAVENOUS at 09:17

## 2020-06-16 RX ADMIN — TICAGRELOR 90 MG: 90 TABLET ORAL at 21:34

## 2020-06-16 NOTE — PROGRESS NOTES
Hospitalist Progress Note    Patient: Orpha Kanner MRN: [id number]  SSN: [id number]    YOB: 1945  Age: 66 y.o. Sex: female      Admit Date: 6/5/2020    LOS: 11 days     Subjective:     66year old CF with medical history significant for CAD, hypertensin, recent CVA with residual right sided weakness, diabetes from [facility] with worsening confusion and elevated creatinine. Patient was found to have urinary tract infection and started on empiric antibiotics. Rapid response called on 6/7/20 AM as patient was found to have acute respiratory failure with hypoxia likely due to aspiration. Patient was suctioned and put on non-rebreather with saturation improving to the 96%. Patient has been lethargic with minimally responsive since the event. Patient had multiple days of temp spikes and antibiotics have been broadened. CT head was neg for any acute intracranial abnormalities. CXR and CT showed infiltrate in the RLL confirming suspicion for aspiration pna. Due to decreased renal function, nephrology was consulted. Workup revealed no obstructive cause. Her renal function and hypernatremia has been improving on IV fluids and free water flushes. Pulmonary was consulted due to persistent high oxygen requirement despite being treated by antibiotics. Family considering trach & PEG.    6/16 - She is more alert today. Just nodding head \"yes\" to everything. Unsure if understands. Spoke with  at bedside who wants to talk to sons about trach and PEG. Review of systems negative except stated above.     Objective:     Visit Vitals  /59 (BP 1 Location: Right arm)   Pulse 98   Temp 99 °F (37.2 °C)   Resp 16   Ht 5' 6\" (1.676 m)   Wt 95.5 kg (210 lb 8 oz)   SpO2 98%   BMI 33.98 kg/m²      Oxygen Therapy  O2 Sat (%): 98 % (06/16/20 1123)  Pulse via Oximetry: 71 beats per minute (06/14/20 1155)  O2 Device: Room air (06/15/20 1355)  O2 Flow Rate (L/min): 4 l/min (06/15/20 0800)  FIO2 (%): 40 % (06/14/20 1155)      Intake and Output:     Intake/Output Summary (Last 24 hours) at 6/16/2020 1444  Last data filed at 6/16/2020 1216  Gross per 24 hour   Intake 1675 ml   Output 1100 ml   Net 575 ml         Physical Exam:   GENERAL: alert, cooperative, no distress, appears stated age  EYE: conjunctivae/corneas clear. PERRL. THROAT & NECK: normal and no erythema or exudates noted. LUNG: scattered rhonchi  HEART: regular rate and rhythm, S1S2, no murmur, no JVD  ABDOMEN: soft, non-tender, non-distended. Bowel sounds normal.   EXTREMITIES:  No edema, 2+ pedal/radial pulses bilaterally  SKIN: no rash or abnormalities  NEUROLOGIC: Alert, nods head yes to everything. Cranial nerves 2-12 grossly intact.     Lab/Data Review:  Recent Results (from the past 24 hour(s))   GLUCOSE, POC    Collection Time: 06/15/20  6:28 PM   Result Value Ref Range    Glucose (POC) 210 (H) 65 - 100 mg/dL   GLUCOSE, POC    Collection Time: 06/15/20  9:56 PM   Result Value Ref Range    Glucose (POC) 235 (H) 65 - 100 mg/dL   GLUCOSE, POC    Collection Time: 06/16/20  3:34 AM   Result Value Ref Range    Glucose (POC) 225 (H) 65 - 100 mg/dL   GLUCOSE, POC    Collection Time: 06/16/20  7:21 AM   Result Value Ref Range    Glucose (POC) 235 (H) 65 - 714 mg/dL   METABOLIC PANEL, BASIC    Collection Time: 06/16/20  7:25 AM   Result Value Ref Range    Sodium 142 136 - 145 mmol/L    Potassium 4.7 3.5 - 5.1 mmol/L    Chloride 112 (H) 98 - 107 mmol/L    CO2 21 21 - 32 mmol/L    Anion gap 9 7 - 16 mmol/L    Glucose 236 (H) 65 - 100 mg/dL    BUN 35 (H) 8 - 23 MG/DL    Creatinine 1.40 (H) 0.6 - 1.0 MG/DL    GFR est AA 47 (L) >60 ml/min/1.73m2    GFR est non-AA 39 (L) >60 ml/min/1.73m2    Calcium 8.9 8.3 - 10.4 MG/DL   CBC WITH AUTOMATED DIFF    Collection Time: 06/16/20  7:25 AM   Result Value Ref Range    WBC 10.3 4.3 - 11.1 K/uL    RBC 2.51 (L) 4.05 - 5.2 M/uL    HGB 8.2 (L) 11.7 - 15.4 g/dL    HCT 24.8 (L) 35.8 - 46.3 %    MCV 98.8 (H) 79.6 - 97.8 FL MCH 32.7 26.1 - 32.9 PG    MCHC 33.1 31.4 - 35.0 g/dL    RDW 16.0 (H) 11.9 - 14.6 %    PLATELET 040 138 - 980 K/uL    MPV 11.8 9.4 - 12.3 FL    ABSOLUTE NRBC 0.00 0.0 - 0.2 K/uL    DF AUTOMATED      NEUTROPHILS 66 43 - 78 %    LYMPHOCYTES 22 13 - 44 %    MONOCYTES 6 4.0 - 12.0 %    EOSINOPHILS 5 0.5 - 7.8 %    BASOPHILS 0 0.0 - 2.0 %    IMMATURE GRANULOCYTES 1 0.0 - 5.0 %    ABS. NEUTROPHILS 6.7 1.7 - 8.2 K/UL    ABS. LYMPHOCYTES 2.3 0.5 - 4.6 K/UL    ABS. MONOCYTES 0.6 0.1 - 1.3 K/UL    ABS. EOSINOPHILS 0.5 0.0 - 0.8 K/UL    ABS. BASOPHILS 0.0 0.0 - 0.2 K/UL    ABS. IMM. GRANS. 0.1 0.0 - 0.5 K/UL   VANCOMYCIN, RANDOM    Collection Time: 06/16/20  7:25 AM   Result Value Ref Range    Vancomycin, random 14.5 UG/ML   GLUCOSE, POC    Collection Time: 06/16/20 11:32 AM   Result Value Ref Range    Glucose (POC) 264 (H) 65 - 100 mg/dL       Imaging:  Nm Lung Scan Perf    Result Date: 5/25/2020  EXAM:  NM LUNG SCAN PERF INDICATION:  Elevated troponin weakness mild hypoxia COMPARISON:  None. TRACER: 4.6 mCi of Tc-99m MAA. FINDINGS: Perfusion lung imaging was performed following intravenous administration of  Tc 99m MAA. Images were obtained from the anterior, posterior and right and left anterior and posterior oblique projections. The perfusion is normal. No segmental or subsegmental defects are identified. Chest x-ray reveals an elevated right hemidiaphragm. IMPRESSION: Normal lung perfusion study. Xr Chest Sngl V    Result Date: 6/14/2020  EXAM: TEMPORARY INDICATION: Respiratory failure COMPARISON: 6/8/2020 FINDINGS: A portable AP radiograph of the chest was obtained at 0522 hours. The patient is on a cardiac monitor. Bibasilar airspace disease unchanged. The cardiac and mediastinal contours and pulmonary vascularity are normal.  The bones and soft tissues are grossly within normal limits. IMPRESSION: Bibasilar atelectasis unchanged. Xr Chest Sngl V    Result Date: 6/8/2020  CHEST X-RAY, one view.  HISTORY: Hypoxemia and increasing oxygen demand. TECHNIQUE:  AP upright portable view COMPARISON: Earlier today FINDINGS:   -The lungs: are clear. -The costophrenic angles: are sharp. -The heart size: is normal. -The pulmonary vasculature: is unremarkable. -Included portion of the upper abdomen: is unremarkable. -Bones: No gross bony lesions. -Other: None. IMPRESSION:  Negative for acute change     Xr Chest Sngl V    Result Date: 6/8/2020   Portable view of the chest COMPARISON: June 7, 2020 Clinical history: Hypoxia. FINDINGS: Persistent elevation of the right hemidiaphragm. Lungs are underinflated. No pneumothorax, pulmonary edema or large pleural effusion. No definite pulmonary consolidation. Heart appears mildly enlarged. Mediastinal contour is within normal limits. Surrounding bones are unremarkable. IMPRESSION: 1. No pulmonary edema or definite pulmonary consolidation. 2. No significant change compared to exam.    Xr Chest Sngl V    Result Date: 6/7/2020  Chest X-ray INDICATION: Shortness of breath A portable AP view of the chest was obtained. FINDINGS: There is chronic elevation of the right diaphragm. There are no infiltrates or effusions. The heart size is normal.  The bony thorax is intact. IMPRESSION: No acute findings in the chest     Xr Chest Pa Lat    Result Date: 5/25/2020  EXAM: XR CHEST PA LAT INDICATION: Cough COMPARISON: None. FINDINGS: PA and lateral radiographs of the chest demonstrate clear lungs. The cardiac and mediastinal contours and pulmonary vascularity are normal. The bones and soft tissues are within normal limits. IMPRESSION: Normal chest.    Xr Abd (kub)    Result Date: 6/10/2020  History: NG tube placement Single view abdomen FINDINGS: There is an NG tube which terminates to the right of midline, likely in the distal stomach. Clips are present from prior cholecystectomy. There is elevation of the right hemidiaphragm. The lung bases are clear.  The bowel gas pattern is nonspecific. IMPRESSION: NG tube as described. Xr Abd (ap And Erect Or Decub)    Result Date: 5/25/2020  Abdominal films, 3 views. History: Nausea and vomiting Technique:  Supine and upright views of the abdomen on 3 images. Comparison: None Findings: Cholecystectomy. Nonspecific bowel gas pattern. No evidence of free air, organomegaly. Calcification located between the right transverse processes of L4 and L5. IMPRESSION: Negative for free air, ileus or obstruction. Possible right ureteral calculus. Correlate clinically. Xr Swallow Func Video    Result Date: 5/28/2020  Modified Barium Swallow INDICATION: Oropharyngeal dysphagia. CVA Fluoro time: 3.15 minutes Spot films:  0 Fluoroscopy was used to evaluate pharyngeal function while the patient was given barium solutions and barium coated solids. FINDINGS: Severe global swallow deficits present. There is harriet aspiration of thin barium by teaspoon with poor cough response. Shallow penetration with nectar by teaspoon. Penetration with nectar by cup. Deep penetration with honey by cup. IMPRESSION: 1. Aspiration and deep penetration with multiple administered consistencies. 2.  Please see concurrent speech and language pathology note for complete description of findings. Mri Brain Wo Cont    Result Date: 5/27/2020  EXAMINATION: BRAIN MRI 5/27/2020 11:49 AM ACCESSION NUMBER: [id number] INDICATION: Worsening of old stroke COMPARISON: Head CT 5/27/2020 and 5/25/2020 TECHNIQUE: Multiplanar multisequence MRI of the brain without intravenous contrast. FINDINGS: There are numerous small and punctate areas of restricted diffusion scattered throughout the supratentorial brain parenchyma, worst in the right lentiform and caudate nuclei. There are multiple foci of restricted diffusion throughout both cerebellar hemispheres. There is a moderate-sized focus of restricted diffusion in the right aspect of the benjy.  The preponderance of these areas demonstrate T2 hyperintensity. The constellation of findings is consistent with multiple supratentorial acute or early subacute infarctions involving multiple vascular distributions. None of the infarctions demonstrates significant associated mass effect or hemorrhagic conversion. The ventricles are within normal limits for the degree of global brain parenchymal volume loss. There is no midline shift. The basilar cisterns are patent. There is no cerebellar tonsillar ectopia or herniation. There are T2 hyperintensities in the periventricular and subcortical white matter and benjy, a nonspecific finding which likely represents chronic microangiopathy. Prior bilateral lens replacement. The expected large vascular flow voids are preserved. There are no suspicious osseous lesions. IMPRESSION: 1. Numerous acute or early subacute infarctions scattered throughout the supratentorial and infratentorial brain parenchyma. These are most likely embolic infarctions from a central source. The most confluent areas of involvement include the right caudate and lentiform nuclei, the benjy, and the left cerebellar hemisphere. There is no evidence of significant mass effect or hemorrhagic conversion. 2. Moderate burden of chronic microangiopathy. 3. Discussed with Dr. Jagruti Bradshaw in person by Dr. Katie Cardenas at 11:45 AM 5/27/2020. VOICE DICTATED BY: Dr. Ronn Williamson    Result Date: 6/8/2020  CT head without contrast History: ALTERED MENTAL STATUS, HX OF RECENT CVA. Technique: 5mm axial images were obtained from the skull base to the vertex without contrast. Radiation dose reduction techniques were used for this study: Our CT scanners use one or all of the following: Automated exposure control, adjustment of the mA and/or kVp according to patient's size, iterative reconstruction. Comparison: 05/27/2020 Findings: The ventricles and sulci are mildly prominent. There are no extra-axial fluid collections.  No evidence of acute intraparenchymal hemorrhage or mass effect is identified. Patchy, confluent areas of decreased attenuation are noted within the supratentorial white matter. These are nonspecific findings but would be most compatible with moderately advanced chronic small vessel ischemic changes. There is no evidence to suggest an acute major territorial infarct. Extensive atherosclerotic changes are present of the vertebral basilar system. The bony calvarium is intact. The visualized mastoid air cells and paranasal sinuses are well pneumatized and aerated. Impression: Chronic findings without evidence of acute intracranial abnormality. Ct Head Wo Cont    Result Date: 5/27/2020  HEAD CT WITHOUT CONTRAST  5/27/2020 HISTORY:   increased AMS  ; leukocytosis; myocardial infarction TECHNIQUE: Noncontrast axial images were obtained through the brain. All CT scans at this facility used dose modulation, interactive reconstruction and/or weight based dosing when appropriate to reduce radiation dose to as low as reasonably achievable. COMPARISON: May 25, 2020 FINDINGS: There is no acute intracranial hemorrhage, significant mass effect or CT evidence of acute large artery territorial infarction. Please note that a hyperacute infarct or small vessel infarct may not be apparent on initial CT imaging. Mild to moderate cerebral volume loss is present. Scattered areas of low-attenuation are present in the supratentorial white matter that are compatible with chronic small vessel ischemic disease. There is no hydrocephalus , intra-axial mass or abnormal extra-axial fluid collection. There are no displaced skull fractures. The mastoid air cells and paranasal sinuses are clear where imaged. IMPRESSION: Cerebral volume loss and white matter findings compatible with chronic small vessel ischemic disease.      Ct Head Wo Cont    Result Date: 5/25/2020  CT of the head without contrast. CLINICAL INDICATION: Weakness, unable to walk PROCEDURE: Serial thin section axial images are obtained from the cranial vertex through the skull base without the administration of intravenous contrast. Radiation dose reduction techniques were used for this study. Our CT scanners use one or all of the following: Automated exposure control, adjusted of the mA and/or kV according to patient size, iterative reconstruction COMPARISON: No prior. FINDINGS: There is no acute intracranial hemorrhage, mass, or mass effect. No abnormal extra-axial fluid collections identified. There is no hydrocephalus. The basilar cisterns are widely patent. There is moderate to severe patchy subcortical, deep, and periventricular white matter hypoattenuation. An old infarct is noted in the right basal ganglia with a tiny focal old lacunar infarct in the left basal ganglia. No skull fracture or aggressive osseous lesion noted. The mastoid air cells and included paranasal sinuses are clear. IMPRESSION: 1. No acute intracranial abnormality. Note, acute/subacute CVA cannot be excluded given the severity of the underlying white matter disease. 2. Moderate to severe bilateral white matter hypoattenuation most in keeping with chronic microangiopathic disease. 3. Old infarct in the right basal ganglia with an old lacunar infarct in the left basal ganglia. Ct Chest Wo Cont    Result Date: 6/11/2020  NONCONTRAST CHEST CT CLINICAL HISTORY:  Fever with high oxygen demand with leukocytosis and clinical concern for aspiration. TECHNIQUE:  Without contrast administration, the chest was scanned with spiral technique. Radiation dose reduction was achieved using one or all of the following techniques: automated exposure control, weight-based dosing, iterative reconstruction. COMPARISON:  Portable chest of June 8, 2020 and CT of September 10, 2013. FINDINGS:  There is infiltrate and volume loss in the right lower lobe associated with endobronchial debris consistent with aspiration.   Minimal left basilar atelectasis is present as well. No pathologically enlarged lymph nodes or abnormal fluid collection. The epigastrium appears unremarkable status post cholecystectomy. IMPRESSION:  RIGHT LOWER LOBE ASPIRATION PNEUMONIA AND MINIMAL LEFT BASILAR ATELECTASIS. Us Retroperitoneum Comp    Result Date: 6/7/2020  INDICATION:  Renal insufficiency TECHNIQUE: Real-time sonography of the kidneys, retroperitoneum and bladder was performed with multiple static images obtained. FINDINGS: The right kidney measures 10.6 cm and the left kidney measures 10.5 cm in length. Both kidneys are slightly echogenic. There is no hydronephrosis. There is a small cyst in the upper pole of the left kidney. There are no significant renal masses. The aorta and IVC are normal in caliber. The urinary bladder is collapsed around a Jefferson catheter. IMPRESSION: Echogenic kidneys suggesting chronic renal disease. No evidence of obstruction. Xr Chest Port    Result Date: 5/25/2020  Portable chest x-ray CLINICAL INDICATION: Weakness FINDINGS: Single AP view the chest compared to a similar exam dated 5/25/2020 show the lungs to be expanded and clear. No pleural effusion or pneumothorax. The cardiac silhouette and mediastinum are unremarkable. The bones are osteopenic. IMPRESSION: No acute cardiopulmonary abnormality. No results found for this visit on 06/05/20. Cultures:   All Micro Results     Procedure Component Value Units Date/Time    CULTURE, BLOOD [[id number]] Collected:  06/08/20 1812    Order Status:  Completed Specimen:  Blood Updated:  06/13/20 0634     Special Requests: --        RIGHT  HAND       Culture result: NO GROWTH 5 DAYS       CULTURE, BLOOD [[id number]] Collected:  06/08/20 1807    Order Status:  Completed Specimen:  Blood Updated:  06/13/20 0634     Special Requests: --        LEFT  HAND       Culture result: NO GROWTH 5 DAYS       CULTURE, BLOOD [[id number]] Collected:  06/05/20 1845    Order Status: Completed Specimen:  Blood Updated:  06/10/20 0631     Special Requests: --        NO SPECIAL REQUESTS  LEFT  Antecubital       Culture result: NO GROWTH 5 DAYS       CULTURE, BLOOD [[id number]] Collected:  06/05/20 1845    Order Status:  Completed Specimen:  Blood Updated:  06/10/20 0631     Special Requests: --        NO SPECIAL REQUESTS  RIGHT  FOREARM       Culture result: NO GROWTH 5 DAYS       MSSA/MRSA SC BY PCR, NASAL SWAB [[id number]] Collected:  06/09/20 1545    Order Status:  Canceled Specimen:  Nasal swab     CULTURE, URINE [[id number]]  (Abnormal)  (Susceptibility) Collected:  06/05/20 1845    Order Status:  Completed Specimen:  Cath Urine Updated:  06/09/20 0710     Special Requests: NO SPECIAL REQUESTS        Culture result:       >100,000 COLONIES/mL PROTEUS MIRABILIS                  >100,000 COLONIES/mL KLEBSIELLA PNEUMONIAE                Assessment/Plan:     Principal Problem:    Acute respiratory failure with hypoxia ([address] (6/11/2020)  - Due to acute aspiration event with chronic dysphagia  - Slow to improve  - Still on 5LNC  - Continue antibiotics for today - will complete 10 days  - Pulmonary following    Active Problems:    ROSA (acute kidney injury) ([address] (6/5/2020)  - Likely due to dehydration  - Improving  - Continue free water with TFs  - Nephrology following      UTI (urinary tract infection) (6/5/2020)  - Proteus + Klebsiella  - Completely treated  - Will stop abx tomorrow      Acute metabolic encephalopathy (6/8/2263)  - Appears to be slowly improving  - Likely due to infections and hypoxia      Aspiration pneumonia ([address] (6/14/2020)  - Due to chronic dysphagia  - Will complete treatment tomorrow  - Oxygen slowly improving      Dysphagia (6/16/2020)  - Chronic in nature  - Family discussing Trach/PEG  - SLP signed off      Leukocytosis (5/26/2020)  - Improving      Hypernatremia (6/6/2020)  - Improving  - Continue free water with TFs      Normocytic anemia (6/5/2020)  - Stable Diabetes mellitus type 2, controlled ([facility] (6/15/2020)  - No acute issues  - Continue Lantus  - Continue SSI    Today's Plan: Continue current plan. Family discussing Trach/PEG after my discussion with . DIET NPO  DIET TUBE FEEDING Opne order for details. Do not start until NGT placement confirmed. Keep HOB > 30 degrees. Check residuals every 4 hours. Hold TF for > 500 ml x 1 or 250 ml x 2 consecutive checks. Also place patient on right side if residual is . ..     DVT Prophylaxis: Heparin    Discharge Plan: TBD      Signed By: Taz Reeves,      June 16, 2020

## 2020-06-16 NOTE — PROGRESS NOTES
CM is following. Patient's family wants patient to return to [facility] when medically stable.

## 2020-06-16 NOTE — PROGRESS NOTES
Pharmacokinetic Consult to Pharmacist    Otis Springer is a 66 y.o. female being treated for sepsis with vancomycin. Height: 5' 6\" (167.6 cm)  Weight: 95.5 kg (210 lb 8 oz)  Lab Results   Component Value Date/Time    BUN 35 (H) 06/16/2020 07:25 AM    Creatinine 1.40 (H) 06/16/2020 07:25 AM    WBC 10.3 06/16/2020 07:25 AM    Procalcitonin 0.45 06/09/2020 08:58 AM    Lactic acid 1.4 06/08/2020 12:57 AM      Estimated Creatinine Clearance: 39.2 mL/min (A) (based on SCr of 1.4 mg/dL (H)). Lab Results   Component Value Date/Time    Vancomycin, random 14.5 06/16/2020 07:25 AM       Day 6 of vancomycin. Goal trough is 15-20. Vancomycin random this am was 14.5. Will schedule a regimen of 1000 mg every 24 hours for now. Will continue to follow patient.       Thank you,  Ar Jennings, PharmD, [address]  Clinical Pharmacy Specialist  [phone]

## 2020-06-16 NOTE — PROGRESS NOTES
[facility] Progress Note    Follow-Up on: ROSA/CKD, hypernatremia    ROS:  Very obtunded     Exam:  Vitals:    06/15/20 1952 06/16/20 0000 06/16/20 0400 06/16/20 0723   BP: 150/62 149/65 150/62 136/59   Pulse: 86 89 85 81   Resp: 17 16 17 14   Temp: 98.3 °F (36.8 °C) 98.7 °F (37.1 °C) 98.4 °F (36.9 °C) 98.9 °F (37.2 °C)   SpO2: 97% 94% 95% 97%   Weight:       Height:             Intake/Output Summary (Last 24 hours) at 6/16/2020 1047  Last data filed at 6/16/2020 0532  Gross per 24 hour   Intake 1675 ml   Output 700 ml   Net 975 ml       Wt Readings from Last 3 Encounters:   06/05/20 95.5 kg (210 lb 8 oz)   06/01/20 104.8 kg (231 lb 0.7 oz)   05/27/20 106.7 kg (235 lb 3.7 oz)       GEN - obtunded   CV - regular, no murmur, no rub  Lung - Decreased BS bilaterally  Abd - soft, nontender  Ext - no edema    Recent Labs     06/16/20  0725 06/15/20  0508   WBC 10.3 10.1   HGB 8.2* 8.1*   HCT 24.8* 25.1*    197        Recent Labs     06/16/20  0725 06/15/20  0508 06/14/20  0804    147* 149*   K 4.7 4.4 4.5   * 117* 119*   CO2 21 21 20*   BUN 35* 39* 43*   CREA 1.40* 1.49* 1.49*   CA 8.9 9.2 9.1   * 154* 250*       Assessment / Plan:  Principal Problem:    Acute metabolic encephalopathy (3/1/3090)    Active Problems:    Leukocytosis (5/26/2020)      ROSA (acute kidney injury) ([facility] (6/5/2020)      UTI (urinary tract infection) (6/5/2020)      Normocytic anemia (6/5/2020)      Hypernatremia (6/6/2020)      Acute respiratory failure with hypoxia ([facility]) (6/11/2020)      Aspiration pneumonia ([facility] [facility] (6/14/2020)      Diabetes mellitus type 2, controlled ([facility] (6/15/2020)      1. ROSA/CKD  - Baseline Cr probably mid to upper 1's  - Suspect volume depletion   - Cr improving,   2. Hypernatremia - continue free water, improving  3. UTI  4.  Encephalopathy

## 2020-06-16 NOTE — PROGRESS NOTES
PROGRESS NOTE    Guero Howard    6/16/2020    Date of Admission:  6/5/2020    The patient's chart is reviewed and the patient is discussed with the staff. 66 y.o. CF evaluated at the request of Dr. Juan Alicea with chronic medical:  CAD, HTN, recent CVA with residual right sided weakness, DM, hx chronic CVA with left side weankness. Arrived from [facility] with worsening confusion and elevated creatinine. Was found to have UTI and started on empiric antibiotics. On 6/7 AM was found to have acute respiratory failure with hypoxia likely due to aspiration. Was suctioned, placed on NRB with sat  improving to 96%. Has been lethargic and minimally responsive since the event with fevers and antibiotics escalated. CT head was neg for any acute intracranial abnormalities and CXR and CT showed RLL infiltrate confirming suspiration for aspiration pneumonia. Due to worsening renal function, nephrology was consulted and workup revealed no obstruction. Renal function has improved on IVFs. Noted to be hyponatremia and hypotonic saline infusion. Pulmonary was consulted due to high O2 requirement despite being treated by antibiotics for 5 days. Patient has been started o vancomycin and zosyn. She is On O2 at 5L/min via NC. Subjective:     Lying in bed, slightly more alert today. Still on oxygen. She does acknowledge that is congested and has weak cough. Aware of need for PEG and trach and reports ok with head nodding. No family here. Review of Systems  Very limited as per above.        Current Facility-Administered Medications   Medication Dose Route Frequency    vancomycin (VANCOCIN) 1,000 mg in 0.9% sodium chloride (MBP/ADV) 250 mL  1,000 mg IntraVENous Q24H    piperacillin-tazobactam (ZOSYN) 3.375 g in 0.9% sodium chloride (MBP/ADV) 100 mL  3.375 g IntraVENous Q8H    aspirin chewable tablet 81 mg  81 mg Per NG tube DAILY    insulin glargine (LANTUS) injection 40 Units  40 Units SubCUTAneous QHS    ticagrelor (BRILINTA) tablet 90 mg  90 mg Per NG tube Q12H    lip protectant (BLISTEX) ointment 1 Each  1 Each Topical PRN    pantoprazole (PROTONIX) 40 mg in 0.9% sodium chloride 10 mL injection  40 mg IntraVENous DAILY    acetaminophen (TYLENOL) suppository 650 mg  650 mg Rectal Q4H PRN    insulin regular (NOVOLIN R, HUMULIN R) injection   SubCUTAneous Q6H    levalbuterol (XOPENEX) nebulizer soln 1.25 mg/3 mL  1.25 mg Nebulization Q4H PRN    hydrALAZINE (APRESOLINE) 20 mg/mL injection 10 mg  10 mg IntraVENous Q6H PRN    levothyroxine (SYNTHROID) injection 110 mcg  110 mcg IntraVENous DAILY    [Held by provider] insulin regular (NOVOLIN R, HUMULIN R) injection 4 Units  4 Units SubCUTAneous QPM    [Held by provider] amLODIPine (NORVASC) tablet 10 mg  10 mg Oral DAILY    [Held by provider] atorvastatin (LIPITOR) tablet 40 mg  40 mg Oral QHS    [Held by provider] carvediloL (COREG) tablet 12.5 mg  12.5 mg Oral BID WITH MEALS    [Held by provider] losartan (COZAAR) tablet 100 mg  100 mg Oral DAILY    [Held by provider] ezetimibe (ZETIA) tablet 10 mg  10 mg Oral DAILY    hyoscyamine SL (LEVSIN/SL) tablet 0.125 mg  0.125 mg SubLINGual Q6H PRN    ondansetron (ZOFRAN ODT) tablet 4 mg  4 mg Oral Q8H PRN    [Held by provider] sertraline (ZOLOFT) tablet 200 mg  200 mg Oral BID    sodium chloride (NS) flush 5-40 mL  5-40 mL IntraVENous Q8H    sodium chloride (NS) flush 5-40 mL  5-40 mL IntraVENous PRN    acetaminophen (TYLENOL) tablet 650 mg  650 mg Oral Q4H PRN    ondansetron (ZOFRAN) injection 4 mg  4 mg IntraVENous Q4H PRN    magnesium hydroxide (MILK OF MAGNESIA) 400 mg/5 mL oral suspension 30 mL  30 mL Oral DAILY PRN    heparin (porcine) injection 5,000 Units  5,000 Units SubCUTAneous Q8H         Objective:     Vitals:    06/15/20 1952 06/16/20 0000 06/16/20 0400 06/16/20 0723   BP: 150/62 149/65 150/62 136/59   Pulse: 86 89 85 81   Resp: 17 16 17 14   Temp: 98.3 °F (36.8 °C) 98.7 °F (37.1 °C) 98.4 °F (36.9 °C) 98.9 °F (37.2 °C)   SpO2: 97% 94% 95% 97%   Weight:       Height:           PHYSICAL EXAM     Gen:  the patient is obese and in no acute distress, NC 5L, sat 97%  HEENT:  Sclera clear, pupils equal, oral mucosa moist  Lungs: mild coarse sounds. B/l with mild rhonchi. No wheezing. Heart:  RRR without M,G,R  Abd: soft with positive bowel sounds, NG with TFs and having loose diarrhea stools  Ext: warm without cyanosis. There is trace upper and lower extremity edema. Skin:  no jaundice or rashes, no wounds   Neuro: No left side movements, right side weakness   Psychiatric:  More alert today and looking at med and moving right hand more. Nodding and responding to questions today. Chest X-ray:    6/14/20:  Bibasilar atelectasis unchanged      Chest CT 6/11/20:    RIGHT LOWER LOBE ASPIRATION PNEUMONIA AND MINIMAL LEFT BASILAR ATELECTASIS. Recent Labs     06/16/20  0725 06/15/20  0508   WBC 10.3 10.1   HGB 8.2* 8.1*   HCT 24.8* 25.1*    197     Recent Labs     06/16/20  0725 06/15/20  0508 06/14/20  0804    147* 149*   K 4.7 4.4 4.5   * 117* 119*   * 154* 250*   CO2 21 21 20*   BUN 35* 39* 43*   CREA 1.40* 1.49* 1.49*   CA 8.9 9.2 9.1     No results for input(s): PH, PCO2, PO2, HCO3 in the last 72 hours.       Assessment:  (Medical Decision Making)         Hospital Problems  Date Reviewed: 6/15/2020          Codes Class Noted POA    [facility] ICD-10-CM: E11.9  ICD-9-CM: 250.00  6/15/2020 Yes    Chronic-- -per primary team    Aspiration pneumonia [facility]) ICD-10-CM: J69.0  ICD-9-CM: 507.0  6/14/2020 Unknown    On abx    Acute respiratory failure with hypoxia [facility]) ICD-10-CM: J96.01  ICD-9-CM: 518.81  6/11/2020 Unknown    On NC 5L--wean O2 as tolearted    Hypernatremia ICD-10-CM: E87.0  ICD-9-CM: 276.0  6/6/2020 Unknown    Na down to 147    ROSA (acute kidney injury) ([facility] ICD-10-CM: N17.9  ICD-9-CM: 584.9  6/5/2020 Yes    Per nephrology--creat down to to 1.4's    UTI (urinary tract infection) ICD-10-CM: N39.0  ICD-9-CM: 599.0  6/5/2020 Yes    On ABX    * (Principal) Acute metabolic encephalopathy OIN-50-PI: G93.41  ICD-9-CM: 348.31  6/5/2020 Yes    Nodding appropriately     Normocytic anemia ICD-10-CM: D64.9  ICD-9-CM: 285.9  6/5/2020 Yes    hgb in 8 range--following CBC    Leukocytosis ICD-10-CM: V61.152  ICD-9-CM: 288.60  5/26/2020 Yes             Elderly female with recent CVA with UTI and likely aspirating (not protecting airway). On oxygen at 5 LPM, ABX and needs PEG and now likely needs trach. Plan:  (Medical Decision Making)     --Still weak. High risk for aspirating. Will need  trach in addition to her PEG. She seems to understand and nodded today. No family in room to agree with here. --on ABX D9 and would consider stopping, if ok with PMD. Urine with pansensitive organisms. --Nephrology following   --Recommend repeat discussion with family and they will be coming today per notes  --continue remaining treatment. Cleveland Alford MD      This note was signed electronically. Errors are unfortunately her likely due to dictation software.

## 2020-06-16 NOTE — PROGRESS NOTES
Problem: Falls - Risk of  Goal: *Absence of Falls  Description: Document Gypsy Momin Fall Risk and appropriate interventions in the flowsheet. Outcome: Progressing Towards Goal  Note: Fall Risk Interventions:  Mobility Interventions: Communicate number of staff needed for ambulation/transfer, OT consult for ADLs, Patient to call before getting OOB, PT Consult for mobility concerns, PT Consult for assist device competence, Strengthening exercises (ROM-active/passive), Utilize walker, cane, or other assistive device    Mentation Interventions: Door open when patient unattended, Evaluate medications/consider consulting pharmacy, Eyeglasses and hearing aids, Gait belt with transfers/ambulation, Increase mobility, More frequent rounding, Reorient patient, Room close to nurse's station, Toileting rounds, Update white board    Medication Interventions: Assess postural VS orthostatic hypotension, Evaluate medications/consider consulting pharmacy, Patient to call before getting OOB, Teach patient to arise slowly    Elimination Interventions: Call light in reach, Patient to call for help with toileting needs, Stay With Me (per policy)              Problem: Pressure Injury - Risk of  Goal: *Prevention of pressure injury  Description: Document Santiago Scale and appropriate interventions in the flowsheet. Outcome: Progressing Towards Goal  Note: Pressure Injury Interventions:  Sensory Interventions: Assess changes in LOC, Assess need for specialty bed, Avoid rigorous massage over bony prominences, Chair cushion, Check visual cues for pain, Discuss PT/OT consult with provider, Keep linens dry and wrinkle-free, Minimize linen layers, Maintain/enhance activity level, Monitor skin under medical devices, Pad between skin to skin, Pressure redistribution bed/mattress (bed type), Turn and reposition approx.  every two hours (pillows and wedges if needed)    Moisture Interventions: Absorbent underpads, Assess need for specialty bed, Check for incontinence Q2 hours and as needed    Activity Interventions: Assess need for specialty bed, Chair cushion, Increase time out of bed, Pressure redistribution bed/mattress(bed type), PT/OT evaluation    Mobility Interventions: Assess need for specialty bed, Chair cushion, Float heels, HOB 30 degrees or less, Pressure redistribution bed/mattress (bed type), PT/OT evaluation, Turn and reposition approx.  every two hours(pillow and wedges)    Nutrition Interventions: Document food/fluid/supplement intake, Discuss nutritional consult with provider, Offer support with meals,snacks and hydration    Friction and Shear Interventions: Apply protective barrier, creams and emollients, Feet elevated on foot rest, Foam dressings/transparent film/skin sealants, HOB 30 degrees or less, Lift sheet, Minimize layers

## 2020-06-17 ENCOUNTER — ANESTHESIA EVENT (OUTPATIENT)
Dept: SURGERY | Age: 75
DRG: 004 | End: 2020-06-17
Payer: MEDICARE

## 2020-06-17 LAB
ANION GAP SERPL CALC-SCNC: 8 MMOL/L (ref 7–16)
BASOPHILS # BLD: 0 K/UL (ref 0–0.2)
BASOPHILS NFR BLD: 0 % (ref 0–2)
BUN SERPL-MCNC: 33 MG/DL (ref 8–23)
CALCIUM SERPL-MCNC: 9.8 MG/DL (ref 8.3–10.4)
CHLORIDE SERPL-SCNC: 111 MMOL/L (ref 98–107)
CO2 SERPL-SCNC: 24 MMOL/L (ref 21–32)
CREAT SERPL-MCNC: 1.36 MG/DL (ref 0.6–1)
DIFFERENTIAL METHOD BLD: ABNORMAL
EOSINOPHIL # BLD: 0.6 K/UL (ref 0–0.8)
EOSINOPHIL NFR BLD: 5 % (ref 0.5–7.8)
ERYTHROCYTE [DISTWIDTH] IN BLOOD BY AUTOMATED COUNT: 16.7 % (ref 11.9–14.6)
GLUCOSE BLD STRIP.AUTO-MCNC: 159 MG/DL (ref 65–100)
GLUCOSE BLD STRIP.AUTO-MCNC: 176 MG/DL (ref 65–100)
GLUCOSE BLD STRIP.AUTO-MCNC: 183 MG/DL (ref 65–100)
GLUCOSE BLD STRIP.AUTO-MCNC: 193 MG/DL (ref 65–100)
GLUCOSE BLD STRIP.AUTO-MCNC: 212 MG/DL (ref 65–100)
GLUCOSE SERPL-MCNC: 153 MG/DL (ref 65–100)
HCT VFR BLD AUTO: 25 % (ref 35.8–46.3)
HGB BLD-MCNC: 8.1 G/DL (ref 11.7–15.4)
IMM GRANULOCYTES # BLD AUTO: 0.1 K/UL (ref 0–0.5)
IMM GRANULOCYTES NFR BLD AUTO: 1 % (ref 0–5)
LYMPHOCYTES # BLD: 2.7 K/UL (ref 0.5–4.6)
LYMPHOCYTES NFR BLD: 24 % (ref 13–44)
MCH RBC QN AUTO: 31.9 PG (ref 26.1–32.9)
MCHC RBC AUTO-ENTMCNC: 32.4 G/DL (ref 31.4–35)
MCV RBC AUTO: 98.4 FL (ref 79.6–97.8)
MONOCYTES # BLD: 0.7 K/UL (ref 0.1–1.3)
MONOCYTES NFR BLD: 6 % (ref 4–12)
NEUTS SEG # BLD: 7 K/UL (ref 1.7–8.2)
NEUTS SEG NFR BLD: 63 % (ref 43–78)
NRBC # BLD: 0.02 K/UL (ref 0–0.2)
PLATELET # BLD AUTO: 223 K/UL (ref 150–450)
PMV BLD AUTO: 11.7 FL (ref 9.4–12.3)
POTASSIUM SERPL-SCNC: 4.6 MMOL/L (ref 3.5–5.1)
RBC # BLD AUTO: 2.54 M/UL (ref 4.05–5.2)
SODIUM SERPL-SCNC: 143 MMOL/L (ref 136–145)
WBC # BLD AUTO: 11.1 K/UL (ref 4.3–11.1)

## 2020-06-17 PROCEDURE — 82962 GLUCOSE BLOOD TEST: CPT

## 2020-06-17 PROCEDURE — 94760 N-INVAS EAR/PLS OXIMETRY 1: CPT

## 2020-06-17 PROCEDURE — 85025 COMPLETE CBC W/AUTO DIFF WBC: CPT

## 2020-06-17 PROCEDURE — 74011250636 HC RX REV CODE- 250/636: Performed by: INTERNAL MEDICINE

## 2020-06-17 PROCEDURE — 99233 SBSQ HOSP IP/OBS HIGH 50: CPT | Performed by: NURSE PRACTITIONER

## 2020-06-17 PROCEDURE — 77010033678 HC OXYGEN DAILY

## 2020-06-17 PROCEDURE — C9113 INJ PANTOPRAZOLE SODIUM, VIA: HCPCS | Performed by: INTERNAL MEDICINE

## 2020-06-17 PROCEDURE — 74011250636 HC RX REV CODE- 250/636: Performed by: FAMILY MEDICINE

## 2020-06-17 PROCEDURE — 74011000250 HC RX REV CODE- 250: Performed by: INTERNAL MEDICINE

## 2020-06-17 PROCEDURE — 65660000000 HC RM CCU STEPDOWN

## 2020-06-17 PROCEDURE — 99232 SBSQ HOSP IP/OBS MODERATE 35: CPT | Performed by: INTERNAL MEDICINE

## 2020-06-17 PROCEDURE — 74011000258 HC RX REV CODE- 258: Performed by: INTERNAL MEDICINE

## 2020-06-17 PROCEDURE — 74011636637 HC RX REV CODE- 636/637: Performed by: INTERNAL MEDICINE

## 2020-06-17 PROCEDURE — 80048 BASIC METABOLIC PNL TOTAL CA: CPT

## 2020-06-17 PROCEDURE — 74011250637 HC RX REV CODE- 250/637: Performed by: INTERNAL MEDICINE

## 2020-06-17 PROCEDURE — 36415 COLL VENOUS BLD VENIPUNCTURE: CPT

## 2020-06-17 RX ADMIN — HUMAN INSULIN 3 UNITS: 100 INJECTION, SOLUTION SUBCUTANEOUS at 10:16

## 2020-06-17 RX ADMIN — ASPIRIN 81 MG 81 MG: 81 TABLET ORAL at 10:17

## 2020-06-17 RX ADMIN — TICAGRELOR 90 MG: 90 TABLET ORAL at 10:17

## 2020-06-17 RX ADMIN — HEPARIN SODIUM 5000 UNITS: 5000 INJECTION INTRAVENOUS; SUBCUTANEOUS at 12:55

## 2020-06-17 RX ADMIN — Medication 10 ML: at 22:21

## 2020-06-17 RX ADMIN — PIPERACILLIN AND TAZOBACTAM 3.38 G: 3; .375 INJECTION, POWDER, FOR SOLUTION INTRAVENOUS at 10:28

## 2020-06-17 RX ADMIN — Medication 10 ML: at 05:31

## 2020-06-17 RX ADMIN — INSULIN GLARGINE 40 UNITS: 100 INJECTION, SOLUTION SUBCUTANEOUS at 22:20

## 2020-06-17 RX ADMIN — PIPERACILLIN AND TAZOBACTAM 3.38 G: 3; .375 INJECTION, POWDER, FOR SOLUTION INTRAVENOUS at 01:00

## 2020-06-17 RX ADMIN — HUMAN INSULIN 3 UNITS: 100 INJECTION, SOLUTION SUBCUTANEOUS at 02:20

## 2020-06-17 RX ADMIN — HEPARIN SODIUM 5000 UNITS: 5000 INJECTION INTRAVENOUS; SUBCUTANEOUS at 05:30

## 2020-06-17 RX ADMIN — HUMAN INSULIN 6 UNITS: 100 INJECTION, SOLUTION SUBCUTANEOUS at 13:17

## 2020-06-17 RX ADMIN — Medication 10 ML: at 13:08

## 2020-06-17 RX ADMIN — SODIUM CHLORIDE 40 MG: 9 INJECTION INTRAMUSCULAR; INTRAVENOUS; SUBCUTANEOUS at 10:12

## 2020-06-17 RX ADMIN — HUMAN INSULIN 3 UNITS: 100 INJECTION, SOLUTION SUBCUTANEOUS at 18:16

## 2020-06-17 RX ADMIN — LEVOTHYROXINE SODIUM ANHYDROUS 110 MCG: 100 INJECTION, POWDER, LYOPHILIZED, FOR SOLUTION INTRAVENOUS at 10:10

## 2020-06-17 RX ADMIN — VANCOMYCIN HYDROCHLORIDE 1000 MG: 1 INJECTION, POWDER, LYOPHILIZED, FOR SOLUTION INTRAVENOUS at 10:27

## 2020-06-17 NOTE — PROGRESS NOTES
Hospitalist Progress Note    Patient: Jaci Fuller MRN: [id number]  SSN: [id number]    YOB: 1945  Age: 66 y.o. Sex: female      Admit Date: 6/5/2020    LOS: 12 days     Subjective:     66year old CF with medical history significant for CAD, hypertensin, recent CVA with residual right sided weakness, diabetes from [facility] with worsening confusion and elevated creatinine. Patient was found to have urinary tract infection and started on empiric antibiotics. Rapid response called on 6/7/20 AM as patient was found to have acute respiratory failure with hypoxia likely due to aspiration. Patient was suctioned and put on non-rebreather with saturation improving to the 96%. Patient has been lethargic with minimally responsive since the event. Patient had multiple days of temp spikes and antibiotics have been broadened. CT head was neg for any acute intracranial abnormalities. CXR and CT showed infiltrate in the RLL confirming suspicion for aspiration pna. Due to decreased renal function, nephrology was consulted. Workup revealed no obstructive cause. Her renal function and hypernatremia has been improving on IV fluids and free water flushes. Pulmonary was consulted due to persistent high oxygen requirement despite being treated by antibiotics. Family considering trach & PEG.    6/17 - She is more alert today. Seems more understanding of issues. Spoke with  at bedside and family wishes for trach and PEG. Review of systems negative except stated above.     Objective:     Visit Vitals  BP (!) 159/95   Pulse 84   Temp 99.5 °F (37.5 °C)   Resp 18   Ht 5' 6\" (1.676 m)   Wt 95.5 kg (210 lb 8 oz)   SpO2 98%   BMI 33.98 kg/m²      Oxygen Therapy  O2 Sat (%): 98 % (06/17/20 0735)  Pulse via Oximetry: 76 beats per minute (06/16/20 1603)  O2 Device: Nasal cannula (06/16/20 1603)  O2 Flow Rate (L/min): 2 l/min (06/16/20 1603)  FIO2 (%): 40 % (06/14/20 1155)      Intake and Output:     Intake/Output Summary (Last 24 hours) at 6/17/2020 1126  Last data filed at 6/17/2020 0615  Gross per 24 hour   Intake 130 ml   Output 1600 ml   Net -1470 ml         Physical Exam:   GENERAL: alert, cooperative, no distress, appears stated age  EYE: conjunctivae/corneas clear. PERRL. THROAT & NECK: normal and no erythema or exudates noted. LUNG: scattered rhonchi  HEART: regular rate and rhythm, S1S2, no murmur, no JVD  ABDOMEN: soft, non-tender, non-distended. Bowel sounds normal.   EXTREMITIES:  No edema, 2+ pedal/radial pulses bilaterally  SKIN: no rash or abnormalities  NEUROLOGIC: Alert, nods head. Cranial nerves 2-12 grossly intact.     Lab/Data Review:  Recent Results (from the past 24 hour(s))   GLUCOSE, POC    Collection Time: 06/16/20 11:32 AM   Result Value Ref Range    Glucose (POC) 264 (H) 65 - 100 mg/dL   GLUCOSE, POC    Collection Time: 06/16/20  3:03 PM   Result Value Ref Range    Glucose (POC) 301 (H) 65 - 100 mg/dL   GLUCOSE, POC    Collection Time: 06/16/20  8:26 PM   Result Value Ref Range    Glucose (POC) 252 (H) 65 - 100 mg/dL   GLUCOSE, POC    Collection Time: 06/17/20  2:06 AM   Result Value Ref Range    Glucose (POC) 183 (H) 65 - 100 mg/dL   GLUCOSE, POC    Collection Time: 06/17/20  7:33 AM   Result Value Ref Range    Glucose (POC) 159 (H) 65 - 428 mg/dL   METABOLIC PANEL, BASIC    Collection Time: 06/17/20  7:44 AM   Result Value Ref Range    Sodium 143 136 - 145 mmol/L    Potassium 4.6 3.5 - 5.1 mmol/L    Chloride 111 (H) 98 - 107 mmol/L    CO2 24 21 - 32 mmol/L    Anion gap 8 7 - 16 mmol/L    Glucose 153 (H) 65 - 100 mg/dL    BUN 33 (H) 8 - 23 MG/DL    Creatinine 1.36 (H) 0.6 - 1.0 MG/DL    GFR est AA 48 (L) >60 ml/min/1.73m2    GFR est non-AA 40 (L) >60 ml/min/1.73m2    Calcium 9.8 8.3 - 10.4 MG/DL   CBC WITH AUTOMATED DIFF    Collection Time: 06/17/20  7:44 AM   Result Value Ref Range    WBC 11.1 4.3 - 11.1 K/uL    RBC 2.54 (L) 4.05 - 5.2 M/uL    HGB 8.1 (L) 11.7 - 15.4 g/dL    HCT 25.0 (L) 35.8 - 46.3 %    MCV 98.4 (H) 79.6 - 97.8 FL    MCH 31.9 26.1 - 32.9 PG    MCHC 32.4 31.4 - 35.0 g/dL    RDW 16.7 (H) 11.9 - 14.6 %    PLATELET 974 176 - 781 K/uL    MPV 11.7 9.4 - 12.3 FL    ABSOLUTE NRBC 0.02 0.0 - 0.2 K/uL    DF AUTOMATED      NEUTROPHILS 63 43 - 78 %    LYMPHOCYTES 24 13 - 44 %    MONOCYTES 6 4.0 - 12.0 %    EOSINOPHILS 5 0.5 - 7.8 %    BASOPHILS 0 0.0 - 2.0 %    IMMATURE GRANULOCYTES 1 0.0 - 5.0 %    ABS. NEUTROPHILS 7.0 1.7 - 8.2 K/UL    ABS. LYMPHOCYTES 2.7 0.5 - 4.6 K/UL    ABS. MONOCYTES 0.7 0.1 - 1.3 K/UL    ABS. EOSINOPHILS 0.6 0.0 - 0.8 K/UL    ABS. BASOPHILS 0.0 0.0 - 0.2 K/UL    ABS. IMM. GRANS. 0.1 0.0 - 0.5 K/UL       Imaging:  Nm Lung Scan Perf    Result Date: 5/25/2020  EXAM:  NM LUNG SCAN PERF INDICATION:  Elevated troponin weakness mild hypoxia COMPARISON:  None. TRACER: 4.6 mCi of Tc-99m MAA. FINDINGS: Perfusion lung imaging was performed following intravenous administration of  Tc 99m MAA. Images were obtained from the anterior, posterior and right and left anterior and posterior oblique projections. The perfusion is normal. No segmental or subsegmental defects are identified. Chest x-ray reveals an elevated right hemidiaphragm. IMPRESSION: Normal lung perfusion study. Xr Chest Sngl V    Result Date: 6/14/2020  EXAM: TEMPORARY INDICATION: Respiratory failure COMPARISON: 6/8/2020 FINDINGS: A portable AP radiograph of the chest was obtained at 0522 hours. The patient is on a cardiac monitor. Bibasilar airspace disease unchanged. The cardiac and mediastinal contours and pulmonary vascularity are normal.  The bones and soft tissues are grossly within normal limits. IMPRESSION: Bibasilar atelectasis unchanged. Xr Chest Sngl V    Result Date: 6/8/2020  CHEST X-RAY, one view. HISTORY:  Hypoxemia and increasing oxygen demand.  TECHNIQUE:  AP upright portable view COMPARISON: Earlier today FINDINGS:   -The lungs: are clear. -The costophrenic angles: are sharp. -The heart size: is normal. -The pulmonary vasculature: is unremarkable. -Included portion of the upper abdomen: is unremarkable. -Bones: No gross bony lesions. -Other: None. IMPRESSION:  Negative for acute change     Xr Chest Sngl V    Result Date: 6/8/2020   Portable view of the chest COMPARISON: June 7, 2020 Clinical history: Hypoxia. FINDINGS: Persistent elevation of the right hemidiaphragm. Lungs are underinflated. No pneumothorax, pulmonary edema or large pleural effusion. No definite pulmonary consolidation. Heart appears mildly enlarged. Mediastinal contour is within normal limits. Surrounding bones are unremarkable. IMPRESSION: 1. No pulmonary edema or definite pulmonary consolidation. 2. No significant change compared to exam.    Xr Chest Sngl V    Result Date: 6/7/2020  Chest X-ray INDICATION: Shortness of breath A portable AP view of the chest was obtained. FINDINGS: There is chronic elevation of the right diaphragm. There are no infiltrates or effusions. The heart size is normal.  The bony thorax is intact. IMPRESSION: No acute findings in the chest     Xr Chest Pa Lat    Result Date: 5/25/2020  EXAM: XR CHEST PA LAT INDICATION: Cough COMPARISON: None. FINDINGS: PA and lateral radiographs of the chest demonstrate clear lungs. The cardiac and mediastinal contours and pulmonary vascularity are normal. The bones and soft tissues are within normal limits. IMPRESSION: Normal chest.    Xr Abd (kub)    Result Date: 6/10/2020  History: NG tube placement Single view abdomen FINDINGS: There is an NG tube which terminates to the right of midline, likely in the distal stomach. Clips are present from prior cholecystectomy. There is elevation of the right hemidiaphragm. The lung bases are clear. The bowel gas pattern is nonspecific. IMPRESSION: NG tube as described. Xr Abd (ap And Erect Or Decub)    Result Date: 5/25/2020  Abdominal films, 3 views.  History: Nausea and vomiting Technique:  Supine and upright views of the abdomen on 3 images. Comparison: None Findings: Cholecystectomy. Nonspecific bowel gas pattern. No evidence of free air, organomegaly. Calcification located between the right transverse processes of L4 and L5. IMPRESSION: Negative for free air, ileus or obstruction. Possible right ureteral calculus. Correlate clinically. Xr Swallow Func Video    Result Date: 5/28/2020  Modified Barium Swallow INDICATION: Oropharyngeal dysphagia. CVA Fluoro time: 3.15 minutes Spot films:  0 Fluoroscopy was used to evaluate pharyngeal function while the patient was given barium solutions and barium coated solids. FINDINGS: Severe global swallow deficits present. There is harriet aspiration of thin barium by teaspoon with poor cough response. Shallow penetration with nectar by teaspoon. Penetration with nectar by cup. Deep penetration with honey by cup. IMPRESSION: 1. Aspiration and deep penetration with multiple administered consistencies. 2.  Please see concurrent speech and language pathology note for complete description of findings. Mri Brain Wo Cont    Result Date: 5/27/2020  EXAMINATION: BRAIN MRI 5/27/2020 11:49 AM ACCESSION NUMBER: [id number] INDICATION: Worsening of old stroke COMPARISON: Head CT 5/27/2020 and 5/25/2020 TECHNIQUE: Multiplanar multisequence MRI of the brain without intravenous contrast. FINDINGS: There are numerous small and punctate areas of restricted diffusion scattered throughout the supratentorial brain parenchyma, worst in the right lentiform and caudate nuclei. There are multiple foci of restricted diffusion throughout both cerebellar hemispheres. There is a moderate-sized focus of restricted diffusion in the right aspect of the benjy. The preponderance of these areas demonstrate T2 hyperintensity. The constellation of findings is consistent with multiple supratentorial acute or early subacute infarctions involving multiple vascular distributions.  None of the infarctions demonstrates significant associated mass effect or hemorrhagic conversion. The ventricles are within normal limits for the degree of global brain parenchymal volume loss. There is no midline shift. The basilar cisterns are patent. There is no cerebellar tonsillar ectopia or herniation. There are T2 hyperintensities in the periventricular and subcortical white matter and benjy, a nonspecific finding which likely represents chronic microangiopathy. Prior bilateral lens replacement. The expected large vascular flow voids are preserved. There are no suspicious osseous lesions. IMPRESSION: 1. Numerous acute or early subacute infarctions scattered throughout the supratentorial and infratentorial brain parenchyma. These are most likely embolic infarctions from a central source. The most confluent areas of involvement include the right caudate and lentiform nuclei, the benjy, and the left cerebellar hemisphere. There is no evidence of significant mass effect or hemorrhagic conversion. 2. Moderate burden of chronic microangiopathy. 3. Discussed with Dr. Isabel Gonzalez in person by Dr. Dominic Ayala at 11:45 AM 5/27/2020. VOICE DICTATED BY: Dr. Ruano Paci    Result Date: 6/8/2020  CT head without contrast History: ALTERED MENTAL STATUS, HX OF RECENT CVA. Technique: 5mm axial images were obtained from the skull base to the vertex without contrast. Radiation dose reduction techniques were used for this study: Our CT scanners use one or all of the following: Automated exposure control, adjustment of the mA and/or kVp according to patient's size, iterative reconstruction. Comparison: 05/27/2020 Findings: The ventricles and sulci are mildly prominent. There are no extra-axial fluid collections. No evidence of acute intraparenchymal hemorrhage or mass effect is identified. Patchy, confluent areas of decreased attenuation are noted within the supratentorial white matter.  These are nonspecific findings but would be most compatible with moderately advanced chronic small vessel ischemic changes. There is no evidence to suggest an acute major territorial infarct. Extensive atherosclerotic changes are present of the vertebral basilar system. The bony calvarium is intact. The visualized mastoid air cells and paranasal sinuses are well pneumatized and aerated. Impression: Chronic findings without evidence of acute intracranial abnormality. Ct Head Wo Cont    Result Date: 5/27/2020  HEAD CT WITHOUT CONTRAST  5/27/2020 HISTORY:   increased AMS  ; leukocytosis; myocardial infarction TECHNIQUE: Noncontrast axial images were obtained through the brain. All CT scans at this facility used dose modulation, interactive reconstruction and/or weight based dosing when appropriate to reduce radiation dose to as low as reasonably achievable. COMPARISON: May 25, 2020 FINDINGS: There is no acute intracranial hemorrhage, significant mass effect or CT evidence of acute large artery territorial infarction. Please note that a hyperacute infarct or small vessel infarct may not be apparent on initial CT imaging. Mild to moderate cerebral volume loss is present. Scattered areas of low-attenuation are present in the supratentorial white matter that are compatible with chronic small vessel ischemic disease. There is no hydrocephalus , intra-axial mass or abnormal extra-axial fluid collection. There are no displaced skull fractures. The mastoid air cells and paranasal sinuses are clear where imaged. IMPRESSION: Cerebral volume loss and white matter findings compatible with chronic small vessel ischemic disease.      Ct Head Wo Cont    Result Date: 5/25/2020  CT of the head without contrast. CLINICAL INDICATION: Weakness, unable to walk PROCEDURE: Serial thin section axial images are obtained from the cranial vertex through the skull base without the administration of intravenous contrast. Radiation dose reduction techniques were used for this study. Our CT scanners use one or all of the following: Automated exposure control, adjusted of the mA and/or kV according to patient size, iterative reconstruction COMPARISON: No prior. FINDINGS: There is no acute intracranial hemorrhage, mass, or mass effect. No abnormal extra-axial fluid collections identified. There is no hydrocephalus. The basilar cisterns are widely patent. There is moderate to severe patchy subcortical, deep, and periventricular white matter hypoattenuation. An old infarct is noted in the right basal ganglia with a tiny focal old lacunar infarct in the left basal ganglia. No skull fracture or aggressive osseous lesion noted. The mastoid air cells and included paranasal sinuses are clear. IMPRESSION: 1. No acute intracranial abnormality. Note, acute/subacute CVA cannot be excluded given the severity of the underlying white matter disease. 2. Moderate to severe bilateral white matter hypoattenuation most in keeping with chronic microangiopathic disease. 3. Old infarct in the right basal ganglia with an old lacunar infarct in the left basal ganglia. Ct Chest Wo Cont    Result Date: 6/11/2020  NONCONTRAST CHEST CT CLINICAL HISTORY:  Fever with high oxygen demand with leukocytosis and clinical concern for aspiration. TECHNIQUE:  Without contrast administration, the chest was scanned with spiral technique. Radiation dose reduction was achieved using one or all of the following techniques: automated exposure control, weight-based dosing, iterative reconstruction. COMPARISON:  Portable chest of June 8, 2020 and CT of September 10, 2013. FINDINGS:  There is infiltrate and volume loss in the right lower lobe associated with endobronchial debris consistent with aspiration. Minimal left basilar atelectasis is present as well. No pathologically enlarged lymph nodes or abnormal fluid collection. The epigastrium appears unremarkable status post cholecystectomy.      IMPRESSION:  RIGHT LOWER LOBE ASPIRATION PNEUMONIA AND MINIMAL LEFT BASILAR ATELECTASIS. Us Retroperitoneum Comp    Result Date: 6/7/2020  INDICATION:  Renal insufficiency TECHNIQUE: Real-time sonography of the kidneys, retroperitoneum and bladder was performed with multiple static images obtained. FINDINGS: The right kidney measures 10.6 cm and the left kidney measures 10.5 cm in length. Both kidneys are slightly echogenic. There is no hydronephrosis. There is a small cyst in the upper pole of the left kidney. There are no significant renal masses. The aorta and IVC are normal in caliber. The urinary bladder is collapsed around a Jefferson catheter. IMPRESSION: Echogenic kidneys suggesting chronic renal disease. No evidence of obstruction. Xr Chest Port    Result Date: 5/25/2020  Portable chest x-ray CLINICAL INDICATION: Weakness FINDINGS: Single AP view the chest compared to a similar exam dated 5/25/2020 show the lungs to be expanded and clear. No pleural effusion or pneumothorax. The cardiac silhouette and mediastinum are unremarkable. The bones are osteopenic. IMPRESSION: No acute cardiopulmonary abnormality. No results found for this visit on 06/05/20. Cultures:   All Micro Results     Procedure Component Value Units Date/Time    CULTURE, BLOOD [[id number]] Collected:  06/08/20 1812    Order Status:  Completed Specimen:  Blood Updated:  06/13/20 0634     Special Requests: --        RIGHT  HAND       Culture result: NO GROWTH 5 DAYS       CULTURE, BLOOD [[id number]] Collected:  06/08/20 1807    Order Status:  Completed Specimen:  Blood Updated:  06/13/20 0634     Special Requests: --        LEFT  HAND       Culture result: NO GROWTH 5 DAYS       CULTURE, BLOOD [[id number]] Collected:  06/05/20 1845    Order Status:  Completed Specimen:  Blood Updated:  06/10/20 0631     Special Requests: --        NO SPECIAL REQUESTS  LEFT  Antecubital       Culture result: NO GROWTH 5 DAYS       CULTURE, BLOOD [[id number]] Collected: 06/05/20 1845    Order Status:  Completed Specimen:  Blood Updated:  06/10/20 0631     Special Requests: --        NO SPECIAL REQUESTS  RIGHT  FOREARM       Culture result: NO GROWTH 5 DAYS       MSSA/MRSA SC BY PCR, NASAL SWAB [[id number]] Collected:  06/09/20 1545    Order Status:  Canceled Specimen:  Nasal swab     CULTURE, URINE [[id number]]  (Abnormal)  (Susceptibility) Collected:  06/05/20 1845    Order Status:  Completed Specimen:  Cath Urine Updated:  06/09/20 0710     Special Requests: NO SPECIAL REQUESTS        Culture result:       >100,000 COLONIES/mL PROTEUS MIRABILIS                  >100,000 COLONIES/mL KLEBSIELLA PNEUMONIAE                Assessment/Plan:     Principal Problem:    Acute respiratory failure with hypoxia ([address] (6/11/2020)  - Due to acute aspiration event with chronic dysphagia  - Slow to improve  - Improving - 5LNC --> 2LNC  - Stop antibiotics today - 10 day course  - Pulmonary following    Active Problems:    ROSA (acute kidney injury) ([address] (6/5/2020)  - Likely due to dehydration  - Improving  - Continue free water with TFs  - Nephrology following      UTI (urinary tract infection) (6/5/2020)  - Proteus + Klebsiella  - Completely treated  - Stop antibiotics today - 10 day course      Acute metabolic encephalopathy (0/0/1754)  - Appears to be slowly improving  - Likely due to infections and hypoxia      Aspiration pneumonia ([address] (6/14/2020)  - Due to chronic dysphagia  - Stop antibiotics today - 10 day course  - Oxygen slowly improving      Dysphagia (6/16/2020)  - Chronic in nature  - Family now wishes for Trach/PEG --> will consult General Surgery  - SLP signed off      Leukocytosis (5/26/2020)  - Improving      Hypernatremia (6/6/2020)  - Improving  - Continue free water with TFs      Normocytic anemia (6/5/2020)  - Stable      Diabetes mellitus type 2, controlled ([address] (6/15/2020)  - No acute issues  - Continue Lantus  - Continue SSI    Today's Plan: Continue current plan.  Family wishes Trach/PEG --> will consult general surgery. DIET NPO  DIET TUBE FEEDING Opne order for details. Do not start until NGT placement confirmed. Keep HOB > 30 degrees. Check residuals every 4 hours. Hold TF for > 500 ml x 1 or 250 ml x 2 consecutive checks. Also place patient on right side if residual is . ..     DVT Prophylaxis: Heparin    Discharge Plan: TBD      Signed By: Chintan Peña,      June 17, 2020

## 2020-06-17 NOTE — CONSULTS
H&P/Consult Note/Progress Note/Office Note:   Guero Live  MRN: [id number]  :1945  Age:75 y.o.    HPI: Lorenda Lennox is a 66 y.o. female who we are asked by Dr. Bello Perea to see for consideration of tracheostomy and PEG tube placement. The patient has a PMHx of CAD, DM type II, HTN, and recent CVA with residual right sided weakness. The patient is currently admitted for UTI. She has a history of chronic dysphagia with recurrent aspiration. She is oxygenating well on 1L NC. She is unable to communicate or provide any history. Past Medical History:   Diagnosis Date    Arrhythmia     recurrent SVT    Arthritis     Asthma     stress induced- well controlled    CAD (coronary artery disease)     \"leaking valves\"    Chronic pain     shoulder    Diabetes ([address]     adv -160.  type 2 IDDM; does not take home sqbs- hypo at 80    GERD (gastroesophageal reflux disease)     Hypertension     well controlled with meds    Menopause     [facility]bid [facility]     Stroke ([facility]     , mini stroke    Thyroid disease     hypo    Unspecified adverse effect of anesthesia     woke up during every surgery     Past Surgical History:   Procedure Laterality Date    CARDIAC SURG PROCEDURE UNLIST  2010    AVNRT ablation times three    HX APPENDECTOMY      HX BREAST BIOPSY      bilateral breast bx    HX KNEE ARTHROSCOPY      left knee    HX LAP CHOLECYSTECTOMY      HX ORTHOPAEDIC      rt rotator cuff and bicep tendon repair    HX EMELY AND BSO      hysterectomy     Current Facility-Administered Medications   Medication Dose Route Frequency    aspirin chewable tablet 81 mg  81 mg Per NG tube DAILY    insulin glargine (LANTUS) injection 40 Units  40 Units SubCUTAneous QHS    [Held by provider] ticagrelor (BRILINTA) tablet 90 mg  90 mg Per NG tube Q12H    lip protectant (BLISTEX) ointment 1 Each  1 Each Topical PRN    pantoprazole (PROTONIX) 40 mg in 0.9% sodium chloride 10 mL injection  40 mg IntraVENous DAILY    acetaminophen (TYLENOL) suppository 650 mg  650 mg Rectal Q4H PRN    insulin regular (NOVOLIN R, HUMULIN R) injection   SubCUTAneous Q6H    levalbuterol (XOPENEX) nebulizer soln 1.25 mg/3 mL  1.25 mg Nebulization Q4H PRN    hydrALAZINE (APRESOLINE) 20 mg/mL injection 10 mg  10 mg IntraVENous Q6H PRN    levothyroxine (SYNTHROID) injection 110 mcg  110 mcg IntraVENous DAILY    [Held by provider] insulin regular (NOVOLIN R, HUMULIN R) injection 4 Units  4 Units SubCUTAneous QPM    [Held by provider] amLODIPine (NORVASC) tablet 10 mg  10 mg Oral DAILY    [Held by provider] atorvastatin (LIPITOR) tablet 40 mg  40 mg Oral QHS    [Held by provider] carvediloL (COREG) tablet 12.5 mg  12.5 mg Oral BID WITH MEALS    [Held by provider] losartan (COZAAR) tablet 100 mg  100 mg Oral DAILY    [Held by provider] ezetimibe (ZETIA) tablet 10 mg  10 mg Oral DAILY    hyoscyamine SL (LEVSIN/SL) tablet 0.125 mg  0.125 mg SubLINGual Q6H PRN    ondansetron (ZOFRAN ODT) tablet 4 mg  4 mg Oral Q8H PRN    [Held by provider] sertraline (ZOLOFT) tablet 200 mg  200 mg Oral BID    sodium chloride (NS) flush 5-40 mL  5-40 mL IntraVENous Q8H    sodium chloride (NS) flush 5-40 mL  5-40 mL IntraVENous PRN    acetaminophen (TYLENOL) tablet 650 mg  650 mg Oral Q4H PRN    ondansetron (ZOFRAN) injection 4 mg  4 mg IntraVENous Q4H PRN    magnesium hydroxide (MILK OF MAGNESIA) 400 mg/5 mL oral suspension 30 mL  30 mL Oral DAILY PRN    heparin (porcine) injection 5,000 Units  5,000 Units SubCUTAneous Q8H     Ace inhibitors; Adhesive tape; Codeine; Other medication;  Other omega-3s; and Sulfa (sulfonamide antibiotics)  Social History     Socioeconomic History    Marital status:      Spouse name: Not on file    Number of children: Not on file    Years of education: Not on file    Highest education level: Not on file   Tobacco Use    Smoking status: Former Smoker     Packs/day: 2.00     Years: 13.00 Pack years: 26.00     Last attempt to quit: 1970     Years since quittin.1    Smokeless tobacco: Never Used    Tobacco comment: quit 36 years ago   Substance and Sexual Activity    Alcohol use: No    Drug use: No     Social History     Tobacco Use   Smoking Status Former Smoker    Packs/day: 2.00    Years: 13.00    Pack years: 26.00    Last attempt to quit: 1970    Years since quittin.1   Smokeless Tobacco Never Used   Tobacco Comment    quit 39 years ago     Family History   Problem Relation Age of Onset    Other Brother     Cancer Mother     Heart Disease Mother     Breast Cancer Mother 79    Heart Disease Father     Malignant Hyperthermia Neg Hx     Pseudocholinesterase Deficiency Neg Hx     Delayed Awakening Neg Hx     Post-op Nausea/Vomiting Neg Hx     Emergence Delirium Neg Hx     Post-op Cognitive Dysfunction Neg Hx      ROS: MARÍA    Physical Exam:   Visit Vitals  /75 (BP 1 Location: Left arm, BP Patient Position: At rest;Head of bed elevated (Comment degrees))   Pulse 78   Temp 98.9 °F (37.2 °C)   Resp 18   Ht 5' 6\" (1.676 m)   Wt 210 lb 8 oz (95.5 kg)   SpO2 97%   BMI 33.98 kg/m²     Constitutional: alert in bed, groaning at attempts to communicate, appears stated age    Eyes: Sclera are clear. Eyes open  ENMT: no external lesions; no obvious neck masses, no ear or lip lesions, nares normal  CV: Normal perfusion  Resp: no audible wheeze, open mouth breathing   GI: soft and non-distended, obese     Musculoskeletal: No embolic signs or cyanosis.    Neuro:  Alert, unable to communicate   Psychiatric: Calm      Recent vitals (if inpt):  Patient Vitals for the past 24 hrs:   BP Temp Pulse Resp SpO2   20 1227 156/75 98.9 °F (37.2 °C) 78 18 97 %   20 1152     96 %   20 0735 (!) 159/95 99.5 °F (37.5 °C) 84 18 98 %   20 0343 155/67 98.1 °F (36.7 °C) 80 18 95 %   20 0007 152/61 98.2 °F (36.8 °C) 82 18 95 %   20 1917 148/70 98.4 °F (36.9 °C) 78 18 98 %   06/16/20 1639 157/69 99 °F (37.2 °C) 96 16 96 %   06/16/20 1603     100 %       Labs:  Recent Labs     06/17/20  0744   WBC 11.1   HGB 8.1*         K 4.6   *   CO2 24   BUN 33*   CREA 1.36*   *       Lab Results   Component Value Date/Time    WBC 11.1 06/17/2020 07:44 AM    HGB 8.1 (L) 06/17/2020 07:44 AM    PLATELET 452 10/91/0474 07:44 AM    Sodium 143 06/17/2020 07:44 AM    Potassium 4.6 06/17/2020 07:44 AM    Chloride 111 (H) 06/17/2020 07:44 AM    CO2 24 06/17/2020 07:44 AM    BUN 33 (H) 06/17/2020 07:44 AM    Creatinine 1.36 (H) 06/17/2020 07:44 AM    Glucose 153 (H) 06/17/2020 07:44 AM    INR 1.0 09/19/2010 12:12 PM    aPTT >200.0 () 05/26/2020 05:24 PM    Bilirubin, total 0.3 06/05/2020 05:23 PM    ALT (SGPT) 23 06/05/2020 05:23 PM    Alk. phosphatase 93 06/05/2020 05:23 PM    Lipase 104 05/25/2020 03:04 AM    Ammonia 29 06/13/2020 07:29 AM    Troponin-I, Qt. 4.63 () 05/27/2020 03:28 AM       I reviewed recent labs and recent radiologic studies. I independently reviewed radiology images for studies I described above or studies I have ordered.    Admission date (for inpatients): 6/5/2020   * No surgery found *  * No surgery found *    ASSESSMENT/PLAN:  Problem List  Date Reviewed: 6/15/2020          Codes Class Noted    Dysphagia ICD-10-CM: R13.10  ICD-9-CM: 787.20  6/16/2020        Diabetes mellitus type 2, controlled ([facility] ICD-10-CM: E11.9  ICD-9-CM: 250.00  6/15/2020        Aspiration pneumonia ([facility] ICD-10-CM: J69.0  ICD-9-CM: 507.0  6/14/2020        * (Principal) Acute respiratory failure with hypoxia (HCC) ICD-10-CM: J96.01  ICD-9-CM: 518.81  6/11/2020        Hypernatremia ICD-10-CM: E87.0  ICD-9-CM: 276.0  6/6/2020        ROSA (acute kidney injury) ([facility] ICD-10-CM: N17.9  ICD-9-CM: 584.9  6/5/2020        UTI (urinary tract infection) ICD-10-CM: N39.0  ICD-9-CM: 599.0  6/5/2020        Acute metabolic encephalopathy ZUU-29-DA: G93.41  ICD-9-CM: 348.31  6/5/2020        Normocytic anemia ICD-10-CM: D64.9  ICD-9-CM: 285.9  6/5/2020        Leukocytosis ICD-10-CM: P35.012  ICD-9-CM: 288.60  5/26/2020            Principal Problem:    Acute respiratory failure with hypoxia ([facility] (6/11/2020)    Active Problems:    Leukocytosis (5/26/2020)      ROSA (acute kidney injury) ([facility] (6/5/2020)      UTI (urinary tract infection) (6/5/2020)      Acute metabolic encephalopathy (4/5/2460)      Normocytic anemia (6/5/2020)      Hypernatremia (6/6/2020)      Aspiration pneumonia ([facility] (6/14/2020)      Diabetes mellitus type 2, controlled ([facility] (6/15/2020)      Dysphagia (6/16/2020)         NPO p MN prior to OR  Hold ACs 24h prior to OR  ABX on call to OR  Obtain consent  SCD for prophylaxis  To OR for trach/PEG with Dr. Alexei Ospina --surgical timing per Dr. Alexei Ospina    Signed:  Oc Suero NP     I have seen and examined the patient with the Nurse Practitioner and agree with the above assessment and plan. PEG and trach later today.      Alissa Peters MD

## 2020-06-17 NOTE — PROGRESS NOTES
Assessment complete via flow sheet. Pt alert and oriented to person  Respirations even and unlabored. S1 S2 auscultated. Bowel sounds active, abdomen soft. Denies other needs. Bed in lowest position, side rails up x3, call bell in reach. Instructed to call for assistance. Pt verbalized understanding. Plan of care reviewed with patient.

## 2020-06-17 NOTE — PROGRESS NOTES
PROGRESS NOTE    Guero Brooks    6/17/2020    Date of Admission:  6/5/2020    The patient's chart is reviewed and the patient is discussed with the staff. 66 y.o. CF evaluated at the request of Dr. Thanh Plaza with chronic medical:  CAD, HTN, recent CVA with residual right sided weakness, DM, hx chronic CVA with left side weankness. Arrived from [facility] with worsening confusion and elevated creatinine. Was found to have UTI and started on empiric antibiotics. On 6/7 AM was found to have acute respiratory failure with hypoxia likely due to aspiration. Was suctioned, placed on NRB with sat  improving to 96%. Has been lethargic and minimally responsive since the event with fevers and antibiotics escalated. CT head was neg for any acute intracranial abnormalities and CXR and CT showed RLL infiltrate confirming suspiration for aspiration pneumonia. Due to worsening renal function, nephrology was consulted and workup revealed no obstruction. Renal function has improved on IVFs. Noted to be hyponatremia and hypotonic saline infusion. Pulmonary was consulted due to high O2 requirement despite being treated by antibiotics for 5 days. Patient has been started o vancomycin and zosyn. She is On O2 at 5L/min via NC. Subjective:     Lying in bed, event more alert today. Still on oxygen. Less congested today. Responding with yes/no with head nodding up and down and sided to side. Still agreeable with Trach and peg. No family here. No dyspnea. No pain. Comfortable currently. Getting NGT feeding. Review of Systems  Very limited as per above.        Current Facility-Administered Medications   Medication Dose Route Frequency    vancomycin (VANCOCIN) 1,000 mg in 0.9% sodium chloride (MBP/ADV) 250 mL  1,000 mg IntraVENous Q24H    piperacillin-tazobactam (ZOSYN) 3.375 g in 0.9% sodium chloride (MBP/ADV) 100 mL  3.375 g IntraVENous Q8H    aspirin chewable tablet 81 mg  81 mg Per NG tube DAILY    insulin glargine (LANTUS) injection 40 Units  40 Units SubCUTAneous QHS    ticagrelor (BRILINTA) tablet 90 mg  90 mg Per NG tube Q12H    lip protectant (BLISTEX) ointment 1 Each  1 Each Topical PRN    pantoprazole (PROTONIX) 40 mg in 0.9% sodium chloride 10 mL injection  40 mg IntraVENous DAILY    acetaminophen (TYLENOL) suppository 650 mg  650 mg Rectal Q4H PRN    insulin regular (NOVOLIN R, HUMULIN R) injection   SubCUTAneous Q6H    levalbuterol (XOPENEX) nebulizer soln 1.25 mg/3 mL  1.25 mg Nebulization Q4H PRN    hydrALAZINE (APRESOLINE) 20 mg/mL injection 10 mg  10 mg IntraVENous Q6H PRN    levothyroxine (SYNTHROID) injection 110 mcg  110 mcg IntraVENous DAILY    [Held by provider] insulin regular (NOVOLIN R, HUMULIN R) injection 4 Units  4 Units SubCUTAneous QPM    [Held by provider] amLODIPine (NORVASC) tablet 10 mg  10 mg Oral DAILY    [Held by provider] atorvastatin (LIPITOR) tablet 40 mg  40 mg Oral QHS    [Held by provider] carvediloL (COREG) tablet 12.5 mg  12.5 mg Oral BID WITH MEALS    [Held by provider] losartan (COZAAR) tablet 100 mg  100 mg Oral DAILY    [Held by provider] ezetimibe (ZETIA) tablet 10 mg  10 mg Oral DAILY    hyoscyamine SL (LEVSIN/SL) tablet 0.125 mg  0.125 mg SubLINGual Q6H PRN    ondansetron (ZOFRAN ODT) tablet 4 mg  4 mg Oral Q8H PRN    [Held by provider] sertraline (ZOLOFT) tablet 200 mg  200 mg Oral BID    sodium chloride (NS) flush 5-40 mL  5-40 mL IntraVENous Q8H    sodium chloride (NS) flush 5-40 mL  5-40 mL IntraVENous PRN    acetaminophen (TYLENOL) tablet 650 mg  650 mg Oral Q4H PRN    ondansetron (ZOFRAN) injection 4 mg  4 mg IntraVENous Q4H PRN    magnesium hydroxide (MILK OF MAGNESIA) 400 mg/5 mL oral suspension 30 mL  30 mL Oral DAILY PRN    heparin (porcine) injection 5,000 Units  5,000 Units SubCUTAneous Q8H         Objective:     Vitals:    06/16/20 1639 06/16/20 1917 06/17/20 0007 06/17/20 0343   BP: 157/69 148/70 152/61 155/67   Pulse: 96 78 82 80   Resp: 16 18 18 18   Temp: 99 °F (37.2 °C) 98.4 °F (36.9 °C) 98.2 °F (36.8 °C) 98.1 °F (36.7 °C)   SpO2: 96% 98% 95% 95%   Weight:       Height:           PHYSICAL EXAM     Gen:  the patient is obese and in no acute distress, NC   sat 95%  HEENT:  Sclera clear, pupils equal, oral mucosa moist  Lungs: mild coarse sounds in based with few rhonchi. Slightly less than yesterday  Heart:  RRR without M,G,R  Abd: soft with positive bowel sounds, NG with TFs and having loose diarrhea stools  Ext: warm without cyanosis. There is trace upper and lower extremity edema. Skin:  no jaundice or rashes, no wounds   Neuro: No left side movements, right side weakness   Psychiatric:  More alert today and looking at med and moving right hand more today. Nodding and responding to questions and more definitive today. Chest X-ray:    6/14/20:  Bibasilar atelectasis unchanged      Chest CT 6/11/20:    RIGHT LOWER LOBE ASPIRATION PNEUMONIA AND MINIMAL LEFT BASILAR ATELECTASIS. Recent Labs     06/16/20  0725 06/15/20  0508   WBC 10.3 10.1   HGB 8.2* 8.1*   HCT 24.8* 25.1*    197     Recent Labs     06/16/20  0725 06/15/20  0508 06/14/20  0804    147* 149*   K 4.7 4.4 4.5   * 117* 119*   * 154* 250*   CO2 21 21 20*   BUN 35* 39* 43*   CREA 1.40* 1.49* 1.49*   CA 8.9 9.2 9.1     No results for input(s): PH, PCO2, PO2, HCO3 in the last 72 hours.       Assessment:  (Medical Decision Making)         Hospital Problems  Date Reviewed: 6/15/2020          Codes Class Noted POA    Diabetes mellitus type 2, controlled ([facility] ICD-10-CM: E11.9  ICD-9-CM: 250.00  6/15/2020 Yes    Chronic-- -per primary team    Aspiration pneumonia [facility]) ICD-10-CM: J69.0  ICD-9-CM: 507.0  6/14/2020 Unknown    On abx    Acute respiratory failure with hypoxia ([facility] ICD-10-CM: J96.01  ICD-9-CM: 518.81  6/11/2020 Unknown    On NC 5L--wean O2 as tolearted    Hypernatremia ICD-10-CM: E87.0  ICD-9-CM: 276.0  6/6/2020 Unknown    Na down to 147    ROSA (acute kidney injury) ([facility] ICD-10-CM: N17.9  ICD-9-CM: 584.9  6/5/2020 Yes    Per nephrology--creat down to to 1.4's    UTI (urinary tract infection) ICD-10-CM: N39.0  ICD-9-CM: 599.0  6/5/2020 Yes    On ABX    * (Principal) Acute metabolic encephalopathy YXH-06-DX: G93.41  ICD-9-CM: 348.31  6/5/2020 Yes    Nodding appropriately     Normocytic anemia ICD-10-CM: D64.9  ICD-9-CM: 285.9  6/5/2020 Yes    hgb in 8 range--following CBC    Leukocytosis ICD-10-CM: X20.277  ICD-9-CM: 288.60  5/26/2020 Yes             Elderly female with recent CVA with UTI and likely aspirating (not protecting airway). On oxygen at 5 LPM, ABX and needs PEG and now likely needs trach. Plan:  (Medical Decision Making)     --Still weak but stronger daily. See HPI and agreeable with Trach and PEG. .No family in room at this time. --on ABX D10 continue per PMD. I would recommend stopping today  --Nephrology following   --Dr. Tate Correia to f/u with family after they discuss matter amongst eachother  --continue remaining treatment. Bela Little MD      This note was signed electronically. Errors are unfortunately her likely due to dictation software.

## 2020-06-17 NOTE — PROGRESS NOTES
Problem: Diabetes Self-Management  Goal: *Disease process and treatment process  Description: Define diabetes and identify own type of diabetes; list 3 options for treating diabetes. Outcome: Progressing Towards Goal  Goal: *Incorporating nutritional management into lifestyle  Description: Describe effect of type, amount and timing of food on blood glucose; list 3 methods for planning meals. Outcome: Progressing Towards Goal  Goal: *Incorporating physical activity into lifestyle  Description: State effect of exercise on blood glucose levels. Outcome: Progressing Towards Goal  Goal: *Developing strategies to promote health/change behavior  Description: Define the ABC's of diabetes; identify appropriate screenings, schedule and personal plan for screenings. Outcome: Progressing Towards Goal  Goal: *Using medications safely  Description: State effect of diabetes medications on diabetes; name diabetes medication taking, action and side effects. Outcome: Progressing Towards Goal  Goal: *Monitoring blood glucose, interpreting and using results  Description: Identify recommended blood glucose targets  and personal targets. Outcome: Progressing Towards Goal  Goal: *Prevention, detection, treatment of acute complications  Description: List symptoms of hyper- and hypoglycemia; describe how to treat low blood sugar and actions for lowering  high blood glucose level. Outcome: Progressing Towards Goal  Goal: *Prevention, detection and treatment of chronic complications  Description: Define the natural course of diabetes and describe the relationship of blood glucose levels to long term complications of diabetes.   Outcome: Progressing Towards Goal  Goal: *Developing strategies to address psychosocial issues  Description: Describe feelings about living with diabetes; identify support needed and support network  Outcome: Progressing Towards Goal  Goal: *Insulin pump training  Outcome: Progressing Towards Goal  Goal: *Sick day guidelines  Outcome: Progressing Towards Goal  Goal: *Patient Specific Goal (EDIT GOAL, INSERT TEXT)  Outcome: Progressing Towards Goal     Problem: Falls - Risk of  Goal: *Absence of Falls  Description: Document Cesar Fall Risk and appropriate interventions in the flowsheet. Outcome: Progressing Towards Goal  Note: Fall Risk Interventions:  Mobility Interventions: Assess mobility with egress test, Bed/chair exit alarm, Communicate number of staff needed for ambulation/transfer, Patient to call before getting OOB, PT Consult for mobility concerns, PT Consult for assist device competence, Strengthening exercises (ROM-active/passive), Utilize walker, cane, or other assistive device, Utilize gait belt for transfers/ambulation    Mentation Interventions: Adequate sleep, hydration, pain control, Bed/chair exit alarm, Door open when patient unattended, Evaluate medications/consider consulting pharmacy    Medication Interventions: Assess postural VS orthostatic hypotension, Bed/chair exit alarm, Evaluate medications/consider consulting pharmacy, Patient to call before getting OOB    Elimination Interventions: Bed/chair exit alarm, Call light in reach, Elevated toilet seat, Patient to call for help with toileting needs              Problem: Patient Education: Go to Patient Education Activity  Goal: Patient/Family Education  Outcome: Progressing Towards Goal     Problem: Pressure Injury - Risk of  Goal: *Prevention of pressure injury  Description: Document Santiago Scale and appropriate interventions in the flowsheet.   Outcome: Progressing Towards Goal  Note: Pressure Injury Interventions:  Sensory Interventions: Assess changes in LOC    Moisture Interventions: Absorbent underpads, Apply protective barrier, creams and emollients    Activity Interventions: Assess need for specialty bed, Chair cushion, Increase time out of bed, Pressure redistribution bed/mattress(bed type), PT/OT evaluation    Mobility Interventions: Assess need for specialty bed, Chair cushion, Float heels, HOB 30 degrees or less, Pressure redistribution bed/mattress (bed type), PT/OT evaluation, Turn and reposition approx. every two hours(pillow and wedges)    Nutrition Interventions: Document food/fluid/supplement intake    Friction and Shear Interventions: Apply protective barrier, creams and emollients, Feet elevated on foot rest, Foam dressings/transparent film/skin sealants, HOB 30 degrees or less, Lift sheet                Problem: Patient Education: Go to Patient Education Activity  Goal: Patient/Family Education  Outcome: Progressing Towards Goal     Problem: Dysphagia (Adult)  Goal: *Speech Goal: (INSERT TEXT)  Description: LTG: Patient will tolerate least restrictive diet without overt signs or symptoms of airway compromise by discharge. STG: Patient will tolerate pureed diet and honey-thick liquids without overt signs or symptoms of aspiration 100% of the time. STG: Patient will perform laryngeal strengthening exercises x10 each with 70% accuracy to improve strength and coordination of swallowing function. STG: Patient will participate in modified barium swallow study as clinically indicated.       Outcome: Progressing Towards Goal     Problem: Patient Education: Go to Patient Education Activity  Goal: Patient/Family Education  Outcome: Progressing Towards Goal     Problem: Nutrition Deficit  Goal: *Optimize nutritional status  Outcome: Progressing Towards Goal

## 2020-06-17 NOTE — PROGRESS NOTES
[facility] Progress Note    Follow-Up on: ROSA/CKD, hypernatremia    ROS:  More awake     Exam:  Vitals:    06/16/20 1917 06/17/20 0007 06/17/20 0343 06/17/20 0735   BP: 148/70 152/61 155/67 (!) 159/95   Pulse: 78 82 80 84   Resp: 18 18 18 18   Temp: 98.4 °F (36.9 °C) 98.2 °F (36.8 °C) 98.1 °F (36.7 °C) 99.5 °F (37.5 °C)   SpO2: 98% 95% 95% 98%   Weight:       Height:             Intake/Output Summary (Last 24 hours) at 6/17/2020 1110  Last data filed at 6/17/2020 0615  Gross per 24 hour   Intake 130 ml   Output 1600 ml   Net -1470 ml       Wt Readings from Last 3 Encounters:   06/05/20 95.5 kg (210 lb 8 oz)   06/01/20 104.8 kg (231 lb 0.7 oz)   05/27/20 106.7 kg (235 lb 3.7 oz)       GEN - obtunded   CV - regular, no murmur, no rub  Lung - Decreased BS bilaterally  Abd - soft, nontender  Ext - no edema    Recent Labs     06/17/20  0744 06/16/20  0725 06/15/20  0508   WBC 11.1 10.3 10.1   HGB 8.1* 8.2* 8.1*   HCT 25.0* 24.8* 25.1*    215 197        Recent Labs     06/17/20  0744 06/16/20  0725 06/15/20  0508    142 147*   K 4.6 4.7 4.4   * 112* 117*   CO2 24 21 21   BUN 33* 35* 39*   CREA 1.36* 1.40* 1.49*   CA 9.8 8.9 9.2   * 236* 154*       Assessment / Plan:  Principal Problem:    Acute respiratory failure with hypoxia (HCC) (6/11/2020)    Active Problems:    Leukocytosis (5/26/2020)      ROSA (acute kidney injury) ([address] (6/5/2020)      UTI (urinary tract infection) (6/5/2020)      Acute metabolic encephalopathy (9/3/6017)      Normocytic anemia (6/5/2020)      Hypernatremia (6/6/2020)      Aspiration pneumonia ([facility] (6/14/2020)      Diabetes mellitus type 2, controlled ([facility] (6/15/2020)      Dysphagia (6/16/2020)      1. ROSA/CKD  - Baseline Cr probably mid to upper 1's  - Suspect volume depletion   - Cr improving,   2. Hypernatremia - resolved   3. UTI  4.  Encephalopathy

## 2020-06-17 NOTE — PROGRESS NOTES
Palliative Care Progress Note    Patient: Otis Springer MRN: [id number]  SSN: [id number]    YOB: 1945  Age: 66 y.o. Sex: female       Assessment/Plan:     Chief Complaint/Interval History: More alert each day per documentation     Principal Diagnosis:    · Debility, Unspecified  R53.81    Additional Diagnoses:   · Advance Care Planning Counseling Z71.89  · Dyspnea  R06.00  · Dysphagia  R13.10  · Counseling, Encounter for Medical Advice  Z71.9  · Encounter for Palliative Care  Z51.5    Palliative Performance Scale (PPS)  PPS: 20    Medical Decision Making:   Reviewed and summarized notes over previous 48 hours. Discussed case with appropriate providers. Reviewed lab and X-ray data. Patient resting semi-reclined in bed, her  is at the bedside. Patient alert throughout my visit today; she nods to yes and no questions, smiled, and even states \"I can't do it\" very weakly when discussing bed mobility. Patient's  has several questions regarding trach/PEG. Answered these questions to the best of my ability. Patient's  indicates that he has spoken with his 3 sons, and they are leaning towards proceeding with these procedures. I asked patient if she was ok with the interventions we were discussing, and she nodded her head yes. Counseled patient and  that decisions and goals/plan of care may be revisited at any time at the request of patient/family. Patient nodded,  verbalizes understanding. Will continue to follow. Will discuss findings with members of the interdisciplinary team.         More than 50% of this 35 minute visit was spent counseling and coordination of care as outlined above. Subjective:     Review of Systems:  Review of systems not obtained due to patient factors: pt has limited ability to respond. She does report mild dyspnea.      Objective:     Visit Vitals  /75 (BP 1 Location: Left arm, BP Patient Position: At rest;Head of bed elevated (Comment degrees))   Pulse 78   Temp 98.9 °F (37.2 °C)   Resp 18   Ht 5' 6\" (1.676 m)   Wt 210 lb 8 oz (95.5 kg)   SpO2 97%   BMI 33.98 kg/m²       Physical Exam:    General:  No acute distress. Eyes:  Conjunctivae/corneas clear    Nose: Nares normal. Septum midline. O2 via NC. Neck: Supple, symmetrical, trachea midline. Lungs:   Clear to auscultation bilaterally, unlabored. Heart:  Regular rate and rhythm. Abdomen:   Soft, non-tender, non-distended. Extremities: Normal, atraumatic, no cyanosis or edema. Skin: Skin color, texture, turgor normal. No rash. Neurologic: Extreme weakness to RUE and RLE.  LUE, LLE flaccid. Psych: Alert, unable to assess orientation.       Signed By: Milana Hilario NP     June 17, 2020

## 2020-06-17 NOTE — PROGRESS NOTES
Nutrition: Follow-up  Consult for tube feeding management (Hospitalist)    Assessment:  Food/Nutrition Patient History: Patient admitted from from [facility] with worsening confusion. She was recently admitted last month with elevated troponins and also found to have CVA. SLP was following during last admission and recommended puree diet with honey thick liquids by spoon. SLP following this admission. Diet of puree with honey liquids started 6/6. Patient made NPO 6/8. Noted that she had rapid called 6/7 for hypoxia and likely aspiration. Diet has not been able to be advanced due to patient not being able to participate in PO trials. NGT has been placed, awaiting KUB confirmation. Per pulmonary note, likely with chronic aspiration due to weak cough and poor airway clearance, may need trach if/when PEG pursued. PMH also significant for CAD, DM2, HTN. Patient seen in follow-up today with  at bedside. She is awake and oriented today. She nods her head \"yes\" that she remembers RD. She smiles and says she is okay. Her  states that they are likely going to precede with Gtube soon. He also asks how patient is doing with current TF. Observed TF running at 60 ml/hr with water flush at 40 ml/hr. Abdomen: Last BM 6/17, active BS  Edema: General 2+, 2+ pitting all extremities  Lab Results   Component Value Date/Time    Sodium 143 06/17/2020 07:44 AM    Potassium 4.6 06/17/2020 07:44 AM    Chloride 111 (H) 06/17/2020 07:44 AM    CO2 24 06/17/2020 07:44 AM    Anion gap 8 06/17/2020 07:44 AM    Glucose 153 (H) 06/17/2020 07:44 AM    BUN 33 (H) 06/17/2020 07:44 AM    Creatinine 1.36 (H) 06/17/2020 07:44 AM    Calcium 9.8 06/17/2020 07:44 AM    Albumin 2.9 (L) 06/11/2020 05:37 PM    Phosphorus 3.1 06/12/2020 08:14 AM   POC glucose 159-301 last 24 hrs  Labs remarkable for Na WNL, Elevated glucose but much improved, elevated BUN and creatinine but improving  Diet: DIET NPO  DIET TUBE FEEDING Opne order for details.  Do not start until NGT placement confirmed. Keep HOB > 30 degrees. Check residuals every 4 hours. Hold TF for > 500 ml x 1 or 250 ml x 2 consecutive checks. Also place patient on right side if residual is . .. Pertinent Medications: Lantus 40 units per day SSI (33 units yesterday, 6 so far today), Protonix, Vanc, synthroid, Milk of Mag PRN  Enteral nutrition access: NGT  Anthropometrics:Height: 5' 6\" (167.6 cm),  Weight: 95.5 kg (210 lb 8 oz), Weight Source: Bed, Body mass index is 33.98 kg/m². BMI class of obese. Macronutrient Needs: 95.5 kg CBW (Current body weight) bed scale  Estimated energy requirements:  1325-8482 kcal /day (15-20 kcal/kg)  Estimated protein requirements:  72-95 grams protein/day (20% kcal)  CHO limit per day: 263 grams CHO/day (55% calories)  Estimated fluid/day: 1.4-1.9 liters/day (~1ml/kcal/day)  Intake/Comparative Standards: TF at goal, providing 2160 kcal (>100% estimated calorie needs), 119 grams protein (>100% estimated protein needs), 192 grams CHO/day (does not exceed maximum CHO/day) and ~2.5L free fluid (>100% for hypernatremia). Intervention:  Meals and snacks: NPO or per SLP  Enteral Nutrition: Change Glucerna 1.5 via NGT to 40ml/hour with free water flush at 30/hour. New regimen will provide 1440 kcal (100% estimated calorie needs), 79 grams protein (100% estimated protein needs), 128 grams CHO/day (does not exceed maximum CHO/day) and ~1.4L free fluid (~1 ml/kcal). Vitamin and Mineral Supplement Therapy:  Nutrition support orders for electrolyte management replacement are activated and placed on the MAR. Coordination of Nutrition Care: Discussed with Andi Henderson RN  Discharge Nutrition Plan: Patient will likely need enteral nutrition at discharge. Administration to be determined pending goals and discharge plan.     [facility] [address] [place] [address], [address], [address] [place] [address]

## 2020-06-17 NOTE — ANESTHESIA PREPROCEDURE EVALUATION
Relevant Problems   No relevant active problems       Anesthetic History               Review of Systems / Medical History  Patient summary reviewed and pertinent labs reviewed    Pulmonary    COPD               Neuro/Psych       CVA       Cardiovascular    Hypertension          CAD and cardiac stents      Comments: CVA with deterioration; Rapid response 6/7 with aspiration  Stent placed 3 weeks ago  CVA and NSTEMI 5/25; stent 5/26   GI/Hepatic/Renal     GERD           Endo/Other    Diabetes    Obesity and arthritis     Other Findings            Physical Exam    Airway  Mallampati: II  TM Distance: > 6 cm  Neck ROM: decreased range of motion   Mouth opening: Normal     Cardiovascular    Rhythm: regular  Rate: normal         Dental         Pulmonary    Rhonchi:bibasilar             Abdominal         Other Findings            Anesthetic Plan    ASA: 4  Anesthesia type: general            Anesthetic plan and risks discussed with: Spouse      Brilinta given today;  says she has had some awareness in the past with anesthesia. Telephone consent from  but he is concerned about the blood thinner.

## 2020-06-18 ENCOUNTER — ANESTHESIA (OUTPATIENT)
Dept: SURGERY | Age: 75
DRG: 004 | End: 2020-06-18
Payer: MEDICARE

## 2020-06-18 LAB
ANION GAP SERPL CALC-SCNC: 8 MMOL/L (ref 7–16)
APTT PPP: 30.1 SEC (ref 24.3–35.4)
BASOPHILS # BLD: 0 K/UL (ref 0–0.2)
BASOPHILS NFR BLD: 0 % (ref 0–2)
BUN SERPL-MCNC: 29 MG/DL (ref 8–23)
CALCIUM SERPL-MCNC: 9.8 MG/DL (ref 8.3–10.4)
CHLORIDE SERPL-SCNC: 112 MMOL/L (ref 98–107)
CO2 SERPL-SCNC: 24 MMOL/L (ref 21–32)
CREAT SERPL-MCNC: 1.17 MG/DL (ref 0.6–1)
DIFFERENTIAL METHOD BLD: ABNORMAL
EOSINOPHIL # BLD: 0.6 K/UL (ref 0–0.8)
EOSINOPHIL NFR BLD: 6 % (ref 0.5–7.8)
ERYTHROCYTE [DISTWIDTH] IN BLOOD BY AUTOMATED COUNT: 16.9 % (ref 11.9–14.6)
GLUCOSE BLD STRIP.AUTO-MCNC: 123 MG/DL (ref 65–100)
GLUCOSE BLD STRIP.AUTO-MCNC: 151 MG/DL (ref 65–100)
GLUCOSE BLD STRIP.AUTO-MCNC: 155 MG/DL (ref 65–100)
GLUCOSE BLD STRIP.AUTO-MCNC: 173 MG/DL (ref 65–100)
GLUCOSE BLD STRIP.AUTO-MCNC: 175 MG/DL (ref 65–100)
GLUCOSE SERPL-MCNC: 116 MG/DL (ref 65–100)
HCT VFR BLD AUTO: 27.2 % (ref 35.8–46.3)
HGB BLD-MCNC: 8.9 G/DL (ref 11.7–15.4)
IMM GRANULOCYTES # BLD AUTO: 0.2 K/UL (ref 0–0.5)
IMM GRANULOCYTES NFR BLD AUTO: 1 % (ref 0–5)
INR PPP: 1
LYMPHOCYTES # BLD: 2.8 K/UL (ref 0.5–4.6)
LYMPHOCYTES NFR BLD: 26 % (ref 13–44)
MCH RBC QN AUTO: 32.2 PG (ref 26.1–32.9)
MCHC RBC AUTO-ENTMCNC: 32.7 G/DL (ref 31.4–35)
MCV RBC AUTO: 98.6 FL (ref 79.6–97.8)
MONOCYTES # BLD: 0.7 K/UL (ref 0.1–1.3)
MONOCYTES NFR BLD: 7 % (ref 4–12)
NEUTS SEG # BLD: 6.3 K/UL (ref 1.7–8.2)
NEUTS SEG NFR BLD: 60 % (ref 43–78)
NRBC # BLD: 0.02 K/UL (ref 0–0.2)
PLATELET # BLD AUTO: 249 K/UL (ref 150–450)
PMV BLD AUTO: 11.4 FL (ref 9.4–12.3)
POTASSIUM SERPL-SCNC: 4.5 MMOL/L (ref 3.5–5.1)
PROTHROMBIN TIME: 13.8 SEC (ref 12–14.7)
RBC # BLD AUTO: 2.76 M/UL (ref 4.05–5.2)
SODIUM SERPL-SCNC: 144 MMOL/L (ref 136–145)
WBC # BLD AUTO: 10.7 K/UL (ref 4.3–11.1)

## 2020-06-18 PROCEDURE — 74011636637 HC RX REV CODE- 636/637: Performed by: INTERNAL MEDICINE

## 2020-06-18 PROCEDURE — 74011250636 HC RX REV CODE- 250/636: Performed by: ANESTHESIOLOGY

## 2020-06-18 PROCEDURE — 74011000250 HC RX REV CODE- 250: Performed by: NURSE PRACTITIONER

## 2020-06-18 PROCEDURE — 76210000006 HC OR PH I REC 0.5 TO 1 HR: Performed by: SURGERY

## 2020-06-18 PROCEDURE — 74011250636 HC RX REV CODE- 250/636: Performed by: FAMILY MEDICINE

## 2020-06-18 PROCEDURE — 65620000000 HC RM CCU GENERAL

## 2020-06-18 PROCEDURE — 77030008793 HC TU TRACH CUF COVD -B: Performed by: SURGERY

## 2020-06-18 PROCEDURE — 74011250636 HC RX REV CODE- 250/636: Performed by: INTERNAL MEDICINE

## 2020-06-18 PROCEDURE — 74011250636 HC RX REV CODE- 250/636: Performed by: NURSE ANESTHETIST, CERTIFIED REGISTERED

## 2020-06-18 PROCEDURE — 77030039425 HC BLD LARYNG TRULITE DISP TELE -A: Performed by: NURSE ANESTHETIST, CERTIFIED REGISTERED

## 2020-06-18 PROCEDURE — 74011000250 HC RX REV CODE- 250: Performed by: NURSE ANESTHETIST, CERTIFIED REGISTERED

## 2020-06-18 PROCEDURE — 85025 COMPLETE CBC W/AUTO DIFF WBC: CPT

## 2020-06-18 PROCEDURE — 77030040922 HC BLNKT HYPOTHRM STRY -A: Performed by: NURSE ANESTHETIST, CERTIFIED REGISTERED

## 2020-06-18 PROCEDURE — 5A1935Z RESPIRATORY VENTILATION, LESS THAN 24 CONSECUTIVE HOURS: ICD-10-PCS | Performed by: SURGERY

## 2020-06-18 PROCEDURE — 74011000250 HC RX REV CODE- 250: Performed by: INTERNAL MEDICINE

## 2020-06-18 PROCEDURE — 94002 VENT MGMT INPAT INIT DAY: CPT

## 2020-06-18 PROCEDURE — 80048 BASIC METABOLIC PNL TOTAL CA: CPT

## 2020-06-18 PROCEDURE — 77030019908 HC STETH ESOPH SIMS -A: Performed by: NURSE ANESTHETIST, CERTIFIED REGISTERED

## 2020-06-18 PROCEDURE — 74011250636 HC RX REV CODE- 250/636: Performed by: NURSE PRACTITIONER

## 2020-06-18 PROCEDURE — 0B113F4 BYPASS TRACHEA TO CUTANEOUS WITH TRACHEOSTOMY DEVICE, PERCUTANEOUS APPROACH: ICD-10-PCS | Performed by: SURGERY

## 2020-06-18 PROCEDURE — 77030040361 HC SLV COMPR DVT MDII -B: Performed by: SURGERY

## 2020-06-18 PROCEDURE — 77030040393 HC DRSG OPTIFOAM GENT MDII -B

## 2020-06-18 PROCEDURE — 82962 GLUCOSE BLOOD TEST: CPT

## 2020-06-18 PROCEDURE — 77030005122 HC CATH GASTMY PEG BSC -B: Performed by: ANESTHESIOLOGY

## 2020-06-18 PROCEDURE — 85730 THROMBOPLASTIN TIME PARTIAL: CPT

## 2020-06-18 PROCEDURE — 99232 SBSQ HOSP IP/OBS MODERATE 35: CPT | Performed by: INTERNAL MEDICINE

## 2020-06-18 PROCEDURE — 76060000034 HC ANESTHESIA 1.5 TO 2 HR: Performed by: SURGERY

## 2020-06-18 PROCEDURE — 77030037088 HC TUBE ENDOTRACH ORAL NSL COVD-A: Performed by: NURSE ANESTHETIST, CERTIFIED REGISTERED

## 2020-06-18 PROCEDURE — 77030002986 HC SUT PROL J&J -A: Performed by: SURGERY

## 2020-06-18 PROCEDURE — 0DH63UZ INSERTION OF FEEDING DEVICE INTO STOMACH, PERCUTANEOUS APPROACH: ICD-10-PCS | Performed by: SURGERY

## 2020-06-18 PROCEDURE — 77030018846 HC SOL IRR STRL H20 ICUM -A: Performed by: SURGERY

## 2020-06-18 PROCEDURE — 76010000162 HC OR TIME 1.5 TO 2 HR INTENSV-TIER 1: Performed by: SURGERY

## 2020-06-18 PROCEDURE — C9113 INJ PANTOPRAZOLE SODIUM, VIA: HCPCS | Performed by: INTERNAL MEDICINE

## 2020-06-18 PROCEDURE — 85610 PROTHROMBIN TIME: CPT

## 2020-06-18 PROCEDURE — 36415 COLL VENOUS BLD VENIPUNCTURE: CPT

## 2020-06-18 PROCEDURE — 77030040831 HC BAG URINE DRNG MDII -A: Performed by: SURGERY

## 2020-06-18 PROCEDURE — 77030031139 HC SUT VCRL2 J&J -A: Performed by: SURGERY

## 2020-06-18 PROCEDURE — 77030002996 HC SUT SLK J&J -A: Performed by: SURGERY

## 2020-06-18 DEVICE — TRACHEOSTOMY TUBE CUFFED WITH DISPOSABLE INNER CANNULA
Type: IMPLANTABLE DEVICE | Site: NECK | Status: FUNCTIONAL
Brand: SHILEY

## 2020-06-18 RX ORDER — HYDROMORPHONE HYDROCHLORIDE 2 MG/ML
0.5 INJECTION, SOLUTION INTRAMUSCULAR; INTRAVENOUS; SUBCUTANEOUS
Status: DISCONTINUED | OUTPATIENT
Start: 2020-06-18 | End: 2020-06-18 | Stop reason: HOSPADM

## 2020-06-18 RX ORDER — FENTANYL CITRATE 50 UG/ML
100 INJECTION, SOLUTION INTRAMUSCULAR; INTRAVENOUS ONCE
Status: ACTIVE | OUTPATIENT
Start: 2020-06-18 | End: 2020-06-19

## 2020-06-18 RX ORDER — ROCURONIUM BROMIDE 10 MG/ML
INJECTION, SOLUTION INTRAVENOUS AS NEEDED
Status: DISCONTINUED | OUTPATIENT
Start: 2020-06-18 | End: 2020-06-18 | Stop reason: HOSPADM

## 2020-06-18 RX ORDER — SODIUM CHLORIDE 0.9 % (FLUSH) 0.9 %
5-40 SYRINGE (ML) INJECTION AS NEEDED
Status: DISCONTINUED | OUTPATIENT
Start: 2020-06-18 | End: 2020-06-23 | Stop reason: HOSPADM

## 2020-06-18 RX ORDER — SODIUM CHLORIDE, SODIUM LACTATE, POTASSIUM CHLORIDE, CALCIUM CHLORIDE 600; 310; 30; 20 MG/100ML; MG/100ML; MG/100ML; MG/100ML
150 INJECTION, SOLUTION INTRAVENOUS CONTINUOUS
Status: DISCONTINUED | OUTPATIENT
Start: 2020-06-18 | End: 2020-06-18 | Stop reason: HOSPADM

## 2020-06-18 RX ORDER — SODIUM CHLORIDE, SODIUM LACTATE, POTASSIUM CHLORIDE, CALCIUM CHLORIDE 600; 310; 30; 20 MG/100ML; MG/100ML; MG/100ML; MG/100ML
INJECTION, SOLUTION INTRAVENOUS
Status: DISCONTINUED | OUTPATIENT
Start: 2020-06-18 | End: 2020-06-18 | Stop reason: HOSPADM

## 2020-06-18 RX ORDER — ACETAMINOPHEN 500 MG
1000 TABLET ORAL
Status: DISCONTINUED | OUTPATIENT
Start: 2020-06-18 | End: 2020-06-18 | Stop reason: HOSPADM

## 2020-06-18 RX ORDER — SODIUM CHLORIDE, SODIUM LACTATE, POTASSIUM CHLORIDE, CALCIUM CHLORIDE 600; 310; 30; 20 MG/100ML; MG/100ML; MG/100ML; MG/100ML
50 INJECTION, SOLUTION INTRAVENOUS CONTINUOUS
Status: DISPENSED | OUTPATIENT
Start: 2020-06-19 | End: 2020-06-19

## 2020-06-18 RX ORDER — ONDANSETRON 2 MG/ML
INJECTION INTRAMUSCULAR; INTRAVENOUS AS NEEDED
Status: DISCONTINUED | OUTPATIENT
Start: 2020-06-18 | End: 2020-06-18 | Stop reason: HOSPADM

## 2020-06-18 RX ORDER — PROPOFOL 10 MG/ML
INJECTION, EMULSION INTRAVENOUS
Status: DISCONTINUED | OUTPATIENT
Start: 2020-06-18 | End: 2020-06-18 | Stop reason: HOSPADM

## 2020-06-18 RX ORDER — LIDOCAINE HYDROCHLORIDE 10 MG/ML
0.1 INJECTION INFILTRATION; PERINEURAL AS NEEDED
Status: DISCONTINUED | OUTPATIENT
Start: 2020-06-18 | End: 2020-06-23 | Stop reason: HOSPADM

## 2020-06-18 RX ORDER — SODIUM CHLORIDE 0.9 % (FLUSH) 0.9 %
5-40 SYRINGE (ML) INJECTION EVERY 8 HOURS
Status: DISCONTINUED | OUTPATIENT
Start: 2020-06-18 | End: 2020-06-23 | Stop reason: HOSPADM

## 2020-06-18 RX ORDER — HYDROCODONE BITARTRATE AND ACETAMINOPHEN 5; 325 MG/1; MG/1
1 TABLET ORAL AS NEEDED
Status: DISCONTINUED | OUTPATIENT
Start: 2020-06-18 | End: 2020-06-18 | Stop reason: HOSPADM

## 2020-06-18 RX ORDER — ACETAMINOPHEN 500 MG
1000 TABLET ORAL ONCE
Status: ACTIVE | OUTPATIENT
Start: 2020-06-18 | End: 2020-06-19

## 2020-06-18 RX ORDER — FAMOTIDINE 20 MG/1
20 TABLET, FILM COATED ORAL ONCE
Status: ACTIVE | OUTPATIENT
Start: 2020-06-18 | End: 2020-06-19

## 2020-06-18 RX ORDER — SUCCINYLCHOLINE CHLORIDE 20 MG/ML
INJECTION INTRAMUSCULAR; INTRAVENOUS AS NEEDED
Status: DISCONTINUED | OUTPATIENT
Start: 2020-06-18 | End: 2020-06-18 | Stop reason: HOSPADM

## 2020-06-18 RX ORDER — FENTANYL CITRATE 50 UG/ML
INJECTION, SOLUTION INTRAMUSCULAR; INTRAVENOUS AS NEEDED
Status: DISCONTINUED | OUTPATIENT
Start: 2020-06-18 | End: 2020-06-18 | Stop reason: HOSPADM

## 2020-06-18 RX ORDER — PROPOFOL 10 MG/ML
INJECTION, EMULSION INTRAVENOUS AS NEEDED
Status: DISCONTINUED | OUTPATIENT
Start: 2020-06-18 | End: 2020-06-18 | Stop reason: HOSPADM

## 2020-06-18 RX ORDER — CEFAZOLIN SODIUM/WATER 2 G/20 ML
2 SYRINGE (ML) INTRAVENOUS
Status: COMPLETED | OUTPATIENT
Start: 2020-06-18 | End: 2020-06-18

## 2020-06-18 RX ORDER — SODIUM CHLORIDE, SODIUM LACTATE, POTASSIUM CHLORIDE, CALCIUM CHLORIDE 600; 310; 30; 20 MG/100ML; MG/100ML; MG/100ML; MG/100ML
150 INJECTION, SOLUTION INTRAVENOUS CONTINUOUS
Status: DISPENSED | OUTPATIENT
Start: 2020-06-18 | End: 2020-06-19

## 2020-06-18 RX ORDER — LIDOCAINE HYDROCHLORIDE 20 MG/ML
INJECTION, SOLUTION EPIDURAL; INFILTRATION; INTRACAUDAL; PERINEURAL AS NEEDED
Status: DISCONTINUED | OUTPATIENT
Start: 2020-06-18 | End: 2020-06-18 | Stop reason: HOSPADM

## 2020-06-18 RX ORDER — SODIUM CHLORIDE 9 MG/ML
50 INJECTION, SOLUTION INTRAVENOUS CONTINUOUS
Status: DISCONTINUED | OUTPATIENT
Start: 2020-06-18 | End: 2020-06-18 | Stop reason: HOSPADM

## 2020-06-18 RX ORDER — DEXAMETHASONE SODIUM PHOSPHATE 4 MG/ML
INJECTION, SOLUTION INTRA-ARTICULAR; INTRALESIONAL; INTRAMUSCULAR; INTRAVENOUS; SOFT TISSUE AS NEEDED
Status: DISCONTINUED | OUTPATIENT
Start: 2020-06-18 | End: 2020-06-18 | Stop reason: HOSPADM

## 2020-06-18 RX ORDER — MIDAZOLAM HYDROCHLORIDE 1 MG/ML
2 INJECTION, SOLUTION INTRAMUSCULAR; INTRAVENOUS
Status: ACTIVE | OUTPATIENT
Start: 2020-06-18 | End: 2020-06-19

## 2020-06-18 RX ORDER — PROPOFOL 10 MG/ML
0-50 VIAL (ML) INTRAVENOUS
Status: DISCONTINUED | OUTPATIENT
Start: 2020-06-18 | End: 2020-06-20

## 2020-06-18 RX ADMIN — ONDANSETRON 4 MG: 2 INJECTION INTRAMUSCULAR; INTRAVENOUS at 13:20

## 2020-06-18 RX ADMIN — PROPOFOL 25 MCG/KG/MIN: 10 INJECTION, EMULSION INTRAVENOUS at 14:40

## 2020-06-18 RX ADMIN — ROCURONIUM BROMIDE 10 MG: 10 INJECTION, SOLUTION INTRAVENOUS at 13:13

## 2020-06-18 RX ADMIN — HEPARIN SODIUM 5000 UNITS: 5000 INJECTION INTRAVENOUS; SUBCUTANEOUS at 21:40

## 2020-06-18 RX ADMIN — LIDOCAINE HYDROCHLORIDE 60 MG: 20 INJECTION, SOLUTION EPIDURAL; INFILTRATION; INTRACAUDAL; PERINEURAL at 13:13

## 2020-06-18 RX ADMIN — FENTANYL CITRATE 50 MCG: 50 INJECTION INTRAMUSCULAR; INTRAVENOUS at 13:31

## 2020-06-18 RX ADMIN — ROCURONIUM BROMIDE 40 MG: 10 INJECTION, SOLUTION INTRAVENOUS at 13:25

## 2020-06-18 RX ADMIN — ROCURONIUM BROMIDE 50 MG: 10 INJECTION, SOLUTION INTRAVENOUS at 14:27

## 2020-06-18 RX ADMIN — DEXAMETHASONE SODIUM PHOSPHATE 4 MG: 4 INJECTION, SOLUTION INTRAMUSCULAR; INTRAVENOUS at 13:20

## 2020-06-18 RX ADMIN — PROPOFOL 100 MG: 10 INJECTION, EMULSION INTRAVENOUS at 13:13

## 2020-06-18 RX ADMIN — LEVOTHYROXINE SODIUM ANHYDROUS 110 MCG: 100 INJECTION, POWDER, LYOPHILIZED, FOR SOLUTION INTRAVENOUS at 09:11

## 2020-06-18 RX ADMIN — Medication 0.5 G: at 13:19

## 2020-06-18 RX ADMIN — HUMAN INSULIN 3 UNITS: 100 INJECTION, SOLUTION SUBCUTANEOUS at 00:53

## 2020-06-18 RX ADMIN — Medication 0.5 G: at 13:20

## 2020-06-18 RX ADMIN — SODIUM CHLORIDE, SODIUM LACTATE, POTASSIUM CHLORIDE, AND CALCIUM CHLORIDE: 600; 310; 30; 20 INJECTION, SOLUTION INTRAVENOUS at 12:06

## 2020-06-18 RX ADMIN — SODIUM CHLORIDE 40 MG: 9 INJECTION INTRAMUSCULAR; INTRAVENOUS; SUBCUTANEOUS at 09:10

## 2020-06-18 RX ADMIN — Medication 10 ML: at 06:13

## 2020-06-18 RX ADMIN — Medication 0.5 G: at 13:18

## 2020-06-18 RX ADMIN — Medication 0.5 G: at 13:21

## 2020-06-18 RX ADMIN — SODIUM CHLORIDE, SODIUM LACTATE, POTASSIUM CHLORIDE, AND CALCIUM CHLORIDE 50 ML/HR: 600; 310; 30; 20 INJECTION, SOLUTION INTRAVENOUS at 23:07

## 2020-06-18 RX ADMIN — HUMAN INSULIN 3 UNITS: 100 INJECTION, SOLUTION SUBCUTANEOUS at 18:27

## 2020-06-18 RX ADMIN — SODIUM CHLORIDE, SODIUM LACTATE, POTASSIUM CHLORIDE, AND CALCIUM CHLORIDE 150 ML/HR: 600; 310; 30; 20 INJECTION, SOLUTION INTRAVENOUS at 12:11

## 2020-06-18 RX ADMIN — SUCCINYLCHOLINE CHLORIDE 140 MG: 20 INJECTION, SOLUTION INTRAMUSCULAR; INTRAVENOUS at 13:13

## 2020-06-18 RX ADMIN — Medication 10 ML: at 21:40

## 2020-06-18 RX ADMIN — INSULIN GLARGINE 40 UNITS: 100 INJECTION, SOLUTION SUBCUTANEOUS at 22:27

## 2020-06-18 RX ADMIN — FENTANYL CITRATE 50 MCG: 50 INJECTION INTRAMUSCULAR; INTRAVENOUS at 13:13

## 2020-06-18 NOTE — PROGRESS NOTES
Shift assessment completed. Pt alert and oriented to person only, reoriented to time and place. Pt with minimal verbalization, nods appropriately. Heart sounds regular. Lung sounds coarse with even and unlabored respirations, on 2L NC. Patient suctioned per request. Active bowel sounds with soft and obese abdomen. Skin warm and dry. IV c/d, flushed and capped. NG tube to left nare site c/d and clamped. No c/o pain, nausea, and no signs of acute distress noted. Pt resting quietly in bed locked in lowest position with call light in reach and bed alarm set.

## 2020-06-18 NOTE — ANESTHESIA POSTPROCEDURE EVALUATION
Procedure(s):  TRACHEOSTOMY  GASTROSTOMY TUBE PERCUTANEOUS INSERTION (GI LAB). general    Anesthesia Post Evaluation        Patient location during evaluation: PACU  Patient participation: complete - patient cannot participate  Post-procedure mental status: Medically sedated. Pain management: adequate  Airway patency: patent  Anesthetic complications: no  Cardiovascular status: acceptable  Respiratory status: acceptable and ventilator (Tracheostomy)  Hydration status: acceptable  Post anesthesia nausea and vomiting:  none  Final Post Anesthesia Temperature Assessment:  Normothermia (36.0-37.5 degrees C)      INITIAL Post-op Vital signs:   Vitals Value Taken Time   /71 6/18/2020  3:40 PM   Temp 36.2 °C (97.2 °F) 6/18/2020  3:19 PM   Pulse 74 6/18/2020  3:41 PM   Resp 24 6/18/2020  3:41 PM   SpO2 100 % 6/18/2020  3:41 PM   Vitals shown include unvalidated device data.

## 2020-06-18 NOTE — PROGRESS NOTES
Hospitalist Progress Note    Patient: Jolynn Mascorro MRN: [id number]  SSN: [id number]    YOB: 1945  Age: 66 y.o. Sex: female      Admit Date: 6/5/2020    LOS: 13 days     Subjective:     66year old CF with medical history significant for CAD, hypertensin, recent CVA with residual right sided weakness, diabetes from [facility] with worsening confusion and elevated creatinine. Patient was found to have urinary tract infection and started on empiric antibiotics. Rapid response called on 6/7/20 AM as patient was found to have acute respiratory failure with hypoxia likely due to aspiration. Patient was suctioned and put on non-rebreather with saturation improving to the 96%. Patient has been lethargic with minimally responsive since the event. Patient had multiple days of temp spikes and antibiotics have been broadened. CT head was neg for any acute intracranial abnormalities. CXR and CT showed infiltrate in the RLL confirming suspicion for aspiration pna. Due to decreased renal function, nephrology was consulted. Workup revealed no obstructive cause. Her renal function and hypernatremia has been improving on IV fluids and free water flushes. Pulmonary was consulted due to persistent high oxygen requirement despite being treated by antibiotics. Family wishes trach & PEG.    6/18 - She is drowsy but awakens today. Spoke with  at bedside who is ready for her to get trach/PEG. Review of systems negative except stated above.     Objective:     Visit Vitals  /75 (BP 1 Location: Left arm, BP Patient Position: At rest;Head of bed elevated (Comment degrees))   Pulse 79   Temp 98.7 °F (37.1 °C)   Resp 19   Ht 5' 6\" (1.676 m)   Wt 95.5 kg (210 lb 8 oz)   SpO2 98%   BMI 33.98 kg/m²      Oxygen Therapy  O2 Sat (%): 98 % (06/18/20 0801)  Pulse via Oximetry: 76 beats per minute (06/17/20 1938)  O2 Device: Nasal cannula (06/17/20 1938)  O2 Flow Rate (L/min): 1 l/min (06/17/20 1938)  FIO2 (%): 40 % (06/14/20 1155)      Intake and Output:     Intake/Output Summary (Last 24 hours) at 6/18/2020 1009  Last data filed at 6/18/2020 0415  Gross per 24 hour   Intake 2386 ml   Output 1500 ml   Net 886 ml         Physical Exam:   GENERAL: alert, cooperative, no distress, appears stated age  EYE: conjunctivae/corneas clear. PERRL. THROAT & NECK: normal and no erythema or exudates noted. LUNG: scattered rhonchi  HEART: regular rate and rhythm, S1S2, no murmur, no JVD  ABDOMEN: soft, non-tender, non-distended. Bowel sounds normal.   EXTREMITIES:  No edema, 2+ pedal/radial pulses bilaterally  SKIN: no rash or abnormalities  NEUROLOGIC: Alert, nods head. Cranial nerves 2-12 grossly intact.     Lab/Data Review:  Recent Results (from the past 24 hour(s))   GLUCOSE, POC    Collection Time: 06/17/20  1:12 PM   Result Value Ref Range    Glucose (POC) 212 (H) 65 - 100 mg/dL   GLUCOSE, POC    Collection Time: 06/17/20  2:39 PM   Result Value Ref Range    Glucose (POC) 193 (H) 65 - 100 mg/dL   GLUCOSE, POC    Collection Time: 06/17/20  6:00 PM   Result Value Ref Range    Glucose (POC) 176 (H) 65 - 100 mg/dL   GLUCOSE, POC    Collection Time: 06/18/20 12:52 AM   Result Value Ref Range    Glucose (POC) 175 (H) 65 - 100 mg/dL   GLUCOSE, POC    Collection Time: 06/18/20  6:09 AM   Result Value Ref Range    Glucose (POC) 123 (H) 65 - 872 mg/dL   METABOLIC PANEL, BASIC    Collection Time: 06/18/20  6:33 AM   Result Value Ref Range    Sodium 144 136 - 145 mmol/L    Potassium 4.5 3.5 - 5.1 mmol/L    Chloride 112 (H) 98 - 107 mmol/L    CO2 24 21 - 32 mmol/L    Anion gap 8 7 - 16 mmol/L    Glucose 116 (H) 65 - 100 mg/dL    BUN 29 (H) 8 - 23 MG/DL    Creatinine 1.17 (H) 0.6 - 1.0 MG/DL    GFR est AA 58 (L) >60 ml/min/1.73m2    GFR est non-AA 48 (L) >60 ml/min/1.73m2    Calcium 9.8 8.3 - 10.4 MG/DL   CBC WITH AUTOMATED DIFF    Collection Time: 06/18/20  6:33 AM   Result Value Ref Range    WBC 10.7 4.3 - 11.1 K/uL    RBC 2.76 (L) 4.05 - 5.2 M/uL    HGB 8.9 (L) 11.7 - 15.4 g/dL    HCT 27.2 (L) 35.8 - 46.3 %    MCV 98.6 (H) 79.6 - 97.8 FL    MCH 32.2 26.1 - 32.9 PG    MCHC 32.7 31.4 - 35.0 g/dL    RDW 16.9 (H) 11.9 - 14.6 %    PLATELET 713 642 - 478 K/uL    MPV 11.4 9.4 - 12.3 FL    ABSOLUTE NRBC 0.02 0.0 - 0.2 K/uL    DF AUTOMATED      NEUTROPHILS 60 43 - 78 %    LYMPHOCYTES 26 13 - 44 %    MONOCYTES 7 4.0 - 12.0 %    EOSINOPHILS 6 0.5 - 7.8 %    BASOPHILS 0 0.0 - 2.0 %    IMMATURE GRANULOCYTES 1 0.0 - 5.0 %    ABS. NEUTROPHILS 6.3 1.7 - 8.2 K/UL    ABS. LYMPHOCYTES 2.8 0.5 - 4.6 K/UL    ABS. MONOCYTES 0.7 0.1 - 1.3 K/UL    ABS. EOSINOPHILS 0.6 0.0 - 0.8 K/UL    ABS. BASOPHILS 0.0 0.0 - 0.2 K/UL    ABS. IMM. GRANS. 0.2 0.0 - 0.5 K/UL   PROTHROMBIN TIME + INR    Collection Time: 06/18/20  6:33 AM   Result Value Ref Range    Prothrombin time 13.8 12.0 - 14.7 sec    INR 1.0     PTT    Collection Time: 06/18/20  6:33 AM   Result Value Ref Range    aPTT 30.1 24.3 - 35.4 SEC       Imaging:  Nm Lung Scan Perf    Result Date: 5/25/2020  EXAM:  NM LUNG SCAN PERF INDICATION:  Elevated troponin weakness mild hypoxia COMPARISON:  None. TRACER: 4.6 mCi of Tc-99m MAA. FINDINGS: Perfusion lung imaging was performed following intravenous administration of  Tc 99m MAA. Images were obtained from the anterior, posterior and right and left anterior and posterior oblique projections. The perfusion is normal. No segmental or subsegmental defects are identified. Chest x-ray reveals an elevated right hemidiaphragm. IMPRESSION: Normal lung perfusion study. Xr Chest Sngl V    Result Date: 6/14/2020  EXAM: TEMPORARY INDICATION: Respiratory failure COMPARISON: 6/8/2020 FINDINGS: A portable AP radiograph of the chest was obtained at 0522 hours. The patient is on a cardiac monitor. Bibasilar airspace disease unchanged. The cardiac and mediastinal contours and pulmonary vascularity are normal.  The bones and soft tissues are grossly within normal limits.      IMPRESSION: Bibasilar atelectasis unchanged. Xr Chest Sngl V    Result Date: 6/8/2020  CHEST X-RAY, one view. HISTORY:  Hypoxemia and increasing oxygen demand. TECHNIQUE:  AP upright portable view COMPARISON: Earlier today FINDINGS:   -The lungs: are clear. -The costophrenic angles: are sharp. -The heart size: is normal. -The pulmonary vasculature: is unremarkable. -Included portion of the upper abdomen: is unremarkable. -Bones: No gross bony lesions. -Other: None. IMPRESSION:  Negative for acute change     Xr Chest Sngl V    Result Date: 6/8/2020   Portable view of the chest COMPARISON: June 7, 2020 Clinical history: Hypoxia. FINDINGS: Persistent elevation of the right hemidiaphragm. Lungs are underinflated. No pneumothorax, pulmonary edema or large pleural effusion. No definite pulmonary consolidation. Heart appears mildly enlarged. Mediastinal contour is within normal limits. Surrounding bones are unremarkable. IMPRESSION: 1. No pulmonary edema or definite pulmonary consolidation. 2. No significant change compared to exam.    Xr Chest Sngl V    Result Date: 6/7/2020  Chest X-ray INDICATION: Shortness of breath A portable AP view of the chest was obtained. FINDINGS: There is chronic elevation of the right diaphragm. There are no infiltrates or effusions. The heart size is normal.  The bony thorax is intact. IMPRESSION: No acute findings in the chest     Xr Chest Pa Lat    Result Date: 5/25/2020  EXAM: XR CHEST PA LAT INDICATION: Cough COMPARISON: None. FINDINGS: PA and lateral radiographs of the chest demonstrate clear lungs. The cardiac and mediastinal contours and pulmonary vascularity are normal. The bones and soft tissues are within normal limits. IMPRESSION: Normal chest.    Xr Abd (kub)    Result Date: 6/10/2020  History: NG tube placement Single view abdomen FINDINGS: There is an NG tube which terminates to the right of midline, likely in the distal stomach.  Clips are present from prior cholecystectomy. There is elevation of the right hemidiaphragm. The lung bases are clear. The bowel gas pattern is nonspecific. IMPRESSION: NG tube as described. Xr Abd (ap And Erect Or Decub)    Result Date: 5/25/2020  Abdominal films, 3 views. History: Nausea and vomiting Technique:  Supine and upright views of the abdomen on 3 images. Comparison: None Findings: Cholecystectomy. Nonspecific bowel gas pattern. No evidence of free air, organomegaly. Calcification located between the right transverse processes of L4 and L5. IMPRESSION: Negative for free air, ileus or obstruction. Possible right ureteral calculus. Correlate clinically. Xr Swallow Func Video    Result Date: 5/28/2020  Modified Barium Swallow INDICATION: Oropharyngeal dysphagia. CVA Fluoro time: 3.15 minutes Spot films:  0 Fluoroscopy was used to evaluate pharyngeal function while the patient was given barium solutions and barium coated solids. FINDINGS: Severe global swallow deficits present. There is harriet aspiration of thin barium by teaspoon with poor cough response. Shallow penetration with nectar by teaspoon. Penetration with nectar by cup. Deep penetration with honey by cup. IMPRESSION: 1. Aspiration and deep penetration with multiple administered consistencies. 2.  Please see concurrent speech and language pathology note for complete description of findings. Mri Brain Wo Cont    Result Date: 5/27/2020  EXAMINATION: BRAIN MRI 5/27/2020 11:49 AM ACCESSION NUMBER: [id number] INDICATION: Worsening of old stroke COMPARISON: Head CT 5/27/2020 and 5/25/2020 TECHNIQUE: Multiplanar multisequence MRI of the brain without intravenous contrast. FINDINGS: There are numerous small and punctate areas of restricted diffusion scattered throughout the supratentorial brain parenchyma, worst in the right lentiform and caudate nuclei. There are multiple foci of restricted diffusion throughout both cerebellar hemispheres.  There is a moderate-sized focus of restricted diffusion in the right aspect of the benjy. The preponderance of these areas demonstrate T2 hyperintensity. The constellation of findings is consistent with multiple supratentorial acute or early subacute infarctions involving multiple vascular distributions. None of the infarctions demonstrates significant associated mass effect or hemorrhagic conversion. The ventricles are within normal limits for the degree of global brain parenchymal volume loss. There is no midline shift. The basilar cisterns are patent. There is no cerebellar tonsillar ectopia or herniation. There are T2 hyperintensities in the periventricular and subcortical white matter and benjy, a nonspecific finding which likely represents chronic microangiopathy. Prior bilateral lens replacement. The expected large vascular flow voids are preserved. There are no suspicious osseous lesions. IMPRESSION: 1. Numerous acute or early subacute infarctions scattered throughout the supratentorial and infratentorial brain parenchyma. These are most likely embolic infarctions from a central source. The most confluent areas of involvement include the right caudate and lentiform nuclei, the benjy, and the left cerebellar hemisphere. There is no evidence of significant mass effect or hemorrhagic conversion. 2. Moderate burden of chronic microangiopathy. 3. Discussed with Dr. Topher Olivier in person by Dr. Ahmet Ivan at 11:45 AM 5/27/2020. VOICE DICTATED BY: Dr. Moriah Lopez    Result Date: 6/8/2020  CT head without contrast History: ALTERED MENTAL STATUS, HX OF RECENT CVA. Technique: 5mm axial images were obtained from the skull base to the vertex without contrast. Radiation dose reduction techniques were used for this study: Our CT scanners use one or all of the following: Automated exposure control, adjustment of the mA and/or kVp according to patient's size, iterative reconstruction.  Comparison: 05/27/2020 Findings: The ventricles and sulci are mildly prominent. There are no extra-axial fluid collections. No evidence of acute intraparenchymal hemorrhage or mass effect is identified. Patchy, confluent areas of decreased attenuation are noted within the supratentorial white matter. These are nonspecific findings but would be most compatible with moderately advanced chronic small vessel ischemic changes. There is no evidence to suggest an acute major territorial infarct. Extensive atherosclerotic changes are present of the vertebral basilar system. The bony calvarium is intact. The visualized mastoid air cells and paranasal sinuses are well pneumatized and aerated. Impression: Chronic findings without evidence of acute intracranial abnormality. Ct Head Wo Cont    Result Date: 5/27/2020  HEAD CT WITHOUT CONTRAST  5/27/2020 HISTORY:   increased AMS  ; leukocytosis; myocardial infarction TECHNIQUE: Noncontrast axial images were obtained through the brain. All CT scans at this facility used dose modulation, interactive reconstruction and/or weight based dosing when appropriate to reduce radiation dose to as low as reasonably achievable. COMPARISON: May 25, 2020 FINDINGS: There is no acute intracranial hemorrhage, significant mass effect or CT evidence of acute large artery territorial infarction. Please note that a hyperacute infarct or small vessel infarct may not be apparent on initial CT imaging. Mild to moderate cerebral volume loss is present. Scattered areas of low-attenuation are present in the supratentorial white matter that are compatible with chronic small vessel ischemic disease. There is no hydrocephalus , intra-axial mass or abnormal extra-axial fluid collection. There are no displaced skull fractures. The mastoid air cells and paranasal sinuses are clear where imaged. IMPRESSION: Cerebral volume loss and white matter findings compatible with chronic small vessel ischemic disease.      Ct Head Wo Cont    Result Date: 5/25/2020  CT of the head without contrast. CLINICAL INDICATION: Weakness, unable to walk PROCEDURE: Serial thin section axial images are obtained from the cranial vertex through the skull base without the administration of intravenous contrast. Radiation dose reduction techniques were used for this study. Our CT scanners use one or all of the following: Automated exposure control, adjusted of the mA and/or kV according to patient size, iterative reconstruction COMPARISON: No prior. FINDINGS: There is no acute intracranial hemorrhage, mass, or mass effect. No abnormal extra-axial fluid collections identified. There is no hydrocephalus. The basilar cisterns are widely patent. There is moderate to severe patchy subcortical, deep, and periventricular white matter hypoattenuation. An old infarct is noted in the right basal ganglia with a tiny focal old lacunar infarct in the left basal ganglia. No skull fracture or aggressive osseous lesion noted. The mastoid air cells and included paranasal sinuses are clear. IMPRESSION: 1. No acute intracranial abnormality. Note, acute/subacute CVA cannot be excluded given the severity of the underlying white matter disease. 2. Moderate to severe bilateral white matter hypoattenuation most in keeping with chronic microangiopathic disease. 3. Old infarct in the right basal ganglia with an old lacunar infarct in the left basal ganglia. Ct Chest Wo Cont    Result Date: 6/11/2020  NONCONTRAST CHEST CT CLINICAL HISTORY:  Fever with high oxygen demand with leukocytosis and clinical concern for aspiration. TECHNIQUE:  Without contrast administration, the chest was scanned with spiral technique. Radiation dose reduction was achieved using one or all of the following techniques: automated exposure control, weight-based dosing, iterative reconstruction. COMPARISON:  Portable chest of June 8, 2020 and CT of September 10, 2013.  FINDINGS:  There is infiltrate and volume loss in the right lower lobe associated with endobronchial debris consistent with aspiration. Minimal left basilar atelectasis is present as well. No pathologically enlarged lymph nodes or abnormal fluid collection. The epigastrium appears unremarkable status post cholecystectomy. IMPRESSION:  RIGHT LOWER LOBE ASPIRATION PNEUMONIA AND MINIMAL LEFT BASILAR ATELECTASIS. Us Retroperitoneum Comp    Result Date: 6/7/2020  INDICATION:  Renal insufficiency TECHNIQUE: Real-time sonography of the kidneys, retroperitoneum and bladder was performed with multiple static images obtained. FINDINGS: The right kidney measures 10.6 cm and the left kidney measures 10.5 cm in length. Both kidneys are slightly echogenic. There is no hydronephrosis. There is a small cyst in the upper pole of the left kidney. There are no significant renal masses. The aorta and IVC are normal in caliber. The urinary bladder is collapsed around a Jefferson catheter. IMPRESSION: Echogenic kidneys suggesting chronic renal disease. No evidence of obstruction. Xr Chest Port    Result Date: 5/25/2020  Portable chest x-ray CLINICAL INDICATION: Weakness FINDINGS: Single AP view the chest compared to a similar exam dated 5/25/2020 show the lungs to be expanded and clear. No pleural effusion or pneumothorax. The cardiac silhouette and mediastinum are unremarkable. The bones are osteopenic. IMPRESSION: No acute cardiopulmonary abnormality. No results found for this visit on 06/05/20. Cultures:   All Micro Results     Procedure Component Value Units Date/Time    CULTURE, BLOOD [[id number]] Collected:  06/08/20 1812    Order Status:  Completed Specimen:  Blood Updated:  06/13/20 0634     Special Requests: --        RIGHT  HAND       Culture result: NO GROWTH 5 DAYS       CULTURE, BLOOD [[id number]] Collected:  06/08/20 1807    Order Status:  Completed Specimen:  Blood Updated:  06/13/20 5473     Special Requests: -- LEFT  HAND       Culture result: NO GROWTH 5 DAYS       CULTURE, BLOOD [[id number]] Collected:  06/05/20 1845    Order Status:  Completed Specimen:  Blood Updated:  06/10/20 0631     Special Requests: --        NO SPECIAL REQUESTS  LEFT  Antecubital       Culture result: NO GROWTH 5 DAYS       CULTURE, BLOOD [[id number]] Collected:  06/05/20 1845    Order Status:  Completed Specimen:  Blood Updated:  06/10/20 0631     Special Requests: --        NO SPECIAL REQUESTS  RIGHT  FOREARM       Culture result: NO GROWTH 5 DAYS       MSSA/MRSA SC BY PCR, NASAL SWAB [[id number]] Collected:  06/09/20 1545    Order Status:  Canceled Specimen:  Nasal swab     CULTURE, URINE [[id number]]  (Abnormal)  (Susceptibility) Collected:  06/05/20 1845    Order Status:  Completed Specimen:  Cath Urine Updated:  06/09/20 0710     Special Requests: NO SPECIAL REQUESTS        Culture result:       >100,000 COLONIES/mL PROTEUS MIRABILIS                  >100,000 COLONIES/mL KLEBSIELLA PNEUMONIAE                Assessment/Plan:     Principal Problem:    Acute respiratory failure with hypoxia ([address] (6/11/2020)  - Due to acute aspiration event with chronic dysphagia  - Slow to improve  - Improving - 5LNC --> 2LNC  - Stopped antibiotics on 6/18/20 - 10 day course  - Pulmonary following    Active Problems:    ROSA (acute kidney injury) ([address] (6/5/2020)  - Likely due to dehydration  - Improving  - Continue free water with TFs  - Nephrology following      UTI (urinary tract infection) (6/5/2020)  - Proteus + Klebsiella  - Completely treated  - Stopped antibiotics on 6/17 - 10 day course      Acute metabolic encephalopathy (9/2/9491)  - Appears to be slowly improving  - Likely due to infections and hypoxia      Aspiration pneumonia ([address] (6/14/2020)  - Due to chronic dysphagia  - Stopped antibiotics on 6/17 - 10 day course  - Oxygen slowly improving      Dysphagia (6/16/2020)  - Chronic in nature  - Family now wishes for Trach/PEG --> General Surgery to take to OR today  - SLP signed off      Leukocytosis (5/26/2020)  - Improving      Hypernatremia (6/6/2020)  - Improving  - Continue free water with TFs      Normocytic anemia (6/5/2020)  - Stable      Diabetes mellitus type 2, controlled ([facility] (6/15/2020)  - No acute issues  - Continue Lantus  - Continue SSI    Today's Plan: To OR for trach/PEG today    DIET NPO  DIET TUBE FEEDING Open order for details. Keep HOB > 30 degrees. Check residuals every 4 hours. Hold TF for > 500 ml x 1 or 250 ml x 2 consecutive checks. Also place patient on right side if residual is > 250 ml.     DVT Prophylaxis: Heparin    Discharge Plan: TBD      Signed By: Olive Eddy DO     June 18, 2020

## 2020-06-18 NOTE — BRIEF OP NOTE
Brief Postoperative Note    Patient: Clifton Carpio  YOB: 1945  MRN: [id number]    Date of Procedure: 6/18/2020     Pre-Op Diagnosis: Acute Respiratory Failure with Hypoxia    Post-Op Diagnosis: Same as preoperative diagnosis. Procedure(s):  TRACHEOSTOMY  GASTROSTOMY TUBE PERCUTANEOUS INSERTION (GI LAB)    Surgeon(s): Yajaira Sim MD    Surgical Assistant: None    Anesthesia: General     Estimated Blood Loss (mL): Minimal    Complications: None    Specimens: * No specimens in log *     Implants:   Implant Name Type Inv.  Item Serial No.  Lot No. LRB No. Used Action   TUBE TRACH CUFFED #08 CANNULA - [id number]  TUBE TRACH CUFFED #08 CANNULA  [employer] RESPIRATORY &amp; MONITOR [id number] N/A 1 Implanted       Drains:   [REMOVED] Nasogastric Tube 06/10/20 (Removed)   Site Assessment Clean, dry, & intact 6/18/2020  9:10 AM   Securement Device Adhesive-based navarrete;Tape 6/18/2020  9:10 AM   G Port Status Clamped 6/18/2020  9:10 AM   External Insertion Ady (cms) 60 cms 6/15/2020  8:15 PM   Action Taken Placement verified (comment) 6/17/2020  7:52 AM   Gastric Residual (mL) 10 ml 6/17/2020  3:43 AM   Tube Feeding/Formula Options Diabetic (Glucerna 1.2) 6/17/2020  7:42 PM   Tube Feeding/Verify Rate (mL/hr) 40 6/17/2020  7:42 PM   Water Flush Volume (mL) 30 mL 6/17/2020  7:42 PM   Intake (ml) 2356 ml 6/18/2020 12:00 AM   Medication Volume 30 ml 6/17/2020  7:52 AM       Findings: satisfactory transillumination test on PET; immediately return of ETCO2 for the trach    Electronically Signed by Alissa Peters MD on 6/18/2020 at 2:35 PM

## 2020-06-18 NOTE — PERIOP NOTES
TRANSFER - OUT REPORT:    Verbal report given to Tg Padilla RN on [address]  being transferred to  for routine post - op       Report consisted of patients Situation, Background, Assessment and   Recommendations(SBAR). Information from the following report(s) OR Summary, Procedure Summary, Intake/Output and MAR was reviewed with the receiving nurse. Lines:   Peripheral IV 06/05/20 Right Forearm (Active)   Site Assessment Clean, dry, & intact 6/18/2020  3:19 PM   Phlebitis Assessment 0 6/18/2020  3:19 PM   Infiltration Assessment 0 6/18/2020  3:19 PM   Dressing Status Clean, dry, & intact 6/18/2020  3:19 PM   Dressing Type Tape;Transparent 6/18/2020  3:19 PM   Hub Color/Line Status Patent;Pink 6/18/2020  3:19 PM   Alcohol Cap Used No 6/18/2020  3:19 PM        Opportunity for questions and clarification was provided. Patient transported with:   O2 @ 25 liters    VTE prophylaxis orders have been written for [facility]. Patient and family given floor number and nurses name. Family updated re: pt status after security code verified.

## 2020-06-18 NOTE — PERIOP NOTES
TRANSFER - IN REPORT:    Verbal report received from Juana Rasmussen RN on [address]  being received from 8th floor for ordered procedure      Report consisted of patients Situation, Background, Assessment and   Recommendations(SBAR). Information from the following report(s) SBAR was reviewed with the receiving nurse. Opportunity for questions and clarification was provided. Assessment to be completed upon patients arrival to unit and care to be assumed.

## 2020-06-18 NOTE — PROGRESS NOTES
[facility] Progress Note    Follow-Up on: ROSA/CKD, hypernatremia    ROS:  More awake     Exam:  Vitals:    06/17/20 2350 06/18/20 0342 06/18/20 0801 06/18/20 1202   BP: 157/66 140/65 165/75 174/77   Pulse: 81 78 79 77   Resp: 18 18 19 18   Temp: 99 °F (37.2 °C) 98.4 °F (36.9 °C) 98.7 °F (37.1 °C) 97.8 °F (36.6 °C)   SpO2: 95% 93% 98% 97%   Weight:    95.3 kg (210 lb)   Height:    5' 6\" (1.676 m)         Intake/Output Summary (Last 24 hours) at 6/18/2020 1356  Last data filed at 6/18/2020 1346  Gross per 24 hour   Intake 2836 ml   Output 1855 ml   Net 981 ml       Wt Readings from Last 3 Encounters:   06/18/20 95.3 kg (210 lb)   06/01/20 104.8 kg (231 lb 0.7 oz)   05/27/20 106.7 kg (235 lb 3.7 oz)       GEN - obtunded   CV - regular, no murmur, no rub  Lung - Decreased BS bilaterally  Abd - soft, nontender  Ext - no edema    Recent Labs     06/18/20  0633 06/17/20  0744 06/16/20  0725   WBC 10.7 11.1 10.3   HGB 8.9* 8.1* 8.2*   HCT 27.2* 25.0* 24.8*    223 215   INR 1.0  --   --         Recent Labs     06/18/20  0633 06/17/20  0744 06/16/20  0725    143 142   K 4.5 4.6 4.7   * 111* 112*   CO2 24 24 21   BUN 29* 33* 35*   CREA 1.17* 1.36* 1.40*   CA 9.8 9.8 8.9   * 153* 236*       Assessment / Plan:  Principal Problem:    Acute respiratory failure with hypoxia (HCC) (6/11/2020)    Active Problems:    Leukocytosis (5/26/2020)      ROSA (acute kidney injury) ([address] (6/5/2020)      UTI (urinary tract infection) (6/5/2020)      Acute metabolic encephalopathy (4/6/8746)      Normocytic anemia (6/5/2020)      Hypernatremia (6/6/2020)      Aspiration pneumonia ([facility] (6/14/2020)      Diabetes mellitus type 2, controlled ([facility] [facility] (6/15/2020)      Dysphagia (6/16/2020)      1. ROSA/CKD  - Baseline Cr probably mid to upper 1's  2. Hypernatremia - resolved   3. UTI  4.  Encephalopathy    Overall issues improved with hydration, I will sign off thanks

## 2020-06-18 NOTE — PROGRESS NOTES
Patient alert to self. Able to respond with whisper. Lung sounds coarse. O2 @ 2L via nasal cannula on. No distress observed. Bowel sounds active. [place] draining clear, yellow urine. Tube feeding with Glucerna 1.5 at 40 ml/hr and 30 ml/hr water flush going. Patient responds no when asked if in pain. Edema present. Bed in low position. Side rails up x 2. No distress observed. Door left open.

## 2020-06-18 NOTE — PROGRESS NOTES
TRANSFER - OUT REPORT:    Verbal report given to Pre-OP RN (name) on [address]  being transferred to Pre-OP (unit) for ordered procedure       Report consisted of patients Situation, Background, Assessment and   Recommendations(SBAR). Information from the following report(s) SBAR, Kardex, Intake/Output and MAR was reviewed with the receiving nurse. Lines:   Peripheral IV 06/05/20 Right Forearm (Active)   Site Assessment Clean, dry, & intact 6/18/2020  4:15 AM   Phlebitis Assessment 0 6/18/2020  4:15 AM   Infiltration Assessment 0 6/18/2020  4:15 AM   Dressing Status Clean, dry, & intact 6/18/2020  4:15 AM   Dressing Type Tape;Transparent 6/18/2020  4:15 AM   Hub Color/Line Status Patent 6/18/2020  4:15 AM   Alcohol Cap Used No 6/10/2020  2:50 AM        Opportunity for questions and clarification was provided.       Patient transported with:   O2 @ 2 liters

## 2020-06-18 NOTE — PROGRESS NOTES
TRANSFER - OUT REPORT:    Verbal report given to Ysabel Terrazas (name) on [address]  being transferred to CCU (unit) for routine progression of care       Report consisted of patients Situation, Background, Assessment and   Recommendations(SBAR). Information from the following report(s) SBAR, Kardex, Intake/Output and MAR was reviewed with the receiving nurse. Lines:   Peripheral IV 06/05/20 Right Forearm (Active)   Site Assessment Clean, dry, & intact 6/18/2020  3:19 PM   Phlebitis Assessment 0 6/18/2020  3:19 PM   Infiltration Assessment 0 6/18/2020  3:19 PM   Dressing Status Clean, dry, & intact 6/18/2020  3:19 PM   Dressing Type Tape;Transparent 6/18/2020  3:19 PM   Hub Color/Line Status Patent;Pink 6/18/2020  3:19 PM   Alcohol Cap Used No 6/18/2020  3:19 PM        Opportunity for questions and clarification was provided.       Patient transported with:

## 2020-06-18 NOTE — PROGRESS NOTES
Goals have been established. Palliative care services are no longer needed at this time. Thank you for the opportunity to participate in this patients care. Please consult again if further needs arise.

## 2020-06-18 NOTE — PROGRESS NOTES
Received pt from OR to [facility] trach/Peg  Insertion. Placed on PRVC 500ml, 16 +8 and 50%. Pt stable. Suctioned mod thick beige secretions.

## 2020-06-18 NOTE — PROGRESS NOTES
PT note: Patient off floor this afternoon for trach/PEG. Will check back tomorrow.      Jyotsna Magana, EVERARDOT

## 2020-06-18 NOTE — PROGRESS NOTES
PROGRESS NOTE    Guero Mchugh    6/18/2020    Date of Admission:  6/5/2020    The patient's chart is reviewed and the patient is discussed with the staff. 66 y.o. CF evaluated at the request of Dr. Kylah Edgar with chronic medical:  CAD, HTN, recent CVA with residual right sided weakness, DM, hx chronic CVA with left side weankness. Arrived from [facility] with worsening confusion and elevated creatinine. Was found to have UTI and started on empiric antibiotics. On 6/7 AM was found to have acute respiratory failure with hypoxia likely due to aspiration. Was suctioned, placed on NRB with sat  improving to 96%. Has been lethargic and minimally responsive since the event with fevers and antibiotics escalated. CT head was neg for any acute intracranial abnormalities and CXR and CT showed RLL infiltrate confirming suspiration for aspiration pneumonia. Due to worsening renal function, nephrology was consulted and workup revealed no obstruction. Renal function has improved on IVFs. Noted to be hyponatremia and hypotonic saline infusion. Pulmonary was consulted due to high O2 requirement despite being treated by antibiotics for 5 days. Patient has been started o vancomycin and zosyn. She is On O2 at 5L/min via NC. Subjective:     Oxygen weaned to 1L. Seen by general surgery and trach and PEG planned for later today. Pt awake but not very interactive. Multiple meds on hold. Review of Systems  Very limited as per above.        Current Facility-Administered Medications   Medication Dose Route Frequency    aspirin chewable tablet 81 mg  81 mg Per NG tube DAILY    insulin glargine (LANTUS) injection 40 Units  40 Units SubCUTAneous QHS    [Held by provider] ticagrelor (BRILINTA) tablet 90 mg  90 mg Per NG tube Q12H    lip protectant (BLISTEX) ointment 1 Each  1 Each Topical PRN    pantoprazole (PROTONIX) 40 mg in 0.9% sodium chloride 10 mL injection  40 mg IntraVENous DAILY    acetaminophen (TYLENOL) suppository 650 mg  650 mg Rectal Q4H PRN    insulin regular (NOVOLIN R, HUMULIN R) injection   SubCUTAneous Q6H    levalbuterol (XOPENEX) nebulizer soln 1.25 mg/3 mL  1.25 mg Nebulization Q4H PRN    hydrALAZINE (APRESOLINE) 20 mg/mL injection 10 mg  10 mg IntraVENous Q6H PRN    levothyroxine (SYNTHROID) injection 110 mcg  110 mcg IntraVENous DAILY    [Held by provider] insulin regular (NOVOLIN R, HUMULIN R) injection 4 Units  4 Units SubCUTAneous QPM    [Held by provider] amLODIPine (NORVASC) tablet 10 mg  10 mg Oral DAILY    [Held by provider] atorvastatin (LIPITOR) tablet 40 mg  40 mg Oral QHS    [Held by provider] carvediloL (COREG) tablet 12.5 mg  12.5 mg Oral BID WITH MEALS    [Held by provider] losartan (COZAAR) tablet 100 mg  100 mg Oral DAILY    [Held by provider] ezetimibe (ZETIA) tablet 10 mg  10 mg Oral DAILY    hyoscyamine SL (LEVSIN/SL) tablet 0.125 mg  0.125 mg SubLINGual Q6H PRN    ondansetron (ZOFRAN ODT) tablet 4 mg  4 mg Oral Q8H PRN    [Held by provider] sertraline (ZOLOFT) tablet 200 mg  200 mg Oral BID    sodium chloride (NS) flush 5-40 mL  5-40 mL IntraVENous Q8H    sodium chloride (NS) flush 5-40 mL  5-40 mL IntraVENous PRN    acetaminophen (TYLENOL) tablet 650 mg  650 mg Oral Q4H PRN    ondansetron (ZOFRAN) injection 4 mg  4 mg IntraVENous Q4H PRN    magnesium hydroxide (MILK OF MAGNESIA) 400 mg/5 mL oral suspension 30 mL  30 mL Oral DAILY PRN    heparin (porcine) injection 5,000 Units  5,000 Units SubCUTAneous Q8H         Objective:     Vitals:    06/17/20 1909 06/17/20 1938 06/17/20 2350 06/18/20 0342   BP: 155/69  157/66 140/65   Pulse: 76  81 78   Resp: 18  18 18   Temp: 98.4 °F (36.9 °C)  99 °F (37.2 °C) 98.4 °F (36.9 °C)   SpO2: 95% 98% 95% 93%   Weight:       Height:           PHYSICAL EXAM     Gen:  the patient is obese and in no acute distress, NC sat 93% on 1L   HEENT:  Sclera clear, pupils equal, oral mucosa moist  Lungs: mild coarse sounds in based with few rhonchi. Heart:  RRR without M,G,R  Abd: soft with positive bowel sounds, NG with TFs off and having loose diarrhea stools  Ext: warm without cyanosis. There is trace upper and lower extremity edema. Skin:  no jaundice or rashes, no wounds   Neuro: No left side movements, right side weakness   Psychiatric:  Alert today and looking at med and moving right hand more today. Appears to understand but not attempting to speak. Chest X-ray:      6/14/20:  Bibasilar atelectasis unchanged        Chest CT 6/11/20:    RIGHT LOWER LOBE ASPIRATION PNEUMONIA AND MINIMAL LEFT BASILAR ATELECTASIS. Recent Labs     06/17/20  0744 06/16/20  0725   WBC 11.1 10.3   HGB 8.1* 8.2*   HCT 25.0* 24.8*    215     Recent Labs     06/17/20  0744 06/16/20  0725    142   K 4.6 4.7   * 112*   * 236*   CO2 24 21   BUN 33* 35*   CREA 1.36* 1.40*   CA 9.8 8.9     No results for input(s): PH, PCO2, PO2, HCO3 in the last 72 hours.       Assessment:  (Medical Decision Making)         Hospital Problems  Date Reviewed: 6/15/2020          Codes Class Noted POA    Diabetes mellitus type 2, controlled ([facility] ICD-10-CM: E11.9  ICD-9-CM: 250.00  6/15/2020 Yes    Chronic-- -per primary team    Aspiration pneumonia ([facility] ICD-10-CM: J69.0  ICD-9-CM: 507.0  6/14/2020 Unknown    Completed abx    Acute respiratory failure with hypoxia ([facility] ICD-10-CM: J96.01  ICD-9-CM: 518.81  6/11/2020 Unknown    On NC 1L--wean O2 as tolearted    Hypernatremia ICD-10-CM: E87.0  ICD-9-CM: 276.0  6/6/2020 Unknown    Na down to 143    ROSA (acute kidney injury) ([facility] ICD-10-CM: N17.9  ICD-9-CM: 584.9  6/5/2020 Yes    Per nephrology--creat down to to 1.4's    UTI (urinary tract infection) ICD-10-CM: N39.0  ICD-9-CM: 599.0  6/5/2020 Yes    Now off ABX    * (Principal) Acute metabolic encephalopathy IOX-00-TC: G93.41  ICD-9-CM: 348.31  6/5/2020 Yes    Nodding appropriately     Normocytic anemia ICD-10-CM: D64.9  ICD-9-CM: 285.9  6/5/2020 Yes hgb in 8 range--following CBC    Leukocytosis ICD-10-CM: R98.218  ICD-9-CM: 288.60  5/26/2020 Yes     Better        Elderly female with recent CVA with UTI and likely aspirating (not protecting airway). On oxygen at 1 LPM, ABX and needs PEG and trach. Plan:  (Medical Decision Making)     Trach/PEG later today. Wean oxygen as able. NPO/TF off for surgery. ABx stopped yesterday. Dee Burrell MD      This note was signed electronically. Errors are unfortunately her likely due to dictation software.

## 2020-06-19 PROBLEM — Z93.0 STATUS POST TRACHEOSTOMY (HCC): Status: ACTIVE | Noted: 2020-06-19

## 2020-06-19 PROBLEM — Z93.1 S/P PERCUTANEOUS ENDOSCOPIC GASTROSTOMY (PEG) TUBE PLACEMENT (HCC): Status: ACTIVE | Noted: 2020-06-19

## 2020-06-19 LAB
ANION GAP SERPL CALC-SCNC: 7 MMOL/L (ref 7–16)
BASOPHILS # BLD: 0 K/UL (ref 0–0.2)
BASOPHILS NFR BLD: 0 % (ref 0–2)
BUN SERPL-MCNC: 25 MG/DL (ref 8–23)
CALCIUM SERPL-MCNC: 9.2 MG/DL (ref 8.3–10.4)
CHLORIDE SERPL-SCNC: 111 MMOL/L (ref 98–107)
CO2 SERPL-SCNC: 26 MMOL/L (ref 21–32)
CREAT SERPL-MCNC: 1.1 MG/DL (ref 0.6–1)
DIFFERENTIAL METHOD BLD: ABNORMAL
EOSINOPHIL # BLD: 0.5 K/UL (ref 0–0.8)
EOSINOPHIL NFR BLD: 5 % (ref 0.5–7.8)
ERYTHROCYTE [DISTWIDTH] IN BLOOD BY AUTOMATED COUNT: 17.2 % (ref 11.9–14.6)
GLUCOSE BLD STRIP.AUTO-MCNC: 136 MG/DL (ref 65–100)
GLUCOSE BLD STRIP.AUTO-MCNC: 138 MG/DL (ref 65–100)
GLUCOSE BLD STRIP.AUTO-MCNC: 166 MG/DL (ref 65–100)
GLUCOSE BLD STRIP.AUTO-MCNC: 92 MG/DL (ref 65–100)
GLUCOSE BLD STRIP.AUTO-MCNC: 96 MG/DL (ref 65–100)
GLUCOSE SERPL-MCNC: 100 MG/DL (ref 65–100)
HCT VFR BLD AUTO: 28.2 % (ref 35.8–46.3)
HGB BLD-MCNC: 9.1 G/DL (ref 11.7–15.4)
IMM GRANULOCYTES # BLD AUTO: 0.1 K/UL (ref 0–0.5)
IMM GRANULOCYTES NFR BLD AUTO: 1 % (ref 0–5)
LYMPHOCYTES # BLD: 1.7 K/UL (ref 0.5–4.6)
LYMPHOCYTES NFR BLD: 17 % (ref 13–44)
MCH RBC QN AUTO: 32.3 PG (ref 26.1–32.9)
MCHC RBC AUTO-ENTMCNC: 32.3 G/DL (ref 31.4–35)
MCV RBC AUTO: 100 FL (ref 79.6–97.8)
MONOCYTES # BLD: 0.6 K/UL (ref 0.1–1.3)
MONOCYTES NFR BLD: 6 % (ref 4–12)
NEUTS SEG # BLD: 6.9 K/UL (ref 1.7–8.2)
NEUTS SEG NFR BLD: 70 % (ref 43–78)
NRBC # BLD: 0 K/UL (ref 0–0.2)
PLATELET # BLD AUTO: 219 K/UL (ref 150–450)
PMV BLD AUTO: 11.4 FL (ref 9.4–12.3)
POTASSIUM SERPL-SCNC: 4.4 MMOL/L (ref 3.5–5.1)
RBC # BLD AUTO: 2.82 M/UL (ref 4.05–5.2)
SODIUM SERPL-SCNC: 144 MMOL/L (ref 136–145)
WBC # BLD AUTO: 9.9 K/UL (ref 4.3–11.1)

## 2020-06-19 PROCEDURE — 65270000029 HC RM PRIVATE

## 2020-06-19 PROCEDURE — C9113 INJ PANTOPRAZOLE SODIUM, VIA: HCPCS | Performed by: SURGERY

## 2020-06-19 PROCEDURE — 36415 COLL VENOUS BLD VENIPUNCTURE: CPT

## 2020-06-19 PROCEDURE — 74011250637 HC RX REV CODE- 250/637: Performed by: SURGERY

## 2020-06-19 PROCEDURE — 77030031642 HC BOOT FT ROOKE HFS OSBM -B

## 2020-06-19 PROCEDURE — 82962 GLUCOSE BLOOD TEST: CPT

## 2020-06-19 PROCEDURE — 74011250636 HC RX REV CODE- 250/636: Performed by: SURGERY

## 2020-06-19 PROCEDURE — 80048 BASIC METABOLIC PNL TOTAL CA: CPT

## 2020-06-19 PROCEDURE — 99232 SBSQ HOSP IP/OBS MODERATE 35: CPT | Performed by: INTERNAL MEDICINE

## 2020-06-19 PROCEDURE — 74011000250 HC RX REV CODE- 250: Performed by: SURGERY

## 2020-06-19 PROCEDURE — 74011636637 HC RX REV CODE- 636/637: Performed by: SURGERY

## 2020-06-19 PROCEDURE — 77030008793 HC TU TRACH CUF COVD -B

## 2020-06-19 PROCEDURE — 77030040393 HC DRSG OPTIFOAM GENT MDII -B

## 2020-06-19 PROCEDURE — 74011250637 HC RX REV CODE- 250/637: Performed by: FAMILY MEDICINE

## 2020-06-19 PROCEDURE — 74011250637 HC RX REV CODE- 250/637: Performed by: INTERNAL MEDICINE

## 2020-06-19 PROCEDURE — 85025 COMPLETE CBC W/AUTO DIFF WBC: CPT

## 2020-06-19 PROCEDURE — 74011636637 HC RX REV CODE- 636/637: Performed by: INTERNAL MEDICINE

## 2020-06-19 RX ADMIN — Medication 10 ML: at 21:21

## 2020-06-19 RX ADMIN — HEPARIN SODIUM 5000 UNITS: 5000 INJECTION INTRAVENOUS; SUBCUTANEOUS at 22:01

## 2020-06-19 RX ADMIN — Medication 10 ML: at 05:08

## 2020-06-19 RX ADMIN — Medication 10 ML: at 14:00

## 2020-06-19 RX ADMIN — ATORVASTATIN CALCIUM 40 MG: 40 TABLET, FILM COATED ORAL at 22:02

## 2020-06-19 RX ADMIN — HUMAN INSULIN 4 UNITS: 100 INJECTION, SOLUTION SUBCUTANEOUS at 18:05

## 2020-06-19 RX ADMIN — TICAGRELOR 90 MG: 90 TABLET ORAL at 09:28

## 2020-06-19 RX ADMIN — Medication 10 ML: at 22:07

## 2020-06-19 RX ADMIN — ASPIRIN 81 MG 81 MG: 81 TABLET ORAL at 09:28

## 2020-06-19 RX ADMIN — TICAGRELOR 90 MG: 90 TABLET ORAL at 22:01

## 2020-06-19 RX ADMIN — HEPARIN SODIUM 5000 UNITS: 5000 INJECTION INTRAVENOUS; SUBCUTANEOUS at 05:07

## 2020-06-19 RX ADMIN — CARVEDILOL 12.5 MG: 12.5 TABLET, FILM COATED ORAL at 17:46

## 2020-06-19 RX ADMIN — SODIUM CHLORIDE 40 MG: 9 INJECTION INTRAMUSCULAR; INTRAVENOUS; SUBCUTANEOUS at 09:41

## 2020-06-19 RX ADMIN — LEVOTHYROXINE SODIUM ANHYDROUS 110 MCG: 100 INJECTION, POWDER, LYOPHILIZED, FOR SOLUTION INTRAVENOUS at 09:42

## 2020-06-19 RX ADMIN — SERTRALINE HYDROCHLORIDE 200 MG: 100 TABLET ORAL at 17:45

## 2020-06-19 RX ADMIN — AMLODIPINE BESYLATE 10 MG: 10 TABLET ORAL at 09:28

## 2020-06-19 RX ADMIN — INSULIN GLARGINE 40 UNITS: 100 INJECTION, SOLUTION SUBCUTANEOUS at 21:21

## 2020-06-19 RX ADMIN — LOSARTAN POTASSIUM 100 MG: 50 TABLET, FILM COATED ORAL at 09:28

## 2020-06-19 RX ADMIN — HEPARIN SODIUM 5000 UNITS: 5000 INJECTION INTRAVENOUS; SUBCUTANEOUS at 14:02

## 2020-06-19 NOTE — PROGRESS NOTES
Mild, glasses just for school. Physical Therapy Note:    Therapist is discontinuing physical therapy at this time due to decline in medical status/transfer to ICU after trach/PEG. Ms. Feli Segura physical therapy goals were not met. Please reorder PT when our services are again appropriate.       Thank you,  Sana Allen, PT, DPT  6/19/2020

## 2020-06-19 NOTE — PROGRESS NOTES
Patient is from [facility]. The family would like her to return when medically stable. Referral made in [address] [place] [address] to [facility].

## 2020-06-19 NOTE — PROGRESS NOTES
Ventilator check complete; patient has a #8.0 tracheostomy. Patient is not sedated. Patient is not able to follow commands. Breath sounds are coarse and diminished. Trachea is midline, Negative for subcutaneous air, and chest excursion is symmetric. Patient is also Negative for cyanosis and is Negative for pitting edema. All alarms are set and audible. Resuscitation bag is at the head of the bed.       Ventilator Settings  Mode FIO2 Rate Tidal Volume Pressure PEEP I:E Ratio   CPAP  35 %     8 cm H2O  8 cm H20  1:2.5      Peak airway pressure: 11 cm H2O   Minute ventilation: 6.5 l/min         Angela Bashir, RT

## 2020-06-19 NOTE — PROGRESS NOTES
Hospitalist Note     Admit Date:  2020  5:03 PM   Name:  Orpha Kanner   Age:  66 y.o.  :  1945   MRN:  [id number]   PCP:  Cheri Manley MD  Treatment Team: Attending Provider: Madiha Will DO; Care Manager: Jere Beck.; Consulting Provider: Hemanth Adam MD; Consulting Provider: Hanny Garcia NP; Consulting Provider: Rima Kinsey MD; Consulting Provider: Rachelle Hill MD; Primary Nurse: Nawaf Ellison RN    HPI/Subjective:   66year old Olga Rodarte history significant for CAD, hypertensin, recent CVA with residual right sided weakness, diabetes from [facility] with worsening confusion and elevated creatinine.  Patient was found to have urinary tract infection and started on empiric antibiotics.  Rapid response called on 20 AM as patient was found to have acute respiratory failure with hypoxia likely due to aspiration.  Patient was suctioned and put on non-rebreather with saturation improving to the 96%. Patient has been lethargic with minimally responsive since the event. Patient had multiple days of temp spikes and antibiotics have been broadened. CT head was neg for any acute intracranial abnormalities.  CXR and CT showed infiltrate in the RLL confirming suspicion for aspiration pna. Due to decreased renal function, nephrology was consulted. Workup revealed no obstructive cause. Her renal function and hypernatremia has been improving on IV fluids and free water flushes. Pulmonary was consulted due to persistent high oxygen requirement despite being treated by antibiotics. Family wishes trach & PEG which was performed on .    : Comfortable. S/p trach/PEG yesterday. ICU overnight. On 28% via TC.  SBPs up but VS otherwise normal.     No other complaints  Objective:     Patient Vitals for the past 24 hrs:   Temp Pulse Resp BP SpO2   20 0759  81 17 177/77 96 %   20 0659 99.7 °F (37.6 °C) 81 16 171/77 96 %   20 0559  76 9 152/70 100 %   06/19/20 0459  79 9 163/72 100 %   06/19/20 0359 99.4 °F (37.4 °C) 79 16 160/73 97 %   06/19/20 0259  76 8 172/77 97 %   06/19/20 0159  75 23 169/77 96 %   06/18/20 2359 98.7 °F (37.1 °C) 76 15 166/75 97 %   06/18/20 2337  74 11 158/70 96 %   06/18/20 2230  78 15  97 %   06/18/20 2159  79 29 173/79 99 %   06/18/20 2130  72 12 159/69 99 %   06/18/20 2059  74 11 159/71 100 %   06/18/20 2030  71 19 164/77 100 %   06/18/20 2014  73 24 155/78 99 %   06/18/20 1959 98.9 °F (37.2 °C) 76 (!) 35 165/82 99 %   06/18/20 1930  72 13 162/77 100 %   06/18/20 1905  75 12  99 %   06/18/20 1900  75 19 165/78 99 %   06/18/20 1814  74 14 161/77 99 %   06/18/20 1800  73 12 160/77 100 %   06/18/20 1745  75 15 159/77 99 %   06/18/20 1729  76 13 161/75 98 %   06/18/20 1714  75 28 164/79 99 %   06/18/20 1700  75 26 168/79 98 %   06/18/20 1629  81 28 174/77 98 %   06/18/20 1618  81 (!) 39  99 %   06/18/20 1617     97 %   06/18/20 1614  85 22 186/87 98 %   06/18/20 1603  80 26 164/77 99 %   06/18/20 1519 97.2 °F (36.2 °C) 74 15 155/70 99 %   06/18/20 1514  77 16 148/67 99 %   06/18/20 1509  75 16 152/62 99 %   06/18/20 1505  79 15 165/73 100 %   06/18/20 1501 97.4 °F (36.3 °C) 82 15 175/77 100 %   06/18/20 1500  80 15 157/61 98 %   06/18/20 1458  81 15  98 %   06/18/20 1455 97.4 °F (36.3 °C) 84 15 175/77 100 %   06/18/20 1202 97.8 °F (36.6 °C) 77 18 174/77 97 %     Oxygen Therapy  O2 Sat (%): 96 % (06/19/20 0759)  Pulse via Oximetry: 82 beats per minute (06/19/20 0759)  O2 Device: Tracheostomy;Tracheal collar (06/19/20 0741)  O2 Flow Rate (L/min): 2 l/min (06/18/20 1202)  FIO2 (%): 28 % (06/19/20 0741)    Estimated body mass index is 33.89 kg/m² as calculated from the following:    Height as of this encounter: 5' 6\" (1.676 m). Weight as of this encounter: 95.3 kg (210 lb).       Intake/Output Summary (Last 24 hours) at 6/19/2020 0804  Last data filed at 6/19/2020 0514  Gross per 24 hour   Intake 1523.83 ml   Output 1180 ml   Net 343.83 ml       *Note that automatically entered I/Os may not be accurate; dependent on patient compliance with collection and accurate  by techs. General:    Well nourished. Comfortable. CV:   RRR. No murmur, rub, or gallop. Lungs:   B/l rhonchi. Trach, mild blood oozing. Sats high 90s. Abdomen:   Soft, nontender, nondistended. Extremities: Warm and dry. No cyanosis or edema. Skin:     No rashes or jaundice.    Neuro:  No gross focal deficits    Data Review:  I have reviewed all labs, meds, and studies from the last 24 hours:  Recent Results (from the past 24 hour(s))   GLUCOSE, POC    Collection Time: 06/18/20 12:04 PM   Result Value Ref Range    Glucose (POC) 155 (H) 65 - 100 mg/dL   GLUCOSE, POC    Collection Time: 06/18/20  6:23 PM   Result Value Ref Range    Glucose (POC) 173 (H) 65 - 100 mg/dL   GLUCOSE, POC    Collection Time: 06/18/20 10:22 PM   Result Value Ref Range    Glucose (POC) 151 (H) 65 - 100 mg/dL   GLUCOSE, POC    Collection Time: 06/18/20 11:50 PM   Result Value Ref Range    Glucose (POC) 136 (H) 65 - 634 mg/dL   METABOLIC PANEL, BASIC    Collection Time: 06/19/20  3:34 AM   Result Value Ref Range    Sodium 144 136 - 145 mmol/L    Potassium 4.4 3.5 - 5.1 mmol/L    Chloride 111 (H) 98 - 107 mmol/L    CO2 26 21 - 32 mmol/L    Anion gap 7 7 - 16 mmol/L    Glucose 100 65 - 100 mg/dL    BUN 25 (H) 8 - 23 MG/DL    Creatinine 1.10 (H) 0.6 - 1.0 MG/DL    GFR est AA >60 >60 ml/min/1.73m2    GFR est non-AA 51 (L) >60 ml/min/1.73m2    Calcium 9.2 8.3 - 10.4 MG/DL   CBC WITH AUTOMATED DIFF    Collection Time: 06/19/20  3:34 AM   Result Value Ref Range    WBC 9.9 4.3 - 11.1 K/uL    RBC 2.82 (L) 4.05 - 5.2 M/uL    HGB 9.1 (L) 11.7 - 15.4 g/dL    HCT 28.2 (L) 35.8 - 46.3 %    .0 (H) 79.6 - 97.8 FL    MCH 32.3 26.1 - 32.9 PG    MCHC 32.3 31.4 - 35.0 g/dL    RDW 17.2 (H) 11.9 - 14.6 %    PLATELET 431 962 - 207 K/uL    MPV 11.4 9.4 - 12.3 FL ABSOLUTE NRBC 0.00 0.0 - 0.2 K/uL    DF AUTOMATED      NEUTROPHILS 70 43 - 78 %    LYMPHOCYTES 17 13 - 44 %    MONOCYTES 6 4.0 - 12.0 %    EOSINOPHILS 5 0.5 - 7.8 %    BASOPHILS 0 0.0 - 2.0 %    IMMATURE GRANULOCYTES 1 0.0 - 5.0 %    ABS. NEUTROPHILS 6.9 1.7 - 8.2 K/UL    ABS. LYMPHOCYTES 1.7 0.5 - 4.6 K/UL    ABS. MONOCYTES 0.6 0.1 - 1.3 K/UL    ABS. EOSINOPHILS 0.5 0.0 - 0.8 K/UL    ABS. BASOPHILS 0.0 0.0 - 0.2 K/UL    ABS. IMM.  GRANS. 0.1 0.0 - 0.5 K/UL   GLUCOSE, POC    Collection Time: 06/19/20  5:03 AM   Result Value Ref Range    Glucose (POC) 96 65 - 100 mg/dL        Current Meds:  Current Facility-Administered Medications   Medication Dose Route Frequency    lidocaine (XYLOCAINE) 10 mg/mL (1 %) injection 0.1 mL  0.1 mL SubCUTAneous PRN    lactated Ringers infusion  150 mL/hr IntraVENous CONTINUOUS    sodium chloride (NS) flush 5-40 mL  5-40 mL IntraVENous Q8H    sodium chloride (NS) flush 5-40 mL  5-40 mL IntraVENous PRN    midazolam (VERSED) injection 2 mg  2 mg IntraVENous ONCE PRN    propofol (DIPRIVAN) 10 mg/mL infusion  0-50 mcg/kg/min IntraVENous TITRATE    aspirin chewable tablet 81 mg  81 mg Per NG tube DAILY    insulin glargine (LANTUS) injection 40 Units  40 Units SubCUTAneous QHS    [Held by provider] ticagrelor (BRILINTA) tablet 90 mg  90 mg Per NG tube Q12H    lip protectant (BLISTEX) ointment 1 Each  1 Each Topical PRN    pantoprazole (PROTONIX) 40 mg in 0.9% sodium chloride 10 mL injection  40 mg IntraVENous DAILY    acetaminophen (TYLENOL) suppository 650 mg  650 mg Rectal Q4H PRN    insulin regular (NOVOLIN R, HUMULIN R) injection   SubCUTAneous Q6H    levalbuterol (XOPENEX) nebulizer soln 1.25 mg/3 mL  1.25 mg Nebulization Q4H PRN    hydrALAZINE (APRESOLINE) 20 mg/mL injection 10 mg  10 mg IntraVENous Q6H PRN    levothyroxine (SYNTHROID) injection 110 mcg  110 mcg IntraVENous DAILY    [Held by provider] insulin regular (NOVOLIN R, HUMULIN R) injection 4 Units  4 Units SubCUTAneous QPM    [Held by provider] amLODIPine (NORVASC) tablet 10 mg  10 mg Oral DAILY    [Held by provider] atorvastatin (LIPITOR) tablet 40 mg  40 mg Oral QHS    [Held by provider] carvediloL (COREG) tablet 12.5 mg  12.5 mg Oral BID WITH MEALS    [Held by provider] losartan (COZAAR) tablet 100 mg  100 mg Oral DAILY    [Held by provider] ezetimibe (ZETIA) tablet 10 mg  10 mg Oral DAILY    hyoscyamine SL (LEVSIN/SL) tablet 0.125 mg  0.125 mg SubLINGual Q6H PRN    ondansetron (ZOFRAN ODT) tablet 4 mg  4 mg Oral Q8H PRN    [Held by provider] sertraline (ZOLOFT) tablet 200 mg  200 mg Oral BID    sodium chloride (NS) flush 5-40 mL  5-40 mL IntraVENous Q8H    sodium chloride (NS) flush 5-40 mL  5-40 mL IntraVENous PRN    acetaminophen (TYLENOL) tablet 650 mg  650 mg Oral Q4H PRN    ondansetron (ZOFRAN) injection 4 mg  4 mg IntraVENous Q4H PRN    magnesium hydroxide (MILK OF MAGNESIA) 400 mg/5 mL oral suspension 30 mL  30 mL Oral DAILY PRN    heparin (porcine) injection 5,000 Units  5,000 Units SubCUTAneous Q8H       Other Studies:  No results found for this visit on 06/05/20. No results found.     All Micro Results     Procedure Component Value Units Date/Time    CULTURE, BLOOD [[id number]] Collected:  06/08/20 1812    Order Status:  Completed Specimen:  Blood Updated:  06/13/20 0634     Special Requests: --        RIGHT  HAND       Culture result: NO GROWTH 5 DAYS       CULTURE, BLOOD [[id number]] Collected:  06/08/20 1807    Order Status:  Completed Specimen:  Blood Updated:  06/13/20 0634     Special Requests: --        LEFT  HAND       Culture result: NO GROWTH 5 DAYS       CULTURE, BLOOD [[id number]] Collected:  06/05/20 1845    Order Status:  Completed Specimen:  Blood Updated:  06/10/20 0631     Special Requests: --        NO SPECIAL REQUESTS  LEFT  Antecubital       Culture result: NO GROWTH 5 DAYS       CULTURE, BLOOD [[id number]] Collected:  06/05/20 1845    Order Status:  Completed Specimen: Blood Updated:  06/10/20 0631     Special Requests: --        NO SPECIAL REQUESTS  RIGHT  FOREARM       Culture result: NO GROWTH 5 DAYS       MSSA/MRSA SC BY PCR, NASAL SWAB [[id number]] Collected:  06/09/20 1545    Order Status:  Canceled Specimen:  Nasal swab     CULTURE, URINE [[id number]]  (Abnormal)  (Susceptibility) Collected:  06/05/20 1845    Order Status:  Completed Specimen:  Cath Urine Updated:  06/09/20 0710     Special Requests: NO SPECIAL REQUESTS        Culture result:       >100,000 COLONIES/mL PROTEUS MIRABILIS                  >100,000 COLONIES/mL KLEBSIELLA PNEUMONIAE                SARS-CoV-2 Lab Results  \"Novel Coronavirus\" Test: No results found for: COV2NT   \"Emergent Disease\" Test:   Lab Results   Component Value Date/Time    EDPR Not detected 05/27/2020 01:14 PM     \"SARS-COV-2\" Test: No results found for: XGCOVT  As of: 8:34 AM on 6/19/2020        Assessment and Plan:     Hospital Problems as of 6/19/2020 Date Reviewed: 6/19/2020          Codes Class Noted - Resolved POA    Status post tracheostomy ([facility] ICD-10-CM: Z93.0  ICD-9-CM: V44.0  6/19/2020 - Present No    Overview Signed 6/19/2020  7:06 AM by Kristopher Olivares NP     6/18/20:  Placed by Dr. Svetlana Chu             S/P percutaneous endoscopic gastrostomy (PEG) tube placement [facility]) ICD-10-CM: Z93.1  ICD-9-CM: V44.1  6/19/2020 - Present No    Overview Signed 6/19/2020  7:07 AM by Kristopher Olivares NP     6/18/20  Dr. Svetlana Chu             Dysphagia ICD-10-CM: R13.10  ICD-9-CM: 787.20  6/16/2020 - Present Yes        Diabetes mellitus type 2, controlled ([facility] ICD-10-CM: E11.9  ICD-9-CM: 250.00  6/15/2020 - Present Yes        Aspiration pneumonia ([facility] ICD-10-CM: J69.0  ICD-9-CM: 507.0  6/14/2020 - Present Yes        * (Principal) Acute respiratory failure with hypoxia ([facility] ICD-10-CM: J96.01  ICD-9-CM: 518.81  6/11/2020 - Present Yes        Hypernatremia ICD-10-CM: E87.0  ICD-9-CM: 276.0  6/6/2020 - Present Yes        ROSA (acute kidney injury) ([facility] ICD-10-CM: N17.9  ICD-9-CM: 584.9  6/5/2020 - Present Yes        UTI (urinary tract infection) ICD-10-CM: N39.0  ICD-9-CM: 599.0  6/5/2020 - Present Yes        Acute metabolic encephalopathy QDW-60-FB: G93.41  ICD-9-CM: 348.31  6/5/2020 - Present Yes        Normocytic anemia ICD-10-CM: D64.9  ICD-9-CM: 285.9  6/5/2020 - Present Yes        Leukocytosis ICD-10-CM: D72.829  ICD-9-CM: 288.60  5/26/2020 - Present Yes              Plan:  # Acute hypoxemic respiratory failure   - 2/2 aspiration in setting of chronic dysphagia   - S/p trach on 6/18   - Completed 10d abx on 6/18   - Appreciate pulm help    # Acute metabolic encephalopathy   - Multifactorial -- acute illness, infectious, metabolic. # HTN   - SBPs up 160s-170s. Need to resume meds via PEG once ok to use     # Hypothyroidism   - Synthroid    # ROSA   - Improving daily. # UTI   - Proteus and klebsiella   - Has completed abx    # HyperNa   - Resolved. # Leukocytosis   - Resolved. # Normocytic anemia   - Stable. # DM2    DC planning/Dispo: Back to floor today.   Diet:  DIET TUBE FEEDING  DIET NPO  DVT ppx: SCDs    Signed:  Jesus Singh MD

## 2020-06-19 NOTE — PROGRESS NOTES
Transfer assessment completed.  (see flow sheet for details.) pt acknowledges yes or no by shaking head

## 2020-06-19 NOTE — PROGRESS NOTES
[address] Nephrology Progress Note    Follow-Up on: ROSA/CKD, hypernatremia    ROS:  S/p trach. Obtunded     Exam:  Vitals:    06/19/20 0459 06/19/20 0559 06/19/20 0659 06/19/20 0759   BP: 163/72 152/70 171/77 177/77   Pulse: 79 76 81 81   Resp: 9 9 16 17   Temp:   99.7 °F (37.6 °C)    SpO2: 100% 100% 96% 96%   Weight:       Height:             Intake/Output Summary (Last 24 hours) at 6/19/2020 0947  Last data filed at 6/19/2020 0514  Gross per 24 hour   Intake 1523.83 ml   Output 1180 ml   Net 343.83 ml       Wt Readings from Last 3 Encounters:   06/18/20 95.3 kg (210 lb)   06/01/20 104.8 kg (231 lb 0.7 oz)   05/27/20 106.7 kg (235 lb 3.7 oz)     Trach. GEN - obtunded   CV - regular, no murmur, no rub  Lung - Decreased BS bilaterally  Abd - soft, nontender  Ext - trace  edema    Recent Labs     06/19/20  0334 06/18/20  0633 06/17/20  0744   WBC 9.9 10.7 11.1   HGB 9.1* 8.9* 8.1*   HCT 28.2* 27.2* 25.0*    249 223   INR  --  1.0  --         Recent Labs     06/19/20  0334 06/18/20  0633 06/17/20  0744    144 143   K 4.4 4.5 4.6   * 112* 111*   CO2 26 24 24   BUN 25* 29* 33*   CREA 1.10* 1.17* 1.36*   CA 9.2 9.8 9.8    116* 153*       Assessment / Plan:  Principal Problem:    Acute respiratory failure with hypoxia (HCC) (6/11/2020)    Active Problems:    Leukocytosis (5/26/2020)      ROSA (acute kidney injury) ([facility] (6/5/2020)      UTI (urinary tract infection) (6/5/2020)      Acute metabolic encephalopathy (3/7/2887)      Normocytic anemia (6/5/2020)      Hypernatremia (6/6/2020)      Aspiration pneumonia ([facility] (6/14/2020)      Diabetes mellitus type 2, controlled ([address] (6/15/2020)      Dysphagia (6/16/2020)      Status post tracheostomy ([facility] (6/19/2020)      Overview: 6/18/20:  Placed by Dr. Neema Verdugo      S/P percutaneous endoscopic gastrostomy (PEG) tube placement ([facility] (6/19/2020)      Overview: 6/18/20  Dr. Neema Verdugo    A/P:   1. ROSA: resolved   2. Hypernatremia - resolved   3. UTI  4. Encephalopathy.   Sign off   Thanks

## 2020-06-19 NOTE — PROGRESS NOTES
TRANSFER - IN REPORT:    Verbal report received from [facility] [place] (name) on [address]  being received from ICU (unit) for routine progression of care      Report consisted of patients Situation, Background, Assessment and   Recommendations(SBAR). Information from the following report(s) SBAR, Intake/Output, MAR and Recent Results was reviewed with the receiving nurse. Opportunity for questions and clarification was provided.

## 2020-06-19 NOTE — PROGRESS NOTES
Nutrition F/U:  TF Management for the Hospitalists. Assessment:  Weight 95.3 kg (6/18/2020, unknown source, unknown location), edema - 2+ generalized and all extremities. The patient had a trach and PEG placed yesterday and spent the night in CCU. Noted order to resume TF today. The previous TF order (Glucerna 1.5 @ 40 ml/hr with 30 ml/hr water flush) remains active and appropriate. Labs are remarkable for sodium 144 (down from 155 on 6/10/2020) and AM glucose 100; POC glucose (6/18) 123,155,173,151 and (6/19) 96 (rec'd her Lantus last night). Abdomen remains soft with active bowel sounds. Last bowel movement was yesterday. Enteral Access:   PEG. Macronutrient Needs: 95.5 kg CBW (Current body weight) bed scale  Estimated energy requirements:  2182-0583 kcal /day (15-20 kcal/kg)  Estimated protein requirements:  72-95 grams protein/day (20% kcal)  CHO limit per day: 263 grams CHO/day (55% calories)  Estimated fluid/day: 1.4-1.9 liters/day (~1ml/kcal/day)  Intake/Comparative Standards:  Previous intake of TF (Glucerna 1.5 @ 40 ml/hr with a 30 ml/hr water flush) provided 1440 kcal (100% estimated calorie needs), 79 grams protein (100% estimated protein needs), 128 grams CHO/day (does not exceed maximum CHO/day) and ~1.4-1.5L free fluid (~1 ml/kcal). Intervention:   Meals and Snacks: NPO. Enteral Nutrition: Resume previous TF and water flush. Coordination of Nutrition Care by a Nutrition Professional: AM CCU rounds, collaboration with Chester Kingston RN. Nutrition Discharge Plan: Too soon to determine. Topher Reyes.  Angie Hogue  [phone]

## 2020-06-19 NOTE — PROGRESS NOTES
PROGRESS NOTE    Guero Macias Bowels    6/19/2020    Date of Admission:  6/5/2020    The patient's chart is reviewed and the patient is discussed with the staff. 66 y.o. CF evaluated at the request of Dr. Janalee Dubin with chronic medical:  CAD, HTN, recent CVA with residual right sided weakness, DM, hx chronic CVA with left side weankness. Arrived from [facility] with worsening confusion and elevated creatinine. Was found to have UTI and started on empiric antibiotics. On 6/7 AM was found to have acute respiratory failure with hypoxia likely due to aspiration. Was suctioned, placed on NRB with sat  improving to 96%. Has been lethargic and minimally responsive since the event with fevers and antibiotics escalated. CT head was neg for any acute intracranial abnormalities and CXR and CT showed RLL infiltrate confirming suspiration for aspiration pneumonia. Due to worsening renal function, nephrology was consulted and workup revealed no obstruction. Renal function has improved on IVFs. Noted to be hyponatremia and hypotonic saline infusion. Pulmonary was consulted due to high O2 requirement despite being treated by antibiotics for 5 days. Patient has been started o vancomycin and zosyn. She is On O2 at 5L/min via NC. Subjective:   S/p PEG yesterday. Starting to use today. Transfer orders out of unit have been placed. Secretions manageable. Review of Systems  Very limited as per above.        Current Facility-Administered Medications   Medication Dose Route Frequency    lidocaine (XYLOCAINE) 10 mg/mL (1 %) injection 0.1 mL  0.1 mL SubCUTAneous PRN    lactated Ringers infusion  150 mL/hr IntraVENous CONTINUOUS    sodium chloride (NS) flush 5-40 mL  5-40 mL IntraVENous Q8H    sodium chloride (NS) flush 5-40 mL  5-40 mL IntraVENous PRN    midazolam (VERSED) injection 2 mg  2 mg IntraVENous ONCE PRN    propofol (DIPRIVAN) 10 mg/mL infusion  0-50 mcg/kg/min IntraVENous TITRATE    aspirin chewable tablet 81 mg  81 mg Per NG tube DAILY    insulin glargine (LANTUS) injection 40 Units  40 Units SubCUTAneous QHS    [Held by provider] ticagrelor (BRILINTA) tablet 90 mg  90 mg Per NG tube Q12H    lip protectant (BLISTEX) ointment 1 Each  1 Each Topical PRN    pantoprazole (PROTONIX) 40 mg in 0.9% sodium chloride 10 mL injection  40 mg IntraVENous DAILY    acetaminophen (TYLENOL) suppository 650 mg  650 mg Rectal Q4H PRN    insulin regular (NOVOLIN R, HUMULIN R) injection   SubCUTAneous Q6H    levalbuterol (XOPENEX) nebulizer soln 1.25 mg/3 mL  1.25 mg Nebulization Q4H PRN    hydrALAZINE (APRESOLINE) 20 mg/mL injection 10 mg  10 mg IntraVENous Q6H PRN    levothyroxine (SYNTHROID) injection 110 mcg  110 mcg IntraVENous DAILY    [Held by provider] insulin regular (NOVOLIN R, HUMULIN R) injection 4 Units  4 Units SubCUTAneous QPM    [Held by provider] amLODIPine (NORVASC) tablet 10 mg  10 mg Oral DAILY    [Held by provider] atorvastatin (LIPITOR) tablet 40 mg  40 mg Oral QHS    [Held by provider] carvediloL (COREG) tablet 12.5 mg  12.5 mg Oral BID WITH MEALS    [Held by provider] losartan (COZAAR) tablet 100 mg  100 mg Oral DAILY    [Held by provider] ezetimibe (ZETIA) tablet 10 mg  10 mg Oral DAILY    hyoscyamine SL (LEVSIN/SL) tablet 0.125 mg  0.125 mg SubLINGual Q6H PRN    ondansetron (ZOFRAN ODT) tablet 4 mg  4 mg Oral Q8H PRN    [Held by provider] sertraline (ZOLOFT) tablet 200 mg  200 mg Oral BID    sodium chloride (NS) flush 5-40 mL  5-40 mL IntraVENous Q8H    sodium chloride (NS) flush 5-40 mL  5-40 mL IntraVENous PRN    acetaminophen (TYLENOL) tablet 650 mg  650 mg Oral Q4H PRN    ondansetron (ZOFRAN) injection 4 mg  4 mg IntraVENous Q4H PRN    magnesium hydroxide (MILK OF MAGNESIA) 400 mg/5 mL oral suspension 30 mL  30 mL Oral DAILY PRN    heparin (porcine) injection 5,000 Units  5,000 Units SubCUTAneous Q8H         Objective:     Vitals:    06/19/20 0459 06/19/20 0559 06/19/20 0659 06/19/20 0759   BP: 163/72 152/70 171/77 177/77   Pulse: 79 76 81 81   Resp: 9 9 16 17   Temp:   99.7 °F (37.6 °C)    SpO2: 100% 100% 96% 96%   Weight:       Height:           PHYSICAL EXAM     Gen:  the patient is obese and in no acute distress, NC sat 93% on 1L   HEENT:  Sclera clear, pupils equal, oral mucosa moist  Lungs: mild coarse sounds in based with few rhonchi. Heart:  RRR without M,G,R  Abd: soft with positive bowel sounds, NG with TFs off and having loose diarrhea stools  Ext: warm without cyanosis. There is trace upper and lower extremity edema. Skin:  no jaundice or rashes, no wounds   Neuro: No left side movements, right side weakness   Psychiatric:  Alert today and looking at med and moving right hand more today. Appears to understand but not attempting to speak. Chest X-ray:      6/14/20:  Bibasilar atelectasis unchanged        Chest CT 6/11/20:    RIGHT LOWER LOBE ASPIRATION PNEUMONIA AND MINIMAL LEFT BASILAR ATELECTASIS. Recent Labs     06/19/20  0334 06/18/20  0633 06/17/20  0744   WBC 9.9 10.7 11.1   HGB 9.1* 8.9* 8.1*   HCT 28.2* 27.2* 25.0*    249 223   INR  --  1.0  --      Recent Labs     06/19/20  0334 06/18/20  0633 06/17/20  0744    144 143   K 4.4 4.5 4.6   * 112* 111*    116* 153*   CO2 26 24 24   BUN 25* 29* 33*   CREA 1.10* 1.17* 1.36*   CA 9.2 9.8 9.8     No results for input(s): PH, PCO2, PO2, HCO3 in the last 72 hours.       Assessment:  (Medical Decision Making)         Hospital Problems  Date Reviewed: 6/15/2020          Codes Class Noted POA    Diabetes mellitus type 2, controlled ([facility] ICD-10-CM: E11.9  ICD-9-CM: 250.00  6/15/2020 Yes    Chronic-- -per primary team    Aspiration pneumonia ([facility] ICD-10-CM: J69.0  ICD-9-CM: 507.0  6/14/2020 Unknown    Completed abx    Acute respiratory failure with hypoxia ([facility] ICD-10-CM: J96.01  ICD-9-CM: 518.81  6/11/2020 Unknown    On NC 1L--wean O2 as tolearted    Hypernatremia ICD-10-CM: E87.0  ICD-9-CM: 276.0  6/6/2020 Unknown    Na down to 144    ROSA (acute kidney injury) ([facility] ICD-10-CM: N17.9  ICD-9-CM: 584.9  6/5/2020 Yes    Per nephrology--creat down to to 1.1's    UTI (urinary tract infection) ICD-10-CM: N39.0  ICD-9-CM: 599.0  6/5/2020 Yes    Now off ABX    * (Principal) Acute metabolic encephalopathy IIF-24-DP: G93.41  ICD-9-CM: 348.31  6/5/2020 Yes    Nodding appropriately     Normocytic anemia ICD-10-CM: D64.9  ICD-9-CM: 285.9  6/5/2020 Yes    hgb in 8 range--following CBC    Leukocytosis ICD-10-CM: F27.649  ICD-9-CM: 288.60  5/26/2020 Yes     Better        Elderly female with recent CVA with UTI and likely aspirating (not protecting airway). On oxygen at 1 LPM, ABX and needs PEG and trach. Plan:  (Medical Decision Making)   --On trach collar 28%. Size 8 cuffed trach placed 6/18. Can downsize in one week to 6 cuffless. --Continue routine trach care and SLP  --Will be available as needed over weekend. Call us for questions and will resume rounding Monday otherwise. Jamshid Langford MD    This note was signed electronically. Errors are unfortunately her likely due to dictation software.

## 2020-06-19 NOTE — OP NOTES
[address] [facility]  OPERATIVE REPORT    Name:  Loly Ahumada  MR#:  [id number]  :  1945  ACCOUNT #:  [de-identified]  DATE OF SERVICE:  2020    PREOPERATIVE DIAGNOSES:  Stroke with respiratory failure and feeding difficulty. POSTOPERATIVE DIAGNOSES:  Stroke with respiratory failure and feeding difficulty. PROCEDURES PERFORMED:  1. Percutaneous endoscopic gastrostomy tube placement. 2.  Tracheostomy. SURGEON:  Judy Ross MD    ANESTHESIA:  General.    COMPLICATIONS:  none    SPECIMENS REMOVED: none    IMPLANTS:  As above    ESTIMATED BLOOD LOSS:  Minimum. INDICATION:  This is a 57-year-old female. She had a stroke with hemiparalysis and she is not able to eat. She is high risk for aspiration and even for her saliva,  trach and PEG were requested and this discussed with the family. They agreed to proceed. FINDINGS:  On PEG placement, we got a good transillumination and compression test.  On the tracheostomy, we had immediate return of end-tidal CO2 once the trach was inserted. PROCEDURE:  After informed consent obtained, the patient brought into the operating room, left in the supine position. General anesthesia was administered. We first started the PEG placement. The regular the EGD was used and this was advanced under direct vision in the stomach and then the stomach was fully distended and the good transillumination test was obtained at the epigastrium with good compression test, so, this spot was chosen as the tube insertion site. This was prepped with Betadine and then the skin cut was made. It was about 2 cm long and the needle was placed into the stomach and then followed by a guidewire. This was visualized from the endoscope and the guidewire was grabbed from the endoscope then pulled out from the mouth.   The G-tube was attached to the guidewire and then pulled back into the stomach and then pulled out from anterior abdominal wall and affixed at the skin vance 2 cm.  This concluded the G-tube placement. The patient was repositioned for the trach. Her neck was extended and then prepped and draped in the routine fashion. A transverse incision was made about 1 fingerbreadth above the suprasternal notch. Dissection carried through the precervical soft tissues. The platysma, then the midline fascia were opened longitudinally. The strap muscle was spread in the middle and the thyroid isthmus was divided in front of the trachea. The trachea was identified. We chose between the first and second ring. Once her oxygen was satisfactory, the tracheotomy was made between the first and second ring and the ET tube was also withdrawn at this time and then the #8 cuffed Brittni tracheostomy tube was inserted distally. The end-tidal CO2 was immediately obtained. The trach was then sutured into place. The patient tolerated the procedure well, transferred to recovery room in stable condition. All the instrument count and lap count were correct.       Padmini Peter MD      [id number]  D:  06/18/2020 14:44  T:  06/19/2020 1:47  JOB #:  [id number]

## 2020-06-20 ENCOUNTER — APPOINTMENT (OUTPATIENT)
Dept: GENERAL RADIOLOGY | Age: 75
DRG: 004 | End: 2020-06-20
Attending: PHYSICIAN ASSISTANT
Payer: MEDICARE

## 2020-06-20 PROBLEM — R04.2 HEMOPTYSIS: Status: ACTIVE | Noted: 2020-06-20

## 2020-06-20 LAB
ALBUMIN SERPL-MCNC: 2.5 G/DL (ref 3.2–4.6)
ALBUMIN/GLOB SERPL: 0.5 {RATIO} (ref 1.2–3.5)
ALP SERPL-CCNC: 77 U/L (ref 50–136)
ALT SERPL-CCNC: 26 U/L (ref 12–65)
ANION GAP SERPL CALC-SCNC: 11 MMOL/L (ref 7–16)
ANION GAP SERPL CALC-SCNC: 6 MMOL/L (ref 7–16)
APTT PPP: 32.3 SEC (ref 24.3–35.4)
AST SERPL-CCNC: 17 U/L (ref 15–37)
BASOPHILS # BLD: 0 K/UL (ref 0–0.2)
BASOPHILS NFR BLD: 0 % (ref 0–2)
BILIRUB SERPL-MCNC: 0.7 MG/DL (ref 0.2–1.1)
BUN SERPL-MCNC: 29 MG/DL (ref 8–23)
BUN SERPL-MCNC: 30 MG/DL (ref 8–23)
CALCIUM SERPL-MCNC: 9.5 MG/DL (ref 8.3–10.4)
CALCIUM SERPL-MCNC: 9.6 MG/DL (ref 8.3–10.4)
CHLORIDE SERPL-SCNC: 106 MMOL/L (ref 98–107)
CHLORIDE SERPL-SCNC: 109 MMOL/L (ref 98–107)
CO2 SERPL-SCNC: 22 MMOL/L (ref 21–32)
CO2 SERPL-SCNC: 26 MMOL/L (ref 21–32)
CREAT SERPL-MCNC: 1.22 MG/DL (ref 0.6–1)
CREAT SERPL-MCNC: 1.26 MG/DL (ref 0.6–1)
DIFFERENTIAL METHOD BLD: ABNORMAL
EOSINOPHIL # BLD: 0.4 K/UL (ref 0–0.8)
EOSINOPHIL NFR BLD: 3 % (ref 0.5–7.8)
ERYTHROCYTE [DISTWIDTH] IN BLOOD BY AUTOMATED COUNT: 18 % (ref 11.9–14.6)
ERYTHROCYTE [DISTWIDTH] IN BLOOD BY AUTOMATED COUNT: 18 % (ref 11.9–14.6)
GLOBULIN SER CALC-MCNC: 5 G/DL (ref 2.3–3.5)
GLUCOSE BLD STRIP.AUTO-MCNC: 168 MG/DL (ref 65–100)
GLUCOSE BLD STRIP.AUTO-MCNC: 177 MG/DL (ref 65–100)
GLUCOSE BLD STRIP.AUTO-MCNC: 205 MG/DL (ref 65–100)
GLUCOSE BLD STRIP.AUTO-MCNC: 219 MG/DL (ref 65–100)
GLUCOSE BLD STRIP.AUTO-MCNC: 287 MG/DL (ref 65–100)
GLUCOSE BLD STRIP.AUTO-MCNC: 292 MG/DL (ref 65–100)
GLUCOSE SERPL-MCNC: 176 MG/DL (ref 65–100)
GLUCOSE SERPL-MCNC: 242 MG/DL (ref 65–100)
HCT VFR BLD AUTO: 26.3 % (ref 35.8–46.3)
HCT VFR BLD AUTO: 26.4 % (ref 35.8–46.3)
HGB BLD-MCNC: 8.3 G/DL (ref 11.7–15.4)
HGB BLD-MCNC: 8.6 G/DL (ref 11.7–15.4)
IMM GRANULOCYTES # BLD AUTO: 0.1 K/UL (ref 0–0.5)
IMM GRANULOCYTES NFR BLD AUTO: 1 % (ref 0–5)
LYMPHOCYTES # BLD: 2.4 K/UL (ref 0.5–4.6)
LYMPHOCYTES NFR BLD: 20 % (ref 13–44)
MCH RBC QN AUTO: 31.6 PG (ref 26.1–32.9)
MCH RBC QN AUTO: 32.1 PG (ref 26.1–32.9)
MCHC RBC AUTO-ENTMCNC: 31.6 G/DL (ref 31.4–35)
MCHC RBC AUTO-ENTMCNC: 32.6 G/DL (ref 31.4–35)
MCV RBC AUTO: 100 FL (ref 79.6–97.8)
MCV RBC AUTO: 98.5 FL (ref 79.6–97.8)
MONOCYTES # BLD: 0.8 K/UL (ref 0.1–1.3)
MONOCYTES NFR BLD: 7 % (ref 4–12)
NEUTS SEG # BLD: 8 K/UL (ref 1.7–8.2)
NEUTS SEG NFR BLD: 69 % (ref 43–78)
NRBC # BLD: 0 K/UL (ref 0–0.2)
NRBC # BLD: 0 K/UL (ref 0–0.2)
PLATELET # BLD AUTO: 261 K/UL (ref 150–450)
PLATELET # BLD AUTO: 283 K/UL (ref 150–450)
PMV BLD AUTO: 10.8 FL (ref 9.4–12.3)
PMV BLD AUTO: 11.1 FL (ref 9.4–12.3)
POTASSIUM SERPL-SCNC: 4.2 MMOL/L (ref 3.5–5.1)
POTASSIUM SERPL-SCNC: 4.6 MMOL/L (ref 3.5–5.1)
PROT SERPL-MCNC: 7.5 G/DL (ref 6.3–8.2)
RBC # BLD AUTO: 2.63 M/UL (ref 4.05–5.2)
RBC # BLD AUTO: 2.68 M/UL (ref 4.05–5.2)
SODIUM SERPL-SCNC: 139 MMOL/L (ref 136–145)
SODIUM SERPL-SCNC: 141 MMOL/L (ref 136–145)
WBC # BLD AUTO: 11.6 K/UL (ref 4.3–11.1)
WBC # BLD AUTO: 13.1 K/UL (ref 4.3–11.1)

## 2020-06-20 PROCEDURE — 80053 COMPREHEN METABOLIC PANEL: CPT

## 2020-06-20 PROCEDURE — 74011636637 HC RX REV CODE- 636/637: Performed by: INTERNAL MEDICINE

## 2020-06-20 PROCEDURE — 85025 COMPLETE CBC W/AUTO DIFF WBC: CPT

## 2020-06-20 PROCEDURE — 85730 THROMBOPLASTIN TIME PARTIAL: CPT

## 2020-06-20 PROCEDURE — 65270000029 HC RM PRIVATE

## 2020-06-20 PROCEDURE — 74011250637 HC RX REV CODE- 250/637: Performed by: SURGERY

## 2020-06-20 PROCEDURE — 74011250636 HC RX REV CODE- 250/636: Performed by: SURGERY

## 2020-06-20 PROCEDURE — C9113 INJ PANTOPRAZOLE SODIUM, VIA: HCPCS | Performed by: SURGERY

## 2020-06-20 PROCEDURE — 71045 X-RAY EXAM CHEST 1 VIEW: CPT

## 2020-06-20 PROCEDURE — 31645 BRNCHSC W/THER ASPIR 1ST: CPT | Performed by: INTERNAL MEDICINE

## 2020-06-20 PROCEDURE — 82962 GLUCOSE BLOOD TEST: CPT

## 2020-06-20 PROCEDURE — 77030018798 HC PMP KT ENTRL FED COVD -A

## 2020-06-20 PROCEDURE — 74011250637 HC RX REV CODE- 250/637: Performed by: INTERNAL MEDICINE

## 2020-06-20 PROCEDURE — 77030040393 HC DRSG OPTIFOAM GENT MDII -B

## 2020-06-20 PROCEDURE — 74011250637 HC RX REV CODE- 250/637: Performed by: FAMILY MEDICINE

## 2020-06-20 PROCEDURE — 85027 COMPLETE CBC AUTOMATED: CPT

## 2020-06-20 PROCEDURE — 36415 COLL VENOUS BLD VENIPUNCTURE: CPT

## 2020-06-20 PROCEDURE — 74011000250 HC RX REV CODE- 250: Performed by: SURGERY

## 2020-06-20 PROCEDURE — 0BJ08ZZ INSPECTION OF TRACHEOBRONCHIAL TREE, VIA NATURAL OR ARTIFICIAL OPENING ENDOSCOPIC: ICD-10-PCS | Performed by: INTERNAL MEDICINE

## 2020-06-20 PROCEDURE — 99233 SBSQ HOSP IP/OBS HIGH 50: CPT | Performed by: INTERNAL MEDICINE

## 2020-06-20 PROCEDURE — 74011636637 HC RX REV CODE- 636/637: Performed by: SURGERY

## 2020-06-20 RX ORDER — LIDOCAINE HYDROCHLORIDE 40 MG/ML
SOLUTION TOPICAL AS NEEDED
Status: DISCONTINUED | OUTPATIENT
Start: 2020-06-20 | End: 2020-06-23 | Stop reason: HOSPADM

## 2020-06-20 RX ADMIN — SERTRALINE HYDROCHLORIDE 200 MG: 100 TABLET ORAL at 09:44

## 2020-06-20 RX ADMIN — EZETIMIBE 10 MG: 10 TABLET ORAL at 09:44

## 2020-06-20 RX ADMIN — INSULIN GLARGINE 40 UNITS: 100 INJECTION, SOLUTION SUBCUTANEOUS at 23:17

## 2020-06-20 RX ADMIN — HUMAN INSULIN 3 UNITS: 100 INJECTION, SOLUTION SUBCUTANEOUS at 23:18

## 2020-06-20 RX ADMIN — HUMAN INSULIN 3 UNITS: 100 INJECTION, SOLUTION SUBCUTANEOUS at 06:12

## 2020-06-20 RX ADMIN — SODIUM CHLORIDE 40 MG: 9 INJECTION INTRAMUSCULAR; INTRAVENOUS; SUBCUTANEOUS at 09:44

## 2020-06-20 RX ADMIN — Medication 10 ML: at 21:56

## 2020-06-20 RX ADMIN — AMLODIPINE BESYLATE 10 MG: 10 TABLET ORAL at 09:44

## 2020-06-20 RX ADMIN — LOSARTAN POTASSIUM 100 MG: 50 TABLET, FILM COATED ORAL at 09:44

## 2020-06-20 RX ADMIN — ASPIRIN 81 MG 81 MG: 81 TABLET ORAL at 09:44

## 2020-06-20 RX ADMIN — SERTRALINE HYDROCHLORIDE 200 MG: 100 TABLET ORAL at 17:34

## 2020-06-20 RX ADMIN — CARVEDILOL 12.5 MG: 12.5 TABLET, FILM COATED ORAL at 17:34

## 2020-06-20 RX ADMIN — HUMAN INSULIN 4 UNITS: 100 INJECTION, SOLUTION SUBCUTANEOUS at 17:48

## 2020-06-20 RX ADMIN — ATORVASTATIN CALCIUM 40 MG: 40 TABLET, FILM COATED ORAL at 21:55

## 2020-06-20 RX ADMIN — LEVOTHYROXINE SODIUM ANHYDROUS 110 MCG: 100 INJECTION, POWDER, LYOPHILIZED, FOR SOLUTION INTRAVENOUS at 09:43

## 2020-06-20 RX ADMIN — HUMAN INSULIN 9 UNITS: 100 INJECTION, SOLUTION SUBCUTANEOUS at 17:48

## 2020-06-20 RX ADMIN — HUMAN INSULIN 6 UNITS: 100 INJECTION, SOLUTION SUBCUTANEOUS at 01:01

## 2020-06-20 RX ADMIN — HEPARIN SODIUM 5000 UNITS: 5000 INJECTION INTRAVENOUS; SUBCUTANEOUS at 06:12

## 2020-06-20 RX ADMIN — TICAGRELOR 90 MG: 90 TABLET ORAL at 09:44

## 2020-06-20 RX ADMIN — CARVEDILOL 12.5 MG: 12.5 TABLET, FILM COATED ORAL at 09:44

## 2020-06-20 RX ADMIN — HUMAN INSULIN 9 UNITS: 100 INJECTION, SOLUTION SUBCUTANEOUS at 14:07

## 2020-06-20 NOTE — PROCEDURES
PROCEDURE    Bronchoscopy with airway inspection /cleanout . INDICATION   Hemoptysis from recent tracheostomy. EQUIPMENT:  Ambu Disposable Bronchoscope    ANESTHESIA    Lidocaine 120 mg to tracheo-bronchial tree    IMAGING:  N/A      AIRWAY INSPECTION    After obtaining informed consent, through a tracheostomy tube, an Ambu Disposable Bronchoscope was introduced into the trachea without complication. RIGHT    LOCATION NORM/ABNORM DESCRIPTION   Larynx N/A    VOCAL CORDS N/A    TRACHEA NL Tracheostomy tube inserted into the trachea without obvious ulceration or source of bleeding. BERTA NL    RMSB NL    RUL NL    BI NL    RML NL    RLL NL    SUP SEGM RLL NL    MED BASAL NL    ANTERIOR BASAL NL    LATERAL BASAL NL    POSTERIOR BASAL NL               LEFT    LOCATION NORM/ABNORM DESCRIPTION   LMSB NL    YEVGENIY NL    LINGULA NL    LLL NL    SUPERIOR SEG LLL NL    NORMA-MEDIAL LLL NL    LATERAL LLL NL    POSTERIOR LLL NL      The entire lower airways were inspected. There were some bloody secretions present throughout the lower airways that was suctioned free. There was a questionable polyp in the LMSB vs area of injured mucous membrane but did not seem to be bleeding. The procedure was completed without complication and the patient tolerated the procedure well. EBL: None    Recommendations:  Hold anticoagulation for now until bleeding has stopped. Minimize suctioning. If significant bleeding recurs will need to repeat bronch with the regular bronch cart.      Yvrose Thakkar MD

## 2020-06-20 NOTE — PROGRESS NOTES
[facility] CRITICAL CARE OUTREACH NURSE PROGRESS REPORT      SUBJECTIVE: Called to assess patient secondary to nurse concern. MEWS Score: 1 (06/20/20 8722)  Vitals:    06/19/20 2325 06/20/20 0353 06/20/20 0808 06/20/20 0834   BP: 124/63 144/65 134/68    Pulse: 85 86 92    Resp: 24 22 20    Temp: 100.1 °F (37.8 °C) 99 °F (37.2 °C) 99.1 °F (37.3 °C)    SpO2: 94% 93% 95% 95%   Weight:       Height:            LAB DATA:    Recent Labs     06/20/20  0817 06/19/20  0334 06/18/20  0633    144 144   K 4.2 4.4 4.5   * 111* 112*   CO2 26 26 24   AGAP 6* 7 8   * 100 116*   BUN 30* 25* 29*   CREA 1.22* 1.10* 1.17*   GFRAA 55* >60 58*   GFRNA 45* 51* 48*   CA 9.6 9.2 9.8        Recent Labs     06/20/20  0817 06/19/20  0334 06/18/20  0633   WBC 11.6* 9.9 10.7   HGB 8.3* 9.1* 8.9*   HCT 26.3* 28.2* 27.2*    219 249          OBJECTIVE: On arrival to room, I found patient to be resting in bed with eyes closed, no distress. Pain Assessment  Pain Intensity 1: 0 (06/19/20 2007)        Patient Stated Pain Goal: Unable to verbalize/indicate pain                                 ASSESSMENT:  Patient drowsy however will open eyes to repeated stimuli. Pupils equal and reactive and pt nodding appropriately to questions. Patient did follow simple commands with upper extremities after repeated requests. Hr 92, O2sat 92 on trach collar,4L. PLAN:  Will continue to follow per outreach program protocol.      Praful Montgomery RN

## 2020-06-20 NOTE — PROGRESS NOTES
Pt in bed this AM, assessment complete. Pt with trach #8 briseyda yuan suctioned at this time. Pt with small amount of thick serosanguinous mucous upon suctioning. RR even, unlabored, no noted distress. PEG tube infusing without difficulties. Bed L/L, call bell is within reach and side rails are up x 2.

## 2020-06-20 NOTE — PROGRESS NOTES
Guero Guerin  Admission Date: 6/5/2020             Daily Progress Note: 6/20/2020    The patient's chart is reviewed and the patient is discussed with the staff. 66 y.o. CF evaluated at the request of Dr. Nevaeh Pineda chronic medical:  CAD, HTN, recent CVA with residual right sided weakness, DM, hx chronic CVA with left side weankness. Arrived from [facility] with worsening confusion and elevated creatinine.  Was found to have UTI and started on empiric antibiotics.  On 6/7 AM was found to have acute respiratory failure with hypoxia likely due to aspiration.  Was suctioned, placed on NRB with sat  improving to 96%. Has been lethargic and minimally responsive since the event with fevers and antibiotics escalated. CT head was neg for any acute intracranial abnormalities and CXR and CT showed RLL infiltrate confirming suspiration for aspiration pneumonia. Due to worsening renal function, nephrology was consulted and workup revealed no obstruction. Renal function has improved on IVFs.  Noted to be hyponatremia and hypotonic saline infusion.    Pulmonary was consulted due to high O2 requirement despite being treated by antibiotics for 5 days. Patient has been started o vancomycin and zosyn. She is On O2 at 5L/min via NC. Subjective:     Called to emergently evaluate the patient. Started coughing up clots from her trach tube. Otherwise has been stable. satting well on 28% trach collar.      Current Facility-Administered Medications   Medication Dose Route Frequency    lidocaine (XYLOCAINE) 10 mg/mL (1 %) injection 0.1 mL  0.1 mL SubCUTAneous PRN    sodium chloride (NS) flush 5-40 mL  5-40 mL IntraVENous Q8H    sodium chloride (NS) flush 5-40 mL  5-40 mL IntraVENous PRN    propofol (DIPRIVAN) 10 mg/mL infusion  0-50 mcg/kg/min IntraVENous TITRATE    aspirin chewable tablet 81 mg  81 mg Per NG tube DAILY    insulin glargine (LANTUS) injection 40 Units  40 Units SubCUTAneous QHS    [Held by provider] ticagrelor (BRILINTA) tablet 90 mg  90 mg Per NG tube Q12H    lip protectant (BLISTEX) ointment 1 Each  1 Each Topical PRN    pantoprazole (PROTONIX) 40 mg in 0.9% sodium chloride 10 mL injection  40 mg IntraVENous DAILY    acetaminophen (TYLENOL) suppository 650 mg  650 mg Rectal Q4H PRN    insulin regular (NOVOLIN R, HUMULIN R) injection   SubCUTAneous Q6H    levalbuterol (XOPENEX) nebulizer soln 1.25 mg/3 mL  1.25 mg Nebulization Q4H PRN    hydrALAZINE (APRESOLINE) 20 mg/mL injection 10 mg  10 mg IntraVENous Q6H PRN    levothyroxine (SYNTHROID) injection 110 mcg  110 mcg IntraVENous DAILY    insulin regular (NOVOLIN R, HUMULIN R) injection 4 Units  4 Units SubCUTAneous QPM    amLODIPine (NORVASC) tablet 10 mg  10 mg Oral DAILY    atorvastatin (LIPITOR) tablet 40 mg  40 mg Oral QHS    carvediloL (COREG) tablet 12.5 mg  12.5 mg Oral BID WITH MEALS    losartan (COZAAR) tablet 100 mg  100 mg Oral DAILY    ezetimibe (ZETIA) tablet 10 mg  10 mg Oral DAILY    hyoscyamine SL (LEVSIN/SL) tablet 0.125 mg  0.125 mg SubLINGual Q6H PRN    ondansetron (ZOFRAN ODT) tablet 4 mg  4 mg Oral Q8H PRN    sertraline (ZOLOFT) tablet 200 mg  200 mg Oral BID    sodium chloride (NS) flush 5-40 mL  5-40 mL IntraVENous Q8H    sodium chloride (NS) flush 5-40 mL  5-40 mL IntraVENous PRN    acetaminophen (TYLENOL) tablet 650 mg  650 mg Oral Q4H PRN    ondansetron (ZOFRAN) injection 4 mg  4 mg IntraVENous Q4H PRN    magnesium hydroxide (MILK OF MAGNESIA) 400 mg/5 mL oral suspension 30 mL  30 mL Oral DAILY PRN    [Held by provider] heparin (porcine) injection 5,000 Units  5,000 Units SubCUTAneous Q8H       Review of Systems  Unobtainable due to patient status.     Objective:     Vitals:    06/20/20 0808 06/20/20 0834 06/20/20 1208 06/20/20 1219   BP: 134/68  132/64 133/60   Pulse: 92  86 83   Resp: 20  20 14   Temp: 99.1 °F (37.3 °C)  99.5 °F (37.5 °C) 99.8 °F (37.7 °C)   SpO2: 95% 95% 93% 97%   Weight: Height:         Intake and Output:   06/18 1901 - 06/20 0700  In: 993.8 [I.V.:723.8]  Out: 7090 [Urine:1235]  06/20 0701 - 06/20 1900  In: 30   Out: -     Physical Exam:   Constitution:  the patient is well developed and in no acute distress  EENMT:  Sclera clear, pupils equal, oral mucosa moist  Respiratory: Trach in place. No obvious bleeding externally. Large clots have been suctioned from inner cannula over last 20 minutes. .   Cardiovascular:  RRR without M,G,R  Gastrointestinal: soft and non-tender; with positive bowel sounds. Musculoskeletal: warm without cyanosis. There is trace B lower leg edema.   Skin:  no jaundice or rashes, no wounds   Neurologic: opens eyes but no other interaction     Psychiatric:  Non verbal.     CHEST XRAY:   No new imaging    LAB  No lab exists for component: [place]   Recent Labs     06/20/20  0817 06/19/20  0334 06/18/20  0633   WBC 11.6* 9.9 10.7   HGB 8.3* 9.1* 8.9*   HCT 26.3* 28.2* 27.2*    219 249   INR  --   --  1.0     Recent Labs     06/20/20  0817 06/19/20  0334 06/18/20  0633    144 144   K 4.2 4.4 4.5   * 111* 112*   CO2 26 26 24   * 100 116*   BUN 30* 25* 29*   CREA 1.22* 1.10* 1.17*   CA 9.6 9.2 9.8     ABG:  No results found for: PH, PHI, PCO2, PCO2I, PO2, PO2I, HCO3, HCO3I, FIO2, FIO2I      Assessment:  (Medical Decision Making)     Hospital Problems  Date Reviewed: 6/19/2020          Codes Class Noted POA    Hemoptysis ICD-10-CM: R04.2  ICD-9-CM: 786.30  6/20/2020 Unknown        Status post tracheostomy ([facility] ICD-10-CM: Z93.0  ICD-9-CM: V44.0  6/19/2020 No    Overview Signed 6/19/2020  7:06 AM by Savi Gambino NP     6/18/20:  Placed by Dr. Tiffanie Hernandez             S/P percutaneous endoscopic gastrostomy (PEG) tube placement [facility]) ICD-10-CM: Z93.1  ICD-9-CM: V44.1  6/19/2020 No    Overview Signed 6/19/2020  7:07 AM by Savi Gambino NP     6/18/20  Dr. Tiffanie Hernandez             Dysphagia ICD-10-CM: R13.10  ICD-9-CM: 787.20  6/16/2020 Yes        Diabetes mellitus type 2, controlled ([facility] ICD-10-CM: E11.9  ICD-9-CM: 250.00  6/15/2020 Yes        Aspiration pneumonia ([facility] ICD-10-CM: J69.0  ICD-9-CM: 507.0  6/14/2020 Yes        * (Principal) Acute respiratory failure with hypoxia (H[facility]) ICD-10-CM: J96.01  ICD-9-CM: 518.81  6/11/2020 Yes        Hypernatremia ICD-10-CM: E87.0  ICD-9-CM: 276.0  6/6/2020 Yes        ROSA (acute kidney injury) ([facility] ICD-10-CM: N17.9  ICD-9-CM: 584.9  6/5/2020 Yes        UTI (urinary tract infection) ICD-10-CM: N39.0  ICD-9-CM: 599.0  6/5/2020 Yes        Acute metabolic encephalopathy QDH-99-RG: G93.41  ICD-9-CM: 348.31  6/5/2020 Yes        Normocytic anemia ICD-10-CM: D64.9  ICD-9-CM: 285.9  6/5/2020 Yes        Leukocytosis ICD-10-CM: L28.570  ICD-9-CM: 288.60  5/26/2020 Yes              New hemoptysis after trach placement. In the setting of asa, heparin, and brilinta usage. Plan:  (Medical Decision Making)   -pt does not appear to be in any worsening distress. Will evaluate the lower airways with bronchoscope to rule out lower source of bleeding.   -if able, hold anticoagulation in the meantime.   -if no lower source of bleeding found and bleeding continues may need to contact surgical team who placed the trach to consider cautery at insertion site.        Keo Palmer MD

## 2020-06-20 NOTE — PROGRESS NOTES
Hold anticoagulants due to hemoptysis. Check coags, H/H, and CXR. Plans for bronch noted. Dr. Kerns Both aware.     JHONNY Oliva

## 2020-06-20 NOTE — PROGRESS NOTES
Shift assessment completed. Pt is alert,  non verbal but responds to painful stimuli. No acute distress noted at this time. Trach intact with some bloody drainage around. RT will change collar later. F/c patent, draining cloudy urine. Bed in low and locked position. Call light within reach.

## 2020-06-20 NOTE — PROGRESS NOTES
Was notified by RT that pt had coughed up a blood clot. Upon suctioning pt bright red blood is also noted. Notified Dr. Ender Cardona, Dr. Gilma Wyatt and JONAS from surgery.

## 2020-06-20 NOTE — PROGRESS NOTES
Hospitalist Progress Note    Patient: Rachelle Stratton MRN: [id number]  SSN: [id number]    YOB: 1945  Age: 66 y.o. Sex: female      Admit Date: 6/5/2020    LOS: 15 days     Subjective:     66year old CF with medical history significant for CAD, hypertensin, recent CVA with residual right sided weakness, diabetes from [facility] with worsening confusion and elevated creatinine. Patient was found to have urinary tract infection and started on empiric antibiotics. Rapid response called on 6/7/20 AM as patient was found to have acute respiratory failure with hypoxia likely due to aspiration. Patient was suctioned and put on non-rebreather with saturation improving to the 96%. Patient has been lethargic with minimally responsive since the event. Patient had multiple days of temp spikes and antibiotics have been broadened. CT head was neg for any acute intracranial abnormalities. CXR and CT showed infiltrate in the RLL confirming suspicion for aspiration pna. Due to decreased renal function, nephrology was consulted. Workup revealed no obstructive cause. Her renal function and hypernatremia has been improving on IV fluids and free water flushes. Pulmonary was consulted due to persistent high oxygen requirement despite being treated by antibiotics. Family wishes trach & PEG, which she got on 6/18. She spent one night in the ICU and was transferred out.    6/20 - She is drowsy today. Dried blood around [facility] site. Neck edematous. Review of systems negative except stated above.     Objective:     Visit Vitals  /68 (BP 1 Location: Right arm, BP Patient Position: At rest)   Pulse 92   Temp 99.1 °F (37.3 °C)   Resp 20   Ht 5' 6\" (1.676 m)   Wt 95.3 kg (210 lb)   SpO2 95%   BMI 33.89 kg/m²      Oxygen Therapy  O2 Sat (%): 95 % (06/20/20 0834)  Pulse via Oximetry: 81 beats per minute (06/20/20 0834)  O2 Device: Tracheal collar (06/20/20 0834)  O2 Flow Rate (L/min): 4 l/min (06/20/20 0834)  FIO2 (%): 28 % (06/20/20 0834)      Intake and Output:     Intake/Output Summary (Last 24 hours) at 6/20/2020 1036  Last data filed at 6/19/2020 2007  Gross per 24 hour   Intake 270 ml   Output 725 ml   Net -455 ml         Physical Exam:   GENERAL: asleep, cooperative, no distress, appears stated age  EYE: conjunctivae/corneas clear. PERRL. THROAT & NECK: normal and no erythema or exudates noted. LUNG: Clear to auscultation  HEART: regular rate and rhythm, S1S2, no murmur, no JVD  ABDOMEN: soft, non-tender, non-distended. Bowel sounds normal.   EXTREMITIES:  No edema, 2+ pedal/radial pulses bilaterally  SKIN: no rash or abnormalities  NEUROLOGIC: Asleep. Cranial nerves 2-12 grossly intact.     Lab/Data Review:  Recent Results (from the past 24 hour(s))   GLUCOSE, POC    Collection Time: 06/19/20 12:23 PM   Result Value Ref Range    Glucose (POC) 92 65 - 100 mg/dL   GLUCOSE, POC    Collection Time: 06/19/20  3:24 PM   Result Value Ref Range    Glucose (POC) 138 (H) 65 - 100 mg/dL   GLUCOSE, POC    Collection Time: 06/19/20  9:11 PM   Result Value Ref Range    Glucose (POC) 166 (H) 65 - 100 mg/dL   GLUCOSE, POC    Collection Time: 06/20/20  1:01 AM   Result Value Ref Range    Glucose (POC) 205 (H) 65 - 100 mg/dL   GLUCOSE, POC    Collection Time: 06/20/20  6:10 AM   Result Value Ref Range    Glucose (POC) 168 (H) 65 - 768 mg/dL   METABOLIC PANEL, BASIC    Collection Time: 06/20/20  8:17 AM   Result Value Ref Range    Sodium 141 136 - 145 mmol/L    Potassium 4.2 3.5 - 5.1 mmol/L    Chloride 109 (H) 98 - 107 mmol/L    CO2 26 21 - 32 mmol/L    Anion gap 6 (L) 7 - 16 mmol/L    Glucose 176 (H) 65 - 100 mg/dL    BUN 30 (H) 8 - 23 MG/DL    Creatinine 1.22 (H) 0.6 - 1.0 MG/DL    GFR est AA 55 (L) >60 ml/min/1.73m2    GFR est non-AA 45 (L) >60 ml/min/1.73m2    Calcium 9.6 8.3 - 10.4 MG/DL   CBC WITH AUTOMATED DIFF    Collection Time: 06/20/20  8:17 AM   Result Value Ref Range    WBC 11.6 (H) 4.3 - 11.1 K/uL    RBC 2.63 (L) 4.05 - 5.2 M/uL    HGB 8.3 (L) 11.7 - 15.4 g/dL    HCT 26.3 (L) 35.8 - 46.3 %    .0 (H) 79.6 - 97.8 FL    MCH 31.6 26.1 - 32.9 PG    MCHC 31.6 31.4 - 35.0 g/dL    RDW 18.0 (H) 11.9 - 14.6 %    PLATELET 496 504 - 550 K/uL    MPV 11.1 9.4 - 12.3 FL    ABSOLUTE NRBC 0.00 0.0 - 0.2 K/uL    DF AUTOMATED      NEUTROPHILS 69 43 - 78 %    LYMPHOCYTES 20 13 - 44 %    MONOCYTES 7 4.0 - 12.0 %    EOSINOPHILS 3 0.5 - 7.8 %    BASOPHILS 0 0.0 - 2.0 %    IMMATURE GRANULOCYTES 1 0.0 - 5.0 %    ABS. NEUTROPHILS 8.0 1.7 - 8.2 K/UL    ABS. LYMPHOCYTES 2.4 0.5 - 4.6 K/UL    ABS. MONOCYTES 0.8 0.1 - 1.3 K/UL    ABS. EOSINOPHILS 0.4 0.0 - 0.8 K/UL    ABS. BASOPHILS 0.0 0.0 - 0.2 K/UL    ABS. IMM. GRANS. 0.1 0.0 - 0.5 K/UL   GLUCOSE, POC    Collection Time: 06/20/20  9:26 AM   Result Value Ref Range    Glucose (POC) 219 (H) 65 - 100 mg/dL       Imaging:  Nm Lung Scan Perf    Result Date: 5/25/2020  EXAM:  NM LUNG SCAN PERF INDICATION:  Elevated troponin weakness mild hypoxia COMPARISON:  None. TRACER: 4.6 mCi of Tc-99m MAA. FINDINGS: Perfusion lung imaging was performed following intravenous administration of  Tc 99m MAA. Images were obtained from the anterior, posterior and right and left anterior and posterior oblique projections. The perfusion is normal. No segmental or subsegmental defects are identified. Chest x-ray reveals an elevated right hemidiaphragm. IMPRESSION: Normal lung perfusion study. Xr Chest Sngl V    Result Date: 6/14/2020  EXAM: TEMPORARY INDICATION: Respiratory failure COMPARISON: 6/8/2020 FINDINGS: A portable AP radiograph of the chest was obtained at 0522 hours. The patient is on a cardiac monitor. Bibasilar airspace disease unchanged. The cardiac and mediastinal contours and pulmonary vascularity are normal.  The bones and soft tissues are grossly within normal limits. IMPRESSION: Bibasilar atelectasis unchanged. Xr Chest Sngl V    Result Date: 6/8/2020  CHEST X-RAY, one view.  HISTORY:  Hypoxemia and increasing oxygen demand. TECHNIQUE:  AP upright portable view COMPARISON: Earlier today FINDINGS:   -The lungs: are clear. -The costophrenic angles: are sharp. -The heart size: is normal. -The pulmonary vasculature: is unremarkable. -Included portion of the upper abdomen: is unremarkable. -Bones: No gross bony lesions. -Other: None. IMPRESSION:  Negative for acute change     Xr Chest Sngl V    Result Date: 6/8/2020   Portable view of the chest COMPARISON: June 7, 2020 Clinical history: Hypoxia. FINDINGS: Persistent elevation of the right hemidiaphragm. Lungs are underinflated. No pneumothorax, pulmonary edema or large pleural effusion. No definite pulmonary consolidation. Heart appears mildly enlarged. Mediastinal contour is within normal limits. Surrounding bones are unremarkable. IMPRESSION: 1. No pulmonary edema or definite pulmonary consolidation. 2. No significant change compared to exam.    Xr Chest Sngl V    Result Date: 6/7/2020  Chest X-ray INDICATION: Shortness of breath A portable AP view of the chest was obtained. FINDINGS: There is chronic elevation of the right diaphragm. There are no infiltrates or effusions. The heart size is normal.  The bony thorax is intact. IMPRESSION: No acute findings in the chest     Xr Chest Pa Lat    Result Date: 5/25/2020  EXAM: XR CHEST PA LAT INDICATION: Cough COMPARISON: None. FINDINGS: PA and lateral radiographs of the chest demonstrate clear lungs. The cardiac and mediastinal contours and pulmonary vascularity are normal. The bones and soft tissues are within normal limits. IMPRESSION: Normal chest.    Xr Abd (kub)    Result Date: 6/10/2020  History: NG tube placement Single view abdomen FINDINGS: There is an NG tube which terminates to the right of midline, likely in the distal stomach. Clips are present from prior cholecystectomy. There is elevation of the right hemidiaphragm. The lung bases are clear. The bowel gas pattern is nonspecific. IMPRESSION: NG tube as described. Xr Abd (ap And Erect Or Decub)    Result Date: 5/25/2020  Abdominal films, 3 views. History: Nausea and vomiting Technique:  Supine and upright views of the abdomen on 3 images. Comparison: None Findings: Cholecystectomy. Nonspecific bowel gas pattern. No evidence of free air, organomegaly. Calcification located between the right transverse processes of L4 and L5. IMPRESSION: Negative for free air, ileus or obstruction. Possible right ureteral calculus. Correlate clinically. Xr Swallow Func Video    Result Date: 5/28/2020  Modified Barium Swallow INDICATION: Oropharyngeal dysphagia. CVA Fluoro time: 3.15 minutes Spot films:  0 Fluoroscopy was used to evaluate pharyngeal function while the patient was given barium solutions and barium coated solids. FINDINGS: Severe global swallow deficits present. There is harriet aspiration of thin barium by teaspoon with poor cough response. Shallow penetration with nectar by teaspoon. Penetration with nectar by cup. Deep penetration with honey by cup. IMPRESSION: 1. Aspiration and deep penetration with multiple administered consistencies. 2.  Please see concurrent speech and language pathology note for complete description of findings. Mri Brain Wo Cont    Result Date: 5/27/2020  EXAMINATION: BRAIN MRI 5/27/2020 11:49 AM ACCESSION NUMBER: [id number] INDICATION: Worsening of old stroke COMPARISON: Head CT 5/27/2020 and 5/25/2020 TECHNIQUE: Multiplanar multisequence MRI of the brain without intravenous contrast. FINDINGS: There are numerous small and punctate areas of restricted diffusion scattered throughout the supratentorial brain parenchyma, worst in the right lentiform and caudate nuclei. There are multiple foci of restricted diffusion throughout both cerebellar hemispheres. There is a moderate-sized focus of restricted diffusion in the right aspect of the benjy.  The preponderance of these areas demonstrate T2 hyperintensity. The constellation of findings is consistent with multiple supratentorial acute or early subacute infarctions involving multiple vascular distributions. None of the infarctions demonstrates significant associated mass effect or hemorrhagic conversion. The ventricles are within normal limits for the degree of global brain parenchymal volume loss. There is no midline shift. The basilar cisterns are patent. There is no cerebellar tonsillar ectopia or herniation. There are T2 hyperintensities in the periventricular and subcortical white matter and benjy, a nonspecific finding which likely represents chronic microangiopathy. Prior bilateral lens replacement. The expected large vascular flow voids are preserved. There are no suspicious osseous lesions. IMPRESSION: 1. Numerous acute or early subacute infarctions scattered throughout the supratentorial and infratentorial brain parenchyma. These are most likely embolic infarctions from a central source. The most confluent areas of involvement include the right caudate and lentiform nuclei, the benjy, and the left cerebellar hemisphere. There is no evidence of significant mass effect or hemorrhagic conversion. 2. Moderate burden of chronic microangiopathy. 3. Discussed with Dr. Vicki Ramirez in person by Dr. Yee Mathur at 11:45 AM 5/27/2020. VOICE DICTATED BY: Dr. Karen Holden    Result Date: 6/8/2020  CT head without contrast History: ALTERED MENTAL STATUS, HX OF RECENT CVA. Technique: 5mm axial images were obtained from the skull base to the vertex without contrast. Radiation dose reduction techniques were used for this study: Our CT scanners use one or all of the following: Automated exposure control, adjustment of the mA and/or kVp according to patient's size, iterative reconstruction. Comparison: 05/27/2020 Findings: The ventricles and sulci are mildly prominent. There are no extra-axial fluid collections.  No evidence of acute intraparenchymal hemorrhage or mass effect is identified. Patchy, confluent areas of decreased attenuation are noted within the supratentorial white matter. These are nonspecific findings but would be most compatible with moderately advanced chronic small vessel ischemic changes. There is no evidence to suggest an acute major territorial infarct. Extensive atherosclerotic changes are present of the vertebral basilar system. The bony calvarium is intact. The visualized mastoid air cells and paranasal sinuses are well pneumatized and aerated. Impression: Chronic findings without evidence of acute intracranial abnormality. Ct Head Wo Cont    Result Date: 5/27/2020  HEAD CT WITHOUT CONTRAST  5/27/2020 HISTORY:   increased AMS  ; leukocytosis; myocardial infarction TECHNIQUE: Noncontrast axial images were obtained through the brain. All CT scans at this facility used dose modulation, interactive reconstruction and/or weight based dosing when appropriate to reduce radiation dose to as low as reasonably achievable. COMPARISON: May 25, 2020 FINDINGS: There is no acute intracranial hemorrhage, significant mass effect or CT evidence of acute large artery territorial infarction. Please note that a hyperacute infarct or small vessel infarct may not be apparent on initial CT imaging. Mild to moderate cerebral volume loss is present. Scattered areas of low-attenuation are present in the supratentorial white matter that are compatible with chronic small vessel ischemic disease. There is no hydrocephalus , intra-axial mass or abnormal extra-axial fluid collection. There are no displaced skull fractures. The mastoid air cells and paranasal sinuses are clear where imaged. IMPRESSION: Cerebral volume loss and white matter findings compatible with chronic small vessel ischemic disease.      Ct Head Wo Cont    Result Date: 5/25/2020  CT of the head without contrast. CLINICAL INDICATION: Weakness, unable to walk PROCEDURE: Serial thin section axial images are obtained from the cranial vertex through the skull base without the administration of intravenous contrast. Radiation dose reduction techniques were used for this study. Our CT scanners use one or all of the following: Automated exposure control, adjusted of the mA and/or kV according to patient size, iterative reconstruction COMPARISON: No prior. FINDINGS: There is no acute intracranial hemorrhage, mass, or mass effect. No abnormal extra-axial fluid collections identified. There is no hydrocephalus. The basilar cisterns are widely patent. There is moderate to severe patchy subcortical, deep, and periventricular white matter hypoattenuation. An old infarct is noted in the right basal ganglia with a tiny focal old lacunar infarct in the left basal ganglia. No skull fracture or aggressive osseous lesion noted. The mastoid air cells and included paranasal sinuses are clear. IMPRESSION: 1. No acute intracranial abnormality. Note, acute/subacute CVA cannot be excluded given the severity of the underlying white matter disease. 2. Moderate to severe bilateral white matter hypoattenuation most in keeping with chronic microangiopathic disease. 3. Old infarct in the right basal ganglia with an old lacunar infarct in the left basal ganglia. Ct Chest Wo Cont    Result Date: 6/11/2020  NONCONTRAST CHEST CT CLINICAL HISTORY:  Fever with high oxygen demand with leukocytosis and clinical concern for aspiration. TECHNIQUE:  Without contrast administration, the chest was scanned with spiral technique. Radiation dose reduction was achieved using one or all of the following techniques: automated exposure control, weight-based dosing, iterative reconstruction. COMPARISON:  Portable chest of June 8, 2020 and CT of September 10, 2013. FINDINGS:  There is infiltrate and volume loss in the right lower lobe associated with endobronchial debris consistent with aspiration.   Minimal left basilar atelectasis is present as well. No pathologically enlarged lymph nodes or abnormal fluid collection. The epigastrium appears unremarkable status post cholecystectomy. IMPRESSION:  RIGHT LOWER LOBE ASPIRATION PNEUMONIA AND MINIMAL LEFT BASILAR ATELECTASIS. Us Retroperitoneum Comp    Result Date: 6/7/2020  INDICATION:  Renal insufficiency TECHNIQUE: Real-time sonography of the kidneys, retroperitoneum and bladder was performed with multiple static images obtained. FINDINGS: The right kidney measures 10.6 cm and the left kidney measures 10.5 cm in length. Both kidneys are slightly echogenic. There is no hydronephrosis. There is a small cyst in the upper pole of the left kidney. There are no significant renal masses. The aorta and IVC are normal in caliber. The urinary bladder is collapsed around a Jefferson catheter. IMPRESSION: Echogenic kidneys suggesting chronic renal disease. No evidence of obstruction. Xr Chest Port    Result Date: 5/25/2020  Portable chest x-ray CLINICAL INDICATION: Weakness FINDINGS: Single AP view the chest compared to a similar exam dated 5/25/2020 show the lungs to be expanded and clear. No pleural effusion or pneumothorax. The cardiac silhouette and mediastinum are unremarkable. The bones are osteopenic. IMPRESSION: No acute cardiopulmonary abnormality. No results found for this visit on 06/05/20. Cultures:   All Micro Results     Procedure Component Value Units Date/Time    CULTURE, BLOOD [[id number]] Collected:  06/08/20 1812    Order Status:  Completed Specimen:  Blood Updated:  06/13/20 0634     Special Requests: --        RIGHT  HAND       Culture result: NO GROWTH 5 DAYS       CULTURE, BLOOD [[id number]] Collected:  06/08/20 1807    Order Status:  Completed Specimen:  Blood Updated:  06/13/20 0634     Special Requests: --        LEFT  HAND       Culture result: NO GROWTH 5 DAYS       CULTURE, BLOOD [[id number]] Collected:  06/05/20 1845    Order Status: Completed Specimen:  Blood Updated:  06/10/20 0631     Special Requests: --        NO SPECIAL REQUESTS  LEFT  Antecubital       Culture result: NO GROWTH 5 DAYS       CULTURE, BLOOD [[id number]] Collected:  06/05/20 1845    Order Status:  Completed Specimen:  Blood Updated:  06/10/20 0631     Special Requests: --        NO SPECIAL REQUESTS  RIGHT  FOREARM       Culture result: NO GROWTH 5 DAYS       MSSA/MRSA SC BY PCR, NASAL SWAB [[id number]] Collected:  06/09/20 1545    Order Status:  Canceled Specimen:  Nasal swab     CULTURE, URINE [[id number]]  (Abnormal)  (Susceptibility) Collected:  06/05/20 1845    Order Status:  Completed Specimen:  Cath Urine Updated:  06/09/20 0710     Special Requests: NO SPECIAL REQUESTS        Culture result:       >100,000 COLONIES/mL PROTEUS MIRABILIS                  >100,000 COLONIES/mL KLEBSIELLA PNEUMONIAE                Assessment/Plan:     Principal Problem:    Acute respiratory failure with hypoxia ([address] (6/11/2020)  - Due to acute aspiration event with chronic dysphagia  - Slow to improve  - Trach collar oxygenation  - Stopped antibiotics on 6/18/20 - 10 day course  - Pulmonary following    Active Problems:    ROSA (acute kidney injury) ([address] (6/5/2020)  - Likely due to dehydration  - Resolved  - Continue free water with TFs  - Nephrology following      UTI (urinary tract infection) (6/5/2020)  - Proteus + Klebsiella  - Completely treated  - Stopped antibiotics on 6/17 - 10 day course      Acute metabolic encephalopathy (2/1/1435)  - More drowsy  - Likely due to infections and hypoxia      Aspiration pneumonia ([address] (6/14/2020)  - Due to chronic dysphagia  - Stopped antibiotics on 6/17 - 10 day course  - Oxygen slowly improving      Dysphagia (6/16/2020)  - Chronic in nature  - S/P Trach  - SLP signed off      Leukocytosis (5/26/2020)  - Improving      Hypernatremia (6/6/2020)  - Improving  - Continue free water with TFs      Normocytic anemia (6/5/2020)  - Stable      Diabetes mellitus type 2, controlled ([facility] (6/15/2020)  - No acute issues  - Continue Lantus  - Continue SSI    Today's Plan: Monitor oxygen sats. Surgery to assess trach site. DIET TUBE FEEDING Open order for details. Keep HOB > 30 degrees. Check residuals every 4 hours. Hold TF for > 500 ml x 1 or 250 ml x 2 consecutive checks. Also place patient on right side if residual is > 250 ml.     DVT Prophylaxis: SCDs    Discharge Plan: TBD      Signed By: Jorge Ellis DO     June 20, 2020

## 2020-06-20 NOTE — PROGRESS NOTES
Date of Outreach Update:  Guero Williamson was seen and assessed. MEWS Score: 1 (06/20/20 1219)  Vitals:    06/20/20 8916 06/20/20 0834 06/20/20 1208 06/20/20 1219   BP: 134/68  132/64 133/60   Pulse: 92  86 83   Resp: 20 20 14   Temp: 99.1 °F (37.3 °C)  99.5 °F (37.5 °C) 99.8 °F (37.7 °C)   SpO2: 95% 95% 93% 97%   Weight:       Height:             Pain Assessment  Pain Intensity 1: 0 (06/20/20 0944)        Patient Stated Pain Goal: Unable to verbalize/indicate pain      Previous Outreach assessment has been reviewed. There have been no significant clinical changes since the completion of the last dated Outreach assessment. Will continue to follow up per outreach protocol.     Signed By:   Asha Mane RN    June 20, 2020 3:57 PM

## 2020-06-20 NOTE — PROGRESS NOTES
H/H are stable  CXR with atx of right base  Bronch note reviewed- no active bleeding found  Awaiting coags    Trach site is without active bleeding and secure. Continue routine trach care. Place 2x2 gauze under trach flange.      JHONNY Landers

## 2020-06-20 NOTE — PROGRESS NOTES
At around 12:00 pm, I arrived in the patient's room to find the patient with a large blood clot on the gauze surrounding her trach, while cleaning around her site the patient coughed up another large blood clot. The nurse and physician was notified.

## 2020-06-21 LAB
ANION GAP SERPL CALC-SCNC: 9 MMOL/L (ref 7–16)
BASOPHILS # BLD: 0 K/UL (ref 0–0.2)
BASOPHILS NFR BLD: 0 % (ref 0–2)
BUN SERPL-MCNC: 45 MG/DL (ref 8–23)
CALCIUM SERPL-MCNC: 9.8 MG/DL (ref 8.3–10.4)
CHLORIDE SERPL-SCNC: 107 MMOL/L (ref 98–107)
CO2 SERPL-SCNC: 24 MMOL/L (ref 21–32)
CREAT SERPL-MCNC: 1.26 MG/DL (ref 0.6–1)
DIFFERENTIAL METHOD BLD: ABNORMAL
EOSINOPHIL # BLD: 0.5 K/UL (ref 0–0.8)
EOSINOPHIL NFR BLD: 4 % (ref 0.5–7.8)
ERYTHROCYTE [DISTWIDTH] IN BLOOD BY AUTOMATED COUNT: 17.7 % (ref 11.9–14.6)
GLUCOSE BLD STRIP.AUTO-MCNC: 166 MG/DL (ref 65–100)
GLUCOSE BLD STRIP.AUTO-MCNC: 204 MG/DL (ref 65–100)
GLUCOSE BLD STRIP.AUTO-MCNC: 207 MG/DL (ref 65–100)
GLUCOSE SERPL-MCNC: 208 MG/DL (ref 65–100)
HCT VFR BLD AUTO: 26.1 % (ref 35.8–46.3)
HGB BLD-MCNC: 8.2 G/DL (ref 11.7–15.4)
IMM GRANULOCYTES # BLD AUTO: 0.1 K/UL (ref 0–0.5)
IMM GRANULOCYTES NFR BLD AUTO: 1 % (ref 0–5)
INR PPP: 1.1
LYMPHOCYTES # BLD: 2.5 K/UL (ref 0.5–4.6)
LYMPHOCYTES NFR BLD: 22 % (ref 13–44)
MCH RBC QN AUTO: 31.7 PG (ref 26.1–32.9)
MCHC RBC AUTO-ENTMCNC: 31.4 G/DL (ref 31.4–35)
MCV RBC AUTO: 100.8 FL (ref 79.6–97.8)
MONOCYTES # BLD: 0.7 K/UL (ref 0.1–1.3)
MONOCYTES NFR BLD: 6 % (ref 4–12)
NEUTS SEG # BLD: 7.4 K/UL (ref 1.7–8.2)
NEUTS SEG NFR BLD: 67 % (ref 43–78)
NRBC # BLD: 0 K/UL (ref 0–0.2)
PLATELET # BLD AUTO: 276 K/UL (ref 150–450)
PMV BLD AUTO: 11.3 FL (ref 9.4–12.3)
POTASSIUM SERPL-SCNC: 4.7 MMOL/L (ref 3.5–5.1)
PROTHROMBIN TIME: 14.1 SEC (ref 12–14.7)
RBC # BLD AUTO: 2.59 M/UL (ref 4.05–5.2)
SODIUM SERPL-SCNC: 140 MMOL/L (ref 136–145)
WBC # BLD AUTO: 11.1 K/UL (ref 4.3–11.1)

## 2020-06-21 PROCEDURE — 74011250637 HC RX REV CODE- 250/637: Performed by: FAMILY MEDICINE

## 2020-06-21 PROCEDURE — 65270000029 HC RM PRIVATE

## 2020-06-21 PROCEDURE — 74011000250 HC RX REV CODE- 250: Performed by: SURGERY

## 2020-06-21 PROCEDURE — 80048 BASIC METABOLIC PNL TOTAL CA: CPT

## 2020-06-21 PROCEDURE — 85025 COMPLETE CBC W/AUTO DIFF WBC: CPT

## 2020-06-21 PROCEDURE — 74011636637 HC RX REV CODE- 636/637: Performed by: INTERNAL MEDICINE

## 2020-06-21 PROCEDURE — 85610 PROTHROMBIN TIME: CPT

## 2020-06-21 PROCEDURE — 74011250636 HC RX REV CODE- 250/636: Performed by: SURGERY

## 2020-06-21 PROCEDURE — 74011636637 HC RX REV CODE- 636/637: Performed by: SURGERY

## 2020-06-21 PROCEDURE — C9113 INJ PANTOPRAZOLE SODIUM, VIA: HCPCS | Performed by: SURGERY

## 2020-06-21 PROCEDURE — 36415 COLL VENOUS BLD VENIPUNCTURE: CPT

## 2020-06-21 PROCEDURE — 74011250637 HC RX REV CODE- 250/637: Performed by: SURGERY

## 2020-06-21 PROCEDURE — 99232 SBSQ HOSP IP/OBS MODERATE 35: CPT | Performed by: INTERNAL MEDICINE

## 2020-06-21 PROCEDURE — 82962 GLUCOSE BLOOD TEST: CPT

## 2020-06-21 RX ADMIN — INSULIN GLARGINE 40 UNITS: 100 INJECTION, SOLUTION SUBCUTANEOUS at 22:11

## 2020-06-21 RX ADMIN — HUMAN INSULIN 6 UNITS: 100 INJECTION, SOLUTION SUBCUTANEOUS at 12:15

## 2020-06-21 RX ADMIN — CARVEDILOL 12.5 MG: 12.5 TABLET, FILM COATED ORAL at 08:42

## 2020-06-21 RX ADMIN — SODIUM CHLORIDE 40 MG: 9 INJECTION INTRAMUSCULAR; INTRAVENOUS; SUBCUTANEOUS at 08:43

## 2020-06-21 RX ADMIN — HUMAN INSULIN 6 UNITS: 100 INJECTION, SOLUTION SUBCUTANEOUS at 18:00

## 2020-06-21 RX ADMIN — CARVEDILOL 12.5 MG: 12.5 TABLET, FILM COATED ORAL at 17:41

## 2020-06-21 RX ADMIN — EZETIMIBE 10 MG: 10 TABLET ORAL at 08:42

## 2020-06-21 RX ADMIN — HUMAN INSULIN 6 UNITS: 100 INJECTION, SOLUTION SUBCUTANEOUS at 06:11

## 2020-06-21 RX ADMIN — ATORVASTATIN CALCIUM 40 MG: 40 TABLET, FILM COATED ORAL at 22:07

## 2020-06-21 RX ADMIN — LEVOTHYROXINE SODIUM ANHYDROUS 110 MCG: 100 INJECTION, POWDER, LYOPHILIZED, FOR SOLUTION INTRAVENOUS at 09:27

## 2020-06-21 RX ADMIN — HUMAN INSULIN 4 UNITS: 100 INJECTION, SOLUTION SUBCUTANEOUS at 18:00

## 2020-06-21 RX ADMIN — AMLODIPINE BESYLATE 10 MG: 10 TABLET ORAL at 08:42

## 2020-06-21 RX ADMIN — Medication 10 ML: at 12:18

## 2020-06-21 RX ADMIN — Medication 10 ML: at 14:01

## 2020-06-21 RX ADMIN — Medication 5 ML: at 22:08

## 2020-06-21 RX ADMIN — SERTRALINE HYDROCHLORIDE 200 MG: 100 TABLET ORAL at 17:42

## 2020-06-21 RX ADMIN — LOSARTAN POTASSIUM 100 MG: 50 TABLET, FILM COATED ORAL at 08:42

## 2020-06-21 RX ADMIN — Medication 10 ML: at 05:13

## 2020-06-21 RX ADMIN — ASPIRIN 81 MG 81 MG: 81 TABLET ORAL at 08:42

## 2020-06-21 RX ADMIN — SERTRALINE HYDROCHLORIDE 200 MG: 100 TABLET ORAL at 08:42

## 2020-06-21 NOTE — PROGRESS NOTES
Nutrition follow up  Received call from Dr Joleen Davis concerning increased BUN and need for increased fee water via PEG vs IVF. RD assesses that water via PEG can be increased to avoid need for resuming IVF. Current TF-Glucerna 1.5 at 40 ml/hr with 30 ml water flush every 1 hr provides ~1500 ml of free water per day. BUN 45-up from 29 ( has been in the  range for 4 days)  Dr Lazarus Morgans has already adjusted water flush to 50 ml every 1 hr which will increase total free water to ~1975 ml/day. May only need increased free water short term. F/U per nutrition standard of care.     [employer], [address] [place] [address], [address], [address] [place] [address]

## 2020-06-21 NOTE — PROGRESS NOTES
[facility] CRITICAL CARE OUTREACH NURSE PROGRESS REPORT      SUBJECTIVE: Called to assess patient secondary to transfer from critical care. MEWS Score: 1 (06/20/20 1955)  Vitals:    06/20/20 1219 06/20/20 1640 06/20/20 1659 06/20/20 1955   BP: 133/60 137/63  129/67   Pulse: 83 80  72   Resp: 14 18  20   Temp: 99.8 °F (37.7 °C) 99.2 °F (37.3 °C)  97.5 °F (36.4 °C)   SpO2: 97% 96% 94% 98%   Weight:       Height:            LAB DATA:    Recent Labs     06/20/20  1250 06/20/20  0817 06/19/20  0334    141 144   K 4.6 4.2 4.4    109* 111*   CO2 22 26 26   AGAP 11 6* 7   * 176* 100   BUN 29* 30* 25*   CREA 1.26* 1.22* 1.10*   GFRAA 53* 55* >60   GFRNA 44* 45* 51*   CA 9.5 9.6 9.2   ALB 2.5*  --   --    TP 7.5  --   --    GLOB 5.0*  --   --    AGRAT 0.5*  --   --    ALT 26  --   --         Recent Labs     06/20/20  1250 06/20/20  0817 06/19/20  0334   WBC 13.1* 11.6* 9.9   HGB 8.6* 8.3* 9.1*   HCT 26.4* 26.3* 28.2*    261 219          OBJECTIVE: On arrival to room, I found patient to be in bed, resting quietly. ASSESSMENT:  Pt opens eyes to voice, minimally interactive but will nod appropriately. PERRL. SpO2 95% on trach collar. Respirations even and unlabored, no apparent distress at this time. VS, labs, and progress notes reviewed. PLAN: Will continue to follow per outreach protocol.

## 2020-06-21 NOTE — PROGRESS NOTES
[facility]  Admission Date: 6/5/2020             Daily Progress Note: 6/21/2020    The patient's chart is reviewed and the patient is discussed with the staff. 66 y.o. CF evaluated at the request of Dr. Sheryle Presume chronic medical:  CAD, HTN, recent CVA with residual right sided weakness, DM, hx chronic CVA with left side weankness. Arrived from [facility] with worsening confusion and elevated creatinine.  Was found to have UTI and started on empiric antibiotics.  On 6/7 AM was found to have acute respiratory failure with hypoxia likely due to aspiration.  Was suctioned, placed on NRB with sat  improving to 96%. Has been lethargic and minimally responsive since the event with fevers and antibiotics escalated. CT head was neg for any acute intracranial abnormalities and CXR and CT showed RLL infiltrate confirming suspiration for aspiration pneumonia. Due to worsening renal function, nephrology was consulted and workup revealed no obstruction. Renal function has improved on IVFs.  Noted to be hyponatremia and hypotonic saline infusion.    Pulmonary was consulted due to high O2 requirement despite being treated by antibiotics for 5 days. Patient has been started o vancomycin and zosyn. She is On O2 at 5L/min via NC. Subjective:     Events are noted from yesterday. Remain  on 28% trach collar with sat 96%. No significant bleeding last night.     Current Facility-Administered Medications   Medication Dose Route Frequency    lidocaine (XYLOCAINE) 4 % (40 mg/mL) topical solution   TransTRACHeal PRN    lidocaine (XYLOCAINE) 10 mg/mL (1 %) injection 0.1 mL  0.1 mL SubCUTAneous PRN    sodium chloride (NS) flush 5-40 mL  5-40 mL IntraVENous Q8H    sodium chloride (NS) flush 5-40 mL  5-40 mL IntraVENous PRN    aspirin chewable tablet 81 mg  81 mg Per NG tube DAILY    insulin glargine (LANTUS) injection 40 Units  40 Units SubCUTAneous QHS    [Held by provider] ticagrelor (BRILINTA) tablet 90 mg  90 mg Per NG tube Q12H    lip protectant (BLISTEX) ointment 1 Each  1 Each Topical PRN    pantoprazole (PROTONIX) 40 mg in 0.9% sodium chloride 10 mL injection  40 mg IntraVENous DAILY    acetaminophen (TYLENOL) suppository 650 mg  650 mg Rectal Q4H PRN    insulin regular (NOVOLIN R, HUMULIN R) injection   SubCUTAneous Q6H    levalbuterol (XOPENEX) nebulizer soln 1.25 mg/3 mL  1.25 mg Nebulization Q4H PRN    hydrALAZINE (APRESOLINE) 20 mg/mL injection 10 mg  10 mg IntraVENous Q6H PRN    levothyroxine (SYNTHROID) injection 110 mcg  110 mcg IntraVENous DAILY    insulin regular (NOVOLIN R, HUMULIN R) injection 4 Units  4 Units SubCUTAneous QPM    amLODIPine (NORVASC) tablet 10 mg  10 mg Oral DAILY    atorvastatin (LIPITOR) tablet 40 mg  40 mg Oral QHS    carvediloL (COREG) tablet 12.5 mg  12.5 mg Oral BID WITH MEALS    losartan (COZAAR) tablet 100 mg  100 mg Oral DAILY    ezetimibe (ZETIA) tablet 10 mg  10 mg Oral DAILY    hyoscyamine SL (LEVSIN/SL) tablet 0.125 mg  0.125 mg SubLINGual Q6H PRN    ondansetron (ZOFRAN ODT) tablet 4 mg  4 mg Oral Q8H PRN    sertraline (ZOLOFT) tablet 200 mg  200 mg Oral BID    sodium chloride (NS) flush 5-40 mL  5-40 mL IntraVENous Q8H    sodium chloride (NS) flush 5-40 mL  5-40 mL IntraVENous PRN    acetaminophen (TYLENOL) tablet 650 mg  650 mg Oral Q4H PRN    ondansetron (ZOFRAN) injection 4 mg  4 mg IntraVENous Q4H PRN    magnesium hydroxide (MILK OF MAGNESIA) 400 mg/5 mL oral suspension 30 mL  30 mL Oral DAILY PRN    [Held by provider] heparin (porcine) injection 5,000 Units  5,000 Units SubCUTAneous Q8H       Review of Systems  Unobtainable due to patient status.     Objective:     Vitals:    06/20/20 2348 06/21/20 0340 06/21/20 0756 06/21/20 0815   BP: 149/62 163/84 143/73    Pulse: 73 78 86    Resp: 20 20 20    Temp: 97.9 °F (36.6 °C) 98.9 °F (37.2 °C) 98.7 °F (37.1 °C)    SpO2: 96% 94% 93% 93%   Weight:       Height:         Intake and Output:   06/19 1901 - 06/21 0700  In: 1095   Out: 1700 [Urine:1700]  06/21 0701 - 06/21 1900  In: 30   Out: -     Physical Exam:   Constitution:  the patient is well developed and in no acute distress  EENMT:  Sclera clear, pupils equal, oral mucosa moist  Respiratory: Trach in place. No obvious bleeding externally. Large clots have been suctioned from inner cannula over last 20 minutes. .   Cardiovascular:  RRR without M,G,R  Gastrointestinal: soft and non-tender; with positive bowel sounds. Musculoskeletal: warm without cyanosis. There is trace B lower leg edema.   Skin:  no jaundice or rashes, no wounds   Neurologic: opens eyes but no other interaction     Psychiatric:  Non verbal.     CHEST XRAY:         LAB  No lab exists for component: [place]   Recent Labs     06/21/20  0703 06/20/20  1250 06/20/20  0817 06/19/20  0334   WBC 11.1 13.1* 11.6* 9.9   HGB 8.2* 8.6* 8.3* 9.1*   HCT 26.1* 26.4* 26.3* 28.2*    283 261 219     Recent Labs     06/21/20  0703 06/20/20  1250 06/20/20  0817    139 141   K 4.7 4.6 4.2    106 109*   CO2 24 22 26   * 242* 176*   BUN 45* 29* 30*   CREA 1.26* 1.26* 1.22*   CA 9.8 9.5 9.6   ALB  --  2.5*  --      ABG:  No results found for: PH, PHI, PCO2, PCO2I, PO2, PO2I, HCO3, HCO3I, FIO2, FIO2I      Assessment:  (Medical Decision Making)     Hospital Problems  Date Reviewed: 6/19/2020          Codes Class Noted POA    Hemoptysis ICD-10-CM: R04.2  ICD-9-CM: 786.30  6/20/2020 Unknown        Status post tracheostomy ([facility] [address] ICD-10-CM: Z93.0  ICD-9-CM: V44.0  6/19/2020 No    Overview Signed 6/19/2020  7:06 AM by Aline Cody NP     6/18/20:  Placed by Dr. Lesly Rojo             S/P percutaneous endoscopic gastrostomy (PEG) tube placement [facility]) ICD-10-CM: Z93.1  ICD-9-CM: V44.1  6/19/2020 No    Overview Signed 6/19/2020  7:07 AM by Aline Cody NP     6/18/20  Dr. Lesly Rojo             Dysphagia ICD-10-CM: R13.10  ICD-9-CM: 787.20  6/16/2020 Yes        Diabetes mellitus type 2, controlled ([facility] ICD-10-CM: E11.9  ICD-9-CM: 250.00  6/15/2020 Yes        Aspiration pneumonia ([facility] ICD-10-CM: J69.0  ICD-9-CM: 507.0  6/14/2020 Yes        * (Principal) Acute respiratory failure with hypoxia (HCC) ICD-10-CM: J96.01  ICD-9-CM: 518.81  6/11/2020 Yes        Hypernatremia ICD-10-CM: E87.0  ICD-9-CM: 276.0  6/6/2020 Yes        ROSA (acute kidney injury) ([facility] ICD-10-CM: N17.9  ICD-9-CM: 584.9  6/5/2020 Yes        UTI (urinary tract infection) ICD-10-CM: N39.0  ICD-9-CM: 599.0  6/5/2020 Yes        Acute metabolic encephalopathy QRL-74-DW: G93.41  ICD-9-CM: 348.31  6/5/2020 Yes        Normocytic anemia ICD-10-CM: D64.9  ICD-9-CM: 285.9  6/5/2020 Yes        Leukocytosis ICD-10-CM: F08.506  ICD-9-CM: 288.60  5/26/2020 Yes              New hemoptysis after trach placement. In the setting of asa, heparin, and brilinta usage. Plan:  (Medical Decision Making)        -cont to hold anticoagulation. Consider to restart in am if no hemoptysis. Would use Lovenox instead  -cont. TF and free water  -follow BMP  -cont.  Other Rx    Mckayla Maria MD

## 2020-06-21 NOTE — PROGRESS NOTES
Date of Outreach Update:  Guero Tovar was seen and assessed. MEWS Score: 1 (06/21/20 0340)  Vitals:    06/20/20 2348 06/21/20 0340 06/21/20 0756 06/21/20 0815   BP: 149/62 163/84 143/73    Pulse: 73 78 86    Resp: 20 20 20    Temp: 97.9 °F (36.6 °C) 98.9 °F (37.2 °C) 98.7 °F (37.1 °C)    SpO2: 96% 94% 93% 93%   Weight:       Height:             Pain Assessment  Pain Intensity 1: 0 (06/21/20 0717)        Patient Stated Pain Goal: Unable to verbalize/indicate pain      Previous Outreach assessment has been reviewed. There have been no significant clinical changes since the completion of the last dated Outreach assessment. Pt resting comfortably in bed, no distress, lungs clear/diminished bilaterally. Trach intact, O2sat 93%, HR 80s. Will continue to follow up per outreach protocol.     Signed By:   Kristin Hung RN    June 21, 2020 11:18 AM

## 2020-06-21 NOTE — PROGRESS NOTES
Hospitalist Progress Note    Patient: Romy Arceo MRN: [id number]  SSN: [id number]    YOB: 1945  Age: 66 y.o. Sex: female      Admit Date: 6/5/2020    LOS: 16 days     Subjective:     66year old CF with medical history significant for CAD, hypertensin, recent CVA with residual right sided weakness, diabetes from [facility] with worsening confusion and elevated creatinine. Patient was found to have urinary tract infection and started on empiric antibiotics. Rapid response called on 6/7/20 AM as patient was found to have acute respiratory failure with hypoxia likely due to aspiration. Patient was suctioned and put on non-rebreather with saturation improving to the 96%. Patient has been lethargic with minimally responsive since the event. Patient had multiple days of temp spikes and antibiotics have been broadened. CT head was neg for any acute intracranial abnormalities. CXR and CT showed infiltrate in the RLL confirming suspicion for aspiration pna. Due to decreased renal function, nephrology was consulted. Workup revealed no obstructive cause. Her renal function and hypernatremia has been improving on IV fluids and free water flushes. Pulmonary was consulted due to persistent high oxygen requirement despite being treated by antibiotics. Family wishes trach & PEG, which she got on 6/18. She spent one night in the ICU and was transferred out.    6/21 - She opens her eyes when spoken to this AM. Neck edematous. Less dried blood around trach site. Review of systems negative except stated above.     Objective:     Visit Vitals  /73 (BP 1 Location: Right arm, BP Patient Position: At rest)   Pulse 86   Temp 98.7 °F (37.1 °C)   Resp 20   Ht 5' 6\" (1.676 m)   Wt 95.3 kg (210 lb)   SpO2 93%   BMI 33.89 kg/m²      Oxygen Therapy  O2 Sat (%): 93 % (06/21/20 0815)  Pulse via Oximetry: 83 beats per minute (06/21/20 0815)  O2 Device: Tracheal collar;Tracheostomy (06/21/20 0815)  O2 Flow Rate (L/min): 2 l/min (06/21/20 0815)  FIO2 (%): 28 % (06/21/20 0815)      Intake and Output:     Intake/Output Summary (Last 24 hours) at 6/21/2020 0819  Last data filed at 6/21/2020 1796  Gross per 24 hour   Intake 1125 ml   Output 975 ml   Net 150 ml         Physical Exam:   GENERAL: awakens, cooperative, no distress, appears stated age  EYE: conjunctivae/corneas clear. PERRL. THROAT & NECK: normal and no erythema or exudates noted. LUNG: Clear to auscultation  HEART: regular rate and rhythm, S1S2, no murmur, no JVD  ABDOMEN: soft, non-tender, non-distended. Bowel sounds normal.   EXTREMITIES:  No edema, 2+ pedal/radial pulses bilaterally  SKIN: no rash or abnormalities  NEUROLOGIC: Awakens. Cranial nerves 2-12 grossly intact. Lab/Data Review:  Recent Results (from the past 24 hour(s))   GLUCOSE, POC    Collection Time: 06/20/20  9:26 AM   Result Value Ref Range    Glucose (POC) 219 (H) 65 - 100 mg/dL   CBC W/O DIFF    Collection Time: 06/20/20 12:50 PM   Result Value Ref Range    WBC 13.1 (H) 4.3 - 11.1 K/uL    RBC 2.68 (L) 4.05 - 5.2 M/uL    HGB 8.6 (L) 11.7 - 15.4 g/dL    HCT 26.4 (L) 35.8 - 46.3 %    MCV 98.5 (H) 79.6 - 97.8 FL    MCH 32.1 26.1 - 32.9 PG    MCHC 32.6 31.4 - 35.0 g/dL    RDW 18.0 (H) 11.9 - 14.6 %    PLATELET 430 442 - 639 K/uL    MPV 10.8 9.4 - 12.3 FL    ABSOLUTE NRBC 0.00 0.0 - 0.2 K/uL   METABOLIC PANEL, COMPREHENSIVE    Collection Time: 06/20/20 12:50 PM   Result Value Ref Range    Sodium 139 136 - 145 mmol/L    Potassium 4.6 3.5 - 5.1 mmol/L    Chloride 106 98 - 107 mmol/L    CO2 22 21 - 32 mmol/L    Anion gap 11 7 - 16 mmol/L    Glucose 242 (H) 65 - 100 mg/dL    BUN 29 (H) 8 - 23 MG/DL    Creatinine 1.26 (H) 0.6 - 1.0 MG/DL    GFR est AA 53 (L) >60 ml/min/1.73m2    GFR est non-AA 44 (L) >60 ml/min/1.73m2    Calcium 9.5 8.3 - 10.4 MG/DL    Bilirubin, total 0.7 0.2 - 1.1 MG/DL    ALT (SGPT) 26 12 - 65 U/L    AST (SGOT) 17 15 - 37 U/L    Alk.  phosphatase 77 50 - 136 U/L    Protein, total 7.5 6.3 - 8.2 g/dL    Albumin 2.5 (L) 3.2 - 4.6 g/dL    Globulin 5.0 (H) 2.3 - 3.5 g/dL    A-G Ratio 0.5 (L) 1.2 - 3.5     GLUCOSE, POC    Collection Time: 06/20/20  1:19 PM   Result Value Ref Range    Glucose (POC) 287 (H) 65 - 100 mg/dL   PTT    Collection Time: 06/20/20  3:05 PM   Result Value Ref Range    aPTT 32.3 24.3 - 35.4 SEC   GLUCOSE, POC    Collection Time: 06/20/20  5:19 PM   Result Value Ref Range    Glucose (POC) 292 (H) 65 - 100 mg/dL   GLUCOSE, POC    Collection Time: 06/20/20 11:18 PM   Result Value Ref Range    Glucose (POC) 177 (H) 65 - 100 mg/dL   GLUCOSE, POC    Collection Time: 06/21/20  6:05 AM   Result Value Ref Range    Glucose (POC) 207 (H) 65 - 473 mg/dL   METABOLIC PANEL, BASIC    Collection Time: 06/21/20  7:03 AM   Result Value Ref Range    Sodium 140 136 - 145 mmol/L    Potassium 4.7 3.5 - 5.1 mmol/L    Chloride 107 98 - 107 mmol/L    CO2 24 21 - 32 mmol/L    Anion gap 9 7 - 16 mmol/L    Glucose 208 (H) 65 - 100 mg/dL    BUN 45 (H) 8 - 23 MG/DL    Creatinine 1.26 (H) 0.6 - 1.0 MG/DL    GFR est AA 53 (L) >60 ml/min/1.73m2    GFR est non-AA 44 (L) >60 ml/min/1.73m2    Calcium 9.8 8.3 - 10.4 MG/DL   GLUCOSE, POC    Collection Time: 06/21/20  7:54 AM   Result Value Ref Range    Glucose (POC) 207 (H) 65 - 100 mg/dL       Imaging:  Nm Lung Scan Perf    Result Date: 5/25/2020  EXAM:  NM LUNG SCAN PERF INDICATION:  Elevated troponin weakness mild hypoxia COMPARISON:  None. TRACER: 4.6 mCi of Tc-99m MAA. FINDINGS: Perfusion lung imaging was performed following intravenous administration of  Tc 99m MAA. Images were obtained from the anterior, posterior and right and left anterior and posterior oblique projections. The perfusion is normal. No segmental or subsegmental defects are identified. Chest x-ray reveals an elevated right hemidiaphragm. IMPRESSION: Normal lung perfusion study.      Xr Chest Sngl V    Result Date: 6/14/2020  EXAM: TEMPORARY INDICATION: Respiratory failure COMPARISON: 6/8/2020 FINDINGS: A portable AP radiograph of the chest was obtained at 0522 hours. The patient is on a cardiac monitor. Bibasilar airspace disease unchanged. The cardiac and mediastinal contours and pulmonary vascularity are normal.  The bones and soft tissues are grossly within normal limits. IMPRESSION: Bibasilar atelectasis unchanged. Xr Chest Sngl V    Result Date: 6/8/2020  CHEST X-RAY, one view. HISTORY:  Hypoxemia and increasing oxygen demand. TECHNIQUE:  AP upright portable view COMPARISON: Earlier today FINDINGS:   -The lungs: are clear. -The costophrenic angles: are sharp. -The heart size: is normal. -The pulmonary vasculature: is unremarkable. -Included portion of the upper abdomen: is unremarkable. -Bones: No gross bony lesions. -Other: None. IMPRESSION:  Negative for acute change     Xr Chest Sngl V    Result Date: 6/8/2020   Portable view of the chest COMPARISON: June 7, 2020 Clinical history: Hypoxia. FINDINGS: Persistent elevation of the right hemidiaphragm. Lungs are underinflated. No pneumothorax, pulmonary edema or large pleural effusion. No definite pulmonary consolidation. Heart appears mildly enlarged. Mediastinal contour is within normal limits. Surrounding bones are unremarkable. IMPRESSION: 1. No pulmonary edema or definite pulmonary consolidation. 2. No significant change compared to exam.    Xr Chest Sngl V    Result Date: 6/7/2020  Chest X-ray INDICATION: Shortness of breath A portable AP view of the chest was obtained. FINDINGS: There is chronic elevation of the right diaphragm. There are no infiltrates or effusions. The heart size is normal.  The bony thorax is intact. IMPRESSION: No acute findings in the chest     Xr Chest Pa Lat    Result Date: 5/25/2020  EXAM: XR CHEST PA LAT INDICATION: Cough COMPARISON: None. FINDINGS: PA and lateral radiographs of the chest demonstrate clear lungs.  The cardiac and mediastinal contours and pulmonary vascularity are normal. The bones and soft tissues are within normal limits. IMPRESSION: Normal chest.    Xr Abd (kub)    Result Date: 6/10/2020  History: NG tube placement Single view abdomen FINDINGS: There is an NG tube which terminates to the right of midline, likely in the distal stomach. Clips are present from prior cholecystectomy. There is elevation of the right hemidiaphragm. The lung bases are clear. The bowel gas pattern is nonspecific. IMPRESSION: NG tube as described. Xr Abd (ap And Erect Or Decub)    Result Date: 5/25/2020  Abdominal films, 3 views. History: Nausea and vomiting Technique:  Supine and upright views of the abdomen on 3 images. Comparison: None Findings: Cholecystectomy. Nonspecific bowel gas pattern. No evidence of free air, organomegaly. Calcification located between the right transverse processes of L4 and L5. IMPRESSION: Negative for free air, ileus or obstruction. Possible right ureteral calculus. Correlate clinically. Xr Swallow Func Video    Result Date: 5/28/2020  Modified Barium Swallow INDICATION: Oropharyngeal dysphagia. CVA Fluoro time: 3.15 minutes Spot films:  0 Fluoroscopy was used to evaluate pharyngeal function while the patient was given barium solutions and barium coated solids. FINDINGS: Severe global swallow deficits present. There is harriet aspiration of thin barium by teaspoon with poor cough response. Shallow penetration with nectar by teaspoon. Penetration with nectar by cup. Deep penetration with honey by cup. IMPRESSION: 1. Aspiration and deep penetration with multiple administered consistencies. 2.  Please see concurrent speech and language pathology note for complete description of findings.      Mri Brain Wo Cont    Result Date: 5/27/2020  EXAMINATION: BRAIN MRI 5/27/2020 11:49 AM ACCESSION NUMBER: [id number] INDICATION: Worsening of old stroke COMPARISON: Head CT 5/27/2020 and 5/25/2020 TECHNIQUE: Multiplanar multisequence MRI of the brain without intravenous contrast. FINDINGS: There are numerous small and punctate areas of restricted diffusion scattered throughout the supratentorial brain parenchyma, worst in the right lentiform and caudate nuclei. There are multiple foci of restricted diffusion throughout both cerebellar hemispheres. There is a moderate-sized focus of restricted diffusion in the right aspect of the benjy. The preponderance of these areas demonstrate T2 hyperintensity. The constellation of findings is consistent with multiple supratentorial acute or early subacute infarctions involving multiple vascular distributions. None of the infarctions demonstrates significant associated mass effect or hemorrhagic conversion. The ventricles are within normal limits for the degree of global brain parenchymal volume loss. There is no midline shift. The basilar cisterns are patent. There is no cerebellar tonsillar ectopia or herniation. There are T2 hyperintensities in the periventricular and subcortical white matter and benjy, a nonspecific finding which likely represents chronic microangiopathy. Prior bilateral lens replacement. The expected large vascular flow voids are preserved. There are no suspicious osseous lesions. IMPRESSION: 1. Numerous acute or early subacute infarctions scattered throughout the supratentorial and infratentorial brain parenchyma. These are most likely embolic infarctions from a central source. The most confluent areas of involvement include the right caudate and lentiform nuclei, the benjy, and the left cerebellar hemisphere. There is no evidence of significant mass effect or hemorrhagic conversion. 2. Moderate burden of chronic microangiopathy. 3. Discussed with Dr. Ganesh Dunbar in person by Dr. Isidro Wilson at 11:45 AM 5/27/2020. VOICE DICTATED BY: Dr. Rolando Agarwal    Result Date: 6/8/2020  CT head without contrast History: ALTERED MENTAL STATUS, HX OF RECENT CVA.  Technique: 5mm axial images were obtained from the skull base to the vertex without contrast. Radiation dose reduction techniques were used for this study: Our CT scanners use one or all of the following: Automated exposure control, adjustment of the mA and/or kVp according to patient's size, iterative reconstruction. Comparison: 05/27/2020 Findings: The ventricles and sulci are mildly prominent. There are no extra-axial fluid collections. No evidence of acute intraparenchymal hemorrhage or mass effect is identified. Patchy, confluent areas of decreased attenuation are noted within the supratentorial white matter. These are nonspecific findings but would be most compatible with moderately advanced chronic small vessel ischemic changes. There is no evidence to suggest an acute major territorial infarct. Extensive atherosclerotic changes are present of the vertebral basilar system. The bony calvarium is intact. The visualized mastoid air cells and paranasal sinuses are well pneumatized and aerated. Impression: Chronic findings without evidence of acute intracranial abnormality. Ct Head Wo Cont    Result Date: 5/27/2020  HEAD CT WITHOUT CONTRAST  5/27/2020 HISTORY:   increased AMS  ; leukocytosis; myocardial infarction TECHNIQUE: Noncontrast axial images were obtained through the brain. All CT scans at this facility used dose modulation, interactive reconstruction and/or weight based dosing when appropriate to reduce radiation dose to as low as reasonably achievable. COMPARISON: May 25, 2020 FINDINGS: There is no acute intracranial hemorrhage, significant mass effect or CT evidence of acute large artery territorial infarction. Please note that a hyperacute infarct or small vessel infarct may not be apparent on initial CT imaging. Mild to moderate cerebral volume loss is present. Scattered areas of low-attenuation are present in the supratentorial white matter that are compatible with chronic small vessel ischemic disease.  There is no hydrocephalus , intra-axial mass or abnormal extra-axial fluid collection. There are no displaced skull fractures. The mastoid air cells and paranasal sinuses are clear where imaged. IMPRESSION: Cerebral volume loss and white matter findings compatible with chronic small vessel ischemic disease. Ct Head Wo Cont    Result Date: 5/25/2020  CT of the head without contrast. CLINICAL INDICATION: Weakness, unable to walk PROCEDURE: Serial thin section axial images are obtained from the cranial vertex through the skull base without the administration of intravenous contrast. Radiation dose reduction techniques were used for this study. Our CT scanners use one or all of the following: Automated exposure control, adjusted of the mA and/or kV according to patient size, iterative reconstruction COMPARISON: No prior. FINDINGS: There is no acute intracranial hemorrhage, mass, or mass effect. No abnormal extra-axial fluid collections identified. There is no hydrocephalus. The basilar cisterns are widely patent. There is moderate to severe patchy subcortical, deep, and periventricular white matter hypoattenuation. An old infarct is noted in the right basal ganglia with a tiny focal old lacunar infarct in the left basal ganglia. No skull fracture or aggressive osseous lesion noted. The mastoid air cells and included paranasal sinuses are clear. IMPRESSION: 1. No acute intracranial abnormality. Note, acute/subacute CVA cannot be excluded given the severity of the underlying white matter disease. 2. Moderate to severe bilateral white matter hypoattenuation most in keeping with chronic microangiopathic disease. 3. Old infarct in the right basal ganglia with an old lacunar infarct in the left basal ganglia. Ct Chest Wo Cont    Result Date: 6/11/2020  NONCONTRAST CHEST CT CLINICAL HISTORY:  Fever with high oxygen demand with leukocytosis and clinical concern for aspiration.  TECHNIQUE:  Without contrast administration, the chest was scanned with spiral technique. Radiation dose reduction was achieved using one or all of the following techniques: automated exposure control, weight-based dosing, iterative reconstruction. COMPARISON:  Portable chest of June 8, 2020 and CT of September 10, 2013. FINDINGS:  There is infiltrate and volume loss in the right lower lobe associated with endobronchial debris consistent with aspiration. Minimal left basilar atelectasis is present as well. No pathologically enlarged lymph nodes or abnormal fluid collection. The epigastrium appears unremarkable status post cholecystectomy. IMPRESSION:  RIGHT LOWER LOBE ASPIRATION PNEUMONIA AND MINIMAL LEFT BASILAR ATELECTASIS. Us Retroperitoneum Comp    Result Date: 6/7/2020  INDICATION:  Renal insufficiency TECHNIQUE: Real-time sonography of the kidneys, retroperitoneum and bladder was performed with multiple static images obtained. FINDINGS: The right kidney measures 10.6 cm and the left kidney measures 10.5 cm in length. Both kidneys are slightly echogenic. There is no hydronephrosis. There is a small cyst in the upper pole of the left kidney. There are no significant renal masses. The aorta and IVC are normal in caliber. The urinary bladder is collapsed around a Jefferson catheter. IMPRESSION: Echogenic kidneys suggesting chronic renal disease. No evidence of obstruction. Xr Chest Port    Result Date: 5/25/2020  Portable chest x-ray CLINICAL INDICATION: Weakness FINDINGS: Single AP view the chest compared to a similar exam dated 5/25/2020 show the lungs to be expanded and clear. No pleural effusion or pneumothorax. The cardiac silhouette and mediastinum are unremarkable. The bones are osteopenic. IMPRESSION: No acute cardiopulmonary abnormality. No results found for this visit on 06/05/20. Cultures:   All Micro Results     Procedure Component Value Units Date/Time    CULTURE, BLOOD [[id number]] Collected:  06/08/20 1812    Order Status:  Completed Specimen: Blood Updated:  06/13/20 0634     Special Requests: --        RIGHT  HAND       Culture result: NO GROWTH 5 DAYS       CULTURE, BLOOD [[id number]] Collected:  06/08/20 1807    Order Status:  Completed Specimen:  Blood Updated:  06/13/20 0634     Special Requests: --        LEFT  HAND       Culture result: NO GROWTH 5 DAYS       CULTURE, BLOOD [[id number]] Collected:  06/05/20 1845    Order Status:  Completed Specimen:  Blood Updated:  06/10/20 0631     Special Requests: --        NO SPECIAL REQUESTS  LEFT  Antecubital       Culture result: NO GROWTH 5 DAYS       CULTURE, BLOOD [[id number]] Collected:  06/05/20 1845    Order Status:  Completed Specimen:  Blood Updated:  06/10/20 0631     Special Requests: --        NO SPECIAL REQUESTS  RIGHT  FOREARM       Culture result: NO GROWTH 5 DAYS       MSSA/MRSA SC BY PCR, NASAL SWAB [[id number]] Collected:  06/09/20 1545    Order Status:  Canceled Specimen:  Nasal swab     CULTURE, URINE [[id number]]  (Abnormal)  (Susceptibility) Collected:  06/05/20 1845    Order Status:  Completed Specimen:  Cath Urine Updated:  06/09/20 0710     Special Requests: NO SPECIAL REQUESTS        Culture result:       >100,000 COLONIES/mL PROTEUS MIRABILIS                  >100,000 COLONIES/mL KLEBSIELLA PNEUMONIAE                Assessment/Plan:     Principal Problem:    Acute respiratory failure with hypoxia ([facility] [address] (6/11/2020)  - Due to acute aspiration event with chronic dysphagia  - Slow to improve  - Trach collar oxygenation  - Stopped antibiotics on 6/18/20 - 10 day course  - Pulmonary following    Active Problems:    ROSA (acute kidney injury) ([facility] [address] (6/5/2020)  - Likely due to dehydration  - BUN going back up - will discuss with nutrition  - Continue free water with TFs  - Nephrology signed off      UTI (urinary tract infection) (6/5/2020)  - Proteus + Klebsiella  - Completely treated  - Stopped antibiotics on 6/17 - 10 day course      Acute metabolic encephalopathy (9/2/2926)  - Waxes/wanes  - Likely due to infections and hypoxia      Aspiration pneumonia ([facility] [address] (6/14/2020)  - Due to chronic dysphagia  - Stopped antibiotics on 6/17 - 10 day course  - Oxygen slowly improving      Dysphagia (6/16/2020)  - Chronic in nature  - S/P Trach  - SLP signed off      Leukocytosis (5/26/2020)  - Resolved      Hypernatremia (6/6/2020)  - Improving  - Continue free water with TFs      Normocytic anemia (6/5/2020)  - Stable      Diabetes mellitus type 2, controlled ([facility] [address] (6/15/2020)  - No acute issues  - Continue Lantus  - Continue SSI    Today's Plan: Monitor oxygen sats. To [facility] when appropriate    DIET TUBE FEEDING Open order for details. Keep HOB > 30 degrees. Check residuals every 4 hours. Hold TF for > 500 ml x 1 or 250 ml x 2 consecutive checks. Also place patient on right side if residual is > 250 ml.     DVT Prophylaxis: SCDs    Discharge Plan: No NHC when appropriate      Signed By: Pawel Masterson DO     June 21, 2020

## 2020-06-21 NOTE — PROGRESS NOTES
H&P/Consult Note/Progress Note/Office Note:   Guero Person  MRN: [id number]  :1945  Age:75 y.o.    HPI: Veronique Iyer is a 66 y.o. female who we are asked by Dr. Joleen Davis to see for consideration of tracheostomy and PEG tube placement. The patient has a PMHx of CAD, DM type II, HTN, and recent CVA with residual right sided weakness. The patient is currently admitted for UTI. She has a history of chronic dysphagia with recurrent aspiration. She is oxygenating well on 1L NC. She is unable to communicate or provide any history. 2020 No report of further bleeding/clots overnight. AF, VSS, on 4L. Past Medical History:   Diagnosis Date    Arrhythmia     recurrent SVT    Arthritis     Asthma     stress induced- well controlled    CAD (coronary artery disease)     \"leaking valves\"    Chronic pain     shoulder    Diabetes ([address]     adv -160.  type 2 IDDM; does not take home sqbs- hypo at 80    GERD (gastroesophageal reflux disease)     Hypertension     well controlled with meds    Menopause     Morbid obesity ([address]     Stroke ([facility]     -, mini stroke    Thyroid disease     hypo    Unspecified adverse effect of anesthesia     woke up during every surgery     Past Surgical History:   Procedure Laterality Date    CARDIAC SURG PROCEDURE UNLIST  2010    AVNRT ablation times three    HX APPENDECTOMY      HX BREAST BIOPSY      bilateral breast bx    HX KNEE ARTHROSCOPY      left knee    HX LAP CHOLECYSTECTOMY      HX ORTHOPAEDIC      rt rotator cuff and bicep tendon repair    HX EMELY AND BSO      hysterectomy     Current Facility-Administered Medications   Medication Dose Route Frequency    lidocaine (XYLOCAINE) 4 % (40 mg/mL) topical solution   TransTRACHeal PRN    lidocaine (XYLOCAINE) 10 mg/mL (1 %) injection 0.1 mL  0.1 mL SubCUTAneous PRN    sodium chloride (NS) flush 5-40 mL  5-40 mL IntraVENous Q8H    sodium chloride (NS) flush 5-40 mL  5-40 mL IntraVENous PRN    aspirin chewable tablet 81 mg  81 mg Per NG tube DAILY    insulin glargine (LANTUS) injection 40 Units  40 Units SubCUTAneous QHS    [Held by provider] ticagrelor (BRILINTA) tablet 90 mg  90 mg Per NG tube Q12H    lip protectant (BLISTEX) ointment 1 Each  1 Each Topical PRN    pantoprazole (PROTONIX) 40 mg in 0.9% sodium chloride 10 mL injection  40 mg IntraVENous DAILY    acetaminophen (TYLENOL) suppository 650 mg  650 mg Rectal Q4H PRN    insulin regular (NOVOLIN R, HUMULIN R) injection   SubCUTAneous Q6H    levalbuterol (XOPENEX) nebulizer soln 1.25 mg/3 mL  1.25 mg Nebulization Q4H PRN    hydrALAZINE (APRESOLINE) 20 mg/mL injection 10 mg  10 mg IntraVENous Q6H PRN    levothyroxine (SYNTHROID) injection 110 mcg  110 mcg IntraVENous DAILY    insulin regular (NOVOLIN R, HUMULIN R) injection 4 Units  4 Units SubCUTAneous QPM    amLODIPine (NORVASC) tablet 10 mg  10 mg Oral DAILY    atorvastatin (LIPITOR) tablet 40 mg  40 mg Oral QHS    carvediloL (COREG) tablet 12.5 mg  12.5 mg Oral BID WITH MEALS    losartan (COZAAR) tablet 100 mg  100 mg Oral DAILY    ezetimibe (ZETIA) tablet 10 mg  10 mg Oral DAILY    hyoscyamine SL (LEVSIN/SL) tablet 0.125 mg  0.125 mg SubLINGual Q6H PRN    ondansetron (ZOFRAN ODT) tablet 4 mg  4 mg Oral Q8H PRN    sertraline (ZOLOFT) tablet 200 mg  200 mg Oral BID    sodium chloride (NS) flush 5-40 mL  5-40 mL IntraVENous Q8H    sodium chloride (NS) flush 5-40 mL  5-40 mL IntraVENous PRN    acetaminophen (TYLENOL) tablet 650 mg  650 mg Oral Q4H PRN    ondansetron (ZOFRAN) injection 4 mg  4 mg IntraVENous Q4H PRN    magnesium hydroxide (MILK OF MAGNESIA) 400 mg/5 mL oral suspension 30 mL  30 mL Oral DAILY PRN    [Held by provider] heparin (porcine) injection 5,000 Units  5,000 Units SubCUTAneous Q8H     Ace inhibitors; Adhesive tape; Codeine; Other medication;  Other omega-3s; and Sulfa (sulfonamide antibiotics)  Social History     Socioeconomic History  Marital status:      Spouse name: Not on file    Number of children: Not on file    Years of education: Not on file    Highest education level: Not on file   Tobacco Use    Smoking status: Former Smoker     Packs/day: 2.00     Years: 13.00     Pack years: 26.00     Last attempt to quit: 1970     Years since quittin.2    Smokeless tobacco: Never Used    Tobacco comment: quit 36 years ago   Substance and Sexual Activity    Alcohol use: No    Drug use: No     Social History     Tobacco Use   Smoking Status Former Smoker    Packs/day: 2.00    Years: 13.00    Pack years: 26.00    Last attempt to quit: 1970    Years since quittin.2   Smokeless Tobacco Never Used   Tobacco Comment    quit 39 years ago     Family History   Problem Relation Age of Onset    Other Brother     Cancer Mother     Heart Disease Mother     Breast Cancer Mother 79    Heart Disease Father     Malignant Hyperthermia Neg Hx     Pseudocholinesterase Deficiency Neg Hx     Delayed Awakening Neg Hx     Post-op Nausea/Vomiting Neg Hx     Emergence Delirium Neg Hx     Post-op Cognitive Dysfunction Neg Hx      ROS: MARÍA    Physical Exam:   Visit Vitals  /73 (BP 1 Location: Right arm, BP Patient Position: At rest)   Pulse 86   Temp 98.7 °F (37.1 °C)   Resp 20   Ht 5' 6\" (1.676 m)   Wt 210 lb (95.3 kg)   SpO2 93%   BMI 33.89 kg/m²     Trach site is without active bleeding.        Recent vitals (if inpt):  Patient Vitals for the past 24 hrs:   BP Temp Pulse Resp SpO2   20 0756 143/73 98.7 °F (37.1 °C) 86 20 93 %   20 0340 163/84 98.9 °F (37.2 °C) 78 20 94 %   20 2348 149/62 97.9 °F (36.6 °C) 73 20 96 %   20 1955 129/67 97.5 °F (36.4 °C) 72 20 98 %   20 1659     94 %   20 1640 137/63 99.2 °F (37.3 °C) 80 18 96 %   20 1219 133/60 99.8 °F (37.7 °C) 83 14 97 %   20 1208 132/64 99.5 °F (37.5 °C) 86 20 93 %   20 0834     95 %   20 0808 134/68 99.1 °F (37.3 °C) 92 20 95 %       Labs:  Recent Labs     06/21/20  0703 06/20/20  1505 06/20/20  1250   WBC  --   --  13.1*   HGB  --   --  8.6*   PLT  --   --  283     --  139   K 4.7  --  4.6     --  106   CO2 24  --  22   BUN 45*  --  29*   CREA 1.26*  --  1.26*   *  --  242*   APTT  --  32.3  --    TBILI  --   --  0.7   ALT  --   --  26   AP  --   --  77       Lab Results   Component Value Date/Time    WBC 13.1 (H) 06/20/2020 12:50 PM    HGB 8.6 (L) 06/20/2020 12:50 PM    PLATELET 804 88/08/3305 12:50 PM    Sodium 140 06/21/2020 07:03 AM    Potassium 4.7 06/21/2020 07:03 AM    Chloride 107 06/21/2020 07:03 AM    CO2 24 06/21/2020 07:03 AM    BUN 45 (H) 06/21/2020 07:03 AM    Creatinine 1.26 (H) 06/21/2020 07:03 AM    Glucose 208 (H) 06/21/2020 07:03 AM    INR 1.0 06/18/2020 06:33 AM    aPTT 32.3 06/20/2020 03:05 PM    Bilirubin, total 0.7 06/20/2020 12:50 PM    ALT (SGPT) 26 06/20/2020 12:50 PM    Alk. phosphatase 77 06/20/2020 12:50 PM    Lipase 104 05/25/2020 03:04 AM    Ammonia 29 06/13/2020 07:29 AM    Troponin-I, Qt. 4.63 (HH) 05/27/2020 03:28 AM       I reviewed recent labs and recent radiologic studies. I independently reviewed radiology images for studies I described above or studies I have ordered.    Admission date (for inpatients): 6/5/2020   * No surgery found *  Procedure(s):  TRACHEOSTOMY  GASTROSTOMY TUBE PERCUTANEOUS INSERTION (GI LAB)    ASSESSMENT/PLAN:  Problem List  Date Reviewed: 6/19/2020          Codes Class Noted    Hemoptysis ICD-10-CM: R04.2  ICD-9-CM: 786.30  6/20/2020        Status post tracheostomy ([address] ICD-10-CM: Z93.0  ICD-9-CM: V44.0  6/19/2020    Overview Signed 6/19/2020  7:06 AM by Genie Vizcaino, TAMIKO     6/18/20:  Placed by Dr. Leeanne Burgos             S/P percutaneous endoscopic gastrostomy (PEG) tube placement [facility]) ICD-10-CM: Z93.1  ICD-9-CM: V44.1  6/19/2020    Overview Signed 6/19/2020  7:07 AM by Genie Vizcaino, TAMIKO     6/18/20  Dr. Leeanne Burgos             Dysphagia ICD-10-CM: R13.10  ICD-9-CM: 787.20  6/16/2020        Diabetes mellitus type 2, controlled ([facility] ICD-10-CM: E11.9  ICD-9-CM: 250.00  6/15/2020        Aspiration pneumonia ([facility] ICD-10-CM: J69.0  ICD-9-CM: 507.0  6/14/2020        * (Principal) Acute respiratory failure with hypoxia (HCC) ICD-10-CM: J96.01  ICD-9-CM: 518.81  6/11/2020        Hypernatremia ICD-10-CM: E87.0  ICD-9-CM: 276.0  6/6/2020        ROSA (acute kidney injury) ([facility] ICD-10-CM: N17.9  ICD-9-CM: 584.9  6/5/2020        UTI (urinary tract infection) ICD-10-CM: N39.0  ICD-9-CM: 599.0  6/5/2020        Acute metabolic encephalopathy HCO-25-ON: G93.41  ICD-9-CM: 348.31  6/5/2020        Normocytic anemia ICD-10-CM: D64.9  ICD-9-CM: 285.9  6/5/2020        Leukocytosis ICD-10-CM: N12.657  ICD-9-CM: 288.60  5/26/2020            Principal Problem:    Acute respiratory failure with hypoxia ([facility] (6/11/2020)    Active Problems:    Leukocytosis (5/26/2020)      ROSA (acute kidney injury) ([facility] (6/5/2020)      UTI (urinary tract infection) (6/5/2020)      Acute metabolic encephalopathy (0/9/2084)      Normocytic anemia (6/5/2020)      Hypernatremia (6/6/2020)      Aspiration pneumonia ([facility] (6/14/2020)      Diabetes mellitus type 2, controlled ([facility] (6/15/2020)      Dysphagia (6/16/2020)      Status post tracheostomy ([facility] (6/19/2020)      Overview: 6/18/20:  Placed by Dr. Juan Alberto Wilson      S/P percutaneous endoscopic gastrostomy (PEG) tube placement ([facility] (6/19/2020)      Overview: 6/18/20  Dr. Juan Alberto Wilson      Hemoptysis (6/20/2020)       No further active bleeding/clots from trach overnight  Routine trach care  Instructed RN on how to dress trach with 2x2s; change as often as needed  Hold anticoagulants as able  Continue care per pulmonary/hospitalist    Signed:  JHONNY Rodriguez

## 2020-06-22 LAB
ANION GAP SERPL CALC-SCNC: 7 MMOL/L (ref 7–16)
BASOPHILS # BLD: 0 K/UL (ref 0–0.2)
BASOPHILS NFR BLD: 0 % (ref 0–2)
BUN SERPL-MCNC: 39 MG/DL (ref 8–23)
CALCIUM SERPL-MCNC: 10.1 MG/DL (ref 8.3–10.4)
CHLORIDE SERPL-SCNC: 105 MMOL/L (ref 98–107)
CO2 SERPL-SCNC: 26 MMOL/L (ref 21–32)
CREAT SERPL-MCNC: 1.26 MG/DL (ref 0.6–1)
DIFFERENTIAL METHOD BLD: ABNORMAL
EOSINOPHIL # BLD: 0.4 K/UL (ref 0–0.8)
EOSINOPHIL NFR BLD: 4 % (ref 0.5–7.8)
ERYTHROCYTE [DISTWIDTH] IN BLOOD BY AUTOMATED COUNT: 17.4 % (ref 11.9–14.6)
GLUCOSE BLD STRIP.AUTO-MCNC: 171 MG/DL (ref 65–100)
GLUCOSE BLD STRIP.AUTO-MCNC: 198 MG/DL (ref 65–100)
GLUCOSE BLD STRIP.AUTO-MCNC: 207 MG/DL (ref 65–100)
GLUCOSE BLD STRIP.AUTO-MCNC: 207 MG/DL (ref 65–100)
GLUCOSE SERPL-MCNC: 190 MG/DL (ref 65–100)
HCT VFR BLD AUTO: 26 % (ref 35.8–46.3)
HGB BLD-MCNC: 8.3 G/DL (ref 11.7–15.4)
IMM GRANULOCYTES # BLD AUTO: 0.1 K/UL (ref 0–0.5)
IMM GRANULOCYTES NFR BLD AUTO: 1 % (ref 0–5)
LYMPHOCYTES # BLD: 2.8 K/UL (ref 0.5–4.6)
LYMPHOCYTES NFR BLD: 24 % (ref 13–44)
MCH RBC QN AUTO: 31.9 PG (ref 26.1–32.9)
MCHC RBC AUTO-ENTMCNC: 31.9 G/DL (ref 31.4–35)
MCV RBC AUTO: 100 FL (ref 79.6–97.8)
MONOCYTES # BLD: 0.7 K/UL (ref 0.1–1.3)
MONOCYTES NFR BLD: 6 % (ref 4–12)
NEUTS SEG # BLD: 7.6 K/UL (ref 1.7–8.2)
NEUTS SEG NFR BLD: 66 % (ref 43–78)
NRBC # BLD: 0 K/UL (ref 0–0.2)
PLATELET # BLD AUTO: 288 K/UL (ref 150–450)
PMV BLD AUTO: 11.2 FL (ref 9.4–12.3)
POTASSIUM SERPL-SCNC: 4.9 MMOL/L (ref 3.5–5.1)
RBC # BLD AUTO: 2.6 M/UL (ref 4.05–5.2)
SODIUM SERPL-SCNC: 138 MMOL/L (ref 136–145)
WBC # BLD AUTO: 11.5 K/UL (ref 4.3–11.1)

## 2020-06-22 PROCEDURE — 86580 TB INTRADERMAL TEST: CPT | Performed by: INTERNAL MEDICINE

## 2020-06-22 PROCEDURE — 99232 SBSQ HOSP IP/OBS MODERATE 35: CPT | Performed by: INTERNAL MEDICINE

## 2020-06-22 PROCEDURE — 74011250637 HC RX REV CODE- 250/637: Performed by: SURGERY

## 2020-06-22 PROCEDURE — C9113 INJ PANTOPRAZOLE SODIUM, VIA: HCPCS | Performed by: SURGERY

## 2020-06-22 PROCEDURE — 77030018798 HC PMP KT ENTRL FED COVD -A

## 2020-06-22 PROCEDURE — 74011636637 HC RX REV CODE- 636/637: Performed by: INTERNAL MEDICINE

## 2020-06-22 PROCEDURE — 85025 COMPLETE CBC W/AUTO DIFF WBC: CPT

## 2020-06-22 PROCEDURE — 36415 COLL VENOUS BLD VENIPUNCTURE: CPT

## 2020-06-22 PROCEDURE — 65270000029 HC RM PRIVATE

## 2020-06-22 PROCEDURE — 80048 BASIC METABOLIC PNL TOTAL CA: CPT

## 2020-06-22 PROCEDURE — 74011250637 HC RX REV CODE- 250/637: Performed by: FAMILY MEDICINE

## 2020-06-22 PROCEDURE — 74011000302 HC RX REV CODE- 302: Performed by: INTERNAL MEDICINE

## 2020-06-22 PROCEDURE — 74011250637 HC RX REV CODE- 250/637: Performed by: INTERNAL MEDICINE

## 2020-06-22 PROCEDURE — 82962 GLUCOSE BLOOD TEST: CPT

## 2020-06-22 PROCEDURE — 87635 SARS-COV-2 COVID-19 AMP PRB: CPT

## 2020-06-22 PROCEDURE — 74011000250 HC RX REV CODE- 250: Performed by: SURGERY

## 2020-06-22 PROCEDURE — 74011250636 HC RX REV CODE- 250/636: Performed by: SURGERY

## 2020-06-22 PROCEDURE — 74011636637 HC RX REV CODE- 636/637: Performed by: SURGERY

## 2020-06-22 RX ADMIN — TUBERCULIN PURIFIED PROTEIN DERIVATIVE 5 UNITS: 5 INJECTION, SOLUTION INTRADERMAL at 12:58

## 2020-06-22 RX ADMIN — HUMAN INSULIN 3 UNITS: 100 INJECTION, SOLUTION SUBCUTANEOUS at 00:00

## 2020-06-22 RX ADMIN — CARVEDILOL 12.5 MG: 12.5 TABLET, FILM COATED ORAL at 18:41

## 2020-06-22 RX ADMIN — HUMAN INSULIN 4 UNITS: 100 INJECTION, SOLUTION SUBCUTANEOUS at 18:42

## 2020-06-22 RX ADMIN — SERTRALINE HYDROCHLORIDE 200 MG: 100 TABLET ORAL at 09:39

## 2020-06-22 RX ADMIN — CARVEDILOL 12.5 MG: 12.5 TABLET, FILM COATED ORAL at 09:39

## 2020-06-22 RX ADMIN — Medication 10 ML: at 05:56

## 2020-06-22 RX ADMIN — HUMAN INSULIN 6 UNITS: 100 INJECTION, SOLUTION SUBCUTANEOUS at 12:57

## 2020-06-22 RX ADMIN — Medication 5 ML: at 21:41

## 2020-06-22 RX ADMIN — Medication 10 ML: at 18:43

## 2020-06-22 RX ADMIN — HUMAN INSULIN 6 UNITS: 100 INJECTION, SOLUTION SUBCUTANEOUS at 18:42

## 2020-06-22 RX ADMIN — LOSARTAN POTASSIUM 100 MG: 50 TABLET, FILM COATED ORAL at 09:39

## 2020-06-22 RX ADMIN — EZETIMIBE 10 MG: 10 TABLET ORAL at 09:39

## 2020-06-22 RX ADMIN — AMLODIPINE BESYLATE 10 MG: 10 TABLET ORAL at 09:39

## 2020-06-22 RX ADMIN — LEVOTHYROXINE SODIUM ANHYDROUS 110 MCG: 100 INJECTION, POWDER, LYOPHILIZED, FOR SOLUTION INTRAVENOUS at 09:48

## 2020-06-22 RX ADMIN — SODIUM CHLORIDE 40 MG: 9 INJECTION INTRAMUSCULAR; INTRAVENOUS; SUBCUTANEOUS at 09:39

## 2020-06-22 RX ADMIN — HUMAN INSULIN 3 UNITS: 100 INJECTION, SOLUTION SUBCUTANEOUS at 05:55

## 2020-06-22 RX ADMIN — SERTRALINE HYDROCHLORIDE 200 MG: 100 TABLET ORAL at 18:41

## 2020-06-22 RX ADMIN — TICAGRELOR 90 MG: 90 TABLET ORAL at 21:20

## 2020-06-22 RX ADMIN — ASPIRIN 81 MG 81 MG: 81 TABLET ORAL at 09:39

## 2020-06-22 RX ADMIN — INSULIN GLARGINE 40 UNITS: 100 INJECTION, SOLUTION SUBCUTANEOUS at 21:23

## 2020-06-22 RX ADMIN — ATORVASTATIN CALCIUM 40 MG: 40 TABLET, FILM COATED ORAL at 21:20

## 2020-06-22 NOTE — WOUND CARE
Trach site with copious amounts of drainage at trach plate, thick yellow, area under plate difficult to assess as it it sutured in (recent placement) plate may be creating pressure on lower neck skin, noted surgery recommended 2x2's changed frequently. General surgery team is following this trach, recommend using aquacel ag rope tucked to this area if possible defer to surgery. Area cleansed and 2x2's applied as the order currently states. Left message for Quique Carrington NP regarding use of aquacel ag.

## 2020-06-22 NOTE — PROGRESS NOTES
Trach care completed. Patient has small amount of thick tan drainage from stoma site. Inner cannula changed. Obturator at head of bed. No suction required. Patient is awake and alert, and answers questions appropriately by shaking her head. No distress noted at this time.

## 2020-06-22 NOTE — PROGRESS NOTES
Hospitalist Progress Note    Patient: Maximino Sullivan MRN: [id number]  SSN: [id number]    YOB: 1945  Age: 66 y.o. Sex: female      Admit Date: 6/5/2020    LOS: 17 days     Subjective:     66year old CF with medical history significant for CAD, hypertensin, recent CVA with residual right sided weakness, diabetes from [facility] with worsening confusion and elevated creatinine. Patient was found to have urinary tract infection and started on empiric antibiotics. Rapid response called on 6/7/20 AM as patient was found to have acute respiratory failure with hypoxia likely due to aspiration. Patient was suctioned and put on non-rebreather with saturation improving to the 96%. Patient has been lethargic with minimally responsive since the event. Patient had multiple days of temp spikes and antibiotics have been broadened. CT head was neg for any acute intracranial abnormalities. CXR and CT showed infiltrate in the RLL confirming suspicion for aspiration pna. Due to decreased renal function, nephrology was consulted. Workup revealed no obstructive cause. Her renal function and hypernatremia has been improving on IV fluids and free water flushes. Pulmonary was consulted due to persistent high oxygen requirement despite being treated by antibiotics. Family wishes trach & PEG, which she got on 6/18. She spent one night in the ICU and was transferred out. Awaiting SNF placement once approved. 6/22 - She opens her eyes when spoken to this AM. Neck edematous. Less dried blood around trach site. Review of systems negative except stated above.     Objective:     Visit Vitals  /63 (BP 1 Location: Left arm, BP Patient Position: At rest)   Pulse 70   Temp 99.2 °F (37.3 °C)   Resp 17   Ht 5' 6\" (1.676 m)   Wt 95.3 kg (210 lb)   SpO2 95%   BMI 33.89 kg/m²      Oxygen Therapy  O2 Sat (%): 95 % (06/22/20 1121)  Pulse via Oximetry: 76 beats per minute (06/21/20 1534)  O2 Device: Tracheal collar;Tracheostomy (06/21/20 1534)  O2 Flow Rate (L/min): 2 l/min (06/21/20 1534)  FIO2 (%): 28 % (06/21/20 1534)      Intake and Output:     Intake/Output Summary (Last 24 hours) at 6/22/2020 1214  Last data filed at 6/22/2020 0754  Gross per 24 hour   Intake 1433 ml   Output 1175 ml   Net 258 ml         Physical Exam:   GENERAL: awakens, cooperative, no distress, appears stated age  EYE: conjunctivae/corneas clear. PERRL. THROAT & NECK: normal and no erythema or exudates noted. LUNG: Clear to auscultation  HEART: regular rate and rhythm, S1S2, no murmur, no JVD  ABDOMEN: soft, non-tender, non-distended. Bowel sounds normal.   EXTREMITIES:  No edema, 2+ pedal/radial pulses bilaterally  SKIN: no rash or abnormalities  NEUROLOGIC: Awakens. Cranial nerves 2-12 grossly intact.     Lab/Data Review:  Recent Results (from the past 24 hour(s))   PROTHROMBIN TIME + INR    Collection Time: 06/21/20 12:50 PM   Result Value Ref Range    Prothrombin time 14.1 12.0 - 14.7 sec    INR 1.1     GLUCOSE, POC    Collection Time: 06/21/20  4:29 PM   Result Value Ref Range    Glucose (POC) 204 (H) 65 - 100 mg/dL   GLUCOSE, POC    Collection Time: 06/21/20 10:06 PM   Result Value Ref Range    Glucose (POC) 166 (H) 65 - 100 mg/dL   GLUCOSE, POC    Collection Time: 06/22/20 12:45 AM   Result Value Ref Range    Glucose (POC) 171 (H) 65 - 100 mg/dL   GLUCOSE, POC    Collection Time: 06/22/20  5:54 AM   Result Value Ref Range    Glucose (POC) 198 (H) 65 - 095 mg/dL   METABOLIC PANEL, BASIC    Collection Time: 06/22/20  6:02 AM   Result Value Ref Range    Sodium 138 136 - 145 mmol/L    Potassium 4.9 3.5 - 5.1 mmol/L    Chloride 105 98 - 107 mmol/L    CO2 26 21 - 32 mmol/L    Anion gap 7 7 - 16 mmol/L    Glucose 190 (H) 65 - 100 mg/dL    BUN 39 (H) 8 - 23 MG/DL    Creatinine 1.26 (H) 0.6 - 1.0 MG/DL    GFR est AA 53 (L) >60 ml/min/1.73m2    GFR est non-AA 44 (L) >60 ml/min/1.73m2    Calcium 10.1 8.3 - 10.4 MG/DL   CBC WITH AUTOMATED DIFF Collection Time: 06/22/20  6:02 AM   Result Value Ref Range    WBC 11.5 (H) 4.3 - 11.1 K/uL    RBC 2.60 (L) 4.05 - 5.2 M/uL    HGB 8.3 (L) 11.7 - 15.4 g/dL    HCT 26.0 (L) 35.8 - 46.3 %    .0 (H) 79.6 - 97.8 FL    MCH 31.9 26.1 - 32.9 PG    MCHC 31.9 31.4 - 35.0 g/dL    RDW 17.4 (H) 11.9 - 14.6 %    PLATELET 393 866 - 760 K/uL    MPV 11.2 9.4 - 12.3 FL    ABSOLUTE NRBC 0.00 0.0 - 0.2 K/uL    DF AUTOMATED      NEUTROPHILS 66 43 - 78 %    LYMPHOCYTES 24 13 - 44 %    MONOCYTES 6 4.0 - 12.0 %    EOSINOPHILS 4 0.5 - 7.8 %    BASOPHILS 0 0.0 - 2.0 %    IMMATURE GRANULOCYTES 1 0.0 - 5.0 %    ABS. NEUTROPHILS 7.6 1.7 - 8.2 K/UL    ABS. LYMPHOCYTES 2.8 0.5 - 4.6 K/UL    ABS. MONOCYTES 0.7 0.1 - 1.3 K/UL    ABS. EOSINOPHILS 0.4 0.0 - 0.8 K/UL    ABS. BASOPHILS 0.0 0.0 - 0.2 K/UL    ABS. IMM. GRANS. 0.1 0.0 - 0.5 K/UL   GLUCOSE, POC    Collection Time: 06/22/20 11:37 AM   Result Value Ref Range    Glucose (POC) 207 (H) 65 - 100 mg/dL       Imaging:  Nm Lung Scan Perf    Result Date: 5/25/2020  EXAM:  NM LUNG SCAN PERF INDICATION:  Elevated troponin weakness mild hypoxia COMPARISON:  None. TRACER: 4.6 mCi of Tc-99m MAA. FINDINGS: Perfusion lung imaging was performed following intravenous administration of  Tc 99m MAA. Images were obtained from the anterior, posterior and right and left anterior and posterior oblique projections. The perfusion is normal. No segmental or subsegmental defects are identified. Chest x-ray reveals an elevated right hemidiaphragm. IMPRESSION: Normal lung perfusion study. Xr Chest Sngl V    Result Date: 6/14/2020  EXAM: TEMPORARY INDICATION: Respiratory failure COMPARISON: 6/8/2020 FINDINGS: A portable AP radiograph of the chest was obtained at 0522 hours. The patient is on a cardiac monitor. Bibasilar airspace disease unchanged. The cardiac and mediastinal contours and pulmonary vascularity are normal.  The bones and soft tissues are grossly within normal limits.      IMPRESSION: Bibasilar atelectasis unchanged. Xr Chest Sngl V    Result Date: 6/8/2020  CHEST X-RAY, one view. HISTORY:  Hypoxemia and increasing oxygen demand. TECHNIQUE:  AP upright portable view COMPARISON: Earlier today FINDINGS:   -The lungs: are clear. -The costophrenic angles: are sharp. -The heart size: is normal. -The pulmonary vasculature: is unremarkable. -Included portion of the upper abdomen: is unremarkable. -Bones: No gross bony lesions. -Other: None. IMPRESSION:  Negative for acute change     Xr Chest Sngl V    Result Date: 6/8/2020   Portable view of the chest COMPARISON: June 7, 2020 Clinical history: Hypoxia. FINDINGS: Persistent elevation of the right hemidiaphragm. Lungs are underinflated. No pneumothorax, pulmonary edema or large pleural effusion. No definite pulmonary consolidation. Heart appears mildly enlarged. Mediastinal contour is within normal limits. Surrounding bones are unremarkable. IMPRESSION: 1. No pulmonary edema or definite pulmonary consolidation. 2. No significant change compared to exam.    Xr Chest Sngl V    Result Date: 6/7/2020  Chest X-ray INDICATION: Shortness of breath A portable AP view of the chest was obtained. FINDINGS: There is chronic elevation of the right diaphragm. There are no infiltrates or effusions. The heart size is normal.  The bony thorax is intact. IMPRESSION: No acute findings in the chest     Xr Chest Pa Lat    Result Date: 5/25/2020  EXAM: XR CHEST PA LAT INDICATION: Cough COMPARISON: None. FINDINGS: PA and lateral radiographs of the chest demonstrate clear lungs. The cardiac and mediastinal contours and pulmonary vascularity are normal. The bones and soft tissues are within normal limits. IMPRESSION: Normal chest.    Xr Abd (kub)    Result Date: 6/10/2020  History: NG tube placement Single view abdomen FINDINGS: There is an NG tube which terminates to the right of midline, likely in the distal stomach.  Clips are present from prior cholecystectomy. There is elevation of the right hemidiaphragm. The lung bases are clear. The bowel gas pattern is nonspecific. IMPRESSION: NG tube as described. Xr Abd (ap And Erect Or Decub)    Result Date: 5/25/2020  Abdominal films, 3 views. History: Nausea and vomiting Technique:  Supine and upright views of the abdomen on 3 images. Comparison: None Findings: Cholecystectomy. Nonspecific bowel gas pattern. No evidence of free air, organomegaly. Calcification located between the right transverse processes of L4 and L5. IMPRESSION: Negative for free air, ileus or obstruction. Possible right ureteral calculus. Correlate clinically. Xr Swallow Func Video    Result Date: 5/28/2020  Modified Barium Swallow INDICATION: Oropharyngeal dysphagia. CVA Fluoro time: 3.15 minutes Spot films:  0 Fluoroscopy was used to evaluate pharyngeal function while the patient was given barium solutions and barium coated solids. FINDINGS: Severe global swallow deficits present. There is harriet aspiration of thin barium by teaspoon with poor cough response. Shallow penetration with nectar by teaspoon. Penetration with nectar by cup. Deep penetration with honey by cup. IMPRESSION: 1. Aspiration and deep penetration with multiple administered consistencies. 2.  Please see concurrent speech and language pathology note for complete description of findings. Mri Brain Wo Cont    Result Date: 5/27/2020  EXAMINATION: BRAIN MRI 5/27/2020 11:49 AM ACCESSION NUMBER: [id number] INDICATION: Worsening of old stroke COMPARISON: Head CT 5/27/2020 and 5/25/2020 TECHNIQUE: Multiplanar multisequence MRI of the brain without intravenous contrast. FINDINGS: There are numerous small and punctate areas of restricted diffusion scattered throughout the supratentorial brain parenchyma, worst in the right lentiform and caudate nuclei. There are multiple foci of restricted diffusion throughout both cerebellar hemispheres.  There is a moderate-sized focus of restricted diffusion in the right aspect of the benjy. The preponderance of these areas demonstrate T2 hyperintensity. The constellation of findings is consistent with multiple supratentorial acute or early subacute infarctions involving multiple vascular distributions. None of the infarctions demonstrates significant associated mass effect or hemorrhagic conversion. The ventricles are within normal limits for the degree of global brain parenchymal volume loss. There is no midline shift. The basilar cisterns are patent. There is no cerebellar tonsillar ectopia or herniation. There are T2 hyperintensities in the periventricular and subcortical white matter and benjy, a nonspecific finding which likely represents chronic microangiopathy. Prior bilateral lens replacement. The expected large vascular flow voids are preserved. There are no suspicious osseous lesions. IMPRESSION: 1. Numerous acute or early subacute infarctions scattered throughout the supratentorial and infratentorial brain parenchyma. These are most likely embolic infarctions from a central source. The most confluent areas of involvement include the right caudate and lentiform nuclei, the benjy, and the left cerebellar hemisphere. There is no evidence of significant mass effect or hemorrhagic conversion. 2. Moderate burden of chronic microangiopathy. 3. Discussed with Dr. Yen Maier in person by Dr. Julio C Mitchell at 11:45 AM 5/27/2020. VOICE DICTATED BY: Dr. Dominique Friends    Result Date: 6/8/2020  CT head without contrast History: ALTERED MENTAL STATUS, HX OF RECENT CVA. Technique: 5mm axial images were obtained from the skull base to the vertex without contrast. Radiation dose reduction techniques were used for this study: Our CT scanners use one or all of the following: Automated exposure control, adjustment of the mA and/or kVp according to patient's size, iterative reconstruction.  Comparison: 05/27/2020 Findings: The ventricles and sulci are mildly prominent. There are no extra-axial fluid collections. No evidence of acute intraparenchymal hemorrhage or mass effect is identified. Patchy, confluent areas of decreased attenuation are noted within the supratentorial white matter. These are nonspecific findings but would be most compatible with moderately advanced chronic small vessel ischemic changes. There is no evidence to suggest an acute major territorial infarct. Extensive atherosclerotic changes are present of the vertebral basilar system. The bony calvarium is intact. The visualized mastoid air cells and paranasal sinuses are well pneumatized and aerated. Impression: Chronic findings without evidence of acute intracranial abnormality. Ct Head Wo Cont    Result Date: 5/27/2020  HEAD CT WITHOUT CONTRAST  5/27/2020 HISTORY:   increased AMS  ; leukocytosis; myocardial infarction TECHNIQUE: Noncontrast axial images were obtained through the brain. All CT scans at this facility used dose modulation, interactive reconstruction and/or weight based dosing when appropriate to reduce radiation dose to as low as reasonably achievable. COMPARISON: May 25, 2020 FINDINGS: There is no acute intracranial hemorrhage, significant mass effect or CT evidence of acute large artery territorial infarction. Please note that a hyperacute infarct or small vessel infarct may not be apparent on initial CT imaging. Mild to moderate cerebral volume loss is present. Scattered areas of low-attenuation are present in the supratentorial white matter that are compatible with chronic small vessel ischemic disease. There is no hydrocephalus , intra-axial mass or abnormal extra-axial fluid collection. There are no displaced skull fractures. The mastoid air cells and paranasal sinuses are clear where imaged. IMPRESSION: Cerebral volume loss and white matter findings compatible with chronic small vessel ischemic disease.      Ct Head Wo Cont    Result Date: 5/25/2020  CT of the head without contrast. CLINICAL INDICATION: Weakness, unable to walk PROCEDURE: Serial thin section axial images are obtained from the cranial vertex through the skull base without the administration of intravenous contrast. Radiation dose reduction techniques were used for this study. Our CT scanners use one or all of the following: Automated exposure control, adjusted of the mA and/or kV according to patient size, iterative reconstruction COMPARISON: No prior. FINDINGS: There is no acute intracranial hemorrhage, mass, or mass effect. No abnormal extra-axial fluid collections identified. There is no hydrocephalus. The basilar cisterns are widely patent. There is moderate to severe patchy subcortical, deep, and periventricular white matter hypoattenuation. An old infarct is noted in the right basal ganglia with a tiny focal old lacunar infarct in the left basal ganglia. No skull fracture or aggressive osseous lesion noted. The mastoid air cells and included paranasal sinuses are clear. IMPRESSION: 1. No acute intracranial abnormality. Note, acute/subacute CVA cannot be excluded given the severity of the underlying white matter disease. 2. Moderate to severe bilateral white matter hypoattenuation most in keeping with chronic microangiopathic disease. 3. Old infarct in the right basal ganglia with an old lacunar infarct in the left basal ganglia. Ct Chest Wo Cont    Result Date: 6/11/2020  NONCONTRAST CHEST CT CLINICAL HISTORY:  Fever with high oxygen demand with leukocytosis and clinical concern for aspiration. TECHNIQUE:  Without contrast administration, the chest was scanned with spiral technique. Radiation dose reduction was achieved using one or all of the following techniques: automated exposure control, weight-based dosing, iterative reconstruction. COMPARISON:  Portable chest of June 8, 2020 and CT of September 10, 2013.  FINDINGS:  There is infiltrate and volume loss in the right lower lobe associated with endobronchial debris consistent with aspiration. Minimal left basilar atelectasis is present as well. No pathologically enlarged lymph nodes or abnormal fluid collection. The epigastrium appears unremarkable status post cholecystectomy. IMPRESSION:  RIGHT LOWER LOBE ASPIRATION PNEUMONIA AND MINIMAL LEFT BASILAR ATELECTASIS. Us Retroperitoneum Comp    Result Date: 6/7/2020  INDICATION:  Renal insufficiency TECHNIQUE: Real-time sonography of the kidneys, retroperitoneum and bladder was performed with multiple static images obtained. FINDINGS: The right kidney measures 10.6 cm and the left kidney measures 10.5 cm in length. Both kidneys are slightly echogenic. There is no hydronephrosis. There is a small cyst in the upper pole of the left kidney. There are no significant renal masses. The aorta and IVC are normal in caliber. The urinary bladder is collapsed around a Jefferson catheter. IMPRESSION: Echogenic kidneys suggesting chronic renal disease. No evidence of obstruction. Xr Chest Port    Result Date: 5/25/2020  Portable chest x-ray CLINICAL INDICATION: Weakness FINDINGS: Single AP view the chest compared to a similar exam dated 5/25/2020 show the lungs to be expanded and clear. No pleural effusion or pneumothorax. The cardiac silhouette and mediastinum are unremarkable. The bones are osteopenic. IMPRESSION: No acute cardiopulmonary abnormality. No results found for this visit on 06/05/20. Cultures:   All Micro Results     Procedure Component Value Units Date/Time    CULTURE, BLOOD [[id number]] Collected:  06/08/20 1812    Order Status:  Completed Specimen:  Blood Updated:  06/13/20 0634     Special Requests: --        RIGHT  HAND       Culture result: NO GROWTH 5 DAYS       CULTURE, BLOOD [[id number]] Collected:  06/08/20 1807    Order Status:  Completed Specimen:  Blood Updated:  06/13/20 1380     Special Requests: -- LEFT  HAND       Culture result: NO GROWTH 5 DAYS       CULTURE, BLOOD [[id number]] Collected:  06/05/20 1845    Order Status:  Completed Specimen:  Blood Updated:  06/10/20 0631     Special Requests: --        NO SPECIAL REQUESTS  LEFT  Antecubital       Culture result: NO GROWTH 5 DAYS       CULTURE, BLOOD [[id number]] Collected:  06/05/20 1845    Order Status:  Completed Specimen:  Blood Updated:  06/10/20 0631     Special Requests: --        NO SPECIAL REQUESTS  RIGHT  FOREARM       Culture result: NO GROWTH 5 DAYS       MSSA/MRSA SC BY PCR, NASAL SWAB [[id number]] Collected:  06/09/20 1545    Order Status:  Canceled Specimen:  Nasal swab     CULTURE, URINE [[id number]]  (Abnormal)  (Susceptibility) Collected:  06/05/20 1845    Order Status:  Completed Specimen:  Cath Urine Updated:  06/09/20 0710     Special Requests: NO SPECIAL REQUESTS        Culture result:       >100,000 COLONIES/mL PROTEUS MIRABILIS                  >100,000 COLONIES/mL KLEBSIELLA PNEUMONIAE                Assessment/Plan:     Principal Problem:    Acute respiratory failure with hypoxia ([address] (6/11/2020)  - Due to acute aspiration event with chronic dysphagia  - Slow to improve  - Trach collar oxygenation  - Stopped antibiotics on 6/18/20 - 10 day course  - Pulmonary following    Active Problems:    ROSA (acute kidney injury) ([address] (6/5/2020)  - Likely due to dehydration  - BUN improving  - Continue free water with TFs  - Nephrology signed off      UTI (urinary tract infection) (6/5/2020)  - Proteus + Klebsiella  - Completely treated  - Stopped antibiotics on 6/17 - 10 day course      Acute metabolic encephalopathy (7/2/0976)  - Waxes/wanes  - Likely due to infections and hypoxia      Aspiration pneumonia ([address] (6/14/2020)  - Due to chronic dysphagia  - Stopped antibiotics on 6/17 - 10 day course  - Oxygen slowly improving      Dysphagia (6/16/2020)  - Chronic in nature  - S/P Trach  - SLP signed off      Leukocytosis (5/26/2020)  - Resolved      Hypernatremia (6/6/2020)  - Improving  - Continue free water with TFs      Normocytic anemia (6/5/2020)  - Stable      Diabetes mellitus type 2, controlled ([address] (6/15/2020)  - No acute issues  - Continue Lantus  - Continue SSI    Today's Plan: Monitor oxygen sats. To SNF when appropriate    DIET TUBE FEEDING Open order for details. Keep HOB > 30 degrees. Check residuals every 4 hours. Hold TF for > 500 ml x 1 or 250 ml x 2 consecutive checks. Also place patient on right side if residual is > 250 ml.     DVT Prophylaxis: SCDs    Discharge Plan: [facility] does not want patient back due to new trach - await SNF placement that will take her with Trach/PEG      Signed By: Shaun Woodson DO     June 22, 2020

## 2020-06-22 NOTE — PROGRESS NOTES
Met with Mr Livan Denney at bedside about STR choices, list provided with information that only facilities marked are taking pts with new traches, verbalized understanding of information. Made aware CM will contact him in am to get choices. Also referral made to [facility] for possible admission, pending response.

## 2020-06-22 NOTE — PROGRESS NOTES
HIRA-COV 2 collected and sent to lab. Verbal bedside report given to oncoming nurse. Patient's situation, background, assessment and recommendations provided. Opportunity for questions provided. No s/s of pain noted. No distress noted. Oncoming RN assumed care of patient.

## 2020-06-22 NOTE — PROGRESS NOTES
Guero Vidal  Admission Date: 6/5/2020             Daily Progress Note: 6/22/2020    The patient's chart is reviewed and the patient is discussed with the staff. 66 y.o. CF evaluated at the request of Dr. Lidya Mccloud chronic medical:  CAD, HTN, recent CVA with residual right sided weakness, DM, hx chronic CVA with left side weankness. Arrived from [facility] with worsening confusion and elevated creatinine.  Was found to have UTI and started on empiric antibiotics.  On 6/7 AM was found to have acute respiratory failure with hypoxia likely due to aspiration.  Was suctioned, placed on NRB with sat  improving to 96%. Has been lethargic and minimally responsive since the event with fevers and antibiotics escalated. CT head was neg for any acute intracranial abnormalities and CXR and CT showed RLL infiltrate confirming suspiration for aspiration pneumonia. Due to worsening renal function, nephrology was consulted and workup revealed no obstruction and renal function has improved on IVFs.  Noted to be hyponatremia and hypotonic saline infusion.    Pulmonary was consulted due to high O2 requirement despite being treated by antibiotics for 5 days. Patient has been started o vancomycin and zosyn. She is On O2 at 5L/min via NC. Started coughing up blood clots 6/2, seen emergently and bronch performed--no ulceration or source of bleeding. Brilinta, ASA and Heparin held. Subjective:     Lying in bed, arouses and nods responses but is nonverbal.    Remains  on 28% trach collar with sat 96%. No significant bleeding last night. Per staff--no bleeding from trach.     Current Facility-Administered Medications   Medication Dose Route Frequency    lidocaine (XYLOCAINE) 4 % (40 mg/mL) topical solution   TransTRACHeal PRN    lidocaine (XYLOCAINE) 10 mg/mL (1 %) injection 0.1 mL  0.1 mL SubCUTAneous PRN    sodium chloride (NS) flush 5-40 mL  5-40 mL IntraVENous Q8H    sodium chloride (NS) flush 5-40 mL 5-40 mL IntraVENous PRN    aspirin chewable tablet 81 mg  81 mg Per NG tube DAILY    insulin glargine (LANTUS) injection 40 Units  40 Units SubCUTAneous QHS    [Held by provider] ticagrelor (BRILINTA) tablet 90 mg  90 mg Per NG tube Q12H    lip protectant (BLISTEX) ointment 1 Each  1 Each Topical PRN    pantoprazole (PROTONIX) 40 mg in 0.9% sodium chloride 10 mL injection  40 mg IntraVENous DAILY    acetaminophen (TYLENOL) suppository 650 mg  650 mg Rectal Q4H PRN    insulin regular (NOVOLIN R, HUMULIN R) injection   SubCUTAneous Q6H    levalbuterol (XOPENEX) nebulizer soln 1.25 mg/3 mL  1.25 mg Nebulization Q4H PRN    hydrALAZINE (APRESOLINE) 20 mg/mL injection 10 mg  10 mg IntraVENous Q6H PRN    levothyroxine (SYNTHROID) injection 110 mcg  110 mcg IntraVENous DAILY    insulin regular (NOVOLIN R, HUMULIN R) injection 4 Units  4 Units SubCUTAneous QPM    amLODIPine (NORVASC) tablet 10 mg  10 mg Oral DAILY    atorvastatin (LIPITOR) tablet 40 mg  40 mg Oral QHS    carvediloL (COREG) tablet 12.5 mg  12.5 mg Oral BID WITH MEALS    losartan (COZAAR) tablet 100 mg  100 mg Oral DAILY    ezetimibe (ZETIA) tablet 10 mg  10 mg Oral DAILY    hyoscyamine SL (LEVSIN/SL) tablet 0.125 mg  0.125 mg SubLINGual Q6H PRN    ondansetron (ZOFRAN ODT) tablet 4 mg  4 mg Oral Q8H PRN    sertraline (ZOLOFT) tablet 200 mg  200 mg Oral BID    sodium chloride (NS) flush 5-40 mL  5-40 mL IntraVENous Q8H    sodium chloride (NS) flush 5-40 mL  5-40 mL IntraVENous PRN    acetaminophen (TYLENOL) tablet 650 mg  650 mg Oral Q4H PRN    ondansetron (ZOFRAN) injection 4 mg  4 mg IntraVENous Q4H PRN    magnesium hydroxide (MILK OF MAGNESIA) 400 mg/5 mL oral suspension 30 mL  30 mL Oral DAILY PRN    [Held by provider] heparin (porcine) injection 5,000 Units  5,000 Units SubCUTAneous Q8H     Review of Systems  Unobtainable due to patient status.       Objective:     Vitals:    06/21/20 1624 06/21/20 2054 06/21/20 2357 06/22/20 9367 BP: 140/71 140/61 146/62 140/64   Pulse: 79 75 77 76   Resp: 18 18 20 18   Temp: 99.9 °F (37.7 °C) 99.6 °F (37.6 °C) 99.5 °F (37.5 °C) 99.2 °F (37.3 °C)   SpO2: 94% 96% 95% 94%   Weight:       Height:         Intake and Output:   06/20 1901 - 06/22 0700  In: 2438   Out: 1350 [Urine:1350]  No intake/output data recorded. Physical Exam:   Constitution:  the patient is obese and in no acute distress, trach collar 28%, sat 94%  EENMT:  Sclera clear, pupils equal, oral mucosa moist  Respiratory: Trach in place, no bleeding, clots or hemoptysis reported or observed. Cardiovascular:  RRR without M,G,R  Gastrointestinal: soft, obese and non-tender; with positive bowel sounds. PEG and receiving TFs  Musculoskeletal: warm without cyanosis. There is trace lower leg edema, support boots.   Skin:  no jaundice or rashes, no wounds   Neurologic: left side weakness and did not spontaneously move right side on exam  Psychiatric:  Non verbal, opens eyes, nodded some responses but did not follow commands      CHEST XRAY:   6/20/20      LAB  No lab exists for component: [place]   Recent Labs     06/22/20  0602 06/21/20  1250 06/21/20  0703 06/20/20  1250 06/20/20  0817   WBC 11.5*  --  11.1 13.1* 11.6*   HGB 8.3*  --  8.2* 8.6* 8.3*   HCT 26.0*  --  26.1* 26.4* 26.3*     --  276 283 261   INR  --  1.1  --   --   --      Recent Labs     06/22/20  0602 06/21/20  0703 06/20/20  1250    140 139   K 4.9 4.7 4.6    107 106   CO2 26 24 22   * 208* 242*   BUN 39* 45* 29*   CREA 1.26* 1.26* 1.26*   CA 10.1 9.8 9.5   ALB  --   --  2.5*     ABG:  No results found for: PH, PHI, PCO2, PCO2I, PO2, PO2I, HCO3, HCO3I, FIO2, FIO2I      Assessment:  (Medical Decision Making)     Hospital Problems  Date Reviewed: 6/22/2020          Codes Class Noted POA    Hemoptysis ICD-10-CM: R04.2  ICD-9-CM: 786.30  6/20/2020 Unknown    None observed--Brilinta and Heparin still on hold    Status post tracheostomy ([facility] [address] ICD-10-CM: Z93.0  ICD-9-CM: V44.0  6/19/2020 No    Overview Signed 6/19/2020  7:06 AM by Abby Rosales NP     6/18/20:  Placed by Dr. Marina Littlejohn bleeding    S/P percutaneous endoscopic gastrostomy (PEG) tube placement [facility]) ICD-10-CM: Z93.1  ICD-9-CM: V44.1  6/19/2020 No    Overview Signed 6/19/2020  7:07 AM by Abby Rosales NP     6/18/20  Dr. Zack Treviño         tolerating TFs    Dysphagia ICD-10-CM: R13.10  ICD-9-CM: 787.20  6/16/2020 Yes    PEG    Diabetes mellitus type 2, controlled ([facility] ICD-10-CM: E11.9  ICD-9-CM: 250.00  6/15/2020 Yes    Chronic--glucose ranges 166-207    Aspiration pneumonia (HCC) ICD-10-CM: J69.0  ICD-9-CM: 507.0  6/14/2020 Yes    Antibiotics completed    * (Principal) Acute respiratory failure with hypoxia (HCC) ICD-10-CM: J96.01  ICD-9-CM: 518.81  6/11/2020 Yes    On TC 28%    Hypernatremia ICD-10-CM: E87.0  ICD-9-CM: 276.0  6/6/2020 Yes    Resolved Na 138    ROSA (acute kidney injury) ([facility] ICD-10-CM: N17.9  ICD-9-CM: 584.9  6/5/2020 Yes    BUN down to 39, creat 1.26    UTI (urinary tract infection) ICD-10-CM: N39.0  ICD-9-CM: 599.0  6/5/2020 Yes    Antibiotics completed    Acute metabolic encephalopathy YLF-20-CK: G93.41  ICD-9-CM: 348.31  6/5/2020 Yes    Nonverbal but nodding some responses    Normocytic anemia ICD-10-CM: D64.9  ICD-9-CM: 285.9  6/5/2020 Yes    hgb remains low 8.3 today    Leukocytosis ICD-10-CM: H19.797  ICD-9-CM: 288.60  5/26/2020 Yes    WBC 11.5          New hemoptysis after trach placement. In the setting of asa, heparin, and brilinta usage. Plan:  (Medical Decision Making)     --Holding Brilinta and Heparin. Consider restarting--no hemoptysis. Would use Lovenox instead of Heparin  --Receiving TF and free water--BUN down to 39  --Hgb 8.3  --Planning [facility] when ready for discharge. Yoni Munoz, TAMIKO     Lungs:  Few trace rhonchi  Heart:  RRR with no Murmur/Rubs/Gallops    Additional Comments:  No further hemoptysis.  Recommend restarting Brilinta today and heparin tomorrow if no further bleeding. I have spoken with and examined the patient. I agree with the above assessment and plan as documented.     Dejah Jacques MD

## 2020-06-22 NOTE — PROGRESS NOTES
PPD requested d/t pt cant return to [address] [place] [address], Covid test pending as well.

## 2020-06-22 NOTE — PROGRESS NOTES
Shift assessment complete. Lungs diminished in the bases on auscultation. Respirations present. Even and unlabored. No s/s of distress. No s/s of pain at this time. See Doc Flowsheet for assessment details. Patient resting in bed. Bed alarm in progress. Door open for observation.

## 2020-06-22 NOTE — PROGRESS NOTES
Bedside report received from night nurse Abby Beaver. . Assessment done as noted  Respiration even and unlabored 20/min; denies pain or nausea at present. Encouraged to call with needs.

## 2020-06-22 NOTE — PROGRESS NOTES
received message from [facility] with [facility] and they are not able to take pt back d/t they cant take pt with trach at this time. Called left message for spouse to return call about DC to see what other referrals he would like at this time for pt for rehab.

## 2020-06-23 ENCOUNTER — HOSPITAL ENCOUNTER (OUTPATIENT)
Age: 75
Discharge: NURSING, REHAB OR LONG-TERM CARE | End: 2020-07-13
Attending: INTERNAL MEDICINE | Admitting: INTERNAL MEDICINE

## 2020-06-23 VITALS
HEIGHT: 66 IN | HEART RATE: 66 BPM | SYSTOLIC BLOOD PRESSURE: 116 MMHG | DIASTOLIC BLOOD PRESSURE: 56 MMHG | OXYGEN SATURATION: 97 % | TEMPERATURE: 98 F | RESPIRATION RATE: 20 BRPM | BODY MASS INDEX: 33.75 KG/M2 | WEIGHT: 210 LBS

## 2020-06-23 LAB
ANION GAP SERPL CALC-SCNC: 10 MMOL/L (ref 7–16)
BASOPHILS # BLD: 0 K/UL (ref 0–0.2)
BASOPHILS NFR BLD: 0 % (ref 0–2)
BUN SERPL-MCNC: 39 MG/DL (ref 8–23)
CALCIUM SERPL-MCNC: 9.7 MG/DL (ref 8.3–10.4)
CHLORIDE SERPL-SCNC: 103 MMOL/L (ref 98–107)
CO2 SERPL-SCNC: 24 MMOL/L (ref 21–32)
CREAT SERPL-MCNC: 1.19 MG/DL (ref 0.6–1)
DIFFERENTIAL METHOD BLD: ABNORMAL
EOSINOPHIL # BLD: 0.4 K/UL (ref 0–0.8)
EOSINOPHIL NFR BLD: 4 % (ref 0.5–7.8)
ERYTHROCYTE [DISTWIDTH] IN BLOOD BY AUTOMATED COUNT: 17.1 % (ref 11.9–14.6)
GLUCOSE BLD STRIP.AUTO-MCNC: 118 MG/DL (ref 65–100)
GLUCOSE BLD STRIP.AUTO-MCNC: 136 MG/DL (ref 65–100)
GLUCOSE BLD STRIP.AUTO-MCNC: 182 MG/DL (ref 65–100)
GLUCOSE SERPL-MCNC: 153 MG/DL (ref 65–100)
HCT VFR BLD AUTO: 26 % (ref 35.8–46.3)
HGB BLD-MCNC: 8.4 G/DL (ref 11.7–15.4)
IMM GRANULOCYTES # BLD AUTO: 0.1 K/UL (ref 0–0.5)
IMM GRANULOCYTES NFR BLD AUTO: 1 % (ref 0–5)
LYMPHOCYTES # BLD: 1.5 K/UL (ref 0.5–4.6)
LYMPHOCYTES NFR BLD: 13 % (ref 13–44)
MCH RBC QN AUTO: 31.6 PG (ref 26.1–32.9)
MCHC RBC AUTO-ENTMCNC: 32.3 G/DL (ref 31.4–35)
MCV RBC AUTO: 97.7 FL (ref 79.6–97.8)
MM INDURATION POC: 0 MM (ref 0–5)
MONOCYTES # BLD: 0.8 K/UL (ref 0.1–1.3)
MONOCYTES NFR BLD: 7 % (ref 4–12)
NEUTS SEG # BLD: 8.9 K/UL (ref 1.7–8.2)
NEUTS SEG NFR BLD: 76 % (ref 43–78)
NRBC # BLD: 0 K/UL (ref 0–0.2)
PLATELET # BLD AUTO: 279 K/UL (ref 150–450)
PMV BLD AUTO: 10.7 FL (ref 9.4–12.3)
POTASSIUM SERPL-SCNC: 4.4 MMOL/L (ref 3.5–5.1)
PPD POC: NEGATIVE NEGATIVE
RBC # BLD AUTO: 2.66 M/UL (ref 4.05–5.2)
SODIUM SERPL-SCNC: 137 MMOL/L (ref 136–145)
WBC # BLD AUTO: 11.7 K/UL (ref 4.3–11.1)

## 2020-06-23 PROCEDURE — 97535 SELF CARE MNGMENT TRAINING: CPT

## 2020-06-23 PROCEDURE — 74011250637 HC RX REV CODE- 250/637: Performed by: INTERNAL MEDICINE

## 2020-06-23 PROCEDURE — 97530 THERAPEUTIC ACTIVITIES: CPT

## 2020-06-23 PROCEDURE — 74011250637 HC RX REV CODE- 250/637: Performed by: SURGERY

## 2020-06-23 PROCEDURE — 99232 SBSQ HOSP IP/OBS MODERATE 35: CPT | Performed by: INTERNAL MEDICINE

## 2020-06-23 PROCEDURE — 74011250637 HC RX REV CODE- 250/637: Performed by: FAMILY MEDICINE

## 2020-06-23 PROCEDURE — 85025 COMPLETE CBC W/AUTO DIFF WBC: CPT

## 2020-06-23 PROCEDURE — 74011000250 HC RX REV CODE- 250: Performed by: SURGERY

## 2020-06-23 PROCEDURE — 36415 COLL VENOUS BLD VENIPUNCTURE: CPT

## 2020-06-23 PROCEDURE — 74011636637 HC RX REV CODE- 636/637: Performed by: SURGERY

## 2020-06-23 PROCEDURE — C9113 INJ PANTOPRAZOLE SODIUM, VIA: HCPCS | Performed by: SURGERY

## 2020-06-23 PROCEDURE — 97164 PT RE-EVAL EST PLAN CARE: CPT

## 2020-06-23 PROCEDURE — 80048 BASIC METABOLIC PNL TOTAL CA: CPT

## 2020-06-23 PROCEDURE — 31720 CLEARANCE OF AIRWAYS: CPT

## 2020-06-23 PROCEDURE — 74011250636 HC RX REV CODE- 250/636: Performed by: SURGERY

## 2020-06-23 PROCEDURE — 97167 OT EVAL HIGH COMPLEX 60 MIN: CPT

## 2020-06-23 RX ORDER — LEVALBUTEROL INHALATION SOLUTION 1.25 MG/3ML
1.25 SOLUTION RESPIRATORY (INHALATION)
Qty: 120 NEBULE | Refills: 0 | Status: SHIPPED
Start: 2020-06-23

## 2020-06-23 RX ORDER — INSULIN GLARGINE 100 [IU]/ML
INJECTION, SOLUTION SUBCUTANEOUS
Qty: 1 VIAL | Refills: 0 | Status: SHIPPED
Start: 2020-06-23

## 2020-06-23 RX ORDER — CARVEDILOL 12.5 MG/1
12.5 TABLET ORAL 2 TIMES DAILY WITH MEALS
Qty: 60 TAB | Refills: 0 | Status: SHIPPED
Start: 2020-06-23

## 2020-06-23 RX ORDER — PANTOPRAZOLE SODIUM 40 MG/1
40 TABLET, DELAYED RELEASE ORAL
Status: DISCONTINUED | OUTPATIENT
Start: 2020-06-24 | End: 2020-06-23 | Stop reason: HOSPADM

## 2020-06-23 RX ORDER — PANTOPRAZOLE SODIUM 40 MG/1
40 TABLET, DELAYED RELEASE ORAL
Qty: 30 TAB | Refills: 0 | Status: SHIPPED
Start: 2020-06-24

## 2020-06-23 RX ADMIN — TICAGRELOR 90 MG: 90 TABLET ORAL at 09:05

## 2020-06-23 RX ADMIN — LEVOTHYROXINE SODIUM ANHYDROUS 110 MCG: 100 INJECTION, POWDER, LYOPHILIZED, FOR SOLUTION INTRAVENOUS at 09:29

## 2020-06-23 RX ADMIN — HUMAN INSULIN 3 UNITS: 100 INJECTION, SOLUTION SUBCUTANEOUS at 12:00

## 2020-06-23 RX ADMIN — SODIUM CHLORIDE 40 MG: 9 INJECTION INTRAMUSCULAR; INTRAVENOUS; SUBCUTANEOUS at 09:14

## 2020-06-23 RX ADMIN — CARVEDILOL 12.5 MG: 12.5 TABLET, FILM COATED ORAL at 09:05

## 2020-06-23 RX ADMIN — SERTRALINE HYDROCHLORIDE 200 MG: 100 TABLET ORAL at 09:05

## 2020-06-23 RX ADMIN — ASPIRIN 81 MG 81 MG: 81 TABLET ORAL at 09:00

## 2020-06-23 RX ADMIN — EZETIMIBE 10 MG: 10 TABLET ORAL at 09:04

## 2020-06-23 RX ADMIN — LOSARTAN POTASSIUM 100 MG: 50 TABLET, FILM COATED ORAL at 09:00

## 2020-06-23 RX ADMIN — AMLODIPINE BESYLATE 10 MG: 10 TABLET ORAL at 09:23

## 2020-06-23 NOTE — PROGRESS NOTES
Message sent to [facility] with [facility] waiting to hear back about possible admission to unit today, spoke with spouse and updated him on plan, made him aware CM will contact him when/if pt goes to [facility] today, verbalized understanding.

## 2020-06-23 NOTE — PROGRESS NOTES
Received call from [facility] pt accepted and will be dc to [facility] today, Dr Perlita Lakhani notified of need for dc orders, called left voice mail for spouse about dc asked to return call if questions. Gabby Samson asked to notify primary RN to call report.

## 2020-06-23 NOTE — PROGRESS NOTES
Problem: Mobility Impaired (Adult and Pediatric)  Goal: *Therapy Goal (Edit Goal, Insert Text)  Outcome: Progressing Towards Goal  Note: STG: (goals assessed and revised as needed 6/23/20):  (1.)Ms. Jesus Boyd will move from supine to sit and sit to supine , scoot up and down, and roll side to side with MAXIMAL ASSIST within 5 treatment day(s). (2.)Ms. Jesus Boyd will transfer from bed to chair and chair to bed with MAXIMAL ASSIST using the least restrictive device within 5 treatment day(s). (3.)Ms. Jesus Boyd will maintain static sitting balance at EOB with MAXIMAL ASSIST x 5 min within 5 treatment days for increased trunk control. (4.)Ms. Jesus Boyd will perform AAROM B LE with MODERATE ASSIST within 5 treatment days for increased ROM and strength. LTG:  (1.)Ms. Jesus Boyd will move from supine to sit and sit to supine , scoot up and down, and roll side to side in bed with MODERATE ASSIST within 10 treatment day(s). (2.)Ms. Jesus Boyd will transfer from bed to chair and chair to bed with MODERATE ASSIST using the least restrictive device within 10 treatment day(s). (3.)Ms. Jesus Boyd will ambulate with MAXIMAL ASSIST for 10 feet with the least restrictive device within 10 treatment day(s).   ________________________________________________________________________________________________       PHYSICAL THERAPY: Re-evaluation and AM 6/23/2020  INPATIENT: PT Visit Days : 1  [place] / [place] / Product Type: Medicare /       NAME/AGE/GENDER: Sarah Blackman is a 66 y.o. female   PRIMARY DIAGNOSIS: ROSA (acute kidney injury) ([facility] [N17.9]  Acute respiratory failure with hypoxia (HCC) [J96.01]  Acute respiratory failure with hypoxia (HCC) [J96.01] Acute respiratory failure with hypoxia (HCC) Acute respiratory failure with hypoxia (HCC)    5 Days Post-Op  ICD-10: Treatment Diagnosis:    · Generalized Muscle Weakness (M62.81)  · Other lack of cordination (R27.8)  · Difficulty in walking, Not elsewhere classified (R26.2)  · Other abnormalities of gait and mobility (R26.89)   Precaution/Allergies:  Ace inhibitors; Adhesive tape; Codeine; Other medication; Other omega-3s; and Sulfa (sulfonamide antibiotics)      ASSESSMENT:     Ms. Rogelio Grant presents in supine, now seen for reassessment due to recent discharge to ICU. Pt post trach and PEG placement on 6/19/20. Pt with complicated medical history, is drowsy this date, awakens to stimulus and voice. Pt non verbal throughout, but nods head yes/no for answers occasionally. Pt noted to have large BM upon initial movement. PT co treatment with OT this date, OT working on ADLs while PT working on bed mobility, positioning. Pt required total assist x 2 for rolling to L and R for cleaning needs. Pt attempted to initiate mobility in B quads, R > L. PROM to [facility] performed to B LEs in all joints in all directions. Pt with very little to no movement on L side. Pt positioned for comfort at end of session. PT to follow for acute care needs. Pt limited progress from initial eval, guarded rehab potential.        This section established at most recent assessment   PROBLEM LIST (Impairments causing functional limitations):  1. Decreased Strength  2. Decreased ADL/Functional Activities  3. Decreased Transfer Abilities  4. Decreased Ambulation Ability/Technique  5. Decreased Balance  6. Decreased Flexibility/Joint Mobility  7. Edema/Girth  8. Decreased Cognition   INTERVENTIONS PLANNED: (Benefits and precautions of physical therapy have been discussed with the patient.)  1. Balance Exercise  2. Bed Mobility  3. Gait Training  4. Group Therapy  5. Neuromuscular Re-education/Strengthening  6. Therapeutic Activites  7. Therapeutic Exercise/Strengthening     TREATMENT PLAN: Frequency/Duration: 3 times a week for duration of hospital stay  Rehabilitation Potential For Stated Goals: [facility] (at time of discharge pending progress):    Placement:   It is my opinion, based on this patient's performance to date, that Ms. Adriana Yeager may benefit from intensive therapy at a [address] [place] [address] after discharge due to the functional deficits listed above that are likely to improve with skilled rehabilitation and concerns that he/she may be unsafe to be unsupervised at home due to debility and decreased mobility. Equipment:    None at this time              HISTORY:   History of Present Injury/Illness (Reason for Referral):  PER MD H&P   Guero Person is a 66 y.o. female with a past medical history of CAD, DM type II, HTN who presents to the ER from [facility] with report of worsening confusion and elevated creatinine on labs. The patient is unable to contributed to history beyond denying pain or fever. Past Medical History/Comorbidities:   Ms. Adriana Yeager  has a past medical history of Arrhythmia, Arthritis, Asthma, CAD (coronary artery disease), Chronic pain, Diabetes ([address] GERD (gastroesophageal reflux disease), Hypertension, Menopause, Morbid obesity ([address] Stroke [facility]), Thyroid disease, and Unspecified adverse effect of anesthesia. She also has no past medical history of Aneurysm ([address] Autoimmune disease ([address] Cancer ([address] Chronic kidney disease, Coagulation defects, COPD, Difficult intubation, Heart failure ([address] Liver disease, Malignant hyperthermia due to anesthesia, Nausea & vomiting, Other ill-defined conditions(799.89), Pseudocholinesterase deficiency, Psychiatric disorder, PUD (peptic ulcer disease), Seizures ([address] Thromboembolus ([address] or Unspecified sleep apnea. Ms. Adriana Yeager  has a past surgical history that includes hx appendectomy; hx lap cholecystectomy; hx orthopaedic; hx knee arthroscopy; hx clemente and bso; hx breast biopsy; and pr cardiac surg procedure unlist (4/2010).   Social History/Living Environment:   Home Environment: Private residence  # Steps to Enter: 10  One/Two Story Residence: Two story  # of Interior Steps: 20(patients room is on second floor)  Living Alone: No  Support Systems: Spouse/Significant Other/Partner  Patient Expects to be Discharged to[de-identified] Private residence  Current DME Used/Available at Home: [employer], rolling  Prior Level of Function/Work/Activity:  Per RN, lives with spouse and ambulatory with [employer]. Has been declining recently. Dominant Side:         RIGHT    Personal Factors:          Sex:  female        Age:  66 y.o. Number of Personal Factors/Comorbidities that affect the Plan of Care: 1-2: MODERATE COMPLEXITY   EXAMINATION:   Most Recent Physical Functioning:   Gross Assessment:  AROM: Grossly decreased, non-functional(B LE)  PROM: Generally decreased, functional(B LE)  Strength: Grossly decreased, non-functional(B LE)  Coordination: Grossly decreased, non-functional  Tone: Abnormal(flaccid)  Sensation: Intact(shakes head yes to light touch B LE)               Posture:     Balance:    Bed Mobility:  Rolling: Total assistance;Assist x2  Scooting: Total assistance;Assist x2  Wheelchair Mobility:     Transfers:     Gait:            Body Structures Involved:  1. Bones  2. Joints  3. Muscles  4. Ligaments Body Functions Affected:  1. Neuromusculoskeletal  2. Movement Related  3. Skin Related  4. Metobolic/Endocrine Activities and Participation Affected:  1. Communication  2. Mobility  3. Self Care  4. Domestic Life  5. Community, Social and [place]   Number of elements that affect the Plan of Care: 3: MODERATE COMPLEXITY   CLINICAL PRESENTATION:   Presentation: Evolving clinical presentation with changing clinical characteristics: MODERATE COMPLEXITY   CLINICAL DECISION MAKIN [facility] Mobility Inpatient Short Form  How much difficulty does the patient currently have. .. Unable A Lot A Little None   1. Turning over in bed (including adjusting bedclothes, sheets and blankets)? [x] 1   [] 2   [] 3   [] 4   2.   Sitting down on and standing up from a chair with arms ( e.g., wheelchair, bedside commode, etc.) [x] 1   [] 2   [] 3   [] 4   3. Moving from lying on back to sitting on the side of the bed? [x] 1   [] 2   [] 3   [] 4   How much help from another person does the patient currently need. .. Total A Lot A Little None   4. Moving to and from a bed to a chair (including a wheelchair)? [x] 1   [] 2   [] 3   [] 4   5. Need to walk in hospital room? [x] 1   [] 2   [] 3   [] 4   6. Climbing 3-5 steps with a railing? [x] 1   [] 2   [] 3   [] 4   © 2007, Trustees of [facility] [employer], under license to [employer]. All rights reserved      Score:  Initial: 6 Most Recent: 6 (Date: -6/23/20- )    Interpretation of Tool:  Represents activities that are increasingly more difficult (i.e. Bed mobility, Transfers, Gait). Medical Necessity:     · Patient demonstrates   · fair and poor  ·  rehab potential due to higher previous functional level. Reason for Services/Other Comments:  · Patient continues to require skilled intervention due to   · medical complications, patient unable to attend/participate in therapy as expected, and decreased transfers, ambulation and mobility. · .   Use of outcome tool(s) and clinical judgement create a POC that gives a: Questionable prediction of patient's progress: MODERATE COMPLEXITY            TREATMENT:   (In addition to Assessment/Re-Assessment sessions the following treatments were rendered)   Pre-treatment Symptoms/Complaints: pt able to shake/nod head appropriately at times   Pain: Initial:   Pain Intensity 1: 0  Post Session:  0/10      Therapeutic Activity: (   23 min ):  Therapeutic activities including Bed mobility (rolling, scooting), supine AAROM/PROM (active, active-assisted, and passive) to B UEs/LEs and positioning to improve mobility, strength, balance, and coordination. Required maximal assist to promote motor control of right, upper extremity(s), lower extremity(s) and would need total assist for all other mobility.     Braces/Orthotics/Lines/Etc: · IV  · farris catheter  · O2 Device: Room air  Treatment/Session Assessment:    · Response to Treatment:  No real progress  · Interdisciplinary Collaboration:   o Physical Therapist  o Occupational Therapist  o Registered Nurse  · After treatment position/precautions:   o Supine in bed  o Bed/Chair-wheels locked  o Bed in low position  o Call light within reach  o RN notified   · Compliance with Program/Exercises: Compliant all of the time  · Recommendations/Intent for next treatment session: \"Next visit will focus on advancements to more challenging activities and reduction in assistance provided\".   Total Treatment Duration:  PT Patient Time In/Time Out  Time In: 1102  Time Out: [facility],

## 2020-06-23 NOTE — PROGRESS NOTES
Nutrition: Follow-up  Consult for tube feeding management (Hospitalist)    Assessment:  Food/Nutrition Patient History: Patient admitted from from [facility] with worsening confusion. She was recently admitted last month with elevated troponins and also found to have CVA. SLP was following during last admission and recommended puree diet with honey thick liquids by spoon. SLP following this admission. Diet of puree with honey liquids started 6/6. Patient made NPO 6/8. Noted that she had rapid called 6/7 for hypoxia and likely aspiration. Diet has not been able to be advanced due to patient not being able to participate in PO trials. S/p trach and PEG 6/18 with short ICU stay following. Transferred back to floor 6/19. Free water flush increased by Dr. Gaby Brizuela for increasing BUN. PMH also significant for CAD, DM2, HTN. Patient seen in follow-up today with no visitors at bedside. She was sleeping and did not wake to voice. Observed TF running at 40 ml/hr and free water flush at 50 ml/hr. Spoke with RN who reports patient has had diarrhea. Noted that patient has been on several antibiotics. Abdomen: Last BM 6/23 - loose or soft for last 4 days, active BS  Edema: General 2+, 2+ pitting all extremities  Lab Results   Component Value Date/Time    Sodium 137 06/23/2020 09:04 AM    Potassium 4.4 06/23/2020 09:04 AM    Chloride 103 06/23/2020 09:04 AM    CO2 24 06/23/2020 09:04 AM    Anion gap 10 06/23/2020 09:04 AM    Glucose 153 (H) 06/23/2020 09:04 AM    BUN 39 (H) 06/23/2020 09:04 AM    Creatinine 1.19 (H) 06/23/2020 09:04 AM    Calcium 9.7 06/23/2020 09:04 AM    Albumin 2.5 (L) 06/20/2020 12:50 PM    Phosphorus 3.1 06/12/2020 08:14 AM   POC glucose 159-301 last 24 hrs  Labs remarkable for Na WNL, Elevated glucose but much improved, elevated BUN and creatinine but improving  Diet: DIET TUBE FEEDING Open order for details. Keep HOB > 30 degrees. Check residuals every 4 hours.  Hold TF for > 500 ml x 1 or 250 ml x 2 consecutive checks. Also place patient on right side if residual is > 250 ml. Pertinent Medications: Lantus 40 units per day SSI (8 units yesterday, 0 so far today), Novolin 4 units every evening, Protonix, synthroid, Milk of Mag PRN, Levsin  Enteral nutrition access: NGT  Anthropometrics:Height: 5' 6\" (167.6 cm),  Weight: 95.3 kg (210 lb), Weight Source: Bed, Body mass index is 33.89 kg/m². BMI class of obese. Macronutrient Needs: 95.5 kg CBW (Current body weight) bed scale  Estimated energy requirements:  4067-9736 kcal /day (15-20 kcal/kg)  Estimated protein requirements:  72-95 grams protein/day (20% kcal)  CHO limit per day: 263 grams CHO/day (55% calories)  Estimated fluid/day: 1.4-1.9 liters/day (~1ml/kcal/day)  Intake/Comparative Standards: TF at goal, providing 1440 kcal (100% estimated calorie needs), 79 grams protein (100% estimated protein needs), 128 grams CHO/day (does not exceed maximum CHO/day) and ~2L free fluid (for elevated BUN). Intervention:  Meals and snacks: NPO   Enteral Nutrition: Continue Glucerna 1.5 via gtube at 40ml/hour with free water flush at 50/hour. Vitamin and Mineral Supplement Therapy:  Nutrition support orders for electrolyte management replacement are activated and placed on the MAR. May benefit from probiotic if having diarrhea as patient has been on several antibiotics. Coordination of Nutrition Care: Discussed with Rivka Roberts RN  Discharge Nutrition Plan: Patient will need enteral nutrition at discharge. Administration to be determined pending goals and discharge plan.     [address] [place] [address], [id number], [phone] [facility]

## 2020-06-23 NOTE — PROGRESS NOTES
Am assessment completed. (see flow sheet for detail). Shakes her head no when asked if she is in pain. >5ml of residual feeding.

## 2020-06-23 NOTE — PROGRESS NOTES
The pt was asleep in bed. The pt had NWOB. There was no sounds of congestion coming from thr trach as she breathed. Her Trah mask was on and O2 was flowing as documented. The pt was easily aroused and made eye contact with a smile. She had good radial pulses and did not appear to be in any distress. Her I.V. site was patent and secured without any noted swelling or discoloration at the site.

## 2020-06-23 NOTE — PROGRESS NOTES
Guero De Paz  Admission Date: 6/5/2020             Daily Progress Note: 6/23/2020    The patient's chart is reviewed and the patient is discussed with the staff. 66 y.o. CF evaluated at the request of Dr. Omar Albert chronic medical:  CAD, HTN, recent CVA with residual right sided weakness, DM, hx chronic CVA with left side weankness. Arrived from [facility] with worsening confusion and elevated creatinine.  Was found to have UTI and started on empiric antibiotics.  On 6/7 AM was found to have acute respiratory failure with hypoxia likely due to aspiration.  Was suctioned, placed on NRB with sat  improving to 96%. Has been lethargic and minimally responsive since the event with fevers and antibiotics escalated. CT head was neg for any acute intracranial abnormalities and CXR and CT showed RLL infiltrate confirming suspiration for aspiration pneumonia. Due to worsening renal function, nephrology was consulted and workup revealed no obstruction and renal function has improved on IVFs.  Noted to be hyponatremia and hypotonic saline infusion.    Pulmonary was consulted due to high O2 requirement despite being treated by antibiotics for 5 days. Patient has been started o vancomycin and zosyn. She is On O2 at 5L/min via NC. Started coughing up blood clots 6/2, seen emergently and bronch performed--no ulceration or source of bleeding. Brilinta, ASA and Heparin held. Subjective:       Remains  on 28% trach collar with sat 96%. No significant bleeding last night. Per staff--no bleeding from trach.     Current Facility-Administered Medications   Medication Dose Route Frequency    tuberculin injection 5 Units  5 Units IntraDERMal ONCE    lidocaine (XYLOCAINE) 4 % (40 mg/mL) topical solution   TransTRACHeal PRN    lidocaine (XYLOCAINE) 10 mg/mL (1 %) injection 0.1 mL  0.1 mL SubCUTAneous PRN    sodium chloride (NS) flush 5-40 mL  5-40 mL IntraVENous Q8H    sodium chloride (NS) flush 5-40 mL 5-40 mL IntraVENous PRN    aspirin chewable tablet 81 mg  81 mg Per NG tube DAILY    insulin glargine (LANTUS) injection 40 Units  40 Units SubCUTAneous QHS    ticagrelor (BRILINTA) tablet 90 mg  90 mg Per NG tube Q12H    lip protectant (BLISTEX) ointment 1 Each  1 Each Topical PRN    pantoprazole (PROTONIX) 40 mg in 0.9% sodium chloride 10 mL injection  40 mg IntraVENous DAILY    acetaminophen (TYLENOL) suppository 650 mg  650 mg Rectal Q4H PRN    insulin regular (NOVOLIN R, HUMULIN R) injection   SubCUTAneous Q6H    levalbuterol (XOPENEX) nebulizer soln 1.25 mg/3 mL  1.25 mg Nebulization Q4H PRN    hydrALAZINE (APRESOLINE) 20 mg/mL injection 10 mg  10 mg IntraVENous Q6H PRN    levothyroxine (SYNTHROID) injection 110 mcg  110 mcg IntraVENous DAILY    insulin regular (NOVOLIN R, HUMULIN R) injection 4 Units  4 Units SubCUTAneous QPM    amLODIPine (NORVASC) tablet 10 mg  10 mg Oral DAILY    atorvastatin (LIPITOR) tablet 40 mg  40 mg Oral QHS    carvediloL (COREG) tablet 12.5 mg  12.5 mg Oral BID WITH MEALS    losartan (COZAAR) tablet 100 mg  100 mg Oral DAILY    ezetimibe (ZETIA) tablet 10 mg  10 mg Oral DAILY    hyoscyamine SL (LEVSIN/SL) tablet 0.125 mg  0.125 mg SubLINGual Q6H PRN    ondansetron (ZOFRAN ODT) tablet 4 mg  4 mg Oral Q8H PRN    sertraline (ZOLOFT) tablet 200 mg  200 mg Oral BID    sodium chloride (NS) flush 5-40 mL  5-40 mL IntraVENous Q8H    sodium chloride (NS) flush 5-40 mL  5-40 mL IntraVENous PRN    acetaminophen (TYLENOL) tablet 650 mg  650 mg Oral Q4H PRN    ondansetron (ZOFRAN) injection 4 mg  4 mg IntraVENous Q4H PRN    magnesium hydroxide (MILK OF MAGNESIA) 400 mg/5 mL oral suspension 30 mL  30 mL Oral DAILY PRN    [Held by provider] heparin (porcine) injection 5,000 Units  5,000 Units SubCUTAneous Q8H     Review of Systems    Unobtainable due to patient status.       Objective:     Vitals:    06/22/20 2032 06/22/20 2332 06/23/20 0322 06/23/20 0736   BP:  141/70 141/61 108/74   Pulse:  70 75 78   Resp:  20 20 20   Temp:  98.8 °F (37.1 °C) 98.6 °F (37 °C) 98.6 °F (37 °C)   SpO2: 96% 95% 94% 98%   Weight:       Height:         Intake and Output:   06/21 1901 - 06/23 0700  In: 678   Out: 2025 [Urine:2025]  No intake/output data recorded. Physical Exam:   Constitution:  the patient is obese and in no acute distress, trach collar 28%, sat 94%  EENMT:  Sclera clear, pupils equal, oral mucosa moist  Respiratory: Trach in place, no bleeding, clots or hemoptysis reported or observed. Cardiovascular:  RRR without M,G,R  Gastrointestinal: soft, obese and non-tender; with positive bowel sounds. PEG and receiving TFs  Musculoskeletal: warm without cyanosis. There is trace lower leg edema, support boots. Skin:  no jaundice or rashes, no wounds   Neurologic: left side weakness and did not spontaneously move right side on exam  Psychiatric:  Non verbal, opens eyes, nodded some responses but did not follow commands      CHEST XRAY:     6/20/20        LAB  No lab exists for component: [place]   Recent Labs     06/22/20  0602 06/21/20  1250 06/21/20  0703 06/20/20  1250   WBC 11.5*  --  11.1 13.1*   HGB 8.3*  --  8.2* 8.6*   HCT 26.0*  --  26.1* 26.4*     --  276 283   INR  --  1.1  --   --      Recent Labs     06/22/20  0602 06/21/20  0703 06/20/20  1250    140 139   K 4.9 4.7 4.6    107 106   CO2 26 24 22   * 208* 242*   BUN 39* 45* 29*   CREA 1.26* 1.26* 1.26*   CA 10.1 9.8 9.5   ALB  --   --  2.5*     ABG:  No results found for: PH, PHI, PCO2, PCO2I, PO2, PO2I, HCO3, HCO3I, FIO2, FIO2I      Assessment:  (Medical Decision Making)     Hospital Problems  Date Reviewed: 6/22/2020          Codes Class Noted POA    Hemoptysis ICD-10-CM: R04.2  ICD-9-CM: 786.30  6/20/2020 Unknown    None observed on Brilinta.  Ok to start heparin    Status post tracheostomy [facility]) ICD-10-CM: Z93.0  ICD-9-CM: V44.0  6/19/2020 No    Overview Signed 6/19/2020  7:06 AM by Anastacia Peters NP     6/18/20:  Placed by Dr. Kathya King further bleeding. S/P percutaneous endoscopic gastrostomy (PEG) tube placement [facility]) ICD-10-CM: Z93.1  ICD-9-CM: V44.1  6/19/2020 No    Overview Signed 6/19/2020  7:07 AM by Delroy Dela Cruz, [email or web address]     6/18/20  Dr. Serena Santos         tolerating TFs    Dysphagia ICD-10-CM: R13.10  ICD-9-CM: 787.20  6/16/2020 Yes    PEG    Diabetes mellitus type 2, controlled ([facility] ICD-10-CM: E11.9  ICD-9-CM: 250.00  6/15/2020 Yes    Chronic--glucose ranges 166-207    Aspiration pneumonia (HCC) ICD-10-CM: J69.0  ICD-9-CM: 507.0  6/14/2020 Yes    Antibiotics completed    * (Principal) Acute respiratory failure with hypoxia (HCC) ICD-10-CM: J96.01  ICD-9-CM: 518.81  6/11/2020 Yes    On TC 28%    Hypernatremia ICD-10-CM: E87.0  ICD-9-CM: 276.0  6/6/2020 Yes    Resolved Na 138    ROSA (acute kidney injury) ([facility] ICD-10-CM: N17.9  ICD-9-CM: 584.9  6/5/2020 Yes    BUN down to 39, creat 1.26    UTI (urinary tract infection) ICD-10-CM: N39.0  ICD-9-CM: 599.0  6/5/2020 Yes    Antibiotics completed    Acute metabolic encephalopathy XDL-94-DA: G93.41  ICD-9-CM: 348.31  6/5/2020 Yes    Nonverbal but nodding some responses    Normocytic anemia ICD-10-CM: D64.9  ICD-9-CM: 285.9  6/5/2020 Yes    hgb remains low 8.3    Leukocytosis ICD-10-CM: X50.565  ICD-9-CM: 288.60  5/26/2020 Yes    WBC 11.5          New hemoptysis after trach placement. In the setting of asa, heparin, and brilinta usage. Plan:  (Medical Decision Making)     Ok to resume heparin. Lovenox preferred. On Brilinta. Receiving TF and free water--BUN down to 39  Change to cuffless trach tomorrow assuming no significant bleeding. Planning [facility] when ready for discharge.     Richard Riley MD

## 2020-06-23 NOTE — PROGRESS NOTES
Problem: Self Care Deficits Care Plan (Adult)  Goal: *Acute Goals and Plan of Care (Insert Text)  Description:   1. Patient will complete rolling in supine with maximal assistance for increased independence with supine ADLs. 2. Patient will attempt to supine to sit transfers with up to maximal assistance in preparation for functional transfers. 3. Patient will complete upper body bathing and dressing with maximal assistance and adaptive equipment as needed. 4. Patient will complete grooming with maximal assistance and adaptive equipment as needed. 5. Patient will tolerate 20 minutes of OT treatment with up to 3 rest breaks to increase activity tolerance for ADLs. 6. Patient will demonstrate modified independence with upper extremity HEP to increase strength/AROM/coordination for increased safety and independence with functional tasks. 7. Family will demonstrate modified independence with LUE support home program to manage subluxation. Timeframe: 7 visits      Outcome: Progressing Towards Goal     OCCUPATIONAL THERAPY: Initial Assessment, Daily Note, and AM 6/23/2020  INPATIENT: OT Visit Days: 1  [place] / [place] / Product Type: Medicare /      NAME/AGE/GENDER: Fernanda Hightower is a 66 y.o. female   PRIMARY DIAGNOSIS:  ROSA (acute kidney injury) ([facility] [place] [address] [N17.9]  Acute respiratory failure with hypoxia (HCC) [J96.01]  Acute respiratory failure with hypoxia (HCC) [J96.01] Acute respiratory failure with hypoxia (HCC) Acute respiratory failure with hypoxia (HCC)  Procedure(s) (LRB):  TRACHEOSTOMY (N/A)  GASTROSTOMY TUBE PERCUTANEOUS INSERTION (GI LAB) (N/A)  5 Days Post-Op  ICD-10: Treatment Diagnosis:    Generalized Muscle Weakness (M62.81)  Other lack of cordination (R27.8)   Precautions/Allergies:    Fall precautions    Ace inhibitors; Adhesive tape; Codeine; Other medication;  Other omega-3s; and Sulfa (sulfonamide antibiotics)      ASSESSMENT:     MsJosé Miguel VargheseMarry [place] is a 66 y.o. female admitted from [facility] with confusion, UTI, elevated creatinine. Hx prior CVA with L sided weakness, some aphasia. Rapid response called 6/7/2020 for acute respiratory failure. Trach/PEG placement 6/18/2020, transfer to CCU. Prior to CVA pt lived with spouse, used walker for ambulation. Information gathered from chart as pt non-verbal with trach. Able to appropriately answer questions with head nods and able to follow commands. Upon arrival pt asleep, awakens to sternal rub. Pt agreeable to OT evaluation and treatment, on trach/trach collar. BUE assessment revealed AROM significantly decreased and strength in RUE, proximally more than distally. LUE flaccid, L shoulder subluxation noted. RN notified, educated to support LUE and approximate L shoulder joint. Pt found to be soiled. Pt practiced rolling L <> R in supine with total assist/cues for technique. Pt able to participate with RUE to reach for and hold onto bed rail. Total assist for clothing management and bowel/phylicia hygiene. Pt practiced changing gown with total assist, decreased function in RUE and some decreased command following. Pt left supine in bed with call bell within reach, LUE supported and L shoulder joint approximated. Pt presents with deficits in strength, cognition, activity tolerance, balance, ambulation and transfers. Mechelle Collins is currently functioning below baseline and would benefit from continued OT to increase safety and independence with ADLs. Will follow. This patient is appropriate for co-treatment at this time due to multiple deficits including decreased balance, decreased cognition, decreased endurance, decreased strength, and need for high level assistance to complete functional transfers and functional tasks.      This section established at most recent assessment   PROBLEM LIST (Impairments causing functional limitations):  Decreased Strength  Decreased ADL/Functional Activities  Decreased Transfer Abilities  Decreased Ambulation Ability/Technique  Decreased Balance  Decreased Activity Tolerance  Decreased Work Simplification/Energy Conservation Techniques  Increased Fatigue  Increased Shortness of Breath  Decreased Flexibility/Joint Mobility  Edema/Girth  Decreased Skin Integrity/Hygeine  Decreased White with Home Exercise Program  Decreased Cognition   INTERVENTIONS PLANNED: (Benefits and precautions of occupational therapy have been discussed with the patient.)  Activities of daily living training  Adaptive equipment training  Balance training  Clothing management  Cognitive training  Donning&doffing training  Sean tech training  Hygiene training  Manual therapy training  Neuromuscular re-eduation  Re-evaluation  Therapeutic activity  Therapeutic exercise  Wheelchair management     TREATMENT PLAN: Frequency/Duration: Follow patient 3x/week to address above goals. Rehabilitation Potential For Stated Goals: [facility] (at time of discharge pending progress):    Placement: It is my opinion, based on this patient's performance to date, that Ms. David Musa may benefit from intensive therapy at a [address] [place] [address] after discharge due to the functional deficits listed above that are likely to improve with skilled rehabilitation and concerns that he/she may be unsafe to be unsupervised at home due to impaired strength and balance impacting ADLs, increasing risk of falls. Vs [facility]   Equipment:   TBD               OCCUPATIONAL PROFILE AND HISTORY:   History of Present Injury/Illness (Reason for Referral):  See H&P. Past Medical History/Comorbidities:   Ms. David Musa  has a past medical history of Arrhythmia, Arthritis, Asthma, CAD (coronary artery disease), Chronic pain, Diabetes ([address] GERD (gastroesophageal reflux disease), Hypertension, Menopause, Morbid obesity ([facility] [address] Stroke [facility]), Thyroid disease, and Unspecified adverse effect of anesthesia.  She also has no past medical history of Aneurysm ([facility] [address] Autoimmune disease ([address] Cancer ([address] Chronic kidney disease, Coagulation defects, COPD, Difficult intubation, Heart failure ([address] Liver disease, Malignant hyperthermia due to anesthesia, Nausea & vomiting, Other ill-defined conditions(799.89), Pseudocholinesterase deficiency, Psychiatric disorder, PUD (peptic ulcer disease), Seizures ([address] Thromboembolus ([address] or Unspecified sleep apnea. Ms. Ronn Ordaz  has a past surgical history that includes hx appendectomy; hx lap cholecystectomy; hx orthopaedic; hx knee arthroscopy; hx clemente and bso; hx breast biopsy; and pr cardiac surg procedure unlist (4/2010). Social History/Living Environment:   Home Environment: [address] [place] [address] Name: [facility]  One/Two Story Residence: Two story  Living Alone: No  Support Systems: Spouse/Significant Other/Partner, Skilled nursing facility  Patient Expects to be Discharged to[de-identified] Skilled nursing facility  Current DME Used/Available at Home: Edi Salguero  Prior Level of Function/Work/Activity:  Hx prior CVA with L sided weakness, some aphasia. Rapid response called 6/7/2020 for acute respiratory failure, likely from aspiration. Trach/PEG placement 6/18/2020. Prior to CVA pt lived with spouse, used walker for ambulation. Dominant Side:         RIGHT    Personal Factors:          Sex:  female        Age:  66 y.o. Other factors that influence how disability is experienced by the patient:  Multiple co-morbidities, prior CVA, difficult hospital stay requiring CCU stay, trach & PEG placement   Number of Personal Factors/Comorbidities that affect the Plan of Care: Extensive review of physical, cognitive, and psychosocial performance (3+):  HIGH COMPLEXITY   ASSESSMENT OF OCCUPATIONAL PERFORMANCE[de-identified]   Activities of Daily Living:   Basic ADLs (From Assessment) Complex ADLs (From Assessment)   Feeding: Total assistance  Oral Facial Hygiene/Grooming: Total assistance  Bathing:  Total assistance  Upper Body Dressing: Total assistance  Lower Body Dressing: Total assistance  Toileting: Total assistance Instrumental ADL  Meal Preparation: Total assistance  Homemaking: Total assistance  Medication Management: Total assistance  Financial Management: Total assistance   Grooming/Bathing/Dressing Activities of Daily Living     Cognitive Retraining  Safety/Judgement: Fall prevention   Upper Body Bathing  Bathing Assistance: Total assistance(dependent)       Toileting  Toileting Assistance: Total assistance(dependent)  Bladder Hygiene: Total assistance (dependent)  Bowel Hygiene: Total assistance (dependent)  Clothing Management: Total assistance (dependent)   Upper Body [address]: Total assistance (dependent)       Bed/Mat Mobility  Rolling: Total assistance  Supine to Sit: Total assistance  Scooting: Total assistance     Most Recent Physical Functioning:   Gross Assessment:  AROM: Grossly decreased, non-functional(BUEs, LUE flaccid )  PROM: Generally decreased, functional(BUEs)  Strength: Grossly decreased, non-functional(BUEs, LUE flaccid)  Coordination: Grossly decreased, non-functional(BUEs, LUE flaccid)  Tone: Abnormal(LUE flaccid)               Posture:  Posture (WDL): Exceptions to WDL  Posture Assessment: Cervical, Forward head  Balance:    Bed Mobility:  Rolling: Total assistance  Supine to Sit: Total assistance  Scooting:  Total assistance  Wheelchair Mobility:     Transfers:               Patient Vitals for the past 6 hrs:   BP BP Patient Position SpO2 O2 Flow Rate (L/min) Pulse   06/23/20 0921 -- -- 96 % 6 l/min --   06/23/20 1149 119/52 At rest 99 % -- 66   06/23/20 1223 -- -- 97 % 6 l/min --       Mental Status  Neurologic State: Alert  Orientation Level: Unable to verbalize  Cognition: Follows commands  Safety/Judgement: Fall prevention                          Physical Skills Involved:  Range of Motion  Balance  Strength  Activity Tolerance  Fine Motor Control  Gross Motor Control  Skin Integrity Cognitive Skills Affected (resulting in the inability to perform in a timely and safe manner):  Sustained Attention  Divided Attention  Comprehension  Expression Psychosocial Skills Affected:  Habits/Routines  Environmental Adaptation  Social Interaction  Emotional Regulation  Self-Awareness  Awareness of Others   Number of elements that affect the Plan of Care: 5+:  HIGH COMPLEXITY   CLINICAL DECISION MAKIN[address] [place] [address] AM-PAC 6 Clicks   Daily Activity Inpatient Short Form  How much help from another person does the patient currently need. .. Total A Lot A Little None   1. Putting on and taking off regular lower body clothing? [x] 1   [] 2   [] 3   [] 4   2. Bathing (including washing, rinsing, drying)? [x] 1   [] 2   [] 3   [] 4   3. Toileting, which includes using toilet, bedpan or urinal?   [x] 1   [] 2   [] 3   [] 4   4. Putting on and taking off regular upper body clothing? [x] 1   [] 2   [] 3   [] 4   5. Taking care of personal grooming such as brushing teeth? [x] 1   [] 2   [] 3   [] 4   6. Eating meals? [x] 1   [] 2   [] 3   [] 4   © , Trustees of [address] [place] [address], under license to [email or web address]. All rights reserved      Score:  Initial: 6 2020 Most Recent: X (Date: -- )    Interpretation of Tool:  Represents activities that are increasingly more difficult (i.e. Bed mobility, Transfers, Gait). Medical Necessity:     Patient demonstrates   fair   rehab potential due to higher previous functional level. Reason for Services/Other Comments:  Patient continues to require skilled intervention due to   Inability to complete ADLs at prior level of independence   .    Use of outcome tool(s) and clinical judgement create a POC that gives a: HIGH COMPLEXITY         TREATMENT:   (In addition to Assessment/Re-Assessment sessions the following treatments were rendered)     Pre-treatment Symptoms/Complaints:    Pain: Initial:   Pain Intensity 1: 0  Post Session:  same     Today's treatment session addressed Decreased Strength, Decreased ADL/Functional Activities, Decreased Transfer Abilities, Decreased Activity Tolerance, Increased Fatigue, Increased Shortness of Breath, Decreased Flexibility/Joint Mobility, Edema/Girth, Decreased Skin Integrity/Hygeine, and Decreased Cognition to progress towards achieving goal(s). During this session,  Physical Therapy addressed  Bed Mobility to progress towards their discipline specific goal(s). Co-treatment was necessary to improve patient's cognitive participation, ability to follow higher level commands, ability to increase activity demands, and ability to return to normal functional activity. Self Care: (23 minutes): Procedure(s) (per grid) utilized to improve and/or restore self-care/home management as related to dressing and toileting. Required maximal visual, verbal, manual, and tactile cueing to facilitate activities of daily living skills and compensatory activities. Pt practiced rolling L <> R in supine with total assist/cues for technique. Pt able to participate with RUE to reach for and hold onto bed rail. Total assist for clothing management and bowel/phylicia hygiene. Pt practiced changing gown with total assist, decreased function in RUE and some decreased command following. Braces/Orthotics/Lines/Etc:   farris catheter  PEG   O2 Device: Tracheal collar, Tracheostomy  Treatment/Session Assessment:    Response to Treatment:  Tolerated well   Interdisciplinary Collaboration:   Physical Therapist  Occupational Therapist  Registered Nurse  Certified Nursing Assistant/Patient Care Technician  After treatment position/precautions:   Supine in bed  Bed/Chair-wheels locked  Bed in low position  Call light within reach  RN notified   Compliance with Program/Exercises: Compliant all of the time, Will assess as treatment progresses. Recommendations/Intent for next treatment session:   \"Next visit will focus on advancements to more challenging activities and reduction in assistance provided\".   Total Treatment Duration:  OT Patient Time In/Time Out  Time In: 1102  Time Out: [address], OTR/L

## 2020-06-23 NOTE — DISCHARGE SUMMARY
Hospitalist Discharge Summary     Admit Date:  2020  5:03 PM   Name:  Camilla Landry   Age:  66 y.o.  :  1945   MRN:  [id number]   PCP:  Rashad Lynne MD  Treatment Team: Attending Provider: Elroy Mata DO; Consulting Provider: Chelsea Casey MD; Consulting Provider: Ivana Perry NP; Consulting Provider: Payton Hsu MD; Care Manager: Gurpreet Baptiste RN; Physical Therapist: Jenny Bethea PT; Occupational Therapist: Jarad Santos OTR/L    Problem List for this Hospitalization:  Hospital Problems as of 2020 Date Reviewed: 2020          Codes Class Noted - Resolved POA    Hemoptysis ICD-10-CM: R04.2  ICD-9-CM: 786.30  2020 - Present Unknown        Status post tracheostomy ([facility] ICD-10-CM: Z93.0  ICD-9-CM: V44.0  2020 - Present No    Overview Signed 2020  7:06 AM by Monse Borges NP     20:  Placed by Dr. Liliya Blancas             S/P percutaneous endoscopic gastrostomy (PEG) tube placement [facility]) ICD-10-CM: Z93.1  ICD-9-CM: V44.1  2020 - Present No    Overview Signed 2020  7:07 AM by Monse Borges NP     20  Dr. Liliya Blancas             Dysphagia ICD-10-CM: R13.10  ICD-9-CM: 787.20  2020 - Present Yes        Diabetes mellitus type 2, controlled ([facility] ICD-10-CM: E11.9  ICD-9-CM: 250.00  6/15/2020 - Present Yes        Aspiration pneumonia ([facility] ICD-10-CM: J69.0  ICD-9-CM: 507.0  2020 - Present Yes        * (Principal) Acute respiratory failure with hypoxia ([facility] ICD-10-CM: J96.01  ICD-9-CM: 518.81  2020 - Present Yes        Hypernatremia ICD-10-CM: E87.0  ICD-9-CM: 276.0  2020 - Present Yes        ROSA (acute kidney injury) ([facility] ICD-10-CM: N17.9  ICD-9-CM: 584.9  2020 - Present Yes        UTI (urinary tract infection) ICD-10-CM: N39.0  ICD-9-CM: 599.0  2020 - Present Yes        Acute metabolic encephalopathy AZE-18-QQ: G93.41  ICD-9-CM: 348.31  2020 - Present Yes        Normocytic anemia ICD-10-CM: D64.9  ICD-9-CM: 285.9  6/5/2020 - Present Yes        Leukocytosis ICD-10-CM: D72.829  ICD-9-CM: 288.60  5/26/2020 - Present Yes                Admission HPI from 6/5/2020:    \" Jaci Fuller is a 66 y.o. female with a past medical history of CAD, DM type II, HTN who presents to the ER from [facility] with report of worsening confusion and elevated creatinine on labs. The patient is unable to contributed to history beyond denying pain or fever. \"            Hospital Course:  66year old CF with medical history significant for CAD, hypertensin, recent CVA with residual right sided weakness, diabetes from [facility] with worsening confusion and elevated creatinine.  Patient was found to have urinary tract infection and started on empiric antibiotics.  Rapid response called on 6/7/20 AM as patient was found to have acute respiratory failure with hypoxia likely due to aspiration.  Patient was suctioned and put on non-rebreather with saturation improving to the 96%. Patient has been lethargic with minimally responsive since the event. Patient had multiple days of temp spikes and antibiotics have been broadened. CT head was neg for any acute intracranial abnormalities.  CXR and CT showed infiltrate in the RLL confirming suspicion for aspiration pna. Due to decreased renal function, nephrology was consulted. Workup revealed no obstructive cause. Her renal function and hypernatremia has been improving on IV fluids and free water flushes. Pulmonary was consulted due to persistent high oxygen requirement despite being treated by antibiotics. Family wishes trach & PEG, which she got on 6/18. She spent one night in the ICU and was transferred out. Accepted to long-term acute [facility] [address] was discussed with staff. All questions answered. Patient was stable at time of discharge.   During hospital stay patient diagnosed with acute respiratory failure with hypoxemia which was slow to improve, acute kidney injury improving likely due to dehydrationmay need to continue/resume IV fluids, Proteus and Klebsiella UTI received a 10-day course of antibiotics stopped as of June 17. Acute metabolic encephalopathy due to recent infections and hypoxia, aspiration pneumonia finished 10-day course of antibiotics, patient is status post tracheostomy tube secondary to chronic dysphagia. The patient is diabetic and insulin regimen was adjusted/changed during hospital stay for formulary medications. Receiving free water secondary to hypernatremia which is improving. Nutrition has assisted with tube feedings. Patient appears stable for discharge to long-term acute care facility. 10 systems reviewed and negative except as noted in HPI. Diagnostic Imaging/Tests:   No results found. Echocardiogram results:  No results found for this visit on 06/05/20.       All Micro Results     Procedure Component Value Units Date/Time    CULTURE, BLOOD [[id number]] Collected:  06/08/20 1812    Order Status:  Completed Specimen:  Blood Updated:  06/13/20 0634     Special Requests: --        RIGHT  HAND       Culture result: NO GROWTH 5 DAYS       CULTURE, BLOOD [[id number]] Collected:  06/08/20 1807    Order Status:  Completed Specimen:  Blood Updated:  06/13/20 0634     Special Requests: --        LEFT  HAND       Culture result: NO GROWTH 5 DAYS       CULTURE, BLOOD [[id number]] Collected:  06/05/20 1845    Order Status:  Completed Specimen:  Blood Updated:  06/10/20 0631     Special Requests: --        NO SPECIAL REQUESTS  LEFT  Antecubital       Culture result: NO GROWTH 5 DAYS       CULTURE, BLOOD [[id number]] Collected:  06/05/20 1845    Order Status:  Completed Specimen:  Blood Updated:  06/10/20 0631     Special Requests: --        NO SPECIAL REQUESTS  RIGHT  FOREARM       Culture result: NO GROWTH 5 DAYS       MSSA/MRSA SC BY PCR, NASAL SWAB [[id number]] Collected:  06/09/20 1545    Order Status:  Canceled Specimen:  Nasal swab     CULTURE, URINE [[id number]]  (Abnormal)  (Susceptibility) Collected:  06/05/20 1845    Order Status:  Completed Specimen:  Cath Urine Updated:  06/09/20 0710     Special Requests: NO SPECIAL REQUESTS        Culture result:       >100,000 COLONIES/mL PROTEUS MIRABILIS                  >100,000 COLONIES/mL KLEBSIELLA PNEUMONIAE                Labs: Results:       BMP, Mg, Phos Recent Labs     06/23/20  0904 06/22/20  0602 06/21/20  0703    138 140   K 4.4 4.9 4.7    105 107   CO2 24 26 24   AGAP 10 7 9   BUN 39* 39* 45*   CREA 1.19* 1.26* 1.26*   CA 9.7 10.1 9.8   * 190* 208*      CBC Recent Labs     06/23/20 0904 06/22/20  0602 06/21/20  0703   WBC 11.7* 11.5* 11.1   RBC 2.66* 2.60* 2.59*   HGB 8.4* 8.3* 8.2*   HCT 26.0* 26.0* 26.1*    288 276   GRANS 76 66 67   LYMPH 13 24 22   EOS 4 4 4   MONOS 7 6 6   BASOS 0 0 0   IG 1 1 1   ANEU 8.9* 7.6 7.4   ABL 1.5 2.8 2.5   MALINI 0.4 0.4 0.5   ABM 0.8 0.7 0.7   ABB 0.0 0.0 0.0   AIG 0.1 0.1 0.1      LFT No results for input(s): ALT, TBIL, AP, TP, ALB, GLOB, AGRAT in the last 72 hours.     No lab exists for component: SGOT, GPT   Cardiac Testing Lab Results   Component Value Date/Time    BNP 24 08/12/2008 07:30 PM     (H) 05/25/2020 08:44 PM    CK 40 11/03/2008 10:58 AM    CK 43 08/12/2008 07:30 PM    CK 42 08/12/2008 07:30 PM    CK - MB <0.5 (L) 11/03/2008 10:58 AM    CK - MB 0.7 08/12/2008 07:30 PM    CK-MB Index CANNOT BE CALCULATED 11/03/2008 10:58 AM    CK-MB Index 1.6 08/12/2008 07:30 PM    Troponin-I, Qt. 4.63 () 05/27/2020 03:28 AM    Troponin-I, Qt. 6.83 () 05/26/2020 06:37 AM    Troponin-I, Qt. 7.69 ([facility]) 05/26/2020 03:11 AM      Coagulation Tests Lab Results   Component Value Date/Time    Prothrombin time 14.1 06/21/2020 12:50 PM    Prothrombin time 13.8 06/18/2020 06:33 AM    Prothrombin time 10.2 09/19/2010 12:12 PM    INR 1.1 06/21/2020 12:50 PM    INR 1.0 06/18/2020 06:33 AM    INR 1.0 09/19/2010 12:12 PM    aPTT 32.3 06/20/2020 03:05 PM aPTT 30.1 06/18/2020 06:33 AM    aPTT >200.0 (HH) 05/26/2020 05:24 PM      A1c Lab Results   Component Value Date/Time    Hemoglobin A1c 7.9 (H) 05/28/2020 03:27 AM      Lipid Panel Lab Results   Component Value Date/Time    Cholesterol, total 266 (H) 05/27/2020 03:28 AM    HDL Cholesterol 41 05/27/2020 03:28 AM    LDL, calculated 182.6 (H) 05/27/2020 03:28 AM    VLDL, calculated 42.4 (H) 05/27/2020 03:28 AM    Triglyceride 212 (H) 05/27/2020 03:28 AM    CHOL/HDL Ratio 6.5 05/27/2020 03:28 AM      Thyroid Panel No results found for: TSH, T4, FT4, TT3, T3U, TSHEXT     Most Recent UA Lab Results   Component Value Date/Time    Color YELLOW 06/05/2020 05:42 PM    Appearance TURBID 06/05/2020 05:42 PM    Specific gravity 1.019 06/05/2020 05:42 PM    pH (UA) 8.0 06/05/2020 05:42 PM    Protein 100 (A) 06/05/2020 05:42 PM    Glucose Negative 06/05/2020 05:42 PM    Ketone Negative 06/05/2020 05:42 PM    Bilirubin Negative 06/05/2020 05:42 PM    Blood LARGE (A) 06/05/2020 05:42 PM    Urobilinogen 0.2 06/05/2020 05:42 PM    Nitrites Negative 06/05/2020 05:42 PM    Leukocyte Esterase LARGE (A) 06/05/2020 05:42 PM        Allergies   Allergen Reactions    Ace Inhibitors Swelling     Only to some    Adhesive Tape Rash    Codeine Nausea and Vomiting    Other Medication Swelling     Unknown blood pressure medication per pt.   Pt also allergic to predforte eye drops, causes pain that is severe    Other Omega-3s Other (comments)     All oral glucose medications, hives and couldn't breathe well    Sulfa (Sulfonamide Antibiotics) Nausea and Vomiting     Immunization History   Administered Date(s) Administered    TB Skin Test (PPD) Intradermal 05/27/2020, 06/22/2020       All Labs from Last 24 Hrs:  Recent Results (from the past 24 hour(s))   GLUCOSE, POC    Collection Time: 06/22/20  6:10 PM   Result Value Ref Range    Glucose (POC) 207 (H) 65 - 100 mg/dL   GLUCOSE, POC    Collection Time: 06/22/20 11:48 PM   Result Value Ref Range    Glucose (POC) 118 (H) 65 - 100 mg/dL   GLUCOSE, POC    Collection Time: 06/23/20  6:35 AM   Result Value Ref Range    Glucose (POC) 136 (H) 65 - 727 mg/dL   METABOLIC PANEL, BASIC    Collection Time: 06/23/20  9:04 AM   Result Value Ref Range    Sodium 137 136 - 145 mmol/L    Potassium 4.4 3.5 - 5.1 mmol/L    Chloride 103 98 - 107 mmol/L    CO2 24 21 - 32 mmol/L    Anion gap 10 7 - 16 mmol/L    Glucose 153 (H) 65 - 100 mg/dL    BUN 39 (H) 8 - 23 MG/DL    Creatinine 1.19 (H) 0.6 - 1.0 MG/DL    GFR est AA 57 (L) >60 ml/min/1.73m2    GFR est non-AA 47 (L) >60 ml/min/1.73m2    Calcium 9.7 8.3 - 10.4 MG/DL   CBC WITH AUTOMATED DIFF    Collection Time: 06/23/20  9:04 AM   Result Value Ref Range    WBC 11.7 (H) 4.3 - 11.1 K/uL    RBC 2.66 (L) 4.05 - 5.2 M/uL    HGB 8.4 (L) 11.7 - 15.4 g/dL    HCT 26.0 (L) 35.8 - 46.3 %    MCV 97.7 79.6 - 97.8 FL    MCH 31.6 26.1 - 32.9 PG    MCHC 32.3 31.4 - 35.0 g/dL    RDW 17.1 (H) 11.9 - 14.6 %    PLATELET 522 197 - 317 K/uL    MPV 10.7 9.4 - 12.3 FL    ABSOLUTE NRBC 0.00 0.0 - 0.2 K/uL    DF AUTOMATED      NEUTROPHILS 76 43 - 78 %    LYMPHOCYTES 13 13 - 44 %    MONOCYTES 7 4.0 - 12.0 %    EOSINOPHILS 4 0.5 - 7.8 %    BASOPHILS 0 0.0 - 2.0 %    IMMATURE GRANULOCYTES 1 0.0 - 5.0 %    ABS. NEUTROPHILS 8.9 (H) 1.7 - 8.2 K/UL    ABS. LYMPHOCYTES 1.5 0.5 - 4.6 K/UL    ABS. MONOCYTES 0.8 0.1 - 1.3 K/UL    ABS. EOSINOPHILS 0.4 0.0 - 0.8 K/UL    ABS. BASOPHILS 0.0 0.0 - 0.2 K/UL    ABS. IMM.  GRANS. 0.1 0.0 - 0.5 K/UL   GLUCOSE, POC    Collection Time: 06/23/20 11:50 AM   Result Value Ref Range    Glucose (POC) 182 (H) 65 - 100 mg/dL   PLEASE READ & DOCUMENT PPD TEST IN 24 HRS    Collection Time: 06/23/20  1:22 PM   Result Value Ref Range    PPD Negative Negative    mm Induration 0 0 - 5 mm       Discharge Exam:  Patient Vitals for the past 24 hrs:   Temp Pulse Resp BP SpO2   06/23/20 1223     97 %   06/23/20 1149 97.9 °F (36.6 °C) 66 22 119/52 99 %   06/23/20 0921     96 % 06/23/20 0736 98.6 °F (37 °C) 78 20 108/74 98 %   06/23/20 0322 98.6 °F (37 °C) 75 20 141/61 94 %   06/22/20 2332 98.8 °F (37.1 °C) 70 20 141/70 95 %   06/22/20 2032     96 %   06/22/20 2031     95 %   06/22/20 2018 100.2 °F (37.9 °C) 74 22 140/66 94 %   06/22/20 1527 99.3 °F (37.4 °C) 74 18 151/69 92 %     Oxygen Therapy  O2 Sat (%): 97 % (06/23/20 1223)  Pulse via Oximetry: 68 beats per minute (06/23/20 1223)  O2 Device: Tracheal collar;Tracheostomy (06/23/20 1223)  O2 Flow Rate (L/min): 6 l/min (06/23/20 1223)  FIO2 (%): 28 % (06/23/20 1223)    Intake/Output Summary (Last 24 hours) at 6/23/2020 1413  Last data filed at 6/23/2020 0555  Gross per 24 hour   Intake 50 ml   Output 1400 ml   Net -1350 ml         Physical exam:  General:    Aphasic but no acute distress. Eyes:   Normal sclera. Extraocular movements intact. ENT:  Torri Peaks site with dried blood no exudate  CV:   Regular rate and rhythm. No murmur, rub, or gallop. Lungs:  Clear to auscultation bilaterally. No wheezing, rhonchi, or rales. Abdomen: Gastrostomy tube site okay. Bowel sounds in all 4 quadrants  Extremities: Warm and dry. No cyanosis or edema. Neurologic: Cranial nerves intact. Unclear if aphasia prior to tracheostomyhistory of CVA. Skin:     No rashes or jaundice. Discharge Info:   Current Discharge Medication List      START taking these medications    Details   insulin regular (NOVOLIN R, HUMULIN R) 100 unit/mL injection INITIATE INSULIN CORRECTIVE PROTOCOL:  Very Insulin Resistant  For Blood Sugar (mg/dL) of:              Less than 150 =   0 units  150 -199 =   3 units  200 -249 =   6 units  250 -299 =   9 units  300 -349 =   12 units  350 and above =   15 units and Call Physician  Initiate Hypoglycemic protocol if blood glucose is <70 mg/dL. Qty: 1 Vial, Refills: 0      levalbuterol (XOPENEX) 1.25 mg/3 mL nebu 3 mL by Nebulization route every four (4) hours as needed for Wheezing or Shortness of Breath.   Qty: 120 Nebule, Refills: 0      pantoprazole (PROTONIX) 40 mg tablet Take 1 Tab by mouth Daily (before breakfast). Qty: 30 Tab, Refills: 0         CONTINUE these medications which have CHANGED    Details   carvediloL (COREG) 12.5 mg tablet Take 1 Tab by mouth two (2) times daily (with meals). Qty: 60 Tab, Refills: 0      insulin glargine (LANTUS) 100 unit/mL injection 40 units subcutaneous q hs  Qty: 1 Vial, Refills: 0         CONTINUE these medications which have NOT CHANGED    Details   ticagrelor (BRILINTA) 90 mg tablet Take 1 Tab by mouth every twelve (12) hours every twelve (12) hours. Qty: 60 Tab, Refills: 0      nitroglycerin (NITROSTAT) 0.4 mg SL tablet 1 Tab by SubLINGual route every five (5) minutes as needed for Chest Pain. Up to 3 doses. Qty: 15 Tab, Refills: 0      atorvastatin (LIPITOR) 40 mg tablet Take 1 Tab by mouth nightly. Qty: 30 Tab, Refills: 2      traZODone (DESYREL) 100 mg tablet Take 100 mg by mouth nightly. acetaminophen (Tylenol Extra Strength) 500 mg tablet Take 1,000 mg by mouth two (2) times daily as needed for Pain. amLODIPine (Norvasc) 10 mg tablet Take 10 mg by mouth daily. ondansetron (ZOFRAN ODT) 4 mg disintegrating tablet Take 1 Tab by mouth every eight (8) hours as needed for Nausea. Qty: 12 Tab, Refills: 0      hyoscyamine SL (LEVSIN/SL) 0.125 mg SL tablet 1 Tab by SubLINGual route every six (6) hours as needed for Cramping. Qty: 10 Tab, Refills: 0      ezetimibe (Zetia) 10 mg tablet Take  by mouth. aspirin delayed-release 81 mg tablet Take 81 mg by mouth daily. Take day of surgery per anesthesia protocol. albuterol (PROVENTIL HFA) 90 mcg/actuation inhaler Take 2 Puffs by inhalation daily as needed. Take day of surgery per anesthesia protocol. BRING DAY OF SURGERY      Cholecalciferol, Vitamin D3, (VITAMIN D3) 1,000 unit cap Take 1 Cap by mouth daily. Stop seven days prior to surgery per anesthesia protocol.       COZAAR 100 mg Tab Take 100 mg by mouth daily. Take 1/2 tablet twice daily. sertraline (ZOLOFT) 50 mg tablet Take 200 mg by mouth two (2) times a day. Take day of surgery per anesthesia protocol. SYNTHROID 175 mcg tablet Take 150 mcg by mouth daily. Take day of surgery per anesthesia protocol. STOP taking these medications       insulin degludec Jaci Au FlexTouch U-100) 100 unit/mL (3 mL) inpn Comments:   Reason for Stopping:         insulin aspart U-100 (NovoLOG Flexpen U-100 Insulin) 100 unit/mL (3 mL) inpn Comments:   Reason for Stopping:         furosemide (Lasix) 40 mg tablet Comments:   Reason for Stopping:         OTHER,NON-FORMULARY, Comments:   Reason for Stopping:         nitrofurantoin, macrocrystal-monohydrate, (MACROBID) 100 mg capsule Comments:   Reason for Stopping:         multivitamin (ONE A DAY) tablet Comments:   Reason for Stopping:         ASPIRIN/CAFFEINE (ANACIN PO) Comments:   Reason for Stopping:         CENESTIN 0.9 mg Tab Comments:   Reason for Stopping:         PRILOSEC 20 mg capsule Comments:   Reason for Stopping:                 Disposition: long term care facility    Activity: PT/OT Eval and Treat  Diet: DIET TUBE FEEDING Open order for details. Keep HOB > 30 degrees. Check residuals every 4 hours. Hold TF for > 500 ml x 1 or 250 ml x 2 consecutive checks. Also place patient on right side if residual is > 250 ml.     Follow-up Appointments   Procedures    FOLLOW UP VISIT Appointment in: 3 - 5 Days Follow-up with [place] provider as soon as possible     Follow-up with [place] provider as soon as possible     Standing Status:   Standing     Number of Occurrences:   1     Order Specific Question:   Appointment in     Answer:   3 - 5 Days         Follow-up Information     Follow up With Specialties Details Why Contact Info    Prince Patton MD Internal Medicine   [address]   [phone]                Time spent in patient discharge planning and coordination 35 minutes.     Signed:  Avon Crews

## 2020-06-24 ENCOUNTER — PATIENT OUTREACH (OUTPATIENT)
Dept: CASE MANAGEMENT | Age: 75
End: 2020-06-24

## 2020-06-24 LAB — TSH SERPL DL<=0.005 MIU/L-ACNC: 2.34 UIU/ML (ref 0.36–3.74)

## 2020-06-24 PROCEDURE — 84443 ASSAY THYROID STIM HORMONE: CPT

## 2020-06-24 PROCEDURE — 36415 COLL VENOUS BLD VENIPUNCTURE: CPT

## 2020-06-24 NOTE — PROGRESS NOTES
Opened chart and noted per [facility] patient is currently inpatient. Patient will be re-assigned pending discharge disposition.

## 2020-06-26 LAB
ANION GAP SERPL CALC-SCNC: 8 MMOL/L (ref 7–16)
BUN SERPL-MCNC: 43 MG/DL (ref 8–23)
CALCIUM SERPL-MCNC: 9.8 MG/DL (ref 8.3–10.4)
CHLORIDE SERPL-SCNC: 98 MMOL/L (ref 98–107)
CO2 SERPL-SCNC: 25 MMOL/L (ref 21–32)
CREAT SERPL-MCNC: 1.21 MG/DL (ref 0.6–1)
GLUCOSE SERPL-MCNC: 137 MG/DL (ref 65–100)
POTASSIUM SERPL-SCNC: 4.7 MMOL/L (ref 3.5–5.1)
SARS-COV-2, COV2NT: NOT DETECTED
SODIUM SERPL-SCNC: 131 MMOL/L (ref 136–145)
SOURCE, COVRS: NORMAL

## 2020-06-26 PROCEDURE — 80048 BASIC METABOLIC PNL TOTAL CA: CPT

## 2020-06-26 PROCEDURE — 36415 COLL VENOUS BLD VENIPUNCTURE: CPT

## 2020-06-29 LAB
ALBUMIN SERPL-MCNC: 2.7 G/DL (ref 3.2–4.6)
ALBUMIN/GLOB SERPL: 0.6 {RATIO} (ref 1.2–3.5)
ALP SERPL-CCNC: 102 U/L (ref 50–136)
ALT SERPL-CCNC: 22 U/L (ref 12–65)
ANION GAP SERPL CALC-SCNC: 8 MMOL/L (ref 7–16)
AST SERPL-CCNC: 14 U/L (ref 15–37)
BASOPHILS # BLD: 0 K/UL (ref 0–0.2)
BASOPHILS NFR BLD: 0 % (ref 0–2)
BILIRUB SERPL-MCNC: 0.4 MG/DL (ref 0.2–1.1)
BUN SERPL-MCNC: 42 MG/DL (ref 8–23)
CALCIUM SERPL-MCNC: 9.6 MG/DL (ref 8.3–10.4)
CHLORIDE SERPL-SCNC: 98 MMOL/L (ref 98–107)
CO2 SERPL-SCNC: 26 MMOL/L (ref 21–32)
CREAT SERPL-MCNC: 1.3 MG/DL (ref 0.6–1)
DIFFERENTIAL METHOD BLD: ABNORMAL
EOSINOPHIL # BLD: 0.4 K/UL (ref 0–0.8)
EOSINOPHIL NFR BLD: 5 % (ref 0.5–7.8)
ERYTHROCYTE [DISTWIDTH] IN BLOOD BY AUTOMATED COUNT: 17.2 % (ref 11.9–14.6)
GLOBULIN SER CALC-MCNC: 4.8 G/DL (ref 2.3–3.5)
GLUCOSE SERPL-MCNC: 85 MG/DL (ref 65–100)
HCT VFR BLD AUTO: 26.3 % (ref 35.8–46.3)
HGB BLD-MCNC: 8.4 G/DL (ref 11.7–15.4)
IMM GRANULOCYTES # BLD AUTO: 0.1 K/UL (ref 0–0.5)
IMM GRANULOCYTES NFR BLD AUTO: 1 % (ref 0–5)
LYMPHOCYTES # BLD: 2.2 K/UL (ref 0.5–4.6)
LYMPHOCYTES NFR BLD: 24 % (ref 13–44)
MCH RBC QN AUTO: 30.5 PG (ref 26.1–32.9)
MCHC RBC AUTO-ENTMCNC: 31.9 G/DL (ref 31.4–35)
MCV RBC AUTO: 95.6 FL (ref 79.6–97.8)
MONOCYTES # BLD: 0.7 K/UL (ref 0.1–1.3)
MONOCYTES NFR BLD: 8 % (ref 4–12)
NEUTS SEG # BLD: 5.7 K/UL (ref 1.7–8.2)
NEUTS SEG NFR BLD: 62 % (ref 43–78)
NRBC # BLD: 0 K/UL (ref 0–0.2)
PLATELET # BLD AUTO: 322 K/UL (ref 150–450)
PMV BLD AUTO: 10.8 FL (ref 9.4–12.3)
POTASSIUM SERPL-SCNC: 4.6 MMOL/L (ref 3.5–5.1)
PROT SERPL-MCNC: 7.5 G/DL (ref 6.3–8.2)
RBC # BLD AUTO: 2.75 M/UL (ref 4.05–5.2)
SODIUM SERPL-SCNC: 132 MMOL/L (ref 136–145)
WBC # BLD AUTO: 9.1 K/UL (ref 4.3–11.1)

## 2020-06-29 PROCEDURE — 80053 COMPREHEN METABOLIC PANEL: CPT

## 2020-06-29 PROCEDURE — 36415 COLL VENOUS BLD VENIPUNCTURE: CPT

## 2020-06-29 PROCEDURE — 85025 COMPLETE CBC W/AUTO DIFF WBC: CPT

## 2020-06-30 ENCOUNTER — APPOINTMENT (OUTPATIENT)
Dept: GENERAL RADIOLOGY | Age: 75
End: 2020-06-30
Attending: INTERNAL MEDICINE

## 2020-07-01 ENCOUNTER — APPOINTMENT (OUTPATIENT)
Dept: GENERAL RADIOLOGY | Age: 75
End: 2020-07-01
Attending: INTERNAL MEDICINE

## 2020-07-01 PROCEDURE — 74230 X-RAY XM SWLNG FUNCJ C+: CPT

## 2020-07-01 PROCEDURE — 74011000255 HC RX REV CODE- 255: Performed by: INTERNAL MEDICINE

## 2020-07-01 RX ADMIN — BARIUM SULFATE 45 ML: 980 POWDER, FOR SUSPENSION ORAL at 10:16

## 2020-07-01 RX ADMIN — BARIUM SULFATE 15 ML: 400 PASTE ORAL at 10:16

## 2020-07-01 NOTE — PROGRESS NOTES
SPEECH LANGUAGE PATHOLOGY: MODIFIED BARIUM SWALLOW STUDY- [facility]  Initial Assessment    NAME/AGE/GENDER: Golden Castellanos is a 66 y.o. female  DATE: 7/1/2020  PRIMARY DIAGNOSIS: Z02.9      ICD-10: Treatment Diagnosis: Dysphagia s/p CVA (E36.239)  INTERDISCIPLINARY COLLABORATION: Radiologist, Tuan  PRECAUTIONS/ALLERGIES: Ace inhibitors; Adhesive tape; Codeine; Other medication; Other omega-3s; and Sulfa (sulfonamide antibiotics)     RECOMMENDATIONS/PLAN   DIET:    PO diet texture:  Pureed   Liquids:  regular thin, by cup only    MEDICATIONS: Crushed in puree     COMPENSATORY STRATEGIES/MODIFICATIONS INCLUDING:  · None  · Fully awake/alert  · Upright for all PO  · 1:1 assistance with all PO  · Small bites and sips  · Cup/sip  · No straws  · Meticulous oral care     OTHER RECOMMENDATIONS (including follow up treatment recommendations):   · Treatment to improve/facilitate oral/pharyngeal skills   · Training in laryngeal strengthening and coordination exercises  · Training in use of compensatory safe swallowing strategies/feeding guidelines  · Patient/family education  · Follow-up MBS as deemed necessary following therapy    Recommendations for next treatment session: Next treatment will address dysphagia as determined by primary SLP       ASSESSMENT   Ms. Fariha Diehl presents with moderate oropharyngeal dysphagia characterized by decreased bolus formation and bolus control as well as reduced base of tongue retraction and delayed swallow initiation. Premature spillage with thin liquids following to pyriforms prior to initiation of swallow. Single instance of trace, transient penetration with thin by cup. No penetration or aspiration on additional 4 cup sips. Decreased airway protection resulting in SILENT aspiration via straw sips when utilized as liquid wash after trials of chewable textures. Tongue pumping when attempting to propel puree bolus posteriorly. No penetration, aspiration, or residue observed with puree. Poor oral prep with chewable textures. Unable to fully masticate bites of fruit and required finger sweep by clinician to remove. Recommend pure diet/thin liquids by CUP SIPS ONLY. Patient will require 1:1 assistance with all meals. Please ensure she is fully awake during po intake. Speech to follow for dysphagia treatment. SUBJECTIVE   History of Present Injury/Illness: Ms. Honey Espinal  has a past medical history of Arrhythmia, Arthritis, Asthma, CAD (coronary artery disease), Chronic pain, Diabetes ([address] GERD (gastroesophageal reflux disease), Hypertension, Menopause, Morbid obesity ([address] Stroke [facility]), Thyroid disease, and Unspecified adverse effect of anesthesia. She also has no past medical history of Aneurysm ([address] Autoimmune disease ([address] Cancer ([address] Chronic kidney disease, Coagulation defects, COPD, Difficult intubation, Heart failure ([address] Liver disease, Malignant hyperthermia due to anesthesia, Nausea & vomiting, Other ill-defined conditions(799.89), Pseudocholinesterase deficiency, Psychiatric disorder, PUD (peptic ulcer disease), Seizures ([address] Thromboembolus ([address] or Unspecified sleep apnea. . She also  has a past surgical history that includes hx appendectomy; hx lap cholecystectomy; hx orthopaedic; hx knee arthroscopy; hx clemente and bso; hx breast biopsy; and pr cardiac surg procedure unlist (4/2010). Pain:  Pain Intensity 1: 0    Results of most recent clinical bedside swallow assessment: 6/30/20    Current dietary status prior to evaluation today:  NPO c PEG    Previous Modified Barium Swallow studies: 5/28/20- Recommendations for puree/honey by tsp    Current Medications:   No current facility-administered medications on file prior to encounter. Current Outpatient Medications on File Prior to Encounter   Medication Sig Dispense Refill    carvediloL (COREG) 12.5 mg tablet Take 1 Tab by mouth two (2) times daily (with meals).  60 Tab 0    insulin glargine (LANTUS) 100 unit/mL injection 40 units subcutaneous q hs 1 Vial 0    insulin regular (NOVOLIN R, HUMULIN R) 100 unit/mL injection INITIATE INSULIN CORRECTIVE PROTOCOL:  Very Insulin Resistant  For Blood Sugar (mg/dL) of:              Less than 150 =   0 units  150 -199 =   3 units  200 -249 =   6 units  250 -299 =   9 units  300 -349 =   12 units  350 and above =   15 units and Call Physician  Initiate Hypoglycemic protocol if blood glucose is <70 mg/dL. 1 Vial 0    levalbuterol (XOPENEX) 1.25 mg/3 mL nebu 3 mL by Nebulization route every four (4) hours as needed for Wheezing or Shortness of Breath. 120 Nebule 0    pantoprazole (PROTONIX) 40 mg tablet Take 1 Tab by mouth Daily (before breakfast). 30 Tab 0    ticagrelor (BRILINTA) 90 mg tablet Take 1 Tab by mouth every twelve (12) hours every twelve (12) hours. 60 Tab 0    nitroglycerin (NITROSTAT) 0.4 mg SL tablet 1 Tab by SubLINGual route every five (5) minutes as needed for Chest Pain. Up to 3 doses. 15 Tab 0    atorvastatin (LIPITOR) 40 mg tablet Take 1 Tab by mouth nightly. 30 Tab 2    traZODone (DESYREL) 100 mg tablet Take 100 mg by mouth nightly.  acetaminophen (Tylenol Extra Strength) 500 mg tablet Take 1,000 mg by mouth two (2) times daily as needed for Pain.  amLODIPine (Norvasc) 10 mg tablet Take 10 mg by mouth daily.  ondansetron (ZOFRAN ODT) 4 mg disintegrating tablet Take 1 Tab by mouth every eight (8) hours as needed for Nausea. 12 Tab 0    hyoscyamine SL (LEVSIN/SL) 0.125 mg SL tablet 1 Tab by SubLINGual route every six (6) hours as needed for Cramping. 10 Tab 0    ezetimibe (Zetia) 10 mg tablet Take  by mouth.  aspirin delayed-release 81 mg tablet Take 81 mg by mouth daily. Take day of surgery per anesthesia protocol.  albuterol (PROVENTIL HFA) 90 mcg/actuation inhaler Take 2 Puffs by inhalation daily as needed. Take day of surgery per anesthesia protocol.    BRING DAY OF SURGERY      Cholecalciferol, Vitamin D3, (VITAMIN D3) 1,000 unit cap Take 1 Cap by mouth daily. Stop seven days prior to surgery per anesthesia protocol.  COZAAR 100 mg Tab Take 100 mg by mouth daily. Take 1/2 tablet twice daily.  sertraline (ZOLOFT) 50 mg tablet Take 200 mg by mouth two (2) times a day. Take day of surgery per anesthesia protocol.  SYNTHROID 175 mcg tablet Take 150 mcg by mouth daily. Take day of surgery per anesthesia protocol. OBJECTIVE   Orientation:    Person      Vocal Quality: Low volume and Dysarthric    Modified barium swallow study was performed in the radiology suite with Ms. Danny Avalos in the lateral plane using C-arm. To evaluate her swallow function, barium coated liquid and food was administered in the form of thin liquids (by spoon, cup sip and straw sip), pudding and mixed consistency. Oral phase of swallow:    prolonged mastication   reduced bolus control   reduced posterior propulsion skills   lingual pumping   reduced oral containment   reduced posterior tongue elevation   premature spillage to pharynx pre-swallow    Pharyngeal phase of swallow:    Swallows of thin liquids were delayed and triggered at pyriform sinuses. There was trace and transient laryngeal penetration observed during the swallow from one trial of thin by cup only that was cleared spontaneously during the swallow. There was silent aspiration observed during the swallow from straw sip that did not trigger a cough or throat clear response. No penetration or aspiration on additional cup sips of thin.  Swallows of pudding were delayed and triggered at valleculae. No laryngeal penetration or aspiration was observed. No pharyngeal residue after swallow.  Swallows of mixed consistencies were delayed and incoordinated. Unable to fully masticate trials and required manual removal of bolus by clinician. Mild residue noted on posterior oropharynx. Martha Savant   Pharyngeal characteristics:   delayed pharyngeal swallow initiation   swallows triggered at pyriform sinuses for thin liquids   reduced retraction of base of tongue   delayed epiglottic inversion   reduced laryngeal closure  Attempted strategies:    none  Effective strategies:    none  Aspiration/Penetration Scale: 8 (Silent aspiration. Contrast passes below the folds/glottis with no effort to eject.)    Cervical esophageal phase of swallow:    adequate and timely clearance of all boluses through cervical esophagus  **Distal esophagus not assessed due to limitations of MBS study. Assessment/Reassessment only, no treatment provided today. Tool Used: Dysphagia Outcome and Severity Scale (BROOKLYN)    Comments   Normal Diet With no strategies or extra time needed   Functional Swallow May have mild oral or pharyngeal delay   Mild Dysphagia Which may require one diet consistency restricted    Mild-Moderate Dysphagia With 1-2 diet consistencies restricted   Moderate Dysphagia With 2 or more diet consistencies restricted   Moderately Severe Dysphagia With partial PO strategies (trials with ST only)   Severe Dysphagia With inability to tolerate any PO safely      Score:  Initial: 4 Most Recent: x (Date:7/1/2020)   Interpretation of Tool: The Dysphagia Outcome and Severity Scale (BROOKLYN) is a simple, easy-to-use, 7-point scale developed to systematically rate the functional severity of dysphagia based on objective assessment and make recommendations for diet level, independence level, and type of nutrition. Payor: /     Education:  · Recommendations discussed with patient and primary SLP at end of study. Safety:   After treatment position/precautions:  · Patient waiting in radiology holding bay for transport back to room.   ·     Total Treatment Duration:  Time In: 1000   Time Out: 8080 CANDIS Hall, CCC-SLP

## 2020-07-03 LAB
ANION GAP SERPL CALC-SCNC: 10 MMOL/L (ref 7–16)
BUN SERPL-MCNC: 48 MG/DL (ref 8–23)
CALCIUM SERPL-MCNC: 9.4 MG/DL (ref 8.3–10.4)
CHLORIDE SERPL-SCNC: 103 MMOL/L (ref 98–107)
CO2 SERPL-SCNC: 24 MMOL/L (ref 21–32)
CREAT SERPL-MCNC: 1.45 MG/DL (ref 0.6–1)
GLUCOSE SERPL-MCNC: 125 MG/DL (ref 65–100)
POTASSIUM SERPL-SCNC: 4.4 MMOL/L (ref 3.5–5.1)
SODIUM SERPL-SCNC: 137 MMOL/L (ref 136–145)

## 2020-07-03 PROCEDURE — 87186 SC STD MICRODIL/AGAR DIL: CPT

## 2020-07-03 PROCEDURE — 87086 URINE CULTURE/COLONY COUNT: CPT

## 2020-07-03 PROCEDURE — 80048 BASIC METABOLIC PNL TOTAL CA: CPT

## 2020-07-03 PROCEDURE — 36415 COLL VENOUS BLD VENIPUNCTURE: CPT

## 2020-07-03 PROCEDURE — 87088 URINE BACTERIA CULTURE: CPT

## 2020-07-06 LAB
ALBUMIN SERPL-MCNC: 3 G/DL (ref 3.2–4.6)
ALBUMIN/GLOB SERPL: 0.6 {RATIO} (ref 1.2–3.5)
ALP SERPL-CCNC: 96 U/L (ref 50–136)
ALT SERPL-CCNC: 19 U/L (ref 12–65)
ANION GAP SERPL CALC-SCNC: 10 MMOL/L (ref 7–16)
AST SERPL-CCNC: 14 U/L (ref 15–37)
BACTERIA SPEC CULT: ABNORMAL
BACTERIA SPEC CULT: ABNORMAL
BASOPHILS # BLD: 0 K/UL (ref 0–0.2)
BASOPHILS NFR BLD: 0 % (ref 0–2)
BILIRUB SERPL-MCNC: 0.4 MG/DL (ref 0.2–1.1)
BUN SERPL-MCNC: 53 MG/DL (ref 8–23)
CALCIUM SERPL-MCNC: 9.7 MG/DL (ref 8.3–10.4)
CHLORIDE SERPL-SCNC: 107 MMOL/L (ref 98–107)
CO2 SERPL-SCNC: 23 MMOL/L (ref 21–32)
CREAT SERPL-MCNC: 1.53 MG/DL (ref 0.6–1)
DIFFERENTIAL METHOD BLD: ABNORMAL
EOSINOPHIL # BLD: 0.4 K/UL (ref 0–0.8)
EOSINOPHIL NFR BLD: 4 % (ref 0.5–7.8)
ERYTHROCYTE [DISTWIDTH] IN BLOOD BY AUTOMATED COUNT: 17.6 % (ref 11.9–14.6)
GLOBULIN SER CALC-MCNC: 4.8 G/DL (ref 2.3–3.5)
GLUCOSE SERPL-MCNC: 69 MG/DL (ref 65–100)
HCT VFR BLD AUTO: 27.4 % (ref 35.8–46.3)
HGB BLD-MCNC: 8.4 G/DL (ref 11.7–15.4)
IMM GRANULOCYTES # BLD AUTO: 0.1 K/UL (ref 0–0.5)
IMM GRANULOCYTES NFR BLD AUTO: 1 % (ref 0–5)
LYMPHOCYTES # BLD: 2.5 K/UL (ref 0.5–4.6)
LYMPHOCYTES NFR BLD: 25 % (ref 13–44)
MCH RBC QN AUTO: 30.8 PG (ref 26.1–32.9)
MCHC RBC AUTO-ENTMCNC: 30.7 G/DL (ref 31.4–35)
MCV RBC AUTO: 100.4 FL (ref 79.6–97.8)
MONOCYTES # BLD: 0.8 K/UL (ref 0.1–1.3)
MONOCYTES NFR BLD: 8 % (ref 4–12)
NEUTS SEG # BLD: 6.5 K/UL (ref 1.7–8.2)
NEUTS SEG NFR BLD: 63 % (ref 43–78)
NRBC # BLD: 0 K/UL (ref 0–0.2)
PLATELET # BLD AUTO: 311 K/UL (ref 150–450)
PMV BLD AUTO: 10.3 FL (ref 9.4–12.3)
POTASSIUM SERPL-SCNC: 4.1 MMOL/L (ref 3.5–5.1)
PROT SERPL-MCNC: 7.8 G/DL (ref 6.3–8.2)
RBC # BLD AUTO: 2.73 M/UL (ref 4.05–5.2)
SERVICE CMNT-IMP: ABNORMAL
SODIUM SERPL-SCNC: 140 MMOL/L (ref 136–145)
WBC # BLD AUTO: 10.3 K/UL (ref 4.3–11.1)

## 2020-07-06 PROCEDURE — 36415 COLL VENOUS BLD VENIPUNCTURE: CPT

## 2020-07-06 PROCEDURE — 85025 COMPLETE CBC W/AUTO DIFF WBC: CPT

## 2020-07-06 PROCEDURE — 80053 COMPREHEN METABOLIC PANEL: CPT

## 2020-07-07 PROCEDURE — 73060999999 HC MISC LAB CHARGE

## 2020-07-07 PROCEDURE — 87635 SARS-COV-2 COVID-19 AMP PRB: CPT

## 2020-07-08 LAB
ANION GAP SERPL CALC-SCNC: 10 MMOL/L (ref 7–16)
BUN SERPL-MCNC: 42 MG/DL (ref 8–23)
CALCIUM SERPL-MCNC: 8.8 MG/DL (ref 8.3–10.4)
CHLORIDE SERPL-SCNC: 106 MMOL/L (ref 98–107)
CO2 SERPL-SCNC: 22 MMOL/L (ref 21–32)
CREAT SERPL-MCNC: 1.28 MG/DL (ref 0.6–1)
GLUCOSE SERPL-MCNC: 106 MG/DL (ref 65–100)
POTASSIUM SERPL-SCNC: 4.4 MMOL/L (ref 3.5–5.1)
SODIUM SERPL-SCNC: 138 MMOL/L (ref 136–145)

## 2020-07-08 PROCEDURE — 36415 COLL VENOUS BLD VENIPUNCTURE: CPT

## 2020-07-08 PROCEDURE — 80048 BASIC METABOLIC PNL TOTAL CA: CPT

## 2020-07-10 LAB
COVID-19 RAPID TEST, COVR: NOT DETECTED
SOURCE, COVRS: NORMAL

## 2020-07-10 PROCEDURE — 87635 SARS-COV-2 COVID-19 AMP PRB: CPT

## 2020-07-10 PROCEDURE — 73060999999 HC MISC LAB CHARGE

## 2020-07-13 LAB
ALBUMIN SERPL-MCNC: 2.9 G/DL (ref 3.2–4.6)
ALBUMIN/GLOB SERPL: 0.6 {RATIO} (ref 1.2–3.5)
ALP SERPL-CCNC: 78 U/L (ref 50–136)
ALT SERPL-CCNC: 18 U/L (ref 12–65)
ANION GAP SERPL CALC-SCNC: 10 MMOL/L (ref 7–16)
AST SERPL-CCNC: 11 U/L (ref 15–37)
BASOPHILS # BLD: 0 K/UL (ref 0–0.2)
BASOPHILS NFR BLD: 0 % (ref 0–2)
BILIRUB SERPL-MCNC: 0.4 MG/DL (ref 0.2–1.1)
BUN SERPL-MCNC: 36 MG/DL (ref 8–23)
CALCIUM SERPL-MCNC: 9.3 MG/DL (ref 8.3–10.4)
CHLORIDE SERPL-SCNC: 106 MMOL/L (ref 98–107)
CO2 SERPL-SCNC: 23 MMOL/L (ref 21–32)
CREAT SERPL-MCNC: 1.24 MG/DL (ref 0.6–1)
DIFFERENTIAL METHOD BLD: ABNORMAL
EOSINOPHIL # BLD: 0.5 K/UL (ref 0–0.8)
EOSINOPHIL NFR BLD: 6 % (ref 0.5–7.8)
ERYTHROCYTE [DISTWIDTH] IN BLOOD BY AUTOMATED COUNT: 18.5 % (ref 11.9–14.6)
GLOBULIN SER CALC-MCNC: 4.6 G/DL (ref 2.3–3.5)
GLUCOSE SERPL-MCNC: 87 MG/DL (ref 65–100)
HCT VFR BLD AUTO: 27.3 % (ref 35.8–46.3)
HGB BLD-MCNC: 8.4 G/DL (ref 11.7–15.4)
IMM GRANULOCYTES # BLD AUTO: 0 K/UL (ref 0–0.5)
IMM GRANULOCYTES NFR BLD AUTO: 0 % (ref 0–5)
LYMPHOCYTES # BLD: 2.4 K/UL (ref 0.5–4.6)
LYMPHOCYTES NFR BLD: 32 % (ref 13–44)
MCH RBC QN AUTO: 31.1 PG (ref 26.1–32.9)
MCHC RBC AUTO-ENTMCNC: 30.8 G/DL (ref 31.4–35)
MCV RBC AUTO: 101.1 FL (ref 79.6–97.8)
MONOCYTES # BLD: 0.6 K/UL (ref 0.1–1.3)
MONOCYTES NFR BLD: 8 % (ref 4–12)
NEUTS SEG # BLD: 4 K/UL (ref 1.7–8.2)
NEUTS SEG NFR BLD: 53 % (ref 43–78)
NRBC # BLD: 0 K/UL (ref 0–0.2)
PLATELET # BLD AUTO: 257 K/UL (ref 150–450)
PMV BLD AUTO: 10.4 FL (ref 9.4–12.3)
POTASSIUM SERPL-SCNC: 4.3 MMOL/L (ref 3.5–5.1)
PROT SERPL-MCNC: 7.5 G/DL (ref 6.3–8.2)
RBC # BLD AUTO: 2.7 M/UL (ref 4.05–5.2)
SODIUM SERPL-SCNC: 139 MMOL/L (ref 136–145)
WBC # BLD AUTO: 7.6 K/UL (ref 4.3–11.1)

## 2020-07-13 PROCEDURE — 80053 COMPREHEN METABOLIC PANEL: CPT

## 2020-07-13 PROCEDURE — 85025 COMPLETE CBC W/AUTO DIFF WBC: CPT

## 2020-07-13 PROCEDURE — 36415 COLL VENOUS BLD VENIPUNCTURE: CPT

## 2020-07-15 LAB
Lab: NOT DETECTED
REFERENCE LAB,REFLB: NORMAL
SOURCE, COVRS: NORMAL
TEST DESCRIPTION:,ATST: NORMAL

## 2020-07-17 LAB
Lab: NOT DETECTED
REFERENCE LAB,REFLB: NORMAL
TEST DESCRIPTION:,ATST: NORMAL

## 2020-09-01 ENCOUNTER — HOSPITAL ENCOUNTER (OUTPATIENT)
Dept: INTERVENTIONAL RADIOLOGY/VASCULAR | Age: 75
Discharge: HOME | End: 2020-09-01
Attending: SURGERY
Payer: MEDICARE

## 2020-09-01 VITALS
OXYGEN SATURATION: 97 % | DIASTOLIC BLOOD PRESSURE: 62 MMHG | SYSTOLIC BLOOD PRESSURE: 119 MMHG | TEMPERATURE: 98 F | HEART RATE: 62 BPM | RESPIRATION RATE: 16 BRPM

## 2020-09-01 DIAGNOSIS — R13.19 ESOPHAGEAL DYSPHAGIA: ICD-10-CM

## 2020-09-01 LAB
ANION GAP SERPL CALC-SCNC: 4 MMOL/L (ref 7–16)
ANION GAP SERPL CALC-SCNC: 8 MMOL/L (ref 7–16)
BASOPHILS # BLD: 0 K/UL (ref 0–0.2)
BASOPHILS NFR BLD: 0 % (ref 0–2)
BUN SERPL-MCNC: 23 MG/DL (ref 8–23)
BUN SERPL-MCNC: 23 MG/DL (ref 8–23)
CALCIUM SERPL-MCNC: 10.1 MG/DL (ref 8.3–10.4)
CALCIUM SERPL-MCNC: 10.6 MG/DL (ref 8.3–10.4)
CHLORIDE SERPL-SCNC: 106 MMOL/L (ref 98–107)
CHLORIDE SERPL-SCNC: 107 MMOL/L (ref 98–107)
CO2 SERPL-SCNC: 25 MMOL/L (ref 21–32)
CO2 SERPL-SCNC: 26 MMOL/L (ref 21–32)
CREAT SERPL-MCNC: 1.68 MG/DL (ref 0.6–1)
CREAT SERPL-MCNC: 1.68 MG/DL (ref 0.6–1)
DIFFERENTIAL METHOD BLD: ABNORMAL
EOSINOPHIL # BLD: 0.4 K/UL (ref 0–0.8)
EOSINOPHIL NFR BLD: 4 % (ref 0.5–7.8)
ERYTHROCYTE [DISTWIDTH] IN BLOOD BY AUTOMATED COUNT: 15.8 % (ref 11.9–14.6)
GLUCOSE BLD STRIP.AUTO-MCNC: 91 MG/DL (ref 65–100)
GLUCOSE SERPL-MCNC: 86 MG/DL (ref 65–100)
GLUCOSE SERPL-MCNC: 87 MG/DL (ref 65–100)
HCT VFR BLD AUTO: 30.3 % (ref 35.8–46.3)
HGB BLD-MCNC: 10.1 G/DL (ref 11.7–15.4)
IMM GRANULOCYTES # BLD AUTO: 0 K/UL (ref 0–0.5)
IMM GRANULOCYTES NFR BLD AUTO: 0 % (ref 0–5)
LYMPHOCYTES # BLD: 2.8 K/UL (ref 0.5–4.6)
LYMPHOCYTES NFR BLD: 32 % (ref 13–44)
MCH RBC QN AUTO: 31.3 PG (ref 26.1–32.9)
MCHC RBC AUTO-ENTMCNC: 33.3 G/DL (ref 31.4–35)
MCV RBC AUTO: 93.8 FL (ref 79.6–97.8)
MONOCYTES # BLD: 0.7 K/UL (ref 0.1–1.3)
MONOCYTES NFR BLD: 8 % (ref 4–12)
NEUTS SEG # BLD: 4.8 K/UL (ref 1.7–8.2)
NEUTS SEG NFR BLD: 55 % (ref 43–78)
NRBC # BLD: 0 K/UL (ref 0–0.2)
PLATELET # BLD AUTO: 252 K/UL (ref 150–450)
PMV BLD AUTO: 11.6 FL (ref 9.4–12.3)
POTASSIUM SERPL-SCNC: 4.5 MMOL/L (ref 3.5–5.1)
POTASSIUM SERPL-SCNC: 7.3 MMOL/L (ref 3.5–5.1)
RBC # BLD AUTO: 3.23 M/UL (ref 4.05–5.2)
SODIUM SERPL-SCNC: 136 MMOL/L (ref 136–145)
SODIUM SERPL-SCNC: 140 MMOL/L (ref 136–145)
WBC # BLD AUTO: 8.7 K/UL (ref 4.3–11.1)

## 2020-09-01 PROCEDURE — 74011000250 HC RX REV CODE- 250: Performed by: PHYSICIAN ASSISTANT

## 2020-09-01 PROCEDURE — C1769 GUIDE WIRE: HCPCS

## 2020-09-01 PROCEDURE — 82962 GLUCOSE BLOOD TEST: CPT

## 2020-09-01 PROCEDURE — 36415 COLL VENOUS BLD VENIPUNCTURE: CPT

## 2020-09-01 PROCEDURE — 74011000250 HC RX REV CODE- 250: Performed by: RADIOLOGY

## 2020-09-01 PROCEDURE — 74011250636 HC RX REV CODE- 250/636: Performed by: RADIOLOGY

## 2020-09-01 PROCEDURE — 49441 PLACE DUOD/JEJ TUBE PERC: CPT

## 2020-09-01 PROCEDURE — 77030016856 HC CATH JEJU4 HALY -C

## 2020-09-01 PROCEDURE — 80048 BASIC METABOLIC PNL TOTAL CA: CPT

## 2020-09-01 PROCEDURE — C1894 INTRO/SHEATH, NON-LASER: HCPCS

## 2020-09-01 PROCEDURE — 77030021532 HC CATH ANGI DX IMPRS MRTM -B

## 2020-09-01 PROCEDURE — 74011000636 HC RX REV CODE- 636: Performed by: RADIOLOGY

## 2020-09-01 PROCEDURE — 85025 COMPLETE CBC W/AUTO DIFF WBC: CPT

## 2020-09-01 RX ORDER — LIDOCAINE HYDROCHLORIDE 20 MG/ML
15 SOLUTION OROPHARYNGEAL ONCE
Status: COMPLETED | OUTPATIENT
Start: 2020-09-01 | End: 2020-09-01

## 2020-09-01 RX ORDER — OXYCODONE HCL 5 MG/5 ML
5 SOLUTION, ORAL ORAL
Status: DISCONTINUED | OUTPATIENT
Start: 2020-09-01 | End: 2020-09-05 | Stop reason: HOSPADM

## 2020-09-01 RX ORDER — CEFAZOLIN SODIUM/WATER 2 G/20 ML
2 SYRINGE (ML) INTRAVENOUS ONCE
Status: COMPLETED | OUTPATIENT
Start: 2020-09-01 | End: 2020-09-01

## 2020-09-01 RX ORDER — LIDOCAINE HYDROCHLORIDE 20 MG/ML
1-20 INJECTION, SOLUTION INFILTRATION; PERINEURAL ONCE
Status: COMPLETED | OUTPATIENT
Start: 2020-09-01 | End: 2020-09-01

## 2020-09-01 RX ORDER — SODIUM CHLORIDE 9 MG/ML
500 INJECTION, SOLUTION INTRAVENOUS CONTINUOUS
Status: DISCONTINUED | OUTPATIENT
Start: 2020-09-01 | End: 2020-09-02 | Stop reason: HOSPADM

## 2020-09-01 RX ORDER — FENTANYL CITRATE 50 UG/ML
25-100 INJECTION, SOLUTION INTRAMUSCULAR; INTRAVENOUS
Status: DISCONTINUED | OUTPATIENT
Start: 2020-09-01 | End: 2020-09-01

## 2020-09-01 RX ORDER — MIDAZOLAM HYDROCHLORIDE 1 MG/ML
.5-2 INJECTION, SOLUTION INTRAMUSCULAR; INTRAVENOUS
Status: DISCONTINUED | OUTPATIENT
Start: 2020-09-01 | End: 2020-09-01

## 2020-09-01 RX ADMIN — IOPAMIDOL 15 ML: 612 INJECTION, SOLUTION INTRAVENOUS at 10:23

## 2020-09-01 RX ADMIN — MIDAZOLAM 1 MG: 1 INJECTION INTRAMUSCULAR; INTRAVENOUS at 09:51

## 2020-09-01 RX ADMIN — MIDAZOLAM 0.5 MG: 1 INJECTION INTRAMUSCULAR; INTRAVENOUS at 09:58

## 2020-09-01 RX ADMIN — CEFAZOLIN SODIUM 2 G: 100 INJECTION, POWDER, LYOPHILIZED, FOR SOLUTION INTRAVENOUS at 09:56

## 2020-09-01 RX ADMIN — FENTANYL CITRATE 25 MCG: 50 INJECTION, SOLUTION INTRAMUSCULAR; INTRAVENOUS at 09:58

## 2020-09-01 RX ADMIN — LIDOCAINE HYDROCHLORIDE 9 ML: 20 INJECTION, SOLUTION INFILTRATION; PERINEURAL at 10:26

## 2020-09-01 RX ADMIN — FENTANYL CITRATE 50 MCG: 50 INJECTION, SOLUTION INTRAMUSCULAR; INTRAVENOUS at 09:51

## 2020-09-01 RX ADMIN — LIDOCAINE HYDROCHLORIDE 10 ML: 20 SOLUTION ORAL; TOPICAL at 10:06

## 2020-09-01 NOTE — DISCHARGE INSTRUCTIONS
[address]  Department of Interventional Radiology  [phone] Office  [phone] Fax       FEEDING TUBE DISCHARGE INSTRUCTIONS    General Information:      Your doctor has asked us to put a feeding tube in your abdomen. The tube can be used to give you medications or supplemental nutrition. Home Care Instructions: You can resume your regular diet and medication regimen. Do not drink alcohol, drive, or make any important legal decisions in the next 24 hours. Do not lift anything heavier than a gallon of milk, or do anything strenuous for the next 24 hours. You will notice a dressing on your abdomen. Keep the site covered for 24-48 hours. Do no immerse yourself in water while the tube is in place. You will be asked to flush the feeding tube through the gastric and/or jejunal port daily after the first two days. You will flush the  port with 20ml of tap water. You must also flush the tube before and after any medication administration. ALWAYS check with your physician or pharmacist before crushing or dissolving medications. Clean around the tube with warm water or hydrogen peroxide diluted with warm water using a Q-tip. Always rinse the area with warm water after cleaning and pat dry. Call If:     You should call your Physician and/or the Radiology Nurse if you have any bleeding other than a small spot on your bandage. Call if you have any signs of infection including fever, swelling, or increased pain at the site. Call if you have any questions. Follow-Up Instructions: Please see your ordering doctor as he/she has requested. To Reach Us: If you have any questions about your procedure, please call the Interventional Radiology department at [phone]. After business hours (5pm) and weekends, call the answering service at [phone] and ask for the Radiologist on call to be paged.    Si tiene Preguntas acerca del procedimiento, por favor llame al departamento de Radiología Intervencional al [phone]. Después de horas de oficina (5 pm) y los fines de [place], llamar al Fayrene Vladislav Aguilar al [phone] y pregunte por el Radiologo de [facility]. Interventional Radiology General Nurse Discharge    After general anesthesia or intravenous sedation, for 24 hours or while taking prescription Narcotics:  · Limit your activities  · Do not drive and operate hazardous machinery  · Do not make important personal or business decisions  · Do  not drink alcoholic beverages  · If you have not urinated within 8 hours after discharge, please contact your surgeon on call. * Please give a list of your current medications to your Primary Care Provider. * Please update this list whenever your medications are discontinued, doses are     changed, or new medications (including over-the-counter products) are added. * Please carry medication information at all times in case of emergency situations. These are general instructions for a healthy lifestyle:    No smoking/ No tobacco products/ Avoid exposure to second hand smoke  Surgeon General's Warning:  Quitting smoking now greatly reduces serious risk to your health. Obesity, smoking, and sedentary lifestyle greatly increases your risk for illness  A healthy diet, regular physical exercise & weight monitoring are important for maintaining a healthy lifestyle    You may be retaining fluid if you have a history of heart failure or if you experience any of the following symptoms:  Weight gain of 3 pounds or more overnight or 5 pounds in a week, increased swelling in our hands or feet or shortness of breath while lying flat in bed. Please call your doctor as soon as you notice any of these symptoms; do not wait until your next office visit.     Recognize signs and symptoms of STROKE:  F-face looks uneven    A-arms unable to move or move unevenly    S-speech slurred or non-existent    T-time-call 911 as soon as signs and symptoms begin-DO NOT go       Back to bed or wait to see if you get better-TIME IS BRAIN.

## 2020-09-01 NOTE — PROCEDURES
Department of Interventional Radiology  [phone]        Interventional Radiology Brief Procedure Note    Patient: Ashvin Garcia MRN: [id number]  SSN: [id number]    YOB: 1945  Age: 66 y.o. Sex: female      Date of Procedure: 9/1/2020    Pre-Procedure Diagnosis: 67 yo woman w CVA, Aspiration Pneumonia, Gastroparesis, GERD presents with a dislodged G-Tube. Post-Procedure Diagnosis: SAME    Procedure(s): Transgastric, Double Lumen Gastro-Jejunostomy Feeding Tube placement. Malpositioned G-Tube removal.      Brief Description of Procedure: as above. Performed By: Bharti Roberts MD     Assistants: None    Anesthesia:Moderate Sedation    Estimated Blood Loss: Less than 10ml    Specimens:  None    Implants:   Double Lumen Gastro-Jejunostomy Feeding Tube    Findings: Delayed gastric and jejunal motility. Complications: None    Recommendations: 1 hour bedrest.  G-lumen can be used for gastric decompression, medication administration, or liquid feeding. J-lumen should be used for liquid feeding. Consider liquid Roxicodone for pain. Follow Up: Feeding tube exchange in 4-5 months.       Signed By: Bharti Roberts MD     September 1, 2020

## 2020-09-01 NOTE — PROGRESS NOTES
The indwelling feeding tube has been pulled out of the stomach, out of the peritoneal cavity, and is in the body wall. In discussion kavon Elmore NP at [facility], Mrs Christia Gilford vomits with virtually all oral intake. The patient and  note frequent belching. She has a history of GERD, Aspiration Pneunomia, CVA. Taking all of this into account, the best option is to place a double lumen, gastro-jejunostomy feeding tube thru a new skin site. This will allow for gastric decompression, gastric delivery of medications, jejunal delivery of tube feeds, minimize the risk of reflux and aspiration. After answering questions, patient and  agree with this plan.      Flor Mcfarland MD

## 2020-09-01 NOTE — H&P
Department of Interventional Radiology  [phone]    History and Physical    Patient:  Sanjay Bush MRN:  [id number]  SSN:  [id number]    YOB: 1945  Age:  66 y.o. Sex:  female      Primary Care Provider:  Maricel Murphy MD  Referring Physician:  Niya Padilla MD    Subjective:     Chief Complaint: feeding difficulties    History of the Present Illness: The patient is a 66 y.o. female who presents for replacement of her malfunctining PEG tube. Hx CVA approx 3 months ago requiring prolong hospitalization, tracheostomy (now removed and healed), and PEG. She now lives in a nursing home. CT scan demonstrates mushroom cap of PEG to be in the abdominal wall.  thinks she may still be getting medications through her PEG. Brillinta held 5 days. Past Medical History:   Diagnosis Date    Arrhythmia     recurrent SVT    Arthritis     Asthma     stress induced- well controlled    CAD (coronary artery disease)     \"leaking valves\"    Chronic pain     shoulder    Diabetes ([address]     adv -160. type 2 IDDM; does not take home sqbs- hypo at 78    GERD (gastroesophageal reflux disease)     Hypertension     well controlled with meds    Menopause     Morbid obesity ([address]     Stroke ([address]     6-2012, mini stroke    Thyroid disease     hypo    Unspecified adverse effect of anesthesia     woke up during every surgery     Past Surgical History:   Procedure Laterality Date    CARDIAC SURG PROCEDURE UNLIST  4/2010    AVNRT ablation times three    HX APPENDECTOMY      HX BREAST BIOPSY      bilateral breast bx    HX KNEE ARTHROSCOPY      left knee    HX LAP CHOLECYSTECTOMY      HX ORTHOPAEDIC      rt rotator cuff and bicep tendon repair    HX EMELY AND BSO      hysterectomy        Review of Systems:    Pertinent items are noted in the History of Present Illness. Prior to Admission medications    Medication Sig Start Date End Date Taking?  Authorizing Provider carvediloL (COREG) 12.5 mg tablet Take 1 Tab by mouth two (2) times daily (with meals). 6/23/20   Stevan Schultz, DO   insulin glargine (LANTUS) 100 unit/mL injection 40 units subcutaneous q hs 6/23/20   Fisher, Freya Cogan, DO   insulin regular (NOVOLIN R, HUMULIN R) 100 unit/mL injection INITIATE INSULIN CORRECTIVE PROTOCOL:  Very Insulin Resistant  For Blood Sugar (mg/dL) of:              Less than 150 =   0 units  150 -199 =   3 units  200 -249 =   6 units  250 -299 =   9 units  300 -349 =   12 units  350 and above =   15 units and Call Physician  Initiate Hypoglycemic protocol if blood glucose is <70 mg/dL. 6/23/20   Fisher, Freya Cogan, DO   levalbuterol (XOPENEX) 1.25 mg/3 mL nebu 3 mL by Nebulization route every four (4) hours as needed for Wheezing or Shortness of Breath. 6/23/20   Fisher, Freya Cogan, DO   pantoprazole (PROTONIX) 40 mg tablet Take 1 Tab by mouth Daily (before breakfast). 6/24/20   Fisher, Freya Cogan, DO   ticagrelor (BRILINTA) 90 mg tablet Take 1 Tab by mouth every twelve (12) hours every twelve (12) hours. 6/1/20   Richard Nesbitt MD   nitroglycerin (NITROSTAT) 0.4 mg SL tablet 1 Tab by SubLINGual route every five (5) minutes as needed for Chest Pain. Up to 3 doses. 6/1/20   Richard Nesbitt MD   atorvastatin (LIPITOR) 40 mg tablet Take 1 Tab by mouth nightly. 6/1/20   Richard Nesbitt MD   traZODone (DESYREL) 100 mg tablet Take 100 mg by mouth nightly. Provider, Historical   acetaminophen (Tylenol Extra Strength) 500 mg tablet Take 1,000 mg by mouth two (2) times daily as needed for Pain. Provider, Historical   amLODIPine (Norvasc) 10 mg tablet Take 10 mg by mouth daily. Provider, Historical   ondansetron (ZOFRAN ODT) 4 mg disintegrating tablet Take 1 Tab by mouth every eight (8) hours as needed for Nausea. 5/25/20   [employer], DO   hyoscyamine SL (LEVSIN/SL) 0.125 mg SL tablet 1 Tab by SubLINGual route every six (6) hours as needed for Cramping.  6/1/18   Staci Corona, MD   ezetimibe (Zetia) 10 mg tablet Take  by mouth. Willian Guaman MD   aspirin delayed-release 81 mg tablet Take 81 mg by mouth daily. Take day of surgery per anesthesia protocol. Provider, Historical   albuterol (PROVENTIL HFA) 90 mcg/actuation inhaler Take 2 Puffs by inhalation daily as needed. Take day of surgery per anesthesia protocol. BRING DAY OF SURGERY    Provider, Historical   Cholecalciferol, Vitamin D3, (VITAMIN D3) 1,000 unit cap Take 1 Cap by mouth daily. Stop seven days prior to surgery per anesthesia protocol. Provider, Historical   COZAAR 100 mg Tab Take 100 mg by mouth daily. Take 1/2 tablet twice daily. Other, MD Willian   sertraline (ZOLOFT) 50 mg tablet Take 200 mg by mouth two (2) times a day. Take day of surgery per anesthesia protocol. 7/20/10   Willian Guaman MD   SYNTHROID 175 mcg tablet Take 150 mcg by mouth daily. Take day of surgery per anesthesia protocol. 7/20/10   Willian Guaman MD        Allergies   Allergen Reactions    Ace Inhibitors Swelling     Only to some    Adhesive Tape Rash    Codeine Nausea and Vomiting    Other Medication Swelling     Unknown blood pressure medication per pt.   Pt also allergic to predforte eye drops, causes pain that is severe    Other Omega-3s Other (comments)     All oral glucose medications, hives and couldn't breathe well    Sulfa (Sulfonamide Antibiotics) Nausea and Vomiting       Family History   Problem Relation Age of Onset    Other Brother     Cancer Mother     Heart Disease Mother     Breast Cancer Mother 79    Heart Disease Father     Malignant Hyperthermia Neg Hx     Pseudocholinesterase Deficiency Neg Hx     Delayed Awakening Neg Hx     Post-op Nausea/Vomiting Neg Hx     Emergence Delirium Neg Hx     Post-op Cognitive Dysfunction Neg Hx      Social History     Tobacco Use    Smoking status: Former Smoker     Packs/day: 2.00     Years: 13.00     Pack years: 26.00     Last attempt to quit: 4/22/1970     Years since quittin.3    Smokeless tobacco: Never Used    Tobacco comment: quit 36 years ago   Substance Use Topics    Alcohol use: No        Objective:       Physical Examination:    Vitals:    20 0738   BP: 138/65   Pulse: 70   Resp: 16   Temp: 98 °F (36.7 °C)   SpO2: 97%       Pain Assessment                  HEART: regular rate and rhythm  LUNG: clear to auscultation bilaterally  ABDOMEN: soft, obese, nontender, PEG site clean  EXTREMITIES: warm    Laboratory:     Lab Results   Component Value Date/Time    Sodium 139 2020 07:16 AM    Sodium 138 2020 06:42 AM    Potassium 4.3 2020 07:16 AM    Potassium 4.4 2020 06:42 AM    Chloride 106 2020 07:16 AM    Chloride 106 2020 06:42 AM    CO2 23 2020 07:16 AM    CO2 22 2020 06:42 AM    Anion gap 10 2020 07:16 AM    Anion gap 10 2020 06:42 AM    Glucose 87 2020 07:16 AM    Glucose 106 (H) 2020 06:42 AM    BUN 36 (H) 2020 07:16 AM    BUN 42 (H) 2020 06:42 AM    Creatinine 1.24 (H) 2020 07:16 AM    Creatinine 1.28 (H) 2020 06:42 AM    GFR est AA 54 (L) 2020 07:16 AM    GFR est AA 52 (L) 2020 06:42 AM    GFR est non-AA 45 (L) 2020 07:16 AM    GFR est non-AA 43 (L) 2020 06:42 AM    Calcium 9.3 2020 07:16 AM    Calcium 8.8 2020 06:42 AM    Magnesium 3.1 (H) 2020 08:14 AM    Magnesium 3.2 (H) 2020 08:07 AM    Phosphorus 3.1 2020 08:14 AM    Phosphorus 2.6 2020 05:37 PM    Albumin 2.9 (L) 2020 07:16 AM    Albumin 3.0 (L) 2020 07:31 AM    Protein, total 7.5 2020 07:16 AM    Protein, total 7.8 2020 07:31 AM    Globulin 4.6 (H) 2020 07:16 AM    Globulin 4.8 (H) 2020 07:31 AM    A-G Ratio 0.6 (L) 2020 07:16 AM    A-G Ratio 0.6 (L) 2020 07:31 AM    ALT (SGPT) 18 2020 07:16 AM    ALT (SGPT) 19 2020 07:31 AM     Lab Results   Component Value Date/Time    WBC 7.6 07/13/2020 07:16 AM    WBC 10.3 07/06/2020 07:31 AM    HGB 8.4 (L) 07/13/2020 07:16 AM    HGB 8.4 (L) 07/06/2020 07:31 AM    HCT 27.3 (L) 07/13/2020 07:16 AM    HCT 27.4 (L) 07/06/2020 07:31 AM    PLATELET 932 78/96/6550 07:16 AM    PLATELET 201 88/59/8063 07:31 AM     Lab Results   Component Value Date/Time    aPTT 32.3 06/20/2020 03:05 PM    aPTT 30.1 06/18/2020 06:33 AM    Prothrombin time 14.1 06/21/2020 12:50 PM    Prothrombin time 13.8 06/18/2020 06:33 AM    INR 1.1 06/21/2020 12:50 PM    INR 1.0 06/18/2020 06:33 AM       Assessment:     CVA, PEG malpositioned    Hospital Problems  Date Reviewed: 6/22/2020    None          Plan:     Planned Procedure:  G tube placement    Risks, benefits, and alternatives reviewed with patient and she agrees to proceed with the procedure.       Signed By: Emilia Hugo PA-C     September 1, 2020

## 2020-09-01 NOTE — PROGRESS NOTES
TRANSFER - OUT REPORT:    Verbal report given to nurse [facility] adal(name) on [address]  being transferred to [facility](unit) for ordered procedure       Report consisted of patients Situation, Background, Assessment and   Recommendations(SBAR). Information from the following report(s) SBAR was reviewed with the receiving nurse. Lines:       Opportunity for questions and clarification was provided.       Patient transported with:   [employer]

## 2021-01-08 ENCOUNTER — HOSPITAL ENCOUNTER (OUTPATIENT)
Dept: INTERVENTIONAL RADIOLOGY/VASCULAR | Age: 76
Discharge: HOME | End: 2021-01-08
Attending: RADIOLOGY
Payer: MEDICARE

## 2021-01-08 VITALS
HEIGHT: 66 IN | HEART RATE: 79 BPM | DIASTOLIC BLOOD PRESSURE: 76 MMHG | TEMPERATURE: 98.9 F | SYSTOLIC BLOOD PRESSURE: 142 MMHG | WEIGHT: 210 LBS | OXYGEN SATURATION: 91 % | RESPIRATION RATE: 16 BRPM | BODY MASS INDEX: 33.75 KG/M2

## 2021-01-08 DIAGNOSIS — K21.9 GERD (GASTROESOPHAGEAL REFLUX DISEASE): ICD-10-CM

## 2021-01-08 LAB — GLUCOSE BLD STRIP.AUTO-MCNC: 168 MG/DL (ref 65–100)

## 2021-01-08 PROCEDURE — 77030003629 HC NDL PERC VASC COOK -A

## 2021-01-08 PROCEDURE — 74011000636 HC RX REV CODE- 636: Performed by: RADIOLOGY

## 2021-01-08 PROCEDURE — 2709999900 HC NON-CHARGEABLE SUPPLY

## 2021-01-08 PROCEDURE — C1769 GUIDE WIRE: HCPCS

## 2021-01-08 PROCEDURE — 77030008735 HC TU GASTMY JEJU HALY -C

## 2021-01-08 PROCEDURE — 82962 GLUCOSE BLOOD TEST: CPT

## 2021-01-08 PROCEDURE — 49452 REPLACE G-J TUBE PERC: CPT

## 2021-01-08 PROCEDURE — 74011000250 HC RX REV CODE- 250: Performed by: RADIOLOGY

## 2021-01-08 RX ORDER — MIDAZOLAM HYDROCHLORIDE 1 MG/ML
.5-2 INJECTION, SOLUTION INTRAMUSCULAR; INTRAVENOUS
Status: DISCONTINUED | OUTPATIENT
Start: 2021-01-08 | End: 2021-01-12 | Stop reason: HOSPADM

## 2021-01-08 RX ORDER — FENTANYL CITRATE 50 UG/ML
25-100 INJECTION, SOLUTION INTRAMUSCULAR; INTRAVENOUS
Status: DISCONTINUED | OUTPATIENT
Start: 2021-01-08 | End: 2021-01-12 | Stop reason: HOSPADM

## 2021-01-08 RX ORDER — LIDOCAINE HYDROCHLORIDE 20 MG/ML
15 SOLUTION OROPHARYNGEAL ONCE
Status: COMPLETED | OUTPATIENT
Start: 2021-01-08 | End: 2021-01-08

## 2021-01-08 RX ADMIN — LIDOCAINE HYDROCHLORIDE 15 ML: 20 SOLUTION ORAL; TOPICAL at 08:00

## 2021-01-08 RX ADMIN — IOPAMIDOL 16 ML: 612 INJECTION, SOLUTION INTRAVENOUS at 08:41

## 2021-01-08 NOTE — DISCHARGE INSTRUCTIONS
[address]  Department of Interventional Radiology  [phone] Office  [phone] Fax       FEEDING TUBE DISCHARGE INSTRUCTIONS    General Information:      Your doctor has asked us to put a feeding tube in your abdomen. The tube can be used to give you medications or supplemental nutrition. Home Care Instructions: You can resume your regular diet and medication regimen. Do not drink alcohol, drive, or make any important legal decisions in the next 24 hours. Do not lift anything heavier than a gallon of milk, or do anything strenuous for the next 24 hours. You will notice a dressing on your abdomen. Keep the site covered for 24-48 hours. Do no immerse yourself in water while the tube is in place. You will be asked to flush the feeding tube through the gastric and/or jejunal port daily after the first two days. You will flush the  port with 20ml of tap water. You must also flush the tube before and after any medication administration. ALWAYS check with your physician or pharmacist before crushing or dissolving medications. Clean around the tube with warm water or hydrogen peroxide diluted with warm water using a Q-tip. Always rinse the area with warm water after cleaning and pat dry. Call If:     You should call your Physician and/or the Radiology Nurse if you have any bleeding other than a small spot on your bandage. Call if you have any signs of infection including fever, swelling, or increased pain at the site. Call if you have any questions. Follow-Up Instructions: Please see your ordering doctor as he/she has requested. To Reach Us:      Interventional Radiology General Nurse Discharge    After general anesthesia or intravenous sedation, for 24 hours or while taking prescription Narcotics:  · Limit your activities  · Do not drive and operate hazardous machinery  · Do not make important personal or business decisions  · Do  not drink alcoholic beverages  · If you have not urinated within 8 hours after discharge, please contact your surgeon on call. * Please give a list of your current medications to your Primary Care Provider. * Please update this list whenever your medications are discontinued, doses are     changed, or new medications (including over-the-counter products) are added. * Please carry medication information at all times in case of emergency situations. These are general instructions for a healthy lifestyle:    No smoking/ No tobacco products/ Avoid exposure to second hand smoke  Surgeon General's Warning:  Quitting smoking now greatly reduces serious risk to your health. Obesity, smoking, and sedentary lifestyle greatly increases your risk for illness  A healthy diet, regular physical exercise & weight monitoring are important for maintaining a healthy lifestyle    You may be retaining fluid if you have a history of heart failure or if you experience any of the following symptoms:  Weight gain of 3 pounds or more overnight or 5 pounds in a week, increased swelling in our hands or feet or shortness of breath while lying flat in bed. Please call your doctor as soon as you notice any of these symptoms; do not wait until your next office visit. Recognize signs and symptoms of STROKE:  F-face looks uneven    A-arms unable to move or move unevenly    S-speech slurred or non-existent    T-time-call 911 as soon as signs and symptoms begin-DO NOT go       Back to bed or wait to see if you get better-TIME IS BRAIN. If you have any questions about your procedure, please call the Interventional Radiology department at [phone]. After business hours (5pm) and weekends, call the answering service at [phone] and ask for the Radiologist on call to be paged. Si tiene Preguntas acerca del procedimiento, por favor llame al departamento de Radiología Intervencional al [phone].  Después de horas de oficina (5 pm) y los fines de [place], llamar al Giancarlo Aguilar al [phone] y pregunte por el Radiologo de [facility].

## 2021-01-08 NOTE — PROCEDURES
Department of Interventional Radiology  [phone]        Interventional Radiology Brief Procedure Note    Patient: Mechelle oCllins MRN: [id number]  SSN: [id number]    YOB: 1945  Age: 64 y.o.   Sex: female      Date of Procedure: 1/8/2021    Pre-Procedure Diagnosis: clogged GJ tube    Post-Procedure Diagnosis: SAME    Procedure(s): feeding tube exchange    Brief Description of Procedure: as above    Performed By: Xi Larson MD     Assistants: None    Anesthesia:Lidocaine    Estimated Blood Loss: Less than 10ml    Specimens:  None    Implants:  22 F gj tube    Findings: tube clogged, exchanged and upsized    Complications: None    Recommendations: higher volume of flushed--at least 30 ml     Follow Up: 4-5 months    Signed By: Xi Larson MD     January 8, 2021

## 2023-06-28 NOTE — H&P
HOSPITALIST INITIAL HISTORY AND PHYSICAL    NAME:  Guero Siu   Age:  66 y.o.  :   1945   MRN:   [id number]  PCP: Trista Mcdonald MD  Consulting MD:  Treatment Team: Attending Provider: Rae Cobb MD; Primary Nurse: Jane Perla    CHIEF COMPLAINT: elevated creatinine, confusion    HISTORY OF PRESENT ILLNESS:   Naya Jung is a 66 y.o. female with a past medical history of CAD, DM type II, HTN who presents to the ER from [facility] with report of worsening confusion and elevated creatinine on labs. The patient is unable to contributed to history beyond denying pain or fever. REVIEW OF SYSTEMS: Comprehensive ROS performed. Denies pain or fever, otherwise negative. Past Medical History:   Diagnosis Date    Arrhythmia     recurrent SVT    Arthritis     Asthma     stress induced- well controlled    CAD (coronary artery disease)     \"leaking valves\"    Chronic pain     shoulder    Diabetes ([address]     adv -160. type 2 IDDM; does not take home sqbs- hypo at 80    GERD (gastroesophageal reflux disease)     Hypertension     well controlled with meds    Menopause     Morbid obesity ([address]     Stroke ([facility]     , mini stroke    Thyroid disease     hypo    Unspecified adverse effect of anesthesia     woke up during every surgery        Past Surgical History:   Procedure Laterality Date    CARDIAC SURG PROCEDURE UNLIST  2010    AVNRT ablation times three    HX APPENDECTOMY      HX BREAST BIOPSY      bilateral breast bx    HX KNEE ARTHROSCOPY      left knee    HX LAP CHOLECYSTECTOMY      HX ORTHOPAEDIC      rt rotator cuff and bicep tendon repair    HX EMELY AND BSO      hysterectomy       Prior to Admission Medications   Prescriptions Last Dose Informant Patient Reported? Taking? ASPIRIN/CAFFEINE (ANACIN PO)   Yes No   Sig: Take 2 Tabs by mouth two (2) times a day. Stop seven days prior to surgery per anesthesia protocol.     Indications: last dose 3-30-10   CENESTIN 0.9 mg Tab   Yes No   Sig: Take  by mouth daily. Take day of surgery per anesthesia protocol. COZAAR 100 mg Tab   Yes No   Sig: Take 100 mg by mouth daily. Take 1/2 tablet twice daily. Cholecalciferol, Vitamin D3, (VITAMIN D3) 1,000 unit cap   Yes No   Sig: Take 1 Cap by mouth daily. Stop seven days prior to surgery per anesthesia protocol. OTHER,NON-FORMULARY,   Yes No   Si Units by SubCUTAneous route once. dinner   Indications: diabetes, tresiva   PRILOSEC 20 mg capsule   Yes No   Sig: Take 20 mg by mouth daily. Take day of surgery per anesthesia protocol. SYNTHROID 175 mcg tablet   Yes No   Sig: Take 150 mcg by mouth daily. Take day of surgery per anesthesia protocol. acetaminophen (Tylenol Extra Strength) 500 mg tablet   Yes No   Sig: Take 1,000 mg by mouth two (2) times daily as needed for Pain. albuterol (PROVENTIL HFA) 90 mcg/actuation inhaler   Yes No   Sig: Take 2 Puffs by inhalation daily as needed. Take day of surgery per anesthesia protocol. BRING DAY OF SURGERY   amLODIPine (Norvasc) 10 mg tablet   Yes No   Sig: Take 10 mg by mouth daily. aspirin delayed-release 81 mg tablet   Yes No   Sig: Take 81 mg by mouth daily. Take day of surgery per anesthesia protocol. atorvastatin (LIPITOR) 40 mg tablet   No No   Sig: Take 1 Tab by mouth nightly. carvediloL (Coreg) 25 mg tablet   Yes No   Sig: Take 12.5 mg by mouth two (2) times daily (with meals). ezetimibe (ZETIA) 10 mg tablet   Yes No   Sig: Take  by mouth. furosemide (Lasix) 40 mg tablet   Yes No   Sig: Take  by mouth daily. hyoscyamine SL (LEVSIN/SL) 0.125 mg SL tablet   No No   Si Tab by SubLINGual route every six (6) hours as needed for Cramping. insulin aspart U-100 (NovoLOG Flexpen U-100 Insulin) 100 unit/mL (3 mL) inpn   Yes No   Si Units by SubCUTAneous route every evening.    insulin degludec [place] FlexTouch U-100) 100 unit/mL (3 mL) inpn   Yes No   Si Units by SubCUTAneous route nightly. insulin glargine (Lantus U-100 Insulin) 100 unit/mL injection   No No   Si Units by SubCUTAneous route nightly. multivitamin (ONE A DAY) tablet   Yes No   Sig: Take 1 Tab by mouth daily. Take day of surgery per anesthesia protocol. nitrofurantoin, macrocrystal-monohydrate, (MACROBID) 100 mg capsule   No No   Sig: Take 1 Cap by mouth two (2) times a day. nitroglycerin (NITROSTAT) 0.4 mg SL tablet   No No   Si Tab by SubLINGual route every five (5) minutes as needed for Chest Pain. Up to 3 doses. ondansetron (ZOFRAN ODT) 4 mg disintegrating tablet   No No   Sig: Take 1 Tab by mouth every eight (8) hours as needed for Nausea. sertraline (ZOLOFT) 50 mg tablet   Yes No   Sig: Take 200 mg by mouth two (2) times a day. Take day of surgery per anesthesia protocol. ticagrelor (BRILINTA) 90 mg tablet   No No   Sig: Take 1 Tab by mouth every twelve (12) hours every twelve (12) hours. traZODone (DESYREL) 100 mg tablet   Yes No   Sig: Take 100 mg by mouth nightly. Facility-Administered Medications: None       Allergies   Allergen Reactions    Ace Inhibitors Swelling     Only to some    Adhesive Tape Rash    Codeine Nausea and Vomiting    Other Medication Swelling     Unknown blood pressure medication per pt. Pt also allergic to predforte eye drops, causes pain that is severe    Other Omega-3s Other (comments)     All oral glucose medications, hives and couldn't breathe well    Sulfa (Sulfonamide Antibiotics) Nausea and Vomiting       FAMILY HISTORY: Reviewed.  Negative except   Family History   Problem Relation Age of Onset    Other Brother    [facility] Cancer Mother     Heart Disease Mother     Breast Cancer Mother 79    Heart Disease Father     Malignant Hyperthermia Neg Hx     Pseudocholinesterase Deficiency Neg Hx     Delayed Awakening Neg Hx     Post-op Nausea/Vomiting Neg Hx     Emergence Delirium Neg Hx     Post-op Cognitive Dysfunction Neg Hx        Social History Tobacco Use    Smoking status: Former Smoker     Packs/day: 2.00     Years: 13.00     Pack years: 26.00     Last attempt to quit: 1970     Years since quittin.1    Smokeless tobacco: Never Used    Tobacco comment: quit 36 years ago   Substance Use Topics    Alcohol use: No         Objective:     Visit Vitals  /62   Pulse 80   Temp 99 °F (37.2 °C)   Resp 18   Ht 5' 6\" (1.676 m)   Wt 104.8 kg (231 lb)   SpO2 95%   BMI 37.28 kg/m²      Temp (24hrs), Av °F (37.2 °C), Min:99 °F (37.2 °C), Max:99 °F (37.2 °C)    Oxygen Therapy  O2 Sat (%): 95 % (20)  Pulse via Oximetry: 80 beats per minute (20)  O2 Device: Room air (20)  Physical Exam:  General:    The patient is a pleasantly confused elderly female in no acute distress. Head:   Normocephalic/atraumatic. Eyes:  No palpebral pallor or scleral icterus. ENT:  External auricular and nasal exam within normal limits. Mucous membranes are moist.  Neck:  Supple, non-tender, no JVD. Lungs:   Clear to auscultation bilaterally without wheezes or crackles. No respiratory distress or accessory muscle use. Heart:   Regular rate and rhythm, without murmurs, rubs, or gallops. Abdomen:   Soft, non-tender, non-distended with normoactive bowel sounds. Genitourinary: No tenderness over the bladder or bilateral CVAs. Extremities: Without clubbing, cyanosis, or edema. Skin:     Normal color, texture, and turgor. No rashes, lesions, or jaundice. Pulses: Radial and dorsalis pedis pulses present 2+ bilaterally. Capillary refill <2s. Neurologic: CN II-XII grossly intact and symmetrical.     Moving all four extremities well with no focal deficits. Psychiatric: Pleasant demeanor, appropriate affect.  Somnolent but responds to voice, disoriented  Data Review:   Recent Results (from the past 24 hour(s))   METABOLIC PANEL, COMPREHENSIVE    Collection Time: 20  5:23 PM   Result Value Ref Range    Sodium 145 136 - 145 mmol/L    Potassium 4.5 3.5 - 5.1 mmol/L    Chloride 113 (H) 98 - 107 mmol/L    CO2 21 21 - 32 mmol/L    Anion gap 11 7 - 16 mmol/L    Glucose 365 (H) 65 - 100 mg/dL     (H) 8 - 23 MG/DL    Creatinine 3.36 (H) 0.6 - 1.0 MG/DL    GFR est AA 17 (L) >60 ml/min/1.73m2    GFR est non-AA 14 (L) >60 ml/min/1.73m2    Calcium 9.4 8.3 - 10.4 MG/DL    Bilirubin, total 0.3 0.2 - 1.1 MG/DL    ALT (SGPT) 23 12 - 65 U/L    AST (SGOT) 16 15 - 37 U/L    Alk.  phosphatase 93 50 - 136 U/L    Protein, total 8.3 (H) 6.3 - 8.2 g/dL    Albumin 3.3 3.2 - 4.6 g/dL    Globulin 5.0 (H) 2.3 - 3.5 g/dL    A-G Ratio 0.7 (L) 1.2 - 3.5     LACTIC ACID    Collection Time: 06/05/20  5:23 PM   Result Value Ref Range    Lactic acid 2.4 (HH) 0.4 - 2.0 MMOL/L   URINALYSIS W/ RFLX MICROSCOPIC    Collection Time: 06/05/20  5:42 PM   Result Value Ref Range    Color YELLOW      Appearance TURBID      Specific gravity 1.019 1.001 - 1.023      pH (UA) 8.0 5.0 - 9.0      Protein 100 (A) NEG mg/dL    Glucose Negative mg/dL    Ketone Negative NEG mg/dL    Bilirubin Negative NEG      Blood LARGE (A) NEG      Urobilinogen 0.2 0.2 - 1.0 EU/dL    Nitrites Negative NEG      Leukocyte Esterase LARGE (A) NEG      WBC 3-5 0 /hpf    RBC 0-3 0 /hpf    Epithelial cells 0-3 0 /hpf    Bacteria 4+ (H) 0 /hpf    Crystals, urine TRIPLE PHOSPHATE 0 /LPF    Other observations RESULTS VERIFIED MANUALLY     CBC WITH AUTOMATED DIFF    Collection Time: 06/05/20  7:02 PM   Result Value Ref Range    WBC 11.3 (H) 4.3 - 11.1 K/uL    RBC 3.07 (L) 4.05 - 5.2 M/uL    HGB 9.6 (L) 11.7 - 15.4 g/dL    HCT 29.9 (L) 35.8 - 46.3 %    MCV 97.4 79.6 - 97.8 FL    MCH 31.3 26.1 - 32.9 PG    MCHC 32.1 31.4 - 35.0 g/dL    RDW 13.8 11.9 - 14.6 %    PLATELET 691 938 - 601 K/uL    MPV 11.2 9.4 - 12.3 FL    ABSOLUTE NRBC 0.00 0.0 - 0.2 K/uL    DF AUTOMATED      NEUTROPHILS 59 43 - 78 %    LYMPHOCYTES 25 13 - 44 %    MONOCYTES 8 4.0 - 12.0 %    EOSINOPHILS 6 0.5 - 7.8 %    BASOPHILS 0 0.0 - 2.0 %    IMMATURE GRANULOCYTES 0 0.0 - 5.0 %    ABS. NEUTROPHILS 6.7 1.7 - 8.2 K/UL    ABS. LYMPHOCYTES 2.9 0.5 - 4.6 K/UL    ABS. MONOCYTES 0.9 0.1 - 1.3 K/UL    ABS. EOSINOPHILS 0.7 0.0 - 0.8 K/UL    ABS. BASOPHILS 0.0 0.0 - 0.2 K/UL    ABS. IMM. GRANS. 0.1 0.0 - 0.5 K/UL       Imaging /Procedures /Studies:  No results found. Assessment and Plan:     Principal Problem:    Acute metabolic encephalopathy (9/3/3258)    Secondary to UTI. Active Problems:    ROSA (acute kidney injury) ([facility] [address] (6/5/2020)    IVF, follow BMP. UTI (urinary tract infection) (6/5/2020)    IV Rocephin. Urine culture pending. Leukocytosis (5/26/2020)    Follow CBC      Normocytic anemia (6/5/2020)    Follow CBC        DVT Prophylaxis: Heparin      Code Status: FULL CODE      Disposition: Admit to med/surg for evaluation and treatment as per above.       Anticipated discharge: 2-3 days     Signed By: Lance Wray MD     June 5, 2020 None

## 2023-08-21 NOTE — PROGRESS NOTES
[facility] Progress Note    Follow-Up on: ROSA/CKD, hypernatremia    ROS:  confused    Exam:  Vitals:    06/12/20 2121 06/12/20 2315 06/13/20 0245 06/13/20 0719   BP:  162/67 158/68 167/68   Pulse:  95 91 87   Resp:  30 22 30   Temp:  99.3 °F (37.4 °C) 98.6 °F (37 °C) (!) 101 °F (38.3 °C)   SpO2: 91% 92% 91% 91%   Weight:       Height:             Intake/Output Summary (Last 24 hours) at 6/13/2020 0852  Last data filed at 6/13/2020 1692  Gross per 24 hour   Intake 2759 ml   Output 1900 ml   Net 859 ml       Wt Readings from Last 3 Encounters:   06/05/20 95.5 kg (210 lb 8 oz)   06/01/20 104.8 kg (231 lb 0.7 oz)   05/27/20 106.7 kg (235 lb 3.7 oz)       GEN - lethargic  CV - regular, no murmur, no rub  Lung - coarse upper airway sounds bilaterally  Abd - soft, nontender  Ext - no edema    Recent Labs     06/13/20  0729 06/11/20  0807   WBC 12.1* 12.9*   HGB 8.5* 10.4*   HCT 27.9* 33.5*    238        Recent Labs     06/13/20  0729 06/12/20  0814 06/11/20  1737 06/11/20  0807   * 153* 154* 154*   K 4.6 4.4 4.5 4.3   * 126* 127* 125*   CO2 19* 18* 18* 17*   BUN 54* 81* 92* 80*   CREA 1.72* 2.35* 2.74* 2.67*   CA 8.7 9.4 9.6 9.2   * 316* 292* 372*   MG  --  3.1*  --  3.2*   PHOS  --  3.1 2.6 2.6       Assessment / Plan:  Principal Problem:    Acute metabolic encephalopathy (5/5/9887)    Active Problems:    Leukocytosis (5/26/2020)      ROSA (acute kidney injury) ([facility] [address] (6/5/2020)      UTI (urinary tract infection) (6/5/2020)      Normocytic anemia (6/5/2020)      Hypernatremia (6/6/2020)      Acute respiratory failure with hypoxia (HCC) (6/11/2020)      1. ROSA/CKD  - Baseline Cr probably mid to upper 1's  - Suspect volume depletion   - Cr improving, likely at or near baseline today  - She still has an active IVF order although no fluids were infusing when I was in her room, will stop IVF today, she is getting free water with TF  2. Hypernatremia - continue free water  3. UTI  4. Encephalopathy Burow's Advancement Flap Text: The defect edges were debeveled with a #15 scalpel blade.  Given the location of the defect and the proximity to free margins a Burow's advancement flap was deemed most appropriate.  Using a sterile surgical marker, the appropriate advancement flap was drawn incorporating the defect and placing the expected incisions within the relaxed skin tension lines where possible.    The area thus outlined was incised deep to adipose tissue with a #15 scalpel blade.  The skin margins were undermined to an appropriate distance in all directions utilizing iris scissors and carried over to close the primary defect.

## (undated) DEVICE — TRACH TUBE HOLDER, FOAM: Brand: DEROYAL

## (undated) DEVICE — BAG,DRAINAGE,4L,A/R TOWER,LL,SLIDE TAP: Brand: MEDLINE

## (undated) DEVICE — GLOVE SURG SZ 6.5 L11.2IN THK8.6MIL LT BRN LTX FREE

## (undated) DEVICE — GAUZE,SPONGE,4"X4",16PLY,STRL,LF,10/TRAY: Brand: MEDLINE

## (undated) DEVICE — SUTURE VCRL SZ 3-0 L27IN ABSRB UD L26MM SH 1/2 CIR J416H

## (undated) DEVICE — TUBE VENT L18CM DEAD SPACE 30ML 12.5GM 15MM M/F CONN FOR

## (undated) DEVICE — SUTURE PERMAHAND SZ 3-0 L18IN NONABSORBABLE BLK SILK BRAID A184H

## (undated) DEVICE — MASTISOL ADHESIVE LIQ 2/3ML

## (undated) DEVICE — STRIP,CLOSURE,WOUND,MEDI-STRIP,1/2X4: Brand: MEDLINE

## (undated) DEVICE — SUTURE PROL SZ 2-0 L36IN NONABSORBABLE BLU SH L26MM 1/2 CIR 8523H

## (undated) DEVICE — GARMENT,MEDLINE,DVT,INT,CALF,MED, GEN2: Brand: MEDLINE

## (undated) DEVICE — INTENDED FOR TISSUE SEPARATION, AND OTHER PROCEDURES THAT REQUIRE A SHARP SURGICAL BLADE TO PUNCTURE OR CUT.: Brand: BARD-PARKER SAFETY BLADES SIZE 15, STERILE

## (undated) DEVICE — JELLY LUBRICATING 10GM PREFIL SYR LUBE

## (undated) DEVICE — SUTURE PERMAHAND SZ 2-0 L18IN NONABSORBABLE BLK L26MM PS 1588H

## (undated) DEVICE — MINOR SPLIT GENERAL: Brand: MEDLINE INDUSTRIES, INC.

## (undated) DEVICE — REM POLYHESIVE ADULT PATIENT RETURN ELECTRODE: Brand: VALLEYLAB

## (undated) DEVICE — 2000CC GUARDIAN II: Brand: GUARDIAN

## (undated) DEVICE — SOLUTION IRRIG 1000ML H2O STRL BLT

## (undated) DEVICE — INTENDED FOR TISSUE SEPARATION, AND OTHER PROCEDURES THAT REQUIRE A SHARP SURGICAL BLADE TO PUNCTURE OR CUT.: Brand: BARD-PARKER SAFETY BLADES SIZE 11, STERILE